# Patient Record
Sex: FEMALE | Race: WHITE | Employment: OTHER | URBAN - METROPOLITAN AREA
[De-identification: names, ages, dates, MRNs, and addresses within clinical notes are randomized per-mention and may not be internally consistent; named-entity substitution may affect disease eponyms.]

---

## 2017-01-05 ENCOUNTER — ANTI-COAG VISIT (OUTPATIENT)
Dept: INTERNAL MEDICINE CLINIC | Facility: CLINIC | Age: 77
End: 2017-01-05

## 2017-01-05 DIAGNOSIS — I48.20 CHRONIC ATRIAL FIBRILLATION (HCC): Primary | ICD-10-CM

## 2017-01-05 LAB — INR: 2.4 (ref 2–3)

## 2017-01-05 PROCEDURE — 85610 PROTHROMBIN TIME: CPT

## 2017-01-05 PROCEDURE — 36416 COLLJ CAPILLARY BLOOD SPEC: CPT

## 2017-02-02 ENCOUNTER — ANTI-COAG VISIT (OUTPATIENT)
Dept: INTERNAL MEDICINE CLINIC | Facility: CLINIC | Age: 77
End: 2017-02-02

## 2017-02-02 ENCOUNTER — TELEPHONE (OUTPATIENT)
Dept: CARDIOLOGY CLINIC | Facility: CLINIC | Age: 77
End: 2017-02-02

## 2017-02-02 DIAGNOSIS — I48.20 CHRONIC ATRIAL FIBRILLATION (HCC): Primary | ICD-10-CM

## 2017-02-02 LAB — INR: 3.1 (ref 2–3)

## 2017-02-02 PROCEDURE — 85610 PROTHROMBIN TIME: CPT | Performed by: INTERNAL MEDICINE

## 2017-02-02 PROCEDURE — 36416 COLLJ CAPILLARY BLOOD SPEC: CPT | Performed by: INTERNAL MEDICINE

## 2017-02-02 NOTE — TELEPHONE ENCOUNTER
Rcvd call from Pastor Burton pacemaker RN she informed me that Pacemaker transmission//Normal function, 99% ventricular pacing, native rythm AF, No VHR- reviewed with Sara and patient  Pt. needs OV with Dr. Adriana Davison in next few weeks/see at [facility]

## 2017-02-02 NOTE — TELEPHONE ENCOUNTER
Let them know that I am not in the office and that if she is having significant problems just go to the ER.

## 2017-02-02 NOTE — TELEPHONE ENCOUNTER
Pt triaged in office. Pt states that for the past week she has been experiencing SOB on exertion and at rest. States that she feels like there is a 'bubble' in her chest (points to her sternum) No c/o chest pain, numbness, tingling.  Pt is compliant with al

## 2017-02-02 NOTE — TELEPHONE ENCOUNTER
Encounter routed to Dr. Abraham Zapien office and patient contacted. Dr. Guzman Osullivan advice relayed to go to ER if symptoms get worse.  Patient states she has been getting palpitations periodically for several weeks, months now and especially noticeable when she lays

## 2017-02-02 NOTE — TELEPHONE ENCOUNTER
Called pt to see how she was doing \"I'm not feeling well\"  Pt states that she s/w pacemaker nurse as well and she recommended that pt sees Dr Rocío Hua pt several times that she needs to be evaluated in the ER for her symptoms.    Reminded pt that s

## 2017-02-02 NOTE — TELEPHONE ENCOUNTER
Pt was in office for coumadin appt. Pt c/o palpitations for the past week and slight SOB. Pt also c/o pain in middle of diaphragm. Pt has a pacemaker. [facility] Pastor Burton advised pt to go home & send her remote transmission.   Laura Alexis &

## 2017-02-02 NOTE — TELEPHONE ENCOUNTER
Per Hill Guzman, pt contacted and was scheduled to see Dr. Kian Kennedy on 02/06. Pt was again advised to go to ER based on symptoms (pt understood but also stated she prefers to see Dr. Kian Kennedy).

## 2017-02-06 ENCOUNTER — OFFICE VISIT (OUTPATIENT)
Dept: CARDIOLOGY CLINIC | Facility: CLINIC | Age: 77
End: 2017-02-06

## 2017-02-06 ENCOUNTER — NURSE ONLY (OUTPATIENT)
Dept: CARDIOLOGY CLINIC | Facility: CLINIC | Age: 77
End: 2017-02-06

## 2017-02-06 VITALS
SYSTOLIC BLOOD PRESSURE: 140 MMHG | HEART RATE: 72 BPM | BODY MASS INDEX: 36.21 KG/M2 | WEIGHT: 220 LBS | DIASTOLIC BLOOD PRESSURE: 80 MMHG | RESPIRATION RATE: 18 BRPM | HEIGHT: 65.5 IN

## 2017-02-06 DIAGNOSIS — I48.20 CHRONIC ATRIAL FIBRILLATION (HCC): Primary | ICD-10-CM

## 2017-02-06 PROCEDURE — 99214 OFFICE O/P EST MOD 30 MIN: CPT | Performed by: INTERNAL MEDICINE

## 2017-02-06 PROCEDURE — G0463 HOSPITAL OUTPT CLINIC VISIT: HCPCS | Performed by: INTERNAL MEDICINE

## 2017-02-06 RX ORDER — NYSTATIN 100000 U/G
CREAM TOPICAL
Qty: 30 G | Refills: 3 | Status: SHIPPED | OUTPATIENT
Start: 2017-02-06 | End: 2017-09-11

## 2017-02-06 RX ORDER — GARLIC EXTRACT 500 MG
1 CAPSULE ORAL DAILY
COMMUNITY
End: 2018-07-25

## 2017-02-06 RX ORDER — AMOXICILLIN 250 MG
CAPSULE ORAL
COMMUNITY
End: 2018-07-25

## 2017-02-06 NOTE — PROGRESS NOTES
Marcia Ellington is a 64year old female.     HPI:   Patient presents with:  Atrial Fibrillation  Hypertension  Hyperlipidemia  Palpitations  Dyspnea: dyspnea accompanied by mid epigastric pain/ swelling, on exertion  Pacemaker Reprogramming      Patient is h Medications    Current outpatient prescriptions:   •  GLIPIZIDE OR, Take by mouth., Disp: , Rfl:   •  Acidophilus/Pectin Oral Cap, Take 1 capsule by mouth daily. , Disp: , Rfl:   •  Multiple Vitamins-Minerals (BIOTIN PLUS/CALCIUM/VIT D3) Oral Tab, Take by m Cardiovascular: Negative for palpitations. Gastrointestinal: Positive for abdominal pain.          PHYSICAL EXAM:   /80 mmHg  Pulse 72  Resp 18  Ht 5' 5.5\" (1.664 m)  Wt 220 lb (99.791 kg)  BMI 36.04 kg/m2  Physical Exam   Constitutional: She is

## 2017-02-21 ENCOUNTER — OFFICE VISIT (OUTPATIENT)
Dept: PODIATRY CLINIC | Facility: CLINIC | Age: 77
End: 2017-02-21

## 2017-02-21 DIAGNOSIS — M79.674 PAIN OF TOE OF RIGHT FOOT: Primary | ICD-10-CM

## 2017-02-21 DIAGNOSIS — B35.1 ONYCHOMYCOSIS: ICD-10-CM

## 2017-02-21 DIAGNOSIS — E11.42 TYPE 2 DIABETES MELLITUS WITH DIABETIC POLYNEUROPATHY, WITHOUT LONG-TERM CURRENT USE OF INSULIN (HCC): ICD-10-CM

## 2017-02-21 PROCEDURE — 99203 OFFICE O/P NEW LOW 30 MIN: CPT | Performed by: PODIATRIST

## 2017-02-21 PROCEDURE — 11720 DEBRIDE NAIL 1-5: CPT | Performed by: PODIATRIST

## 2017-02-21 NOTE — PROGRESS NOTES
HPI:    Patient ID: Shruthi Edmond is a 64year old female. HPI  Presents as a new patient to me and states that she is self-referred.   She states that she is here for me to check her feet as a diabetic but is having pain associated with the right great OneTouch UltraSoft Lancets Does not apply Misc Test 2-3 times per day Disp:  Rfl:    Albuterol Sulfate (VENTOLIN) (2.5 MG/3ML) 0.083% Inhalation Nebu Soln  Disp:  Rfl: 0     Allergies:  Benzocaine                  Comment:Other reaction(s): BENZOCAINE  C types were placed in this encounter.        Meds This Visit:  No prescriptions requested or ordered in this encounter    Imaging & Referrals:  None       VV#[id number]

## 2017-02-27 ENCOUNTER — TELEPHONE (OUTPATIENT)
Dept: NEPHROLOGY | Facility: CLINIC | Age: 77
End: 2017-02-27

## 2017-02-27 NOTE — TELEPHONE ENCOUNTER
Form dropped off form Diabetic shoes.  Pt saw Dr Hieu Menjivar and his office will be sending a report of that appt

## 2017-02-28 NOTE — TELEPHONE ENCOUNTER
Form completed by Dr. Avelina Yoon, office notes and order faxed to [address] [place] [address].

## 2017-03-02 ENCOUNTER — ANTI-COAG VISIT (OUTPATIENT)
Dept: INTERNAL MEDICINE CLINIC | Facility: CLINIC | Age: 77
End: 2017-03-02

## 2017-03-02 DIAGNOSIS — I48.20 CHRONIC ATRIAL FIBRILLATION (HCC): Primary | ICD-10-CM

## 2017-03-02 LAB — INR: 3.4 (ref 2–3)

## 2017-03-02 PROCEDURE — 36416 COLLJ CAPILLARY BLOOD SPEC: CPT

## 2017-03-02 PROCEDURE — G0463 HOSPITAL OUTPT CLINIC VISIT: HCPCS

## 2017-03-02 PROCEDURE — 85610 PROTHROMBIN TIME: CPT

## 2017-03-15 ENCOUNTER — ANTI-COAG VISIT (OUTPATIENT)
Dept: INTERNAL MEDICINE CLINIC | Facility: CLINIC | Age: 77
End: 2017-03-15

## 2017-03-15 DIAGNOSIS — I48.20 CHRONIC ATRIAL FIBRILLATION (HCC): Primary | ICD-10-CM

## 2017-03-15 LAB — INR: 2.7 (ref 2–3)

## 2017-03-15 PROCEDURE — 36416 COLLJ CAPILLARY BLOOD SPEC: CPT

## 2017-03-15 PROCEDURE — 85610 PROTHROMBIN TIME: CPT

## 2017-03-17 ENCOUNTER — TELEPHONE (OUTPATIENT)
Dept: CARDIOLOGY CLINIC | Facility: CLINIC | Age: 77
End: 2017-03-17

## 2017-03-17 DIAGNOSIS — E78.00 PURE HYPERCHOLESTEROLEMIA: Primary | ICD-10-CM

## 2017-03-17 DIAGNOSIS — E11.9 DIABETES MELLITUS WITHOUT COMPLICATION (HCC): ICD-10-CM

## 2017-03-17 RX ORDER — GLIPIZIDE 10 MG/1
TABLET, FILM COATED, EXTENDED RELEASE ORAL
Qty: 90 TABLET | Refills: 0 | Status: SHIPPED | OUTPATIENT
Start: 2017-03-17 | End: 2017-06-14

## 2017-03-17 RX ORDER — GLIPIZIDE 10 MG/1
TABLET, FILM COATED, EXTENDED RELEASE ORAL
Qty: 30 TABLET | Refills: 0 | Status: SHIPPED | OUTPATIENT
Start: 2017-03-17 | End: 2017-03-17

## 2017-03-20 NOTE — TELEPHONE ENCOUNTER
Patient contacted. Advised to do lab work ordered and appointment booked for Monday 3/27/17 at 2:40PM. Instructed to fast 12 hours for lab work.

## 2017-03-23 ENCOUNTER — LAB ENCOUNTER (OUTPATIENT)
Dept: LAB | Age: 77
End: 2017-03-23
Attending: INTERNAL MEDICINE
Payer: MEDICARE

## 2017-03-23 DIAGNOSIS — E78.00 PURE HYPERCHOLESTEROLEMIA: ICD-10-CM

## 2017-03-23 DIAGNOSIS — E11.9 DIABETES MELLITUS WITHOUT COMPLICATION (HCC): ICD-10-CM

## 2017-03-23 LAB
ALBUMIN SERPL BCP-MCNC: 3.8 G/DL (ref 3.5–4.8)
ALBUMIN/GLOB SERPL: 1 {RATIO} (ref 1–2)
ALP SERPL-CCNC: 39 U/L (ref 32–100)
ALT SERPL-CCNC: 29 U/L (ref 14–54)
ANION GAP SERPL CALC-SCNC: 7 MMOL/L (ref 0–18)
AST SERPL-CCNC: 36 U/L (ref 15–41)
BASOPHILS # BLD: 0.1 K/UL (ref 0–0.2)
BASOPHILS NFR BLD: 1 %
BILIRUB SERPL-MCNC: 0.9 MG/DL (ref 0.3–1.2)
BILIRUB UR QL: NEGATIVE
BUN SERPL-MCNC: 15 MG/DL (ref 8–20)
BUN/CREAT SERPL: 18.3 (ref 10–20)
CALCIUM SERPL-MCNC: 9.8 MG/DL (ref 8.5–10.5)
CHLORIDE SERPL-SCNC: 101 MMOL/L (ref 95–110)
CHOLEST SERPL-MCNC: 178 MG/DL (ref 110–200)
CO2 SERPL-SCNC: 31 MMOL/L (ref 22–32)
COLOR UR: YELLOW
CREAT SERPL-MCNC: 0.82 MG/DL (ref 0.5–1.5)
EOSINOPHIL # BLD: 0.2 K/UL (ref 0–0.7)
EOSINOPHIL NFR BLD: 3 %
ERYTHROCYTE [DISTWIDTH] IN BLOOD BY AUTOMATED COUNT: 13.8 % (ref 11–15)
GLOBULIN PLAS-MCNC: 3.8 G/DL (ref 2.5–3.7)
GLUCOSE SERPL-MCNC: 97 MG/DL (ref 70–99)
GLUCOSE UR-MCNC: NEGATIVE MG/DL
HBA1C MFR BLD: 5.8 % (ref 4–6)
HCT VFR BLD AUTO: 38.7 % (ref 35–48)
HDLC SERPL-MCNC: 66 MG/DL
HGB BLD-MCNC: 12.7 G/DL (ref 12–16)
HYALINE CASTS #/AREA URNS AUTO: 1 /LPF
KETONES UR-MCNC: NEGATIVE MG/DL
LDLC SERPL CALC-MCNC: 95 MG/DL (ref 0–99)
LYMPHOCYTES # BLD: 1.4 K/UL (ref 1–4)
LYMPHOCYTES NFR BLD: 28 %
MCH RBC QN AUTO: 29.6 PG (ref 27–32)
MCHC RBC AUTO-ENTMCNC: 32.9 G/DL (ref 32–37)
MCV RBC AUTO: 89.9 FL (ref 80–100)
MONOCYTES # BLD: 0.6 K/UL (ref 0–1)
MONOCYTES NFR BLD: 12 %
NEUTROPHILS # BLD AUTO: 2.9 K/UL (ref 1.8–7.7)
NEUTROPHILS NFR BLD: 56 %
NITRITE UR QL STRIP.AUTO: NEGATIVE
NONHDLC SERPL-MCNC: 112 MG/DL
OSMOLALITY UR CALC.SUM OF ELEC: 289 MOSM/KG (ref 275–295)
PH UR: 7 [PH] (ref 5–8)
PLATELET # BLD AUTO: 199 K/UL (ref 140–400)
PMV BLD AUTO: 7.3 FL (ref 7.4–10.3)
POTASSIUM SERPL-SCNC: 4 MMOL/L (ref 3.3–5.1)
PROT SERPL-MCNC: 7.6 G/DL (ref 5.9–8.4)
PROT UR-MCNC: NEGATIVE MG/DL
RBC # BLD AUTO: 4.31 M/UL (ref 3.7–5.4)
RBC #/AREA URNS AUTO: 1 /HPF
SODIUM SERPL-SCNC: 139 MMOL/L (ref 136–144)
SP GR UR STRIP: 1.02 (ref 1–1.03)
TRIGL SERPL-MCNC: 87 MG/DL (ref 1–149)
UROBILINOGEN UR STRIP-ACNC: <2
VIT C UR-MCNC: NEGATIVE MG/DL
WBC # BLD AUTO: 5.2 K/UL (ref 4–11)
WBC #/AREA URNS AUTO: 5 /HPF

## 2017-03-23 PROCEDURE — 83036 HEMOGLOBIN GLYCOSYLATED A1C: CPT

## 2017-03-23 PROCEDURE — 80061 LIPID PANEL: CPT

## 2017-03-23 PROCEDURE — 36415 COLL VENOUS BLD VENIPUNCTURE: CPT

## 2017-03-23 PROCEDURE — 85025 COMPLETE CBC W/AUTO DIFF WBC: CPT

## 2017-03-23 PROCEDURE — 81001 URINALYSIS AUTO W/SCOPE: CPT

## 2017-03-23 PROCEDURE — 80053 COMPREHEN METABOLIC PANEL: CPT

## 2017-03-27 ENCOUNTER — OFFICE VISIT (OUTPATIENT)
Dept: NEPHROLOGY | Facility: CLINIC | Age: 77
End: 2017-03-27

## 2017-03-27 VITALS
DIASTOLIC BLOOD PRESSURE: 73 MMHG | HEIGHT: 64 IN | BODY MASS INDEX: 38.07 KG/M2 | HEART RATE: 65 BPM | WEIGHT: 223 LBS | SYSTOLIC BLOOD PRESSURE: 126 MMHG

## 2017-03-27 DIAGNOSIS — E11.9 TYPE 2 DIABETES MELLITUS WITHOUT COMPLICATION, WITHOUT LONG-TERM CURRENT USE OF INSULIN (HCC): Primary | ICD-10-CM

## 2017-03-27 DIAGNOSIS — M25.561 ARTHRALGIA OF RIGHT KNEE: ICD-10-CM

## 2017-03-27 DIAGNOSIS — I10 ESSENTIAL HYPERTENSION: ICD-10-CM

## 2017-03-27 PROCEDURE — G0463 HOSPITAL OUTPT CLINIC VISIT: HCPCS | Performed by: INTERNAL MEDICINE

## 2017-03-27 PROCEDURE — 99214 OFFICE O/P EST MOD 30 MIN: CPT | Performed by: INTERNAL MEDICINE

## 2017-03-27 RX ORDER — TRAMADOL HYDROCHLORIDE 50 MG/1
TABLET ORAL
Qty: 30 TABLET | Refills: 1 | Status: SHIPPED | OUTPATIENT
Start: 2017-03-27 | End: 2017-10-06

## 2017-03-29 ENCOUNTER — HOSPITAL ENCOUNTER (OUTPATIENT)
Dept: GENERAL RADIOLOGY | Facility: HOSPITAL | Age: 77
Discharge: HOME | End: 2017-03-29
Attending: INTERNAL MEDICINE
Payer: MEDICARE

## 2017-03-29 DIAGNOSIS — M25.561 ARTHRALGIA OF RIGHT KNEE: ICD-10-CM

## 2017-03-29 PROCEDURE — 73562 X-RAY EXAM OF KNEE 3: CPT

## 2017-03-31 NOTE — PROGRESS NOTES
HPI:    Patient ID: Cierra Melo is a 64year old female.     HPI Comments: 26-year-old female with a history of adult onset diabetes mellitus, hypertension, villous adenoma, DJD, diabetic peripheral neuropathy, status post cholecystectomy, status post TA TABLET THE OTHER 4 DAYS AS DIRECTED Disp: 90 tablet Rfl: 1   glycerin, laxative, 1.2 G Rectal Suppos Place 1 suppository rectally daily as needed.  Disp:  Rfl:    OneTouch UltraSoft Lancets Does not apply Misc Test 2-3 times per day Disp:  Rfl:    NYSTATIN from March 23, 2017. There are actually quite good and diabetes was under good control with an A1c of 5.8. Lipids were good. Therefore continue current medications. Is having increasing orthopedic issues.   We will x-ray her right knee and then advised

## 2017-04-01 ENCOUNTER — TELEPHONE (OUTPATIENT)
Dept: NEPHROLOGY | Facility: CLINIC | Age: 77
End: 2017-04-01

## 2017-04-03 NOTE — TELEPHONE ENCOUNTER
Spoke to pt's  and relayed MKK's message below re: XR results (per okay in confidential communications). He will let her know to see ortho.

## 2017-04-13 ENCOUNTER — ANTI-COAG VISIT (OUTPATIENT)
Dept: INTERNAL MEDICINE CLINIC | Facility: CLINIC | Age: 77
End: 2017-04-13

## 2017-04-13 DIAGNOSIS — I48.20 CHRONIC ATRIAL FIBRILLATION (HCC): Primary | ICD-10-CM

## 2017-04-13 PROCEDURE — 36416 COLLJ CAPILLARY BLOOD SPEC: CPT | Performed by: INTERNAL MEDICINE

## 2017-04-13 PROCEDURE — 85610 PROTHROMBIN TIME: CPT | Performed by: INTERNAL MEDICINE

## 2017-05-11 ENCOUNTER — ANTI-COAG VISIT (OUTPATIENT)
Dept: INTERNAL MEDICINE CLINIC | Facility: CLINIC | Age: 77
End: 2017-05-11

## 2017-05-11 DIAGNOSIS — I48.20 CHRONIC ATRIAL FIBRILLATION (HCC): Primary | ICD-10-CM

## 2017-05-11 PROCEDURE — 85610 PROTHROMBIN TIME: CPT

## 2017-05-11 PROCEDURE — 36416 COLLJ CAPILLARY BLOOD SPEC: CPT

## 2017-06-05 RX ORDER — WARFARIN SODIUM 5 MG/1
TABLET ORAL
Qty: 64 TABLET | Refills: 0 | Status: SHIPPED | OUTPATIENT
Start: 2017-06-05 | End: 2018-02-19

## 2017-06-05 RX ORDER — METFORMIN HYDROCHLORIDE 500 MG/1
TABLET, EXTENDED RELEASE ORAL
Qty: 180 TABLET | Refills: 0 | Status: SHIPPED | OUTPATIENT
Start: 2017-06-05 | End: 2017-09-11

## 2017-06-08 ENCOUNTER — ANTI-COAG VISIT (OUTPATIENT)
Dept: INTERNAL MEDICINE CLINIC | Facility: CLINIC | Age: 77
End: 2017-06-08

## 2017-06-08 DIAGNOSIS — I48.20 CHRONIC ATRIAL FIBRILLATION (HCC): Primary | ICD-10-CM

## 2017-06-08 PROCEDURE — 85610 PROTHROMBIN TIME: CPT

## 2017-06-08 PROCEDURE — 36416 COLLJ CAPILLARY BLOOD SPEC: CPT

## 2017-06-12 ENCOUNTER — OFFICE VISIT (OUTPATIENT)
Dept: ORTHOPEDICS CLINIC | Facility: CLINIC | Age: 77
End: 2017-06-12

## 2017-06-12 ENCOUNTER — HOSPITAL ENCOUNTER (OUTPATIENT)
Dept: GENERAL RADIOLOGY | Facility: HOSPITAL | Age: 77
Discharge: HOME | End: 2017-06-12
Attending: ORTHOPAEDIC SURGERY | Admitting: ORTHOPAEDIC SURGERY
Payer: MEDICARE

## 2017-06-12 VITALS — SYSTOLIC BLOOD PRESSURE: 140 MMHG | HEART RATE: 60 BPM | DIASTOLIC BLOOD PRESSURE: 90 MMHG | RESPIRATION RATE: 14 BRPM

## 2017-06-12 DIAGNOSIS — M25.562 LEFT KNEE PAIN, UNSPECIFIED CHRONICITY: Primary | ICD-10-CM

## 2017-06-12 DIAGNOSIS — M25.562 LEFT KNEE PAIN, UNSPECIFIED CHRONICITY: ICD-10-CM

## 2017-06-12 PROCEDURE — 73564 X-RAY EXAM KNEE 4 OR MORE: CPT | Performed by: ORTHOPAEDIC SURGERY

## 2017-06-12 PROCEDURE — G0463 HOSPITAL OUTPT CLINIC VISIT: HCPCS | Performed by: ORTHOPAEDIC SURGERY

## 2017-06-12 PROCEDURE — 99203 OFFICE O/P NEW LOW 30 MIN: CPT | Performed by: ORTHOPAEDIC SURGERY

## 2017-06-12 NOTE — PROGRESS NOTES
Per verbal order from VT, draw up 2 syringes, each with 3ml of Kenalog 10 and 3ml of 1% lidocaine for cortisone injection to bilateral knee.  Panda Cardenas RN

## 2017-06-12 NOTE — H&P
Chief Complaint: Bilateral knee pain    NURSING INTAKE COMMENTS: Patient presents with:  Knee Pain: Bilateral- pt states left knee pain started 4 yrs ago & right knee started 2 yrs ago.  pt had right knee XR. pt states right knee feels like it twists when s negatives noted in the the HPI. Physical Examination:  There is no weight on file to calculate BMI. This 64year old female is A&O in no acute distress.   KNEE EXAM: LEFT  RIGHT   Range of Motion 0-150 Degrees 0-150 Degrees   Effusion None None   Swell

## 2017-06-13 RX ORDER — HYDROCHLOROTHIAZIDE 25 MG/1
TABLET ORAL
Qty: 90 TABLET | Refills: 0 | Status: SHIPPED | OUTPATIENT
Start: 2017-06-13 | End: 2017-09-11

## 2017-06-13 RX ORDER — CLONIDINE HYDROCHLORIDE 0.2 MG/1
TABLET ORAL
Qty: 180 TABLET | Refills: 0 | Status: SHIPPED | OUTPATIENT
Start: 2017-06-13 | End: 2017-09-11

## 2017-06-13 RX ORDER — VALSARTAN 320 MG/1
TABLET ORAL
Qty: 90 TABLET | Refills: 0 | Status: SHIPPED | OUTPATIENT
Start: 2017-06-13 | End: 2017-09-11

## 2017-06-14 RX ORDER — GLIPIZIDE 10 MG/1
TABLET, FILM COATED, EXTENDED RELEASE ORAL
Qty: 90 TABLET | Refills: 0 | Status: SHIPPED | OUTPATIENT
Start: 2017-06-14 | End: 2017-09-11

## 2017-06-16 ENCOUNTER — MYAURORA ACCOUNT LINK (OUTPATIENT)
Dept: OTHER | Age: 77
End: 2017-06-16

## 2017-06-20 NOTE — TELEPHONE ENCOUNTER
Current Outpatient Prescriptions:  ONETOUCH ULTRA BLUE In Vitro Strip TEST EVERY DAY Disp: 100 strip Rfl: 1     Refill

## 2017-06-21 ENCOUNTER — TELEPHONE (OUTPATIENT)
Dept: NEPHROLOGY | Facility: CLINIC | Age: 77
End: 2017-06-21

## 2017-06-21 RX ORDER — LANCING DEVICE/LANCETS
KIT MISCELLANEOUS
Qty: 1 KIT | Refills: 0 | Status: SHIPPED | OUTPATIENT
Start: 2017-06-21 | End: 2017-09-19

## 2017-06-21 RX ORDER — LANCETS 30 GAUGE
EACH MISCELLANEOUS
Qty: 100 EACH | Refills: 5 | Status: SHIPPED | OUTPATIENT
Start: 2017-06-21

## 2017-06-21 NOTE — TELEPHONE ENCOUNTER
Pts  states that pharmacy told pt that One Touch is not covered by Medicare any longer. Pt will need RX for True Metrix or Accu chek would be covered. Pt will also need new meter. Please call.         Current outpatient prescriptions:   •  Glucose

## 2017-06-21 NOTE — TELEPHONE ENCOUNTER
Current Outpatient Prescriptions:  OneTouch UltraSoft Lancets Does not apply Misc Test 2-3 times per day Disp:  Rfl:      Per pharmacy pt needs new RX for Accu Check softclix lancets

## 2017-06-22 ENCOUNTER — TELEPHONE (OUTPATIENT)
Dept: CARDIOLOGY CLINIC | Facility: CLINIC | Age: 77
End: 2017-06-22

## 2017-06-22 NOTE — TELEPHONE ENCOUNTER
S/w pt- states once every 2 to 3 week, her SBP goes up to 180's in the middle of night. And it already happened about 5 times. She denies any symptoms like CP, SOB, headache when BP goes, but it makes her anxious and unable to fall back to sleep.  She is cu

## 2017-07-06 ENCOUNTER — ANTI-COAG VISIT (OUTPATIENT)
Dept: INTERNAL MEDICINE CLINIC | Facility: CLINIC | Age: 77
End: 2017-07-06

## 2017-07-06 DIAGNOSIS — I48.20 CHRONIC ATRIAL FIBRILLATION (HCC): ICD-10-CM

## 2017-07-06 LAB — INR: 3.5 (ref 2–3)

## 2017-07-06 PROCEDURE — G0463 HOSPITAL OUTPT CLINIC VISIT: HCPCS

## 2017-07-06 PROCEDURE — 85610 PROTHROMBIN TIME: CPT

## 2017-07-06 PROCEDURE — 36416 COLLJ CAPILLARY BLOOD SPEC: CPT

## 2017-08-03 ENCOUNTER — ANTI-COAG VISIT (OUTPATIENT)
Dept: INTERNAL MEDICINE CLINIC | Facility: CLINIC | Age: 77
End: 2017-08-03

## 2017-08-03 DIAGNOSIS — I48.20 CHRONIC ATRIAL FIBRILLATION (HCC): ICD-10-CM

## 2017-08-03 LAB — INR: 2 (ref 2–3)

## 2017-08-03 PROCEDURE — 85610 PROTHROMBIN TIME: CPT

## 2017-08-03 PROCEDURE — 36416 COLLJ CAPILLARY BLOOD SPEC: CPT

## 2017-08-16 ENCOUNTER — OFFICE VISIT (OUTPATIENT)
Dept: PODIATRY CLINIC | Facility: CLINIC | Age: 77
End: 2017-08-16

## 2017-08-16 DIAGNOSIS — B35.1 ONYCHOMYCOSIS: ICD-10-CM

## 2017-08-16 DIAGNOSIS — M79.674 PAIN OF TOE OF RIGHT FOOT: ICD-10-CM

## 2017-08-16 DIAGNOSIS — E11.42 TYPE 2 DIABETES MELLITUS WITH PERIPHERAL NEUROPATHY (HCC): Primary | ICD-10-CM

## 2017-08-16 PROCEDURE — 99212 OFFICE O/P EST SF 10 MIN: CPT | Performed by: PODIATRIST

## 2017-08-16 PROCEDURE — 11721 DEBRIDE NAIL 6 OR MORE: CPT | Performed by: PODIATRIST

## 2017-08-16 NOTE — PROGRESS NOTES
HPI:    Patient ID: Arminda Piper is a 64year old female. HPI  This 64-year-old diabetic presents for evaluation and care associated with her painful toenails.   She also would like to discuss diabetic shoes because the present shoe gear is not adequat Disp: 30 g Rfl: 3   Acidophilus/Pectin Oral Cap Take 1 capsule by mouth daily. Disp:  Rfl:    Multiple Vitamins-Minerals (BIOTIN PLUS/CALCIUM/VIT D3) Oral Tab Take by mouth. Disp:  Rfl:    FOLIC ACID OR Take by mouth.  Disp:  Rfl:    WARFARIN SODIUM 5 MG Or of the defined types were placed in this encounter.       Meds This Visit:  No prescriptions requested or ordered in this encounter    Imaging & Referrals:  DME - EXTERNAL        #[phone]

## 2017-08-21 ENCOUNTER — OFFICE VISIT (OUTPATIENT)
Dept: OTOLARYNGOLOGY | Facility: CLINIC | Age: 77
End: 2017-08-21

## 2017-08-21 VITALS
TEMPERATURE: 97 F | BODY MASS INDEX: 36.93 KG/M2 | SYSTOLIC BLOOD PRESSURE: 148 MMHG | WEIGHT: 219 LBS | HEIGHT: 64.5 IN | DIASTOLIC BLOOD PRESSURE: 75 MMHG

## 2017-08-21 DIAGNOSIS — J32.9 CHRONIC SINUSITIS, UNSPECIFIED LOCATION: ICD-10-CM

## 2017-08-21 DIAGNOSIS — R42 VERTIGO: Primary | ICD-10-CM

## 2017-08-21 PROCEDURE — 99203 OFFICE O/P NEW LOW 30 MIN: CPT | Performed by: OTOLARYNGOLOGY

## 2017-08-21 PROCEDURE — G0463 HOSPITAL OUTPT CLINIC VISIT: HCPCS | Performed by: OTOLARYNGOLOGY

## 2017-08-21 RX ORDER — FLUTICASONE PROPIONATE 50 MCG
2 SPRAY, SUSPENSION (ML) NASAL DAILY
Qty: 1 BOTTLE | Refills: 3 | Status: SHIPPED | OUTPATIENT
Start: 2017-08-21 | End: 2017-08-21

## 2017-08-21 NOTE — PROGRESS NOTES
Prateek Harris is a 64year old female. Patient presents with:  Dizziness: off/on forever, water in left ear,     HPI:   She feels that her ears are blocked and that she has water in her ears.  She's been experiencing dizziness for many years which she desc DRY SKIN Disp: 30 g Rfl: 3   Acidophilus/Pectin Oral Cap Take 1 capsule by mouth daily. Disp:  Rfl:    Multiple Vitamins-Minerals (BIOTIN PLUS/CALCIUM/VIT D3) Oral Tab Take by mouth. Disp:  Rfl:    FOLIC ACID OR Take by mouth.  Disp:  Rfl:    WARFARIN SODIU - Normal.   Oral/Oropharynx Normal Lips - Normal, Tonsils - Normal, Tongue - Normal    Nasal Normal External nose - Normal. Nasal septum - nasal septal deviation with congestion   Neurological Normal Memory - Normal. Cranial nerves - Cranial nerves II thro

## 2017-08-24 ENCOUNTER — TELEPHONE (OUTPATIENT)
Dept: NEPHROLOGY | Facility: CLINIC | Age: 77
End: 2017-08-24

## 2017-08-24 RX ORDER — WARFARIN SODIUM 5 MG/1
TABLET ORAL
Qty: 90 TABLET | Refills: 0 | Status: SHIPPED | OUTPATIENT
Start: 2017-08-24 | End: 2017-12-10

## 2017-08-24 RX ORDER — FLUTICASONE PROPIONATE 50 MCG
SPRAY, SUSPENSION (ML) NASAL
Qty: 3 BOTTLE | Refills: 1 | Status: SHIPPED | OUTPATIENT
Start: 2017-08-24 | End: 2018-07-25

## 2017-08-24 NOTE — TELEPHONE ENCOUNTER
Current Outpatient Prescriptions:                                                                                                  WARFARIN SODIUM 5 MG Oral Tab TAKE 1 TABLET BY MOUTH 3 DAYS OUT OF THE WEEK AND 1/2 TABLET THE OTHER 4 DAYS AS DIRECTED Dis

## 2017-08-24 NOTE — TELEPHONE ENCOUNTER
Last Coumadin Clinic appointment was on 8/3/17. Prescription refilled per written protocol for 1 time #90 as patient requested.

## 2017-08-31 ENCOUNTER — ANTI-COAG VISIT (OUTPATIENT)
Dept: INTERNAL MEDICINE CLINIC | Facility: CLINIC | Age: 77
End: 2017-08-31

## 2017-08-31 DIAGNOSIS — I48.20 CHRONIC ATRIAL FIBRILLATION (HCC): ICD-10-CM

## 2017-08-31 LAB — INR: 2.4 (ref 2–3)

## 2017-08-31 PROCEDURE — 85610 PROTHROMBIN TIME: CPT

## 2017-08-31 PROCEDURE — 36416 COLLJ CAPILLARY BLOOD SPEC: CPT

## 2017-09-11 DIAGNOSIS — E11.9 DIABETES MELLITUS WITHOUT COMPLICATION (HCC): Primary | ICD-10-CM

## 2017-09-11 DIAGNOSIS — E78.00 PURE HYPERCHOLESTEROLEMIA: ICD-10-CM

## 2017-09-11 RX ORDER — GLIPIZIDE 10 MG/1
TABLET, FILM COATED, EXTENDED RELEASE ORAL
Qty: 90 TABLET | Refills: 0 | Status: SHIPPED | OUTPATIENT
Start: 2017-09-11 | End: 2017-12-06

## 2017-09-11 RX ORDER — HYDROCHLOROTHIAZIDE 25 MG/1
TABLET ORAL
Qty: 90 TABLET | Refills: 0 | Status: SHIPPED | OUTPATIENT
Start: 2017-09-11 | End: 2017-12-06

## 2017-09-11 RX ORDER — VALSARTAN 320 MG/1
TABLET ORAL
Qty: 90 TABLET | Refills: 0 | Status: SHIPPED | OUTPATIENT
Start: 2017-09-11 | End: 2017-12-06

## 2017-09-11 RX ORDER — METFORMIN HYDROCHLORIDE 500 MG/1
TABLET, EXTENDED RELEASE ORAL
Qty: 180 TABLET | Refills: 0 | Status: SHIPPED | OUTPATIENT
Start: 2017-09-11 | End: 2017-12-06

## 2017-09-11 RX ORDER — NYSTATIN 100000 U/G
CREAM TOPICAL
Qty: 30 G | Refills: 0 | Status: SHIPPED | OUTPATIENT
Start: 2017-09-11 | End: 2017-11-09

## 2017-09-11 RX ORDER — CLONIDINE HYDROCHLORIDE 0.2 MG/1
TABLET ORAL
Qty: 180 TABLET | Refills: 0 | Status: SHIPPED | OUTPATIENT
Start: 2017-09-11 | End: 2017-10-17

## 2017-09-11 NOTE — TELEPHONE ENCOUNTER
Pharmacy is requesting a refill for Rx METFORMIN HCL  MG Oral Tablet 24 Hr.  Thank You       Current Outpatient Prescriptions:     •  METFORMIN HCL  MG Oral Tablet 24 Hr, TAKE 2 TABLETS BY MOUTH BEFORE DINNER, Disp: 180 tablet, Rfl: 0

## 2017-09-12 NOTE — TELEPHONE ENCOUNTER
Spoke to patient's  Trung Simmons and informed him to tell patient that Dr. Dominique Blum would like patient to do lab work and schedule an office visit. Orders entered in system. Instructed to fast 12 hours for lab work.

## 2017-09-19 RX ORDER — LANCETS
EACH MISCELLANEOUS
Qty: 100 EACH | Refills: 3 | Status: SHIPPED | OUTPATIENT
Start: 2017-09-19 | End: 2018-09-27

## 2017-09-21 ENCOUNTER — APPOINTMENT (OUTPATIENT)
Dept: LAB | Age: 77
End: 2017-09-21
Attending: INTERNAL MEDICINE
Payer: MEDICARE

## 2017-09-21 DIAGNOSIS — E78.00 PURE HYPERCHOLESTEROLEMIA: ICD-10-CM

## 2017-09-21 DIAGNOSIS — E11.9 DIABETES MELLITUS WITHOUT COMPLICATION (HCC): ICD-10-CM

## 2017-09-21 LAB
ALBUMIN SERPL BCP-MCNC: 3.4 G/DL (ref 3.5–4.8)
ALBUMIN/GLOB SERPL: 1 {RATIO} (ref 1–2)
ALP SERPL-CCNC: 37 U/L (ref 32–100)
ALT SERPL-CCNC: 28 U/L (ref 14–54)
ANION GAP SERPL CALC-SCNC: 8 MMOL/L (ref 0–18)
AST SERPL-CCNC: 35 U/L (ref 15–41)
BILIRUB SERPL-MCNC: 0.9 MG/DL (ref 0.3–1.2)
BUN SERPL-MCNC: 12 MG/DL (ref 8–20)
BUN/CREAT SERPL: 14.8 (ref 10–20)
CALCIUM SERPL-MCNC: 9.2 MG/DL (ref 8.5–10.5)
CHLORIDE SERPL-SCNC: 97 MMOL/L (ref 95–110)
CHOLEST SERPL-MCNC: 167 MG/DL (ref 110–200)
CO2 SERPL-SCNC: 29 MMOL/L (ref 22–32)
CREAT SERPL-MCNC: 0.81 MG/DL (ref 0.5–1.5)
GLOBULIN PLAS-MCNC: 3.4 G/DL (ref 2.5–3.7)
GLUCOSE SERPL-MCNC: 106 MG/DL (ref 70–99)
HBA1C MFR BLD: 5.5 % (ref 4–6)
HDLC SERPL-MCNC: 71 MG/DL
LDLC SERPL CALC-MCNC: 80 MG/DL (ref 0–99)
NONHDLC SERPL-MCNC: 96 MG/DL
OSMOLALITY UR CALC.SUM OF ELEC: 278 MOSM/KG (ref 275–295)
POTASSIUM SERPL-SCNC: 3.8 MMOL/L (ref 3.3–5.1)
PROT SERPL-MCNC: 6.8 G/DL (ref 5.9–8.4)
SODIUM SERPL-SCNC: 134 MMOL/L (ref 136–144)
TRIGL SERPL-MCNC: 81 MG/DL (ref 1–149)

## 2017-09-21 PROCEDURE — 80053 COMPREHEN METABOLIC PANEL: CPT

## 2017-09-21 PROCEDURE — 83036 HEMOGLOBIN GLYCOSYLATED A1C: CPT

## 2017-09-21 PROCEDURE — 36415 COLL VENOUS BLD VENIPUNCTURE: CPT

## 2017-09-21 PROCEDURE — 80061 LIPID PANEL: CPT

## 2017-09-27 ENCOUNTER — MYAURORA ACCOUNT LINK (OUTPATIENT)
Dept: OTHER | Age: 77
End: 2017-09-27

## 2017-09-28 ENCOUNTER — ANTI-COAG VISIT (OUTPATIENT)
Dept: INTERNAL MEDICINE CLINIC | Facility: CLINIC | Age: 77
End: 2017-09-28

## 2017-09-28 DIAGNOSIS — I48.20 CHRONIC ATRIAL FIBRILLATION (HCC): ICD-10-CM

## 2017-09-28 LAB — INR: 2.3 (ref 2–3)

## 2017-09-28 PROCEDURE — 36416 COLLJ CAPILLARY BLOOD SPEC: CPT

## 2017-09-28 PROCEDURE — 85610 PROTHROMBIN TIME: CPT

## 2017-10-06 ENCOUNTER — OFFICE VISIT (OUTPATIENT)
Dept: NEPHROLOGY | Facility: CLINIC | Age: 77
End: 2017-10-06

## 2017-10-06 VITALS
SYSTOLIC BLOOD PRESSURE: 130 MMHG | BODY MASS INDEX: 40.64 KG/M2 | HEIGHT: 63 IN | DIASTOLIC BLOOD PRESSURE: 79 MMHG | WEIGHT: 229.38 LBS | HEART RATE: 64 BPM

## 2017-10-06 DIAGNOSIS — M54.6 CHRONIC MIDLINE THORACIC BACK PAIN: Primary | ICD-10-CM

## 2017-10-06 DIAGNOSIS — G89.29 CHRONIC MIDLINE THORACIC BACK PAIN: Primary | ICD-10-CM

## 2017-10-06 PROCEDURE — G0463 HOSPITAL OUTPT CLINIC VISIT: HCPCS | Performed by: INTERNAL MEDICINE

## 2017-10-06 PROCEDURE — 99215 OFFICE O/P EST HI 40 MIN: CPT | Performed by: INTERNAL MEDICINE

## 2017-10-06 RX ORDER — TRAMADOL HYDROCHLORIDE 50 MG/1
TABLET ORAL
Qty: 30 TABLET | Refills: 1 | Status: SHIPPED | OUTPATIENT
Start: 2017-10-06 | End: 2018-03-09

## 2017-10-06 RX ORDER — ALBUTEROL SULFATE 2.5 MG/3ML
2.5 SOLUTION RESPIRATORY (INHALATION) EVERY 6 HOURS PRN
Qty: 25 VIAL | Refills: 3 | Status: ON HOLD | OUTPATIENT
Start: 2017-10-06 | End: 2019-01-01

## 2017-10-06 NOTE — PATIENT INSTRUCTIONS
Call me with your blood pressure readings. You should see gastroenterology for follow-up. If your heel pain does not improve with your new shoes you should follow-up with podiatry.

## 2017-10-06 NOTE — PROGRESS NOTES
[facility], [facility]  Nephrology Daily Progress Note    Char Foster  [id number]  64year old  Patient presents with:  Hypertension: follow up      HPI:   Char Foster is a 64year old female.   58-year-old female with a history of adult onset diabetes lyndsey bruising  Integumentary:  Negative for pruritus and rash  Musculoskeletal:  Negative for bone/joint symptoms  Neurological:  Negative for gait disturbance  Psychiatric:  Negative for inappropriate interaction and psychiatric symptoms  Respiratory:  Negativ Inhalation Nebu Soln, Take 3 mL (2.5 mg total) by nebulization every 6 (six) hours as needed for Wheezing., Disp: 25 vial, Rfl: 3  •  NYSTATIN [id number] UNIT/GM External Cream, APPLY EXTERNALLY TO THE AFFECTED AREA TWICE DAILY AS NEEDED FOR DRY SKIN, Disp: 30 BY MOUTH TWICE DAILY, Disp: 180 tablet, Rfl: 0  •  Lancets Does not apply Misc, For ACCU-Check softclix device Dx E11.9 Type 2 non insulin dependant diabetes, Disp: 100 each, Rfl: 5  •  FOLIC ACID OR, Take by mouth., Disp: , Rfl:     Allergies:    Benzocai

## 2017-10-09 ENCOUNTER — TELEPHONE (OUTPATIENT)
Dept: NEPHROLOGY | Facility: CLINIC | Age: 77
End: 2017-10-09

## 2017-10-09 NOTE — TELEPHONE ENCOUNTER
Current Outpatient Prescriptions:  albuterol sulfate (2.5 MG/3ML) 0.083% Inhalation Nebu Soln Take 3 mL (2.5 mg total) by nebulization every 6 (six) hours as needed for Wheezing.  Disp: 25 vial Rfl: 3     PA request pls call [phone] Pt ID# [id number]

## 2017-10-09 NOTE — TELEPHONE ENCOUNTER
Spoke to representative. She states PA is already on file for albuterol so she looked into why claim isn't going through and she said it is because they need to have the purchase/rental date of her nebulizer machine on file.  We can either provide this info

## 2017-10-10 ENCOUNTER — TELEPHONE (OUTPATIENT)
Dept: NEPHROLOGY | Facility: CLINIC | Age: 77
End: 2017-10-10

## 2017-10-10 NOTE — TELEPHONE ENCOUNTER
Grady/pharm calling for pt requesting diagnosis for the use of jorge henry for medicare. Pls fax:[phone] or call at:[phone],x.     Current Outpatient Prescriptions:   •  albuterol sulfate (2.5 MG/3ML) 0.083% Inhalation Nebu Soln, Take 3 mL (2.

## 2017-10-17 ENCOUNTER — TELEPHONE (OUTPATIENT)
Dept: NEPHROLOGY | Facility: CLINIC | Age: 77
End: 2017-10-17

## 2017-10-17 RX ORDER — CLONIDINE HYDROCHLORIDE 0.2 MG/1
0.1 TABLET ORAL 2 TIMES DAILY
Qty: 90 TABLET | Refills: 0 | COMMUNITY
Start: 2017-10-17 | End: 2018-07-02

## 2017-10-17 NOTE — TELEPHONE ENCOUNTER
MKK received pt's fax with BPs and sugars. Per MKK, \"blood sugars good. CPM. BPs too high. She had stopped clonidine but resume at lower dose 0.1 mg BID #60 5 refills. Call in 1 week. \"    Spoke to pt and notified her MKK's instructions.  She has 0.2 mg ta

## 2017-10-19 ENCOUNTER — OFFICE VISIT (OUTPATIENT)
Dept: PHYSICAL THERAPY | Facility: HOSPITAL | Age: 77
End: 2017-10-19
Attending: OTOLARYNGOLOGY
Payer: MEDICARE

## 2017-10-19 DIAGNOSIS — R42 VERTIGO: ICD-10-CM

## 2017-10-19 PROCEDURE — 97161 PT EVAL LOW COMPLEX 20 MIN: CPT

## 2017-10-19 NOTE — PROGRESS NOTES
PHYSICAL THERAPY EVALUATION:   Referring Physician: Dr. Otis Fontanez V  Diagnosis: Dizziness      Date of Onset: Per HPI Date of Service: 10/19/2017     PATIENT SUMMARY   Akbar Baptiste is a 76year old y/o female who presents to therapy today with reports of back surgery (thoracic spine), A-fib        ASSESSMENT:      Meño Brandi presents today with complaints of vertigo brought on by position changes. Oculomotor exam normal. Pt's subjective symptoms consistent with BPPV but testing negative today.  Testing limited with position changes. Frequency / Duration: Patient will be seen for 1-2 x/week or a total of 8 visits over a 90 day period.   Treatment will include: Home exercise program development and instruction, Patient/family education, Balance training, Kierralisandro Yip

## 2017-10-23 ENCOUNTER — OFFICE VISIT (OUTPATIENT)
Dept: PHYSICAL THERAPY | Facility: HOSPITAL | Age: 77
End: 2017-10-23
Attending: OTOLARYNGOLOGY
Payer: MEDICARE

## 2017-10-23 PROCEDURE — 97112 NEUROMUSCULAR REEDUCATION: CPT

## 2017-10-23 NOTE — PROGRESS NOTES
Diagnosis: Dizziness   Visit (# authorized): 2     Referring MD(next visit): Delano  Precautions:   Pacemaker  Medication Changes since last visit?: No    Subjective: Pt reports experiencing neck and back pain after last session.  The night after the initia experience symptoms in next few weeks she will be discharged. Pt was agreeable to POC and expressed understanding. Plan: Monitor symptoms, follow-up if needed.      Charges: 3 NMR       Total Timed Treatment: 38 min  Total Treatment Time: 40 min

## 2017-10-23 NOTE — PATIENT INSTRUCTIONS
Do these exercises everyday. 1. Stand tall and place feet next to each other so that they are touching. Cross arms, keep your eyes open. Balance for 30 seconds, paying attention to the feeling of your feet on the floor. Do near your bed for safety.

## 2017-10-26 ENCOUNTER — ANTI-COAG VISIT (OUTPATIENT)
Dept: INTERNAL MEDICINE CLINIC | Facility: CLINIC | Age: 77
End: 2017-10-26

## 2017-10-26 DIAGNOSIS — I48.20 CHRONIC ATRIAL FIBRILLATION (HCC): ICD-10-CM

## 2017-10-26 PROCEDURE — 36416 COLLJ CAPILLARY BLOOD SPEC: CPT

## 2017-10-26 PROCEDURE — 85610 PROTHROMBIN TIME: CPT

## 2017-10-31 ENCOUNTER — APPOINTMENT (OUTPATIENT)
Dept: PHYSICAL THERAPY | Facility: HOSPITAL | Age: 77
End: 2017-10-31
Attending: OTOLARYNGOLOGY
Payer: MEDICARE

## 2017-11-07 ENCOUNTER — APPOINTMENT (OUTPATIENT)
Dept: PHYSICAL THERAPY | Facility: HOSPITAL | Age: 77
End: 2017-11-07
Attending: OTOLARYNGOLOGY
Payer: MEDICARE

## 2017-11-07 ENCOUNTER — OFFICE VISIT (OUTPATIENT)
Dept: PODIATRY CLINIC | Facility: CLINIC | Age: 77
End: 2017-11-07

## 2017-11-07 DIAGNOSIS — E11.42 TYPE 2 DIABETES MELLITUS WITH PERIPHERAL NEUROPATHY (HCC): Primary | ICD-10-CM

## 2017-11-07 DIAGNOSIS — B35.1 ONYCHOMYCOSIS: ICD-10-CM

## 2017-11-07 PROCEDURE — 11721 DEBRIDE NAIL 6 OR MORE: CPT | Performed by: PODIATRIST

## 2017-11-07 NOTE — PROGRESS NOTES
HPI:    Patient ID: Stephanie Kaba is a 76year old female. HPI  This 78-year-old diabetic presents for evaluation and care. She saw Sourav Mccoy her 6/2017. Her most recent A1c was 5.5 and her fasting blood sugar today was 78.   Review of Systems  I did Lancets Does not apply Misc For ACCU-Check softclix device Dx E11.9 Type 2 non insulin dependant diabetes Disp: 100 each Rfl: 5   WARFARIN SODIUM 5 MG Oral Tab TAKE 1 TABLET BY MOUTH 3 DAYS OUT OF THE WEEK AND 1/2 TABLET THE OTHER 4 DAYS AS DIRECTED Disp in reference to her symptom. She was given the name of Dr. Cristy Valdez for consideration.   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

## 2017-11-09 ENCOUNTER — APPOINTMENT (OUTPATIENT)
Dept: PHYSICAL THERAPY | Facility: HOSPITAL | Age: 77
End: 2017-11-09
Attending: OTOLARYNGOLOGY
Payer: MEDICARE

## 2017-11-10 RX ORDER — NYSTATIN 100000 U/G
CREAM TOPICAL
Qty: 30 G | Refills: 0 | Status: SHIPPED | OUTPATIENT
Start: 2017-11-10 | End: 2018-02-19

## 2017-11-13 ENCOUNTER — APPOINTMENT (OUTPATIENT)
Dept: PHYSICAL THERAPY | Facility: HOSPITAL | Age: 77
End: 2017-11-13
Attending: OTOLARYNGOLOGY
Payer: MEDICARE

## 2017-11-15 ENCOUNTER — TELEPHONE (OUTPATIENT)
Dept: INTERNAL MEDICINE CLINIC | Facility: CLINIC | Age: 77
End: 2017-11-15

## 2017-11-15 ENCOUNTER — APPOINTMENT (OUTPATIENT)
Dept: PHYSICAL THERAPY | Facility: HOSPITAL | Age: 77
End: 2017-11-15
Attending: OTOLARYNGOLOGY
Payer: MEDICARE

## 2017-11-15 DIAGNOSIS — I48.20 CHRONIC ATRIAL FIBRILLATION (HCC): Primary | ICD-10-CM

## 2017-11-16 ENCOUNTER — NURSE ONLY (OUTPATIENT)
Dept: CARDIOLOGY CLINIC | Facility: CLINIC | Age: 77
End: 2017-11-16

## 2017-11-16 PROCEDURE — 93279 PRGRMG DEV EVAL PM/LDLS PM: CPT | Performed by: INTERNAL MEDICINE

## 2017-11-16 NOTE — PROGRESS NOTES
[employer] pacemaker implanted by Dr. Austin Gibson in 2015  No [address] [facility]. Native EKG: AF (on Warfarin)  Ventricular pacing 98%. Satisfactory pacemaker function with stable thresholds and impedences. Battery status good 11.7 yrs. RTC in 1 year, q 3 month remotes.

## 2017-11-20 ENCOUNTER — ANTI-COAG VISIT (OUTPATIENT)
Dept: INTERNAL MEDICINE CLINIC | Facility: CLINIC | Age: 77
End: 2017-11-20

## 2017-11-20 ENCOUNTER — APPOINTMENT (OUTPATIENT)
Dept: PHYSICAL THERAPY | Facility: HOSPITAL | Age: 77
End: 2017-11-20
Attending: OTOLARYNGOLOGY
Payer: MEDICARE

## 2017-11-20 DIAGNOSIS — I48.20 CHRONIC ATRIAL FIBRILLATION (HCC): ICD-10-CM

## 2017-11-20 PROCEDURE — 85610 PROTHROMBIN TIME: CPT

## 2017-11-20 PROCEDURE — 36416 COLLJ CAPILLARY BLOOD SPEC: CPT

## 2017-11-27 NOTE — PROGRESS NOTES
Patient Name: Paulette Lundy, : 10/14/1940, MRN: [id number]   Date:  2017  Referring Physician:  Ric Gonzalez V    Diagnosis: Dizziness    Discharge Summary    Pt has attended 2 visits in Physical Therapy.      Progress Note Start Date: 10/19/201

## 2017-12-01 ENCOUNTER — OFFICE VISIT (OUTPATIENT)
Dept: PHYSICAL THERAPY | Age: 77
End: 2017-12-01
Attending: INTERNAL MEDICINE
Payer: MEDICARE

## 2017-12-01 DIAGNOSIS — M54.6 CHRONIC MIDLINE THORACIC BACK PAIN: ICD-10-CM

## 2017-12-01 DIAGNOSIS — G89.29 CHRONIC MIDLINE THORACIC BACK PAIN: ICD-10-CM

## 2017-12-01 PROCEDURE — 97162 PT EVAL MOD COMPLEX 30 MIN: CPT | Performed by: PHYSICAL THERAPIST

## 2017-12-01 NOTE — PROGRESS NOTES
LUMBAR SPINE EVALUATION:   Referring Physician: Dr. Nabeel Briceno  Diagnosis: Chronic midline thoracic back pain (M54.6,G89.29)    Date of Service: 12/1/2017   Date of Onset: 10/6/17    PATIENT SUMMARY   Mauricio Macias is a 76year old y/o female who presents Pain (+/-)   Flexion Minimal loss P, NW (R) low back   Extension Major loss P, NW mid back stretch/pressure   R Sideglide Minimal loss NE stiff   L Sideglide Minimal loss NE stiff     Today’s Treatment and Response:  Patient instructed and educated on lumb you for your referral. Please co-sign or sign and return this letter via fax as soon as possible to [phone].  If you have any questions, please contact me at Dept: [phone]    Sincerely,  Electronically signed by therapist: Chris Ashford PT Cert

## 2017-12-04 ENCOUNTER — TELEPHONE (OUTPATIENT)
Dept: GASTROENTEROLOGY | Facility: CLINIC | Age: 77
End: 2017-12-04

## 2017-12-04 DIAGNOSIS — R19.4 CHANGE IN BOWEL HABITS: ICD-10-CM

## 2017-12-04 DIAGNOSIS — K62.89 ANAL OR RECTAL PAIN: ICD-10-CM

## 2017-12-04 DIAGNOSIS — R10.84 ABDOMINAL PAIN, GENERALIZED: Primary | ICD-10-CM

## 2017-12-04 DIAGNOSIS — K59.00 CONSTIPATION, UNSPECIFIED CONSTIPATION TYPE: ICD-10-CM

## 2017-12-04 NOTE — TELEPHONE ENCOUNTER
Dr Eric Mccain    I spoke to the pt's     He states the pt is experiencing abdominal pain and the pain radiates from the rectum up into her back.  She has felt this way for the past couple of weeks    She has a hard time digesting food    She does have a

## 2017-12-04 NOTE — TELEPHONE ENCOUNTER
Pts  states that pt has been constipated and has been having abdominal pain for last few weeks. He is requesting appt with Dr. Shen Price or Darryl Denny sooner than first available. Please call.

## 2017-12-05 NOTE — TELEPHONE ENCOUNTER
I spoke to the pt. She was given Dr Moraima Rdz recommendations and she verbalizes understanding. I gave her the number to call to schedule the CT scan. She would like to hold off on an appointment until after the CT scan results are back.

## 2017-12-05 NOTE — TELEPHONE ENCOUNTER
GI RNs–  Please advise the Froylan Benson family that Katerine Blanchard needs to be taking MiraLAX twice daily -- may need to increase her dose. She should stay on clear liquids until better. I would advise CT scan to assess the unusual constipation complaint.   I have put

## 2017-12-06 ENCOUNTER — TELEPHONE (OUTPATIENT)
Dept: PHYSICAL THERAPY | Age: 77
End: 2017-12-06

## 2017-12-06 RX ORDER — GLIPIZIDE 10 MG/1
TABLET, FILM COATED, EXTENDED RELEASE ORAL
Qty: 90 TABLET | Refills: 1 | Status: SHIPPED | OUTPATIENT
Start: 2017-12-06 | End: 2018-05-14

## 2017-12-06 RX ORDER — HYDROCHLOROTHIAZIDE 25 MG/1
TABLET ORAL
Qty: 90 TABLET | Refills: 1 | Status: SHIPPED | OUTPATIENT
Start: 2017-12-06 | End: 2018-06-09

## 2017-12-06 RX ORDER — GLIPIZIDE 10 MG/1
TABLET, FILM COATED, EXTENDED RELEASE ORAL
Qty: 90 TABLET | Refills: 0 | Status: SHIPPED | OUTPATIENT
Start: 2017-12-06 | End: 2018-02-19

## 2017-12-06 RX ORDER — VALSARTAN 320 MG/1
TABLET ORAL
Qty: 90 TABLET | Refills: 1 | Status: SHIPPED | OUTPATIENT
Start: 2017-12-06 | End: 2018-06-10

## 2017-12-06 RX ORDER — METFORMIN HYDROCHLORIDE 500 MG/1
TABLET, EXTENDED RELEASE ORAL
Qty: 180 TABLET | Refills: 1 | Status: SHIPPED | OUTPATIENT
Start: 2017-12-06 | End: 2018-09-27

## 2017-12-06 RX ORDER — NYSTATIN 100000 U/G
CREAM TOPICAL
Qty: 30 G | Refills: 1 | Status: SHIPPED | OUTPATIENT
Start: 2017-12-06 | End: 2018-07-30

## 2017-12-07 ENCOUNTER — APPOINTMENT (OUTPATIENT)
Dept: PHYSICAL THERAPY | Age: 77
End: 2017-12-07
Attending: INTERNAL MEDICINE
Payer: MEDICARE

## 2017-12-11 RX ORDER — WARFARIN SODIUM 5 MG/1
TABLET ORAL
Qty: 90 TABLET | Refills: 1 | Status: SHIPPED | OUTPATIENT
Start: 2017-12-11 | End: 2018-08-11

## 2017-12-13 ENCOUNTER — OFFICE VISIT (OUTPATIENT)
Dept: PHYSICAL THERAPY | Age: 77
End: 2017-12-13
Attending: INTERNAL MEDICINE
Payer: MEDICARE

## 2017-12-13 PROCEDURE — 97110 THERAPEUTIC EXERCISES: CPT

## 2017-12-14 ENCOUNTER — HOSPITAL ENCOUNTER (OUTPATIENT)
Dept: CT IMAGING | Age: 77
Discharge: HOME | End: 2017-12-14
Attending: INTERNAL MEDICINE
Payer: MEDICARE

## 2017-12-14 DIAGNOSIS — R10.84 ABDOMINAL PAIN, GENERALIZED: ICD-10-CM

## 2017-12-14 DIAGNOSIS — K59.00 CONSTIPATION, UNSPECIFIED CONSTIPATION TYPE: ICD-10-CM

## 2017-12-14 DIAGNOSIS — K62.89 ANAL OR RECTAL PAIN: ICD-10-CM

## 2017-12-14 DIAGNOSIS — R19.4 CHANGE IN BOWEL HABITS: ICD-10-CM

## 2017-12-14 PROCEDURE — 82565 ASSAY OF CREATININE: CPT

## 2017-12-14 PROCEDURE — 74177 CT ABD & PELVIS W/CONTRAST: CPT | Performed by: INTERNAL MEDICINE

## 2017-12-15 ENCOUNTER — OFFICE VISIT (OUTPATIENT)
Dept: PHYSICAL THERAPY | Age: 77
End: 2017-12-15
Attending: INTERNAL MEDICINE
Payer: MEDICARE

## 2017-12-15 PROCEDURE — 97110 THERAPEUTIC EXERCISES: CPT | Performed by: PHYSICAL THERAPIST

## 2017-12-15 NOTE — PROGRESS NOTES
Dx: chronic midline thoracic back pain                               Next MD visit: none scheduled  Fall Risk: standard         Precautions: n/a           Medications: Yes/no  Subjective: Patient reports that she is feeling a tightness/ache in the medial ( symptoms in the low back to enable easier ADLs, functional, work, and recreational activities.    3. Patient to have WNL of lumbar mobility in all directions to facilitate a return to all (walking in the hallway of the hospital, watch TV/read) activities wi

## 2017-12-19 ENCOUNTER — OFFICE VISIT (OUTPATIENT)
Dept: PHYSICAL THERAPY | Age: 77
End: 2017-12-19
Attending: INTERNAL MEDICINE
Payer: MEDICARE

## 2017-12-19 PROCEDURE — 97110 THERAPEUTIC EXERCISES: CPT

## 2017-12-19 NOTE — PROGRESS NOTES
Dx: chronic midline thoracic back pain                               Next MD visit: none scheduled  Fall Risk: standard         Precautions: n/a           Medications: Yes/no  Subjective: Patient reports that she has no more \" burning \" pain  in the [facility] Improvement in posture with gait observed  after therapy today. Goals     • Therapy Goals            1. Patient to consistently perform her HEP and it's progression to maintain her improved condition.    2. Patient to have reduced, centralized, and ab

## 2017-12-21 ENCOUNTER — TELEPHONE (OUTPATIENT)
Dept: GASTROENTEROLOGY | Facility: CLINIC | Age: 77
End: 2017-12-21

## 2017-12-21 ENCOUNTER — ANTI-COAG VISIT (OUTPATIENT)
Dept: INTERNAL MEDICINE CLINIC | Facility: CLINIC | Age: 77
End: 2017-12-21

## 2017-12-21 DIAGNOSIS — K74.60 CIRRHOSIS OF LIVER WITHOUT ASCITES, UNSPECIFIED HEPATIC CIRRHOSIS TYPE (HCC): ICD-10-CM

## 2017-12-21 DIAGNOSIS — I48.20 CHRONIC ATRIAL FIBRILLATION (HCC): ICD-10-CM

## 2017-12-21 DIAGNOSIS — R16.0 HEPATOMEGALY: ICD-10-CM

## 2017-12-21 DIAGNOSIS — R93.3 ABNORMAL CT SCAN, COLON: ICD-10-CM

## 2017-12-21 DIAGNOSIS — K76.0 FATTY LIVER DISEASE, NONALCOHOLIC: Primary | ICD-10-CM

## 2017-12-21 PROCEDURE — 85610 PROTHROMBIN TIME: CPT

## 2017-12-21 PROCEDURE — 36416 COLLJ CAPILLARY BLOOD SPEC: CPT

## 2017-12-21 NOTE — TELEPHONE ENCOUNTER
Pt states she has not rec'vd CT Scan results and was wondering if CB had a chance to review them. Pls call. Thank you.

## 2017-12-21 NOTE — TELEPHONE ENCOUNTER
GI RNs–    Please call Ms. Devante Garza and  to advise that her intestines looked good on the CT scan. We ordered the CT scan to evaluate her abdominal pain and possible constipation. I was concerned that she might have another obstruction going.   Nothing

## 2017-12-21 NOTE — TELEPHONE ENCOUNTER
Pt contacted and reviewed Dr. Karina Hull message below in detail w/ the pt.      She verbalized understanding and is interested in completing more labs for her liver and discuss the results with Dr. Karina Hull at the appt she scheduled:  Date Time Provider Department [facility]

## 2017-12-21 NOTE — TELEPHONE ENCOUNTER
GI RNs–  These call and advise the Gilberto Rush family that I have ordered some labs to further evaluate her symptoms and the abnormal liver.   1 of them, the \"CEA\" marker for colon tumors which is VERY unlikely to be positive but worth checking, is being denied

## 2017-12-21 NOTE — TELEPHONE ENCOUNTER
Dr. Junior Carpio,     Please advise on CT a/p results from 12/14/17, thank you. \"CONCLUSION:   1. Hepatomegaly. Liver has increased in size since prior exam and has a scalloped contrast using cirrhosis.   The hepatic parenchyma is heterogeneous no discrete mass

## 2017-12-22 ENCOUNTER — OFFICE VISIT (OUTPATIENT)
Dept: PHYSICAL THERAPY | Age: 77
End: 2017-12-22
Attending: INTERNAL MEDICINE
Payer: MEDICARE

## 2017-12-22 PROCEDURE — 97110 THERAPEUTIC EXERCISES: CPT | Performed by: PHYSICAL THERAPIST

## 2017-12-22 NOTE — PROGRESS NOTES
Dx: chronic midline thoracic back pain                               Next MD visit: none scheduled  Fall Risk: standard         Precautions: n/a           Medications: Yes/no  Subjective: Patient has no pain at this time just tightness across the upper roberto RTB    + c/s retraction 2 x 10 reps; P, NW (tightness along the spine)       Supine: with towel with towel roll x 3 mins; NE   + c/s retraction 2 x 10 reps; NE       AVERY over mat table x 10 reps; NE       Seated: c/s retraction 2 x 10 reps; NE (no crampin

## 2018-01-01 ENCOUNTER — NURSE ONLY (OUTPATIENT)
Dept: CARDIOLOGY CLINIC | Facility: CLINIC | Age: 78
End: 2018-01-01
Payer: MEDICARE

## 2018-01-01 ENCOUNTER — OFFICE VISIT (OUTPATIENT)
Dept: PODIATRY CLINIC | Facility: CLINIC | Age: 78
End: 2018-01-01
Payer: MEDICARE

## 2018-01-01 ENCOUNTER — ANTI-COAG VISIT (OUTPATIENT)
Dept: INTERNAL MEDICINE CLINIC | Facility: CLINIC | Age: 78
End: 2018-01-01
Payer: MEDICARE

## 2018-01-01 ENCOUNTER — TELEPHONE (OUTPATIENT)
Dept: NEPHROLOGY | Facility: CLINIC | Age: 78
End: 2018-01-01

## 2018-01-01 DIAGNOSIS — Z51.81 ENCOUNTER FOR THERAPEUTIC DRUG MONITORING: ICD-10-CM

## 2018-01-01 DIAGNOSIS — I48.20 CHRONIC ATRIAL FIBRILLATION (HCC): ICD-10-CM

## 2018-01-01 DIAGNOSIS — B35.1 ONYCHOMYCOSIS: ICD-10-CM

## 2018-01-01 DIAGNOSIS — Z79.01 LONG TERM (CURRENT) USE OF ANTICOAGULANTS: ICD-10-CM

## 2018-01-01 DIAGNOSIS — E11.42 TYPE 2 DIABETES MELLITUS WITH PERIPHERAL NEUROPATHY (HCC): ICD-10-CM

## 2018-01-01 DIAGNOSIS — M72.2 PLANTAR FASCIITIS OF LEFT FOOT: Primary | ICD-10-CM

## 2018-01-01 PROCEDURE — 93279 PRGRMG DEV EVAL PM/LDLS PM: CPT | Performed by: INTERNAL MEDICINE

## 2018-01-01 PROCEDURE — 85610 PROTHROMBIN TIME: CPT

## 2018-01-01 PROCEDURE — 36416 COLLJ CAPILLARY BLOOD SPEC: CPT

## 2018-01-01 PROCEDURE — 11721 DEBRIDE NAIL 6 OR MORE: CPT | Performed by: PODIATRIST

## 2018-01-01 RX ORDER — LOSARTAN POTASSIUM 50 MG/1
50 TABLET ORAL DAILY
Qty: 30 TABLET | Refills: 2 | Status: SHIPPED | OUTPATIENT
Start: 2018-01-01 | End: 2019-01-01

## 2018-01-03 ENCOUNTER — OFFICE VISIT (OUTPATIENT)
Dept: PHYSICAL THERAPY | Age: 78
End: 2018-01-03
Attending: INTERNAL MEDICINE
Payer: MEDICARE

## 2018-01-03 PROCEDURE — 97110 THERAPEUTIC EXERCISES: CPT | Performed by: PHYSICAL THERAPIST

## 2018-01-03 NOTE — PROGRESS NOTES
Dx: chronic midline thoracic back pain                               Next MD visit: none scheduled  Fall Risk: standard         Precautions: n/a           Medications: Yes/no  Subjective: Patient is painfree but she feels light tightness across the upper b Supine: 1 pillow with towel roll x 3 mins; NE relaxed CC: (R/L) D1 extension 2.5 lbs x 20 reps ea    (B) UE redTB n.row, ext., w.row  10 reps x 5 cts; NE (B) neha rows, shoulder extension and wide rows with RTB    + c/s retraction 2 x 10 reps; P, NW (tightn preference exercises, posture correction/exercises, and patient education. Charges:  TherEx 3      Total Timed Treatment: 46 min  Total Treatment Time: 48 min

## 2018-01-03 NOTE — PROGRESS NOTES
Dx: chronic midline thoracic back pain                               Next MD visit: none scheduled  Fall Risk: standard         Precautions: n/a           Medications: Yes/no  Subjective: Patient is painfree but she feels light tightness across the upper b

## 2018-01-04 ENCOUNTER — TELEPHONE (OUTPATIENT)
Dept: NEPHROLOGY | Facility: CLINIC | Age: 78
End: 2018-01-04

## 2018-01-04 DIAGNOSIS — M54.9 BACK PAIN, UNSPECIFIED BACK LOCATION, UNSPECIFIED BACK PAIN LATERALITY, UNSPECIFIED CHRONICITY: Primary | ICD-10-CM

## 2018-01-04 NOTE — TELEPHONE ENCOUNTER
Pt states she has a therapy session coming up on 1/9/18. Pt states therapy facility needs approval from [facility] to continue therapy. Pt states initial appt was scheduled for December but had to be pushed back.  Thank you

## 2018-01-05 NOTE — TELEPHONE ENCOUNTER
Spoke to patient who directed me to call [facility]. I spoke to [place]. She states they just need a new order to continue PT. Order entered in system per Dr. Yumiko macias.

## 2018-01-05 NOTE — TELEPHONE ENCOUNTER
LM on vm--need details. Where is therapy being done? Do they need a written order or is verbal order ok? Need name and phone or fax# of facility where patient is going to go to.

## 2018-01-09 ENCOUNTER — OFFICE VISIT (OUTPATIENT)
Dept: PHYSICAL THERAPY | Age: 78
End: 2018-01-09
Attending: INTERNAL MEDICINE
Payer: MEDICARE

## 2018-01-09 DIAGNOSIS — M54.9 BACK PAIN, UNSPECIFIED BACK LOCATION, UNSPECIFIED BACK PAIN LATERALITY, UNSPECIFIED CHRONICITY: ICD-10-CM

## 2018-01-09 PROCEDURE — 97110 THERAPEUTIC EXERCISES: CPT

## 2018-01-09 NOTE — PROGRESS NOTES
Dx: chronic midline thoracic back pain                               Next MD visit: none scheduled  Fall Risk: standard         Precautions: n/a           Medications: Yes/no  Subjective: Patient reports that she continue to feel stiff in her CS and thorac (B) UE redTB n.row, ext., w.row 10 reps x 10 cts ea  AVERY x 10 reps     AVERY with hips over fulcrum x 10 x 2  P/NW with end range stretch  Seated: c/s (R) SB x 10 reps; Dec, C to neck  Seated : CS SB to (R) x 10 x 2, P/ NE ( inc pulling sensation in right correction/exercises, and patient education. Charges:  TherEx 3      Total Timed Treatment: 40 min  Total Treatment Time: 45 min

## 2018-01-11 ENCOUNTER — APPOINTMENT (OUTPATIENT)
Dept: LAB | Age: 78
End: 2018-01-11
Attending: INTERNAL MEDICINE
Payer: MEDICARE

## 2018-01-11 ENCOUNTER — OFFICE VISIT (OUTPATIENT)
Dept: PHYSICAL THERAPY | Age: 78
End: 2018-01-11
Attending: INTERNAL MEDICINE
Payer: MEDICARE

## 2018-01-11 DIAGNOSIS — R93.3 ABNORMAL CT SCAN, COLON: ICD-10-CM

## 2018-01-11 DIAGNOSIS — K76.0 FATTY LIVER DISEASE, NONALCOHOLIC: ICD-10-CM

## 2018-01-11 DIAGNOSIS — R16.0 HEPATOMEGALY: ICD-10-CM

## 2018-01-11 DIAGNOSIS — K74.60 CIRRHOSIS OF LIVER WITHOUT ASCITES, UNSPECIFIED HEPATIC CIRRHOSIS TYPE (HCC): ICD-10-CM

## 2018-01-11 LAB
CEA SERPL-MCNC: 3.5 NG/ML (ref 0–5)
FERRITIN SERPL IA-MCNC: 25 NG/ML (ref 11–307)
IRON SATN MFR SERPL: 18 % (ref 15–50)
IRON SERPL-MCNC: 78 MCG/DL (ref 28–170)
TIBC SERPL-MCNC: 426 MCG/DL (ref 228–428)
TRANSFERRIN SERPL-MCNC: 323 MG/DL (ref 192–382)

## 2018-01-11 PROCEDURE — 82728 ASSAY OF FERRITIN: CPT

## 2018-01-11 PROCEDURE — 86803 HEPATITIS C AB TEST: CPT

## 2018-01-11 PROCEDURE — 82378 CARCINOEMBRYONIC ANTIGEN: CPT

## 2018-01-11 PROCEDURE — 83540 ASSAY OF IRON: CPT

## 2018-01-11 PROCEDURE — 86704 HEP B CORE ANTIBODY TOTAL: CPT

## 2018-01-11 PROCEDURE — 80500 HEPATITIS A B + C PROFILE: CPT

## 2018-01-11 PROCEDURE — 36415 COLL VENOUS BLD VENIPUNCTURE: CPT

## 2018-01-11 PROCEDURE — 87340 HEPATITIS B SURFACE AG IA: CPT

## 2018-01-11 PROCEDURE — 86708 HEPATITIS A ANTIBODY: CPT

## 2018-01-11 PROCEDURE — 84466 ASSAY OF TRANSFERRIN: CPT

## 2018-01-11 PROCEDURE — 82105 ALPHA-FETOPROTEIN SERUM: CPT

## 2018-01-11 PROCEDURE — 86706 HEP B SURFACE ANTIBODY: CPT

## 2018-01-11 PROCEDURE — 97110 THERAPEUTIC EXERCISES: CPT | Performed by: PHYSICAL THERAPIST

## 2018-01-11 NOTE — PROGRESS NOTES
Dx: chronic midline thoracic back pain                               Next MD visit: none scheduled  Fall Risk: standard         Precautions: n/a           Medications: Yes/no  Subjective: Patient reports (R) c/s ache and (R) anterior hip ache upon getting stretch  Seated: c/s (R) SB x 10 reps; Dec, C to neck  Seated : CS SB to (R) x 10 x 2, P/ NE ( inc pulling sensation in right UT and right upper scapula ) Supine: 1 pillow with towel roll x 3 mins; NE relaxed CC: (R/L) D1 extension 2.5 lbs x 20 reps ea   S consistently perform her HEP and it's progression to maintain her improved condition. 2. Patient to have reduced, centralized, and abolished symptoms in the low back to enable easier ADLs, functional, work, and recreational activities.    3. Patient to ha

## 2018-01-12 LAB
AFP-TM SERPL-MCNC: 3.9 NG/ML (ref 0–8.9)
HAV AB SER QL IA: NONREACTIVE
HBV CORE AB SERPL QL IA: NONREACTIVE
HBV SURFACE AB SER-ACNC: <3.1 MIU/ML (ref ?–10)
HBV SURFACE AG SERPL QL IA: NONREACTIVE
HBV SURFACE AG SERPL QL IA: NONREACTIVE
HCV AB SERPL QL IA: REACTIVE

## 2018-01-15 ENCOUNTER — OFFICE VISIT (OUTPATIENT)
Dept: GASTROENTEROLOGY | Facility: CLINIC | Age: 78
End: 2018-01-15

## 2018-01-15 VITALS
DIASTOLIC BLOOD PRESSURE: 77 MMHG | SYSTOLIC BLOOD PRESSURE: 172 MMHG | HEART RATE: 65 BPM | WEIGHT: 227 LBS | BODY MASS INDEX: 38.28 KG/M2 | HEIGHT: 64.5 IN

## 2018-01-15 DIAGNOSIS — R16.0 HEPATOMEGALY: ICD-10-CM

## 2018-01-15 DIAGNOSIS — R76.8 HEPATITIS C ANTIBODY TEST POSITIVE: Primary | ICD-10-CM

## 2018-01-15 DIAGNOSIS — R10.84 ABDOMINAL PAIN, GENERALIZED: ICD-10-CM

## 2018-01-15 DIAGNOSIS — K59.01 SLOW TRANSIT CONSTIPATION: ICD-10-CM

## 2018-01-15 PROCEDURE — G0463 HOSPITAL OUTPT CLINIC VISIT: HCPCS | Performed by: INTERNAL MEDICINE

## 2018-01-15 PROCEDURE — 99214 OFFICE O/P EST MOD 30 MIN: CPT | Performed by: INTERNAL MEDICINE

## 2018-01-15 NOTE — PROGRESS NOTES
HPI:    Patient ID: Irwin Jonas returns today for follow up. Meño Paz' Chief complaint this past month has been right-sided abdominal pain radiating around to the right flank and right back.   Severe right sided abdominal pain right flank and right roberto 12/21/17 5:20 PM   You routed this conversation to [facility] Clinical Staff   Me       GI RNs–  These call and advise the Kanwal Fowler family that I have ordered some labs to further evaluate her symptoms and the abnormal liver.   1 of them, the \"CEA\" marker for colo fatty change. This is most likely one of the consequences of being a bit overweight. Fatty liver is fairly common.   Her last liver tests (usually run in the basic chemistry panel which she has had 4 times since January 2016) have been repeatedly NORMAL 2 12/4/2017:    Telephone     December 14, 2017   Chetan Villaseñor RN       CT scan scheduled for today 12/14/17 @ 12:30pm.       December 5, 2017   8:56 AM   Alma Fields RN routed this conversation to Me        I spoke to the pt.  She was given Dr MCCRARY   9:00 AM   Francie Loya routed this conversation to [facility] Clinical Staff       Pts  states that pt has been constipated and has been having abdominal pain for last few weeks.   He is requesting appt with Dr. Celina Goel or Talisha Carrasquillo sooner than first a stable.    ============================================  Previous office visit 12/16/2016    Gretchen Pinto returns with her  for follow-up of abdominal pain in the previous severe postoperative obstipation. Overall, things sound about the same. since the laminectomy surgery. This is postprandial.  Her  believes that she is eating less. She describes focal right upper quadrant pain radiating around the right side towards the back.   At times, she describes a diffuse pain involving the enti =============================================  Previous office visit September 11th 2015 Dr. Nick Staples: The patient is seen today along with her . Her history has been well documented in previous inpatient and outpatient notes.   In brief not been eating very much and when she eats she typically will \"vomit\". She describes dry heaves and very little food. The patient typically has very small volume stools every 4 days with \"slime\".   This is the case despite stool softeners and laxativ August 18th, 2015. After that tumor, Ms. Seun Lucero made a gradual recovery  and was transferred over to a rehab facility. She was in significant pain  and apparently has been receiving regular doses of narcotic pain  medications since the surgery.   She and he fibrillation. 2.   Diabetes. 3.   Hypertension. 4.   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 PAST SURGICAL HISTORY:  Includes:  1. The above thoracic laminectomy surgery.   2.   Apparent diffusely. Suprapubic tenderness of uncertain  significance. EXTREMITIES:  Show no cyanosis or edema. SKIN:  Warm and moist with no rashes. NEUROLOGICAL:  Mental status and sensorium are intact. She is alert and  oriented and appropriate.      LABORATO tumor on August 18th, 2015.   After inactivity, narcotics since the surgery superimposed on a  likely preceding constipation, she is here with a fairly severe obstipation  with a large massive stool impacted likely at her surgical anastomosis and  unable t PELVIS WITHOUT CONTRAST  COMPARISON: [facility], [address], 7/18/2013, 11:29. INDICATIONS: Abdominal pelvic pain/discomfort. Previous partial colectomy. Yumiko Vogel       FINDINGS:  LIVER: No enlargement, atrophy, abnormal density, or this segment suggests a  nonspecific colitis. No obstructive pattern. .  2. Cholecystectomy. No biliary dilatation. 3. Large hiatal hernia. Chronic bibasilar atelectasis/scarring with bibasilar pleural reaction.     Dictated by (CST): JORGE Guadalupe lymph nodes. BLADDER: Moderately distended. PELVIC ORGANS: Post hysterectomy. BONES: Multilevel degenerative changes of the visualized spine. No acute-appearing fracture  or suspicious osseous lesion.   LUNG BASES: There is atelectasis and/or scarring at size.  STOMACH:       Moderate-sized hiatal hernia. No evidence of gastric obstruction. Duodenum has a subcentimeter diverticulum near the ampulla. PANCREAS:      No lesion, fluid collection, ductal dilatation, or atrophy.     ADRENALS:      No defined m has a scalloped contrast using cirrhosis. The hepatic parenchyma is heterogeneous no discrete mass lesion is identified however arterial phase imaging was not performed.   Recommend   correlation with AFP level and liver protocol MRI abdomen without contra abdominal pain and some constipation. She is  overdue for surveillance colonoscope and presents today in light of her  history of polyps and her symptoms. ENDOSCOPIC FINDINGS:  Preparation of the colon was good.   The terminal ileal mucosa was normal POSTOPERATIVE DIAGNOSIS:  1. Sigmoid colon fecal impaction. 2.  Colonic obstruction. SEDATION:  Fentanyl 125 mcg and midazolam 5 mg given intravenously   before  and throughout the procedure.      SIGMOIDOSCOPY PROCEDURE:  After the nature and risks likely due to the impaction and pressure against the   walls of the  colon. RECOMMENDATIONS:  1. Resume clear liquid diet and continue to observe. Miss Milo Baca   should  pass the rest of this stool and prep in the next 24-48 hours.   2.  Daily laxative r scope around this   sharp  angulation into the stool ball itself. From here about 10 minutes   spent  flushing over 1 liter of irrigating sterile water into the stool   ball  breaking it up into debris. The stool ball did have white barium   in it.   As w Oral Tab TAKE ONE TABLET BY MOUTH 4 DAYS A WEEK AND 1/2 TABLET ON THE OTHER DAYS Disp: 90 tablet Rfl: 1   GLIPIZIDE ER 10 MG Oral Tablet 24 Hr TAKE 1 TABLET BY MOUTH DAILY Disp: 90 tablet Rfl: 0   GLIPIZIDE ER 10 MG Oral Tablet 24 Hr TAKE 1 TABLET BY MOUTH Tab TAKE 1 TABLET BY MOUTH 3 DAYS OUT OF THE WEEK AND 1/2 TABLET THE OTHER 4 DAYS AS DIRECTED Disp: 64 tablet Rfl: 0   Acidophilus/Pectin Oral Cap Take 1 capsule by mouth daily.  Disp:  Rfl:    Multiple Vitamins-Minerals (BIOTIN PLUS/CALCIUM/VIT D3) Oral Ta recurring rectal villous adenoma requiring multiple resections to clear 2004 - 2005. Her abdominal pain was significantly improved on discharge from the hospital in September after we cleared out the severe fecal impaction.   It had worsened again as of to cardiac pacemaker. Consider repeat CT scan approximately 3/15/2018 for 3 month follow-up. 6.  Imaging is also showing us \"stable\" diffuse biliary dilation in this patient who has undergone cholecystectomy surgery approximately 15 years ago.   Kunal Briseno

## 2018-01-16 ENCOUNTER — APPOINTMENT (OUTPATIENT)
Dept: PHYSICAL THERAPY | Age: 78
End: 2018-01-16
Attending: INTERNAL MEDICINE
Payer: MEDICARE

## 2018-01-16 PROBLEM — K59.01 SLOW TRANSIT CONSTIPATION: Status: ACTIVE | Noted: 2018-01-16

## 2018-01-16 PROBLEM — R76.8 HEPATITIS C ANTIBODY TEST POSITIVE: Status: ACTIVE | Noted: 2018-01-16

## 2018-01-16 PROBLEM — R16.0 HEPATOMEGALY: Status: ACTIVE | Noted: 2018-01-16

## 2018-01-16 PROCEDURE — 97110 THERAPEUTIC EXERCISES: CPT | Performed by: PHYSICAL THERAPIST

## 2018-01-16 NOTE — PROGRESS NOTES
Dx: chronic midline thoracic back pain                               Next MD visit: none scheduled  Fall Risk: standard         Precautions: n/a           Medications: Yes/no  Subjective: Patient has no symptoms at this time.    She reports doing her HEP (r x 10 reps; NE  (B) UE redTB n.row, ext., w.row 10 reps x 10 cts ea  AVERY x 10 reps     AVERY with hips over fulcrum x 10 x 2  P/NW with end range stretch  Seated: c/s (R) SB x 10 reps; Dec, C to neck  Seated : CS SB to (R) x 10 x 2, P/ NE ( inc pulling sens back.  She required cueing to maintain her posture during exercises. Goals in progress  Goals     • Therapy Goals            1. Patient to consistently perform her HEP and it's progression to maintain her improved condition.    2. Patient to have redu

## 2018-01-18 ENCOUNTER — OFFICE VISIT (OUTPATIENT)
Dept: PHYSICAL THERAPY | Age: 78
End: 2018-01-18
Attending: INTERNAL MEDICINE
Payer: MEDICARE

## 2018-01-18 ENCOUNTER — ANTI-COAG VISIT (OUTPATIENT)
Dept: INTERNAL MEDICINE CLINIC | Facility: CLINIC | Age: 78
End: 2018-01-18

## 2018-01-18 ENCOUNTER — APPOINTMENT (OUTPATIENT)
Dept: LAB | Age: 78
End: 2018-01-18
Attending: INTERNAL MEDICINE
Payer: MEDICARE

## 2018-01-18 DIAGNOSIS — I48.20 CHRONIC ATRIAL FIBRILLATION (HCC): ICD-10-CM

## 2018-01-18 DIAGNOSIS — R76.8 HEPATITIS C ANTIBODY TEST POSITIVE: ICD-10-CM

## 2018-01-18 LAB — INR: 2.8 (ref 2–3)

## 2018-01-18 PROCEDURE — 36415 COLL VENOUS BLD VENIPUNCTURE: CPT

## 2018-01-18 PROCEDURE — 36416 COLLJ CAPILLARY BLOOD SPEC: CPT

## 2018-01-18 PROCEDURE — 87902 NFCT AGT GNTYP ALYS HEP C: CPT

## 2018-01-18 PROCEDURE — 87522 HEPATITIS C REVRS TRNSCRPJ: CPT

## 2018-01-18 PROCEDURE — 85610 PROTHROMBIN TIME: CPT

## 2018-01-18 PROCEDURE — 97110 THERAPEUTIC EXERCISES: CPT | Performed by: PHYSICAL THERAPIST

## 2018-01-18 NOTE — PROGRESS NOTES
Dx: chronic midline thoracic back pain                               Next MD visit: none scheduled  Fall Risk: standard         Precautions: n/a           Medications: Yes/no  Subjective: Patient has no symptoms at this time.    She reports doing her HEP (r x 10 reps; NE  (B) UE redTB n.row, ext., w.row 10 reps x 10 cts ea  AVERY x 10 reps     AVERY with hips over fulcrum x 10 x 2  P/NW with end range stretch  Seated: c/s (R) SB x 10 reps; Dec, C to neck  Seated : CS SB to (R) x 10 x 2, P/ NE ( inc pulling sens greenTB ext., w.row 10 reps x 10 cts ea; NE  (R/L) step up onto 6 inch step x 20 reps ea LE with CGA  Seated: c/s extension x 10 reps                Assessment:  Patient performed her exercises today without production of symptoms in the neck and upper roberto

## 2018-01-20 LAB
HCV RNA QNT PCR INTERPRETATION: DETECTED
HCV RNA QNT REAL-TIME PCR LOG IU/ML: 6.9 LOG IU

## 2018-01-23 ENCOUNTER — OFFICE VISIT (OUTPATIENT)
Dept: PHYSICAL THERAPY | Age: 78
End: 2018-01-23
Attending: INTERNAL MEDICINE
Payer: MEDICARE

## 2018-01-23 PROCEDURE — 97110 THERAPEUTIC EXERCISES: CPT | Performed by: PHYSICAL THERAPIST

## 2018-01-23 NOTE — PROGRESS NOTES
Dx: chronic midline thoracic back pain                               Next MD visit: none scheduled  Fall Risk: standard         Precautions: n/a           Medications: Yes/no  Subjective: Patient has no symptoms at this time but she feels achy and tight al with hips over fulcrum x 10 x 2  P/NW with end range stretch  Seated: c/s (R) SB x 10 reps; Dec, C to neck  Seated : CS SB to (R) x 10 x 2, P/ NE ( inc pulling sensation in right UT and right upper scapula ) Supine: 1 pillow with towel roll x 3 mins; NE re inch step x 20 reps ea LE with CGA  Seated: c/s extension x 10 reps         Scifit: UBE L2 bkwd x 5 mins; postural training  Seated: c/s extension x 10 reps; P, NW mid neck   + lean back on chair with 1 pillow on chair x 10 reps; P, NW mid neck  (B) BECKY lozano

## 2018-01-24 ENCOUNTER — APPOINTMENT (OUTPATIENT)
Dept: PHYSICAL THERAPY | Age: 78
End: 2018-01-24
Attending: INTERNAL MEDICINE
Payer: MEDICARE

## 2018-01-25 ENCOUNTER — APPOINTMENT (OUTPATIENT)
Dept: PHYSICAL THERAPY | Age: 78
End: 2018-01-25
Attending: INTERNAL MEDICINE
Payer: MEDICARE

## 2018-01-31 ENCOUNTER — TELEPHONE (OUTPATIENT)
Dept: GASTROENTEROLOGY | Facility: CLINIC | Age: 78
End: 2018-01-31

## 2018-02-02 NOTE — TELEPHONE ENCOUNTER
Dr Lloyd Ash,    I spoke to Yen. She is taking the Miralax and the mineral oil    She says sometimes her stomach gets sore and she has to skip a day of taking the mineral oil and Miralax. She likes the mineral oil.  She states it helps her BM's get past

## 2018-02-02 NOTE — TELEPHONE ENCOUNTER
The Hepatitis C labs show positive HCV viral load 7.7 million IU/mL (6.9 log), HCV genotype 1A or 1B. AFP 3.9. CEA 3.5. I called Nabila Velásquez and left a non-detailed message today.     GI RNs – if Deborah Ernestine calls back, please advise that she does have chronic

## 2018-02-15 ENCOUNTER — ANTI-COAG VISIT (OUTPATIENT)
Dept: INTERNAL MEDICINE CLINIC | Facility: CLINIC | Age: 78
End: 2018-02-15

## 2018-02-15 ENCOUNTER — TELEPHONE (OUTPATIENT)
Dept: INTERNAL MEDICINE CLINIC | Facility: CLINIC | Age: 78
End: 2018-02-15

## 2018-02-15 DIAGNOSIS — I48.20 CHRONIC ATRIAL FIBRILLATION (HCC): ICD-10-CM

## 2018-02-15 LAB — INR: 3 (ref 2–3)

## 2018-02-15 PROCEDURE — 36416 COLLJ CAPILLARY BLOOD SPEC: CPT

## 2018-02-15 PROCEDURE — 85610 PROTHROMBIN TIME: CPT

## 2018-02-15 NOTE — TELEPHONE ENCOUNTER
Zechariah Emery and Staff. Just an FYI. Pt was seen today and just happened to states she saw blood in her stool some time last week or the week before. She stated it hasn't happened since. INR was 3.0 today, this is in her range.   Thanks christopher

## 2018-02-16 NOTE — TELEPHONE ENCOUNTER
Spoke to patient's  Pilar Peraza. Dr. Denton Braxton advice message relayed to monitor stools for blood and if it continues he wants patient to see GI Dr. Sudhir Browning.

## 2018-02-19 ENCOUNTER — OFFICE VISIT (OUTPATIENT)
Dept: GASTROENTEROLOGY | Facility: CLINIC | Age: 78
End: 2018-02-19

## 2018-02-19 ENCOUNTER — TELEPHONE (OUTPATIENT)
Dept: GASTROENTEROLOGY | Facility: CLINIC | Age: 78
End: 2018-02-19

## 2018-02-19 VITALS
HEART RATE: 65 BPM | SYSTOLIC BLOOD PRESSURE: 147 MMHG | HEIGHT: 64 IN | BODY MASS INDEX: 38.76 KG/M2 | DIASTOLIC BLOOD PRESSURE: 81 MMHG | WEIGHT: 227 LBS

## 2018-02-19 DIAGNOSIS — M62.89 PELVIC FLOOR DYSFUNCTION: Primary | ICD-10-CM

## 2018-02-19 DIAGNOSIS — B18.2 CHRONIC HEPATITIS C WITHOUT HEPATIC COMA (HCC): ICD-10-CM

## 2018-02-19 DIAGNOSIS — K59.02 CONSTIPATION DUE TO OUTLET DYSFUNCTION: ICD-10-CM

## 2018-02-19 DIAGNOSIS — K92.1 BLOOD IN STOOL: ICD-10-CM

## 2018-02-19 DIAGNOSIS — R76.8 HEPATITIS C ANTIBODY TEST POSITIVE: Primary | ICD-10-CM

## 2018-02-19 DIAGNOSIS — K62.89 ANORECTAL PAIN: ICD-10-CM

## 2018-02-19 PROCEDURE — 99214 OFFICE O/P EST MOD 30 MIN: CPT | Performed by: INTERNAL MEDICINE

## 2018-02-19 PROCEDURE — G0463 HOSPITAL OUTPT CLINIC VISIT: HCPCS | Performed by: INTERNAL MEDICINE

## 2018-02-19 NOTE — PROGRESS NOTES
HPI:    Patient ID: Peggyann Mask returns today for follow up. Arielle Kathryn comes in with numerous tangential concerns today. It was very difficult to get a cohesive discussion and conclusion on each topic discussed before jumping to the next.   Toward the saw an uncertain amount of bright red blood with a bowel movement. This was new and unprecedented. Nikko Bansal is very concerned about hair loss, hair thinning.   It sounds like she is seeing a dermatologist.  She is very worried about her family histor oil to keep her bowels regular. She has a tight surgical anastomosis above the rectum which is aggravating the constipation.     How she doing? She has a follow-up with me on 3/19/2018 which should be enough.   I can call her back if she has additional qu surgery with ostomy as above    ======================  Telephone calls of 12/21/2017:    Shanna Sheikh RN   12/21/17 5:44 PM   Note      Pt contacted and reviewed Dr. Alma Sommer message below, she verbalized understanding and will complete before her OV on 1/15 regimen of MiraLAX or fiber or stool softeners to absolutely prevent the constipation.   She certainly does not have another colonic obstruction.     She did have an incidental finding of possible liver disease on the scan compared to the previous scan of 2 spleen.  Spleen is normal in size. 7.  Moderate size hiatal hernia. 8.  Cardiomegaly.  Cardiac pacing electrode is present. 9. Destiny Pink in the right gluteus medius muscle. 10.  Post hysterectomy.  No evidence of mass. 11.  Post appendectomy. \"      this point        She had a pacemaker placed about a year ago     She is not feeling well at all and  is at a loss for what to do for her     LOV 02/24/16     Please advise next step             9:59 AM      Jenny Carmona RN contacted migdalia occasionally PRN. Passing soft, loose stools usually at least once per day, sometimes more. Stools are soft, loose without previous bowel regimen. Last iron infusion therapy was October and November 2015.     Weight has been stable.    ================ multiple issues including the followin. Right upper quadrant abdominal pain. This continues to be severe and a daily problem. She wakes up with it.   This preceded the laminectomy surgery in August but has been much worse since the laminectomy paulina has not gone. She was afraid they would be constipating. She and her  are very focused on the iron deficiency. Abdominal Pain    Associated symptoms include constipation.     Constipation    Associated symptoms include abdominal pain.     === discharged to rehabilitation. The patient has not felt well since hospital discharge. She states that she has constant, crampy mid abdominal pain. If she is able to pass flatus her symptoms improve for a few minutes.   Due to the pain she has not been stool  impaction.      HISTORY OF PRESENT ILLNESS: Patel Au is a herminia 49-year-old woman  recovering from a complicated series of events including a thoracic  laminectomy and removal of an intradural extra medullary spinal cord tumor  on August 18th, 2 She  feels nauseated and full. She looks very uncomfortable. No fevers so far. Reassuring vital signs. Ms. Aleksandra Ruelas is still able to get  up to go to the bathroom to urinate. PAST MEDICAL HISTORY:  Includes:  1. Previous atrial fibrillation.   2. abdomen is soft and nondistended. She has good bowel sounds,  slightly increased. Exquisite diffuse abdominal tenderness with no amy  peritoneal signs. Tenderness is most focused in the mid left abdomen. Lesser tenderness diffusely.   Suprapubic tende previous history of atrial  fibrillation, Coumadin therapy, dyslipidemia, and colonic surgical history  as above noteworthy for this anastomosis in her sigmoid colon; recovering  from thoracic laminectomy and excision of spinal cord tumor on August 18th, 201511:34 A    ==============================================    Patient: Jose Maria Shen MRN: [id number]  : 10/14/1940 Age/Sex: 74/F Account Number: [de-identified]  Date of exam: 2015    PROCEDURE: CT OF THE ABDOMEN AND PELVIS WITHOUT CONTRAST measures 9.6 x  8.0 x 3.2 cm. .    CONCLUSION:  1. Colonic diverticulosis without diverticulitis. Partial colectomy with anastomosis in the sigmoid colon.   Mild dilatation just proximal to the anastomosis with mild bowel wall dilatation this segment suggest contrast  within the sigmoid colon. There is a background of colonic diverticulosis. VASCULAR: There is atherosclerotic calcification of the abdominal aorta and branch vessels but no  aneurysm. LYMPH NODES: No significantly enlarged lymph nodes.   BLADDER Cholecystectomy has been performed. There is mild intrahepatic biliary ductal dilatation. Common bile duct is enlarged at 16 mm in diameter. SPLEEN:           The spleen is granulomatous calcifications. Spleen has normal size.   STOMACH:       Moderate-s electrode in the right ventricle. OTHER:             Negative. Dictated by (CST): Mickey Baptiste MD on 12/14/2017 at 13:27          =====  CONCLUSION:   1. Hepatomegaly.   Liver has increased in size since prior exam and has a scalloped contrast using polyps including a large rectal villous adenoma that  required multiple sessions of both endoscopic and surgical transanal  resection. The patient's last colonoscopy was in 2007.   The patient has  been experiencing right-sided abdominal pain and some cons 11/19/2013 8:34 A    ======================    Patient:   Edgardo Short     SIGMOIDOSCOPY PROCEDURE REPORT     DATE OF PROCEDURE:  9/14/15     PREOPERATIVE DIAGNOSIS:  1. Sigmoid colon fecal impaction. 2.  Colonic obstruction.      POSTOPERATIVE DIAGNOS above the  anastomosis as before. Able to irrigate, wash and advance the   scope around  and through this up to the distal transverse colon.   2.  2 cm colonic ulceration seen on limited views of the areas of   the fecal  impaction likely due to the impact encountered a somewhat   edematous,  swollen colon and a mass of stool hooked behind a haustral fold   and  possibly the anastomosis.   With some difficulty and significant   tenderness  on her part, I slowly torqued and advanced the scope around this   sha kg)  10/06/17 : 229 lb 6.4 oz (104.1 kg)  08/21/17 : 219 lb (99.3 kg)  03/27/17 : 223 lb (101.2 kg)  02/06/17 : 220 lb (99.8 kg)       27 pound weight loss since June is noted         Current Outpatient Prescriptions:  WARFARIN SODIUM 5 MG Oral Tab TAKE ON Vitamins-Minerals (BIOTIN PLUS/CALCIUM/VIT D3) Oral Tab Take by mouth. Disp:  Rfl:    FOLIC ACID OR Take by mouth. Disp:  Rfl:    glycerin, laxative, 1.2 G Rectal Suppos Place 1 suppository rectally daily as needed.  Disp:  Rfl:    OneTouch UltraSoft Lancet on discharge from the hospital in September after we cleared out the severe fecal impaction. It had worsened again as of follow-up visit October 2015. Suspect multifactorial pain here. Constipation and colonic distention probably plays a role.   Scar tis hypoalbuminemia. We briefly discussed the new oral antiviral therapy options for chronic Hepatitis C. We discussed the minimal side effects and toxicity.   However, unclear if any of her multiple complicated symptoms relate to the Hepatitis C and mild a this encounter    Imaging & Referrals:  None     DE#[id number]

## 2018-02-19 NOTE — TELEPHONE ENCOUNTER
Referral entered for pelvic floor therapy. I gave pt the phone # [phone] to call and make an appt.  They only see pre and post jefry pt's in [address] but she will ask when she calls to make an appt

## 2018-02-19 NOTE — TELEPHONE ENCOUNTER
GI RNs–  Please arrange referral for Rinagustin Shawndemetrio to physical therapy for pelvic floor therapy, training. Suspect pelvic floor dysfunction. If possible, she needs to be seen in the [address] [place] [address].

## 2018-02-20 ENCOUNTER — APPOINTMENT (OUTPATIENT)
Dept: PHYSICAL THERAPY | Age: 78
End: 2018-02-20
Attending: INTERNAL MEDICINE
Payer: MEDICARE

## 2018-02-23 ENCOUNTER — APPOINTMENT (OUTPATIENT)
Dept: PHYSICAL THERAPY | Age: 78
End: 2018-02-23
Attending: INTERNAL MEDICINE
Payer: MEDICARE

## 2018-03-01 ENCOUNTER — OFFICE VISIT (OUTPATIENT)
Dept: PHYSICAL THERAPY | Age: 78
End: 2018-03-01
Attending: INTERNAL MEDICINE
Payer: MEDICARE

## 2018-03-01 PROCEDURE — 97110 THERAPEUTIC EXERCISES: CPT | Performed by: PHYSICAL THERAPIST

## 2018-03-01 NOTE — PROGRESS NOTES
Dx: chronic midline thoracic back pain                               Next MD visit: none scheduled  Fall Risk: standard         Precautions: n/a           Medications: Yes/no  Subjective: Patient has no pain in the neck or upper back but she feels weak bec 10 reps x 10 cts ea  AVERY x 10 reps     AVERY with hips over fulcrum x 10 x 2  P/NW with end range stretch  Seated: c/s (R) SB x 10 reps; Dec, C to neck  Seated : CS SB to (R) x 10 x 2, P/ NE ( inc pulling sensation in right UT and right upper scapula ) Sup UE greenTB ext., w.row 10 reps x 10 cts ea; NE  (R/L) step up onto 6 inch step x 20 reps ea LE with CGA  Seated: c/s extension x 10 reps         Scifit: UBE L2 bkwd x 5 mins; postural training  Seated: c/s extension x 10 reps; P, NW mid neck   + lean back

## 2018-03-05 RX ORDER — GLIPIZIDE 10 MG/1
TABLET, FILM COATED, EXTENDED RELEASE ORAL
Qty: 90 TABLET | Refills: 0 | Status: SHIPPED | OUTPATIENT
Start: 2018-03-05 | End: 2018-06-10

## 2018-03-05 NOTE — TELEPHONE ENCOUNTER
Current Outpatient Prescriptions:  TraMADol HCl 50 MG Oral Tab TAKE 1 TABLET BY MOUTH THREE TIMES DAILY AS NEEDED FOR PAIN Disp: 30 tablet Rfl: 1     Refill

## 2018-03-06 ENCOUNTER — OFFICE VISIT (OUTPATIENT)
Dept: PHYSICAL THERAPY | Age: 78
End: 2018-03-06
Attending: INTERNAL MEDICINE
Payer: MEDICARE

## 2018-03-06 PROCEDURE — 97110 THERAPEUTIC EXERCISES: CPT | Performed by: PHYSICAL THERAPIST

## 2018-03-06 NOTE — PROGRESS NOTES
Dx: chronic midline thoracic back pain                               Next MD visit: none scheduled  Fall Risk: standard         Precautions: n/a           Medications: Yes/no  Subjective: Patient reports that she still has the ache in the (R) base of the n ) Seated: c/s (R) rotation 2 x 20 reps; NE  Supine: 2 pillows x 2 mins; NE relaxed    + c/s retraction x 10 reps; NE  (B) UE Kailee n.row, ext., w.row 10 reps x 10 cts ea  AVERY x 10 reps     AVERY with hips over fulcrum x 10 x 2  P/NW with end range stretch NE  Seated: c/s (R/L) 10-8 reps x 5 cts ea; NE tired (R) LE  AVERY over mat table x 10 reps; NE  Scifit: UBE L4 6 mins bkwd; postural training  Seated: c/s extension x 10 reps  (B) UE greenTB ext., w.row 10 reps x 10 cts ea; NE  (R/L) step up onto 6 inch st (c/s (R) rotation with self OP 10-20 reps every 4-5 hours). She will benefit to continue with her physical therapy to attain goals. Goals in progress  Goals     • Therapy Goals            1.  Patient to consistently perform her HEP and it's progress

## 2018-03-07 NOTE — PROGRESS NOTES
Dx: chronic midline thoracic back pain                               Next MD visit: none scheduled  Fall Risk: standard         Precautions: n/a           Medications: Yes/no  Subjective: Patient reports that she still has the ache in the (R) base of the n 43/100;  Goal:  52/100    Current status G Code: Interim Mobility: Walking and Moving Around, CL: 20-39% impaired, limited, or restricted  Goal status G Code: Projected Mobility: Walking and Moving AroundCK: 40-59% impaired, limited, or restricted    Plan:

## 2018-03-09 RX ORDER — TRAMADOL HYDROCHLORIDE 50 MG/1
TABLET ORAL
Qty: 30 TABLET | Refills: 1 | Status: SHIPPED | OUTPATIENT
Start: 2018-03-09 | End: 2018-09-25

## 2018-03-15 ENCOUNTER — ANTI-COAG VISIT (OUTPATIENT)
Dept: INTERNAL MEDICINE CLINIC | Facility: CLINIC | Age: 78
End: 2018-03-15

## 2018-03-15 DIAGNOSIS — I48.20 CHRONIC ATRIAL FIBRILLATION (HCC): ICD-10-CM

## 2018-03-15 LAB — INR: 2.8 (ref 2–3)

## 2018-03-15 PROCEDURE — 85610 PROTHROMBIN TIME: CPT

## 2018-03-15 PROCEDURE — 36416 COLLJ CAPILLARY BLOOD SPEC: CPT

## 2018-03-27 ENCOUNTER — TELEPHONE (OUTPATIENT)
Dept: NEPHROLOGY | Facility: CLINIC | Age: 78
End: 2018-03-27

## 2018-03-27 RX ORDER — AZITHROMYCIN 250 MG/1
TABLET, FILM COATED ORAL
Qty: 1 PACKAGE | Refills: 0 | Status: SHIPPED | OUTPATIENT
Start: 2018-03-27 | End: 2018-03-31

## 2018-03-27 NOTE — TELEPHONE ENCOUNTER
Interaction warning:    Drug-Drug: azithromycin and Warfarin Sodium   Hypoprothrombinemic effects of Anticoagulants may be increased by Macrolide and Ketolides. Bleeding may occur. Okay to dispense?

## 2018-03-27 NOTE — TELEPHONE ENCOUNTER
Pt indicates she has an irriated infected throat and is requesting medication to relieve, pls call pt at:[phone],thanks.

## 2018-03-27 NOTE — TELEPHONE ENCOUNTER
Rx sent. Contacted pt and notified her rx has been sent & advised her to f/u with [facility] if not getting better. She stated understanding.

## 2018-03-27 NOTE — TELEPHONE ENCOUNTER
Contacted pt. She started having a sore throat 2 nights ago and it has progressively gotten worse. She has been gargling salt water & using throat lozenges without relief. Ears are also bothering her.  She has a little bit of body aches & chills, nothing se

## 2018-04-12 ENCOUNTER — ANTI-COAG VISIT (OUTPATIENT)
Dept: INTERNAL MEDICINE CLINIC | Facility: CLINIC | Age: 78
End: 2018-04-12

## 2018-04-12 ENCOUNTER — TELEPHONE (OUTPATIENT)
Dept: INTERNAL MEDICINE CLINIC | Facility: CLINIC | Age: 78
End: 2018-04-12

## 2018-04-12 DIAGNOSIS — I48.20 CHRONIC ATRIAL FIBRILLATION (HCC): ICD-10-CM

## 2018-04-12 PROCEDURE — 85610 PROTHROMBIN TIME: CPT

## 2018-04-12 PROCEDURE — 36416 COLLJ CAPILLARY BLOOD SPEC: CPT

## 2018-04-12 PROCEDURE — G0463 HOSPITAL OUTPT CLINIC VISIT: HCPCS

## 2018-04-12 NOTE — TELEPHONE ENCOUNTER
FYI:  Pt came in today with C/O S/S of poss blood clot in rt leg. Pt states area is painful all the time, swollen and warm to touch. When I looked at her leg it was swollen but I think it's like that a lot was not warm to touch.   When I was feeling her l

## 2018-05-02 RX ORDER — NYSTATIN 100000 U/G
CREAM TOPICAL
Qty: 30 G | Refills: 0 | Status: SHIPPED | OUTPATIENT
Start: 2018-05-02 | End: 2018-05-14

## 2018-05-02 NOTE — TELEPHONE ENCOUNTER
Per pt has a rash that is continuous under the breast. Requesting cream. Pt states cream helps but comes back and spreads. Onset 4 days ago.  Please advise

## 2018-05-10 ENCOUNTER — ANTI-COAG VISIT (OUTPATIENT)
Dept: INTERNAL MEDICINE CLINIC | Facility: CLINIC | Age: 78
End: 2018-05-10

## 2018-05-10 DIAGNOSIS — I48.20 CHRONIC ATRIAL FIBRILLATION (HCC): ICD-10-CM

## 2018-05-10 PROCEDURE — 36416 COLLJ CAPILLARY BLOOD SPEC: CPT

## 2018-05-10 PROCEDURE — 85610 PROTHROMBIN TIME: CPT

## 2018-05-14 ENCOUNTER — OFFICE VISIT (OUTPATIENT)
Dept: OBGYN CLINIC | Facility: CLINIC | Age: 78
End: 2018-05-14

## 2018-05-14 VITALS
SYSTOLIC BLOOD PRESSURE: 149 MMHG | WEIGHT: 227 LBS | DIASTOLIC BLOOD PRESSURE: 77 MMHG | BODY MASS INDEX: 39 KG/M2 | HEART RATE: 72 BPM

## 2018-05-14 DIAGNOSIS — R10.9 ABDOMINAL PAIN IN FEMALE: Primary | ICD-10-CM

## 2018-05-14 PROCEDURE — 99202 OFFICE O/P NEW SF 15 MIN: CPT | Performed by: OBSTETRICS & GYNECOLOGY

## 2018-05-14 RX ORDER — NYSTATIN 100000 [USP'U]/G
POWDER TOPICAL
Qty: 45 G | Refills: 1 | Status: SHIPPED | OUTPATIENT
Start: 2018-05-14 | End: 2018-11-06

## 2018-05-15 NOTE — PROGRESS NOTES
Arminda Piper is a 76year old female  No LMP recorded. Patient has had a hysterectomy. Patient presents with:  Gyn Problem: Lower R. pelvic pain running down R. leg. with extreme cramping    New pt.   Ever since back surgery, has right mid abdomina [id number] UNIT/GM External Powder Apply to affected areas bid Disp: 45 g Rfl: 1   TraMADol HCl 50 MG Oral Tab TAKE 1 TABLET BY MOUTH THREE TIMES DAILY AS NEEDED FOR PAIN Disp: 30 tablet Rfl: 1   GLIPIZIDE ER 10 MG Oral Tablet 24 Hr TAKE 1 TABLET BY MOUTH KRYSTEN mouth. Disp:  Rfl:    FOLIC ACID OR Take by mouth. Disp:  Rfl:    glycerin, laxative, 1.2 G Rectal Suppos Place 1 suppository rectally daily as needed.  Disp:  Rfl:    OneTouch UltraSoft Lancets Does not apply Misc Test 2-3 times per day Disp:  Rfl:

## 2018-05-17 ENCOUNTER — TELEPHONE (OUTPATIENT)
Dept: PODIATRY CLINIC | Facility: CLINIC | Age: 78
End: 2018-05-17

## 2018-05-17 ENCOUNTER — OFFICE VISIT (OUTPATIENT)
Dept: PODIATRY CLINIC | Facility: CLINIC | Age: 78
End: 2018-05-17

## 2018-05-17 DIAGNOSIS — B35.1 ONYCHOMYCOSIS: ICD-10-CM

## 2018-05-17 DIAGNOSIS — E11.42 TYPE 2 DIABETES MELLITUS WITH PERIPHERAL NEUROPATHY (HCC): ICD-10-CM

## 2018-05-17 DIAGNOSIS — G62.9 NEUROPATHY: Primary | ICD-10-CM

## 2018-05-17 PROCEDURE — 11721 DEBRIDE NAIL 6 OR MORE: CPT | Performed by: PODIATRIST

## 2018-05-17 NOTE — PROGRESS NOTES
HPI:    Patient ID: Darryl Shaw is a 76year old female. HPI  This 66-year-old diabetic presents for evaluation and care. She saw Yulia Stephenson her seventh 2017. She states her fasting blood sugar today was 107.   Review of Systems  I reviewed medical [id number] UNIT/GM External Cream APPLY EXTERNALLY TO THE AFFECTED AREA TWICE DAILY AS NEEDED FOR DRY SKIN Disp: 30 g Rfl: 1   ACCU-CHEK SOFTCLIX LANCETS Does not apply Misc Test blood sugar daily Dx E11.9 Adult onset non insulin using diabetes.  Disp: 100 each (hcc)  Onychomycosis    No orders of the defined types were placed in this encounter.       Meds This Visit:  No prescriptions requested or ordered in this encounter    Imaging & Referrals:  DME - EXTERNAL        AU#[id number]

## 2018-05-17 NOTE — TELEPHONE ENCOUNTER
Pt asking for me to call her informing her which spine specialist [facility] recommends. Spoke to [facility] and he states pt should call PCP, Dr. Brina Mars, to discuss which spine specialist pt should go to.

## 2018-06-04 ENCOUNTER — APPOINTMENT (OUTPATIENT)
Dept: GENERAL RADIOLOGY | Age: 78
End: 2018-06-04
Attending: EMERGENCY MEDICINE
Payer: MEDICARE

## 2018-06-04 ENCOUNTER — TELEPHONE (OUTPATIENT)
Dept: PODIATRY CLINIC | Facility: CLINIC | Age: 78
End: 2018-06-04

## 2018-06-04 ENCOUNTER — HOSPITAL ENCOUNTER (OUTPATIENT)
Age: 78
Discharge: HOME | End: 2018-06-04
Attending: EMERGENCY MEDICINE
Payer: MEDICARE

## 2018-06-04 VITALS
BODY MASS INDEX: 37.49 KG/M2 | HEART RATE: 66 BPM | RESPIRATION RATE: 14 BRPM | WEIGHT: 225 LBS | SYSTOLIC BLOOD PRESSURE: 148 MMHG | OXYGEN SATURATION: 98 % | HEIGHT: 65 IN | DIASTOLIC BLOOD PRESSURE: 70 MMHG | TEMPERATURE: 98 F

## 2018-06-04 DIAGNOSIS — M77.30: Primary | ICD-10-CM

## 2018-06-04 PROCEDURE — 73630 X-RAY EXAM OF FOOT: CPT | Performed by: EMERGENCY MEDICINE

## 2018-06-04 PROCEDURE — 99213 OFFICE O/P EST LOW 20 MIN: CPT

## 2018-06-04 PROCEDURE — 73610 X-RAY EXAM OF ANKLE: CPT | Performed by: EMERGENCY MEDICINE

## 2018-06-04 NOTE — TELEPHONE ENCOUNTER
Per SCR, okay to schedule pt tomorrow at 12:50pm.    Spoke to PIERCE, spouse of pt, and scheduled appt with SCR for tomorrow at [facility] at 12:50pm. Directions discussed. PIERCE verbalized understanding.

## 2018-06-04 NOTE — ED INITIAL ASSESSMENT (HPI)
REPORTS LEFT FOOT PAIN X 1 WEEK. STATES PAIN IS MAINLY LOCATED TO HER LEFT HEEL AND IT RADIATES UP HER LEG AND DOWN HER FOOT.  + EDEMA NOTED. DENIES TRAUMA/INJURY.

## 2018-06-04 NOTE — ED PROVIDER NOTES
Patient Seen in: [address]    History   Patient presents with:  Lower Extremity Injury (musculoskeletal)    Stated Complaint: Lt Foot Pain    HPI    The patient is a 18-year-old female with an extensive past medical hist Pain  Other systems are as noted in HPI. Constitutional and vital signs reviewed. All other systems reviewed and negative except as noted above.     Physical Exam   ED Triage Vitals [06/04/18 1339]  BP: 148/70  Pulse: 66  Resp: 14  Temp: 98.1 °F (36.7

## 2018-06-04 NOTE — TELEPHONE ENCOUNTER
pts  called. pt was seen at [address] [place] [address] Today. Heel spur. Advised to follow up in one day. Please advise. Thank you.

## 2018-06-05 ENCOUNTER — OFFICE VISIT (OUTPATIENT)
Dept: PODIATRY CLINIC | Facility: CLINIC | Age: 78
End: 2018-06-05

## 2018-06-05 VITALS — DIASTOLIC BLOOD PRESSURE: 80 MMHG | SYSTOLIC BLOOD PRESSURE: 157 MMHG | HEART RATE: 65 BPM | RESPIRATION RATE: 20 BRPM

## 2018-06-05 DIAGNOSIS — M72.2 PLANTAR FASCIITIS OF LEFT FOOT: Primary | ICD-10-CM

## 2018-06-05 PROCEDURE — 20550 NJX 1 TENDON SHEATH/LIGAMENT: CPT | Performed by: PODIATRIST

## 2018-06-05 PROCEDURE — 99213 OFFICE O/P EST LOW 20 MIN: CPT | Performed by: PODIATRIST

## 2018-06-05 NOTE — PROGRESS NOTES
Per verbal order from Dr. Jcaky Shepherd, draw up 0.5ml of 0.5% Marcaine and 20 mg of Depo-Medrol for cortisone injection to left foot.   Layne Bennett RN

## 2018-06-05 NOTE — PROGRESS NOTES
HPI:    Patient ID: Florentin Jacques is a 76year old female. HPI  This 20-year-old female reports today after being seen in the urgent care center yesterday with left heel pain.   The physician requested that I give her a cortisone injection to her left h AREA TWICE DAILY AS NEEDED FOR DRY SKIN Disp: 30 g Rfl: 1   ACCU-CHEK SOFTCLIX LANCETS Does not apply Misc Test blood sugar daily Dx E11.9 Adult onset non insulin using diabetes.  Disp: 100 each Rfl: 3   Blood Glucose Monitoring Suppl (ACCU-CHEK SABRINA) Does reference to her blood sugar level.   She indicates an understanding see patient in 1 month         ASSESSMENT/PLAN:   Plantar fasciitis of left foot  (primary encounter diagnosis)      Orders Placed This Encounter      tendon sheath/ligament    Meds This V

## 2018-06-06 ENCOUNTER — TELEPHONE (OUTPATIENT)
Dept: NEPHROLOGY | Facility: CLINIC | Age: 78
End: 2018-06-06

## 2018-06-08 ENCOUNTER — ANTI-COAG VISIT (OUTPATIENT)
Dept: INTERNAL MEDICINE CLINIC | Facility: CLINIC | Age: 78
End: 2018-06-08

## 2018-06-08 DIAGNOSIS — I48.20 CHRONIC ATRIAL FIBRILLATION (HCC): ICD-10-CM

## 2018-06-08 PROCEDURE — 85610 PROTHROMBIN TIME: CPT

## 2018-06-08 PROCEDURE — 99211 OFF/OP EST MAY X REQ PHY/QHP: CPT

## 2018-06-08 PROCEDURE — 36416 COLLJ CAPILLARY BLOOD SPEC: CPT

## 2018-06-11 RX ORDER — VALSARTAN 320 MG/1
TABLET ORAL
Qty: 90 TABLET | Refills: 0 | Status: SHIPPED | OUTPATIENT
Start: 2018-06-11 | End: 2018-07-26 | Stop reason: ALTCHOICE

## 2018-06-11 RX ORDER — GLIPIZIDE 10 MG/1
TABLET, FILM COATED, EXTENDED RELEASE ORAL
Qty: 90 TABLET | Refills: 0 | Status: SHIPPED | OUTPATIENT
Start: 2018-06-11 | End: 2018-09-27

## 2018-06-11 RX ORDER — HYDROCHLOROTHIAZIDE 25 MG/1
TABLET ORAL
Qty: 90 TABLET | Refills: 0 | Status: SHIPPED | OUTPATIENT
Start: 2018-06-11 | End: 2018-07-25

## 2018-06-15 ENCOUNTER — TELEPHONE (OUTPATIENT)
Dept: NEPHROLOGY | Facility: CLINIC | Age: 78
End: 2018-06-15

## 2018-06-15 DIAGNOSIS — E11.9 DIABETES MELLITUS WITHOUT COMPLICATION (HCC): Primary | ICD-10-CM

## 2018-06-15 NOTE — TELEPHONE ENCOUNTER
Orders entered in system. Need 12 hour fast. Patient contacted. She is aware to do lab work a few days prior to her appointment.

## 2018-06-15 NOTE — TELEPHONE ENCOUNTER
Carl Huff states pt has appt to see [facility] on 6/20/2018 and was wondering if labs are due. Pls call>  Thank you.

## 2018-06-15 NOTE — TELEPHONE ENCOUNTER
Checking with Dr. Keyana Moran you have the form? Patient would like a call back once form is completed. She stated that Medicare requires PCP not Podiatrist to complete this form.

## 2018-06-18 ENCOUNTER — TELEPHONE (OUTPATIENT)
Dept: PODIATRY CLINIC | Facility: CLINIC | Age: 78
End: 2018-06-18

## 2018-06-18 ENCOUNTER — LAB ENCOUNTER (OUTPATIENT)
Dept: LAB | Age: 78
End: 2018-06-18
Attending: INTERNAL MEDICINE
Payer: MEDICARE

## 2018-06-18 DIAGNOSIS — E11.9 DIABETES MELLITUS WITHOUT COMPLICATION (HCC): ICD-10-CM

## 2018-06-18 PROCEDURE — 82570 ASSAY OF URINE CREATININE: CPT

## 2018-06-18 PROCEDURE — 82043 UR ALBUMIN QUANTITATIVE: CPT

## 2018-06-18 PROCEDURE — 83036 HEMOGLOBIN GLYCOSYLATED A1C: CPT

## 2018-06-18 PROCEDURE — 80053 COMPREHEN METABOLIC PANEL: CPT

## 2018-06-18 PROCEDURE — 80061 LIPID PANEL: CPT

## 2018-06-18 PROCEDURE — 81001 URINALYSIS AUTO W/SCOPE: CPT

## 2018-06-18 PROCEDURE — 85025 COMPLETE CBC W/AUTO DIFF WBC: CPT

## 2018-06-18 PROCEDURE — 36415 COLL VENOUS BLD VENIPUNCTURE: CPT

## 2018-06-20 ENCOUNTER — OFFICE VISIT (OUTPATIENT)
Dept: NEPHROLOGY | Facility: CLINIC | Age: 78
End: 2018-06-20

## 2018-06-20 VITALS
SYSTOLIC BLOOD PRESSURE: 133 MMHG | BODY MASS INDEX: 38.38 KG/M2 | HEIGHT: 65 IN | HEART RATE: 65 BPM | WEIGHT: 230.38 LBS | DIASTOLIC BLOOD PRESSURE: 73 MMHG

## 2018-06-20 DIAGNOSIS — I48.20 CHRONIC ATRIAL FIBRILLATION (HCC): ICD-10-CM

## 2018-06-20 DIAGNOSIS — I34.0 MITRAL VALVE INSUFFICIENCY, UNSPECIFIED ETIOLOGY: Primary | ICD-10-CM

## 2018-06-20 DIAGNOSIS — R60.9 EDEMA, UNSPECIFIED TYPE: ICD-10-CM

## 2018-06-20 DIAGNOSIS — E11.9 TYPE 2 DIABETES MELLITUS WITHOUT COMPLICATION, WITHOUT LONG-TERM CURRENT USE OF INSULIN (HCC): ICD-10-CM

## 2018-06-20 PROCEDURE — G0463 HOSPITAL OUTPT CLINIC VISIT: HCPCS | Performed by: INTERNAL MEDICINE

## 2018-06-20 PROCEDURE — 99215 OFFICE O/P EST HI 40 MIN: CPT | Performed by: INTERNAL MEDICINE

## 2018-06-20 NOTE — PROGRESS NOTES
[facility], [facility]  Nephrology Daily Progress Note    Paulette Lundy  [id number]  76year old  Patient presents with:  Test Results      HPI:   Paulette Lundy is a 76year old female.   76year old female with a history of adult onset diabetes mellitus, hypert vomiting  Genitourinary:  Negative for dysuria and hematuria  Hema/Lymph:  Negative for easy bleeding and easy bruising  Integumentary:  Negative for pruritus and rash  Musculoskeletal:  Negative for bone/joint symptoms  Neurological:  Negative for gait di last 6 months:    Recent Labs   06/18/18  0937   GLU 97   *   K 4.5   CL 98   CO2 30   BUN 15   CREATSERUM 0.80   CA 9.4   ALB 3.7   BUNCREA 18.8   ANIONGAP 5   OSMOCALC 277   GFRNAA >60   GFRAA >60       Imaging        Medications:    Current Outpat device Dx E11.9 Type 2 non insulin dependant diabetes, Disp: 100 each, Rfl: 5  •  Multiple Vitamins-Minerals (BIOTIN PLUS/CALCIUM/VIT D3) Oral Tab, Take by mouth., Disp: , Rfl:   •  FOLIC ACID OR, Take by mouth., Disp: , Rfl:   •  glycerin, laxative, 1.2 G No thrombocytopenia. Does have chronic lower extremity edema. Will repeat Doppler echocardiogram.  This may be secondary to pulmonary hypertension and apnea.   Encouraged her to see orthopedic doctor for follow-up for possible cortisone injections in her

## 2018-06-21 ENCOUNTER — TELEPHONE (OUTPATIENT)
Dept: NEPHROLOGY | Facility: CLINIC | Age: 78
End: 2018-06-21

## 2018-06-21 DIAGNOSIS — I48.20 CHRONIC ATRIAL FIBRILLATION (HCC): ICD-10-CM

## 2018-06-21 DIAGNOSIS — I34.8 MYXOID TRANSFORMATION OF MITRAL VALVE: Primary | ICD-10-CM

## 2018-06-21 NOTE — TELEPHONE ENCOUNTER
I will let patient know that the thyroid labs are just a one-time blood draw, but I see that a TSH, TSH with reflex to T4, and T3 were ordered.  Dr. Ramonia Kayser, did you want a T4 level drawn regardless of TSH level or only if TSH is abnormal?

## 2018-06-21 NOTE — TELEPHONE ENCOUNTER
Pt has ques about thyroid test - she thinks it is 2 parts and there is a waiting period - asking for Rn to confirm this

## 2018-06-21 NOTE — TELEPHONE ENCOUNTER
Contacted pt and notified her all 3 thyroid hormones will be drawn in one seating.  There is no 2 part test.

## 2018-06-25 ENCOUNTER — OFFICE VISIT (OUTPATIENT)
Dept: GASTROENTEROLOGY | Facility: CLINIC | Age: 78
End: 2018-06-25

## 2018-06-25 VITALS
HEIGHT: 65 IN | WEIGHT: 230 LBS | BODY MASS INDEX: 38.32 KG/M2 | SYSTOLIC BLOOD PRESSURE: 156 MMHG | HEART RATE: 65 BPM | DIASTOLIC BLOOD PRESSURE: 79 MMHG

## 2018-06-25 DIAGNOSIS — K59.01 SLOW TRANSIT CONSTIPATION: Primary | ICD-10-CM

## 2018-06-25 DIAGNOSIS — R10.11 RUQ ABDOMINAL PAIN: ICD-10-CM

## 2018-06-25 DIAGNOSIS — R10.13 EPIGASTRIC ABDOMINAL PAIN: ICD-10-CM

## 2018-06-25 DIAGNOSIS — K83.8 DILATED BILE DUCT: ICD-10-CM

## 2018-06-25 PROCEDURE — 99215 OFFICE O/P EST HI 40 MIN: CPT | Performed by: INTERNAL MEDICINE

## 2018-06-25 PROCEDURE — G0463 HOSPITAL OUTPT CLINIC VISIT: HCPCS | Performed by: INTERNAL MEDICINE

## 2018-06-27 ENCOUNTER — HOSPITAL ENCOUNTER (OUTPATIENT)
Dept: CV DIAGNOSTICS | Facility: HOSPITAL | Age: 78
Discharge: HOME | End: 2018-06-27
Attending: INTERNAL MEDICINE
Payer: MEDICARE

## 2018-06-27 ENCOUNTER — HOSPITAL ENCOUNTER (EMERGENCY)
Facility: HOSPITAL | Age: 78
Discharge: HOME | End: 2018-06-27
Attending: EMERGENCY MEDICINE
Payer: MEDICARE

## 2018-06-27 VITALS
HEIGHT: 65 IN | OXYGEN SATURATION: 97 % | BODY MASS INDEX: 37.15 KG/M2 | WEIGHT: 223 LBS | RESPIRATION RATE: 18 BRPM | HEART RATE: 65 BPM | SYSTOLIC BLOOD PRESSURE: 172 MMHG | TEMPERATURE: 99 F | DIASTOLIC BLOOD PRESSURE: 67 MMHG

## 2018-06-27 DIAGNOSIS — R00.2 PALPITATIONS: Primary | ICD-10-CM

## 2018-06-27 DIAGNOSIS — I34.0 MITRAL VALVE INSUFFICIENCY, UNSPECIFIED ETIOLOGY: ICD-10-CM

## 2018-06-27 DIAGNOSIS — R07.89 CHEST PRESSURE: ICD-10-CM

## 2018-06-27 PROCEDURE — 80048 BASIC METABOLIC PNL TOTAL CA: CPT | Performed by: EMERGENCY MEDICINE

## 2018-06-27 PROCEDURE — 93005 ELECTROCARDIOGRAM TRACING: CPT

## 2018-06-27 PROCEDURE — 93306 TTE W/DOPPLER COMPLETE: CPT | Performed by: INTERNAL MEDICINE

## 2018-06-27 PROCEDURE — 84484 ASSAY OF TROPONIN QUANT: CPT | Performed by: EMERGENCY MEDICINE

## 2018-06-27 PROCEDURE — 85025 COMPLETE CBC W/AUTO DIFF WBC: CPT | Performed by: EMERGENCY MEDICINE

## 2018-06-27 PROCEDURE — 36415 COLL VENOUS BLD VENIPUNCTURE: CPT

## 2018-06-27 PROCEDURE — 82962 GLUCOSE BLOOD TEST: CPT

## 2018-06-27 PROCEDURE — 99285 EMERGENCY DEPT VISIT HI MDM: CPT

## 2018-06-27 PROCEDURE — 80061 LIPID PANEL: CPT | Performed by: EMERGENCY MEDICINE

## 2018-06-27 PROCEDURE — 93010 ELECTROCARDIOGRAM REPORT: CPT | Performed by: EMERGENCY MEDICINE

## 2018-06-27 NOTE — ED PROVIDER NOTES
Patient Seen in: [facility] AND [facility] Emergency Department    History   Patient presents with:  Palpitations    Stated Complaint: palpitations    HPI    64-year-old female with history of atrial fibrillation, valvular heart disease, hypertension, hyperlipid Review of Systems    Positive for stated complaint: palpitations  Other systems are as noted in HPI. Constitutional and vital signs reviewed. All other systems reviewed and negative except as noted above.     Physical Exam   ED Triage Vitals [06 Abnormality         Status                     ---------                               -----------         ------                     CBC W/ DIFFERENTIAL[[id number]]          Abnormal            Final result                 Please view results for these renetta

## 2018-06-27 NOTE — ED INITIAL ASSESSMENT (HPI)
Pt presents to ED with a c/o palpitations and sob. States she was having a cardiac \"doppler\" to check a leaky valve.

## 2018-06-27 NOTE — ED NOTES
Care assumed from triage via ARCADIO Tuttle 23 stand pivot independently to stretcher Alert and interactive Presents with \"palpatations\" and anterior chest pressure after receiving IV contrast for cardiac procedure + associated SOB/HTN and HA Symptoms have abated but c

## 2018-06-28 ENCOUNTER — TELEPHONE (OUTPATIENT)
Dept: NEPHROLOGY | Facility: CLINIC | Age: 78
End: 2018-06-28

## 2018-06-29 ENCOUNTER — OFFICE VISIT (OUTPATIENT)
Dept: INTERNAL MEDICINE CLINIC | Facility: CLINIC | Age: 78
End: 2018-06-29

## 2018-06-29 VITALS
WEIGHT: 226 LBS | SYSTOLIC BLOOD PRESSURE: 130 MMHG | HEIGHT: 65 IN | HEART RATE: 65 BPM | BODY MASS INDEX: 37.65 KG/M2 | DIASTOLIC BLOOD PRESSURE: 76 MMHG

## 2018-06-29 DIAGNOSIS — I27.20 PULMONARY HYPERTENSION (HCC): ICD-10-CM

## 2018-06-29 DIAGNOSIS — I51.89 LEFT VENTRICULAR DYSFUNCTION WITH REDUCED LEFT VENTRICULAR FUNCTION: ICD-10-CM

## 2018-06-29 DIAGNOSIS — R60.0 LEG EDEMA, RIGHT: Primary | ICD-10-CM

## 2018-06-29 DIAGNOSIS — M25.561 CHRONIC PAIN OF RIGHT KNEE: ICD-10-CM

## 2018-06-29 DIAGNOSIS — G89.29 CHRONIC PAIN OF RIGHT KNEE: ICD-10-CM

## 2018-06-29 PROCEDURE — 99214 OFFICE O/P EST MOD 30 MIN: CPT | Performed by: INTERNAL MEDICINE

## 2018-06-29 PROCEDURE — G0463 HOSPITAL OUTPT CLINIC VISIT: HCPCS | Performed by: INTERNAL MEDICINE

## 2018-06-30 ENCOUNTER — TELEPHONE (OUTPATIENT)
Dept: NEPHROLOGY | Facility: CLINIC | Age: 78
End: 2018-06-30

## 2018-06-30 NOTE — PROGRESS NOTES
HPI:    Patient ID: Cierra Melo is a 76year old female. Presents for evaluation of several concerns    HPI  Patient of Dr. Damian Valerio presents with concern of right leg swelling.   Patient reports that last 2 weeks leg is swollen and she has more difficul Prescriptions:  HYDROCHLOROTHIAZIDE 25 MG Oral Tab TAKE 1 TABLET(25 MG) BY MOUTH DAILY Disp: 90 tablet Rfl: 0   GLIPIZIDE ER 10 MG Oral Tablet 24 Hr TAKE 1 TABLET BY MOUTH DAILY Disp: 90 tablet Rfl: 0   VALSARTAN 320 MG Oral Tab TAKE 1 TABLET BY MOUTH EVER D3) Oral Tab Take by mouth. Disp:  Rfl:    FOLIC ACID OR Take by mouth. Disp:  Rfl:    glycerin, laxative, 1.2 G Rectal Suppos Place 1 suppository rectally daily as needed.  Disp:  Rfl:    OneTouch UltraSoft Lancets Does not apply Misc Test 2-3 times per [employer] Coumadin,  She will schedule test at her convenience  Chronic pain of right knee advised patient to see orthopedic specialist for further management of the knee pain, continue to use cane  Valvular heart disease, pulmonary hypertension decreased left ventr

## 2018-06-30 NOTE — TELEPHONE ENCOUNTER
Her echocadiogram shows that her heart as a pump has gotten much worse. This is probably causing her lower ext edema. Needs to see her cardilogist ASAP.

## 2018-07-02 ENCOUNTER — OFFICE VISIT (OUTPATIENT)
Dept: CARDIOLOGY CLINIC | Facility: CLINIC | Age: 78
End: 2018-07-02

## 2018-07-02 ENCOUNTER — PRIOR ORIGINAL RECORDS (OUTPATIENT)
Dept: OTHER | Age: 78
End: 2018-07-02

## 2018-07-02 ENCOUNTER — HOSPITAL ENCOUNTER (EMERGENCY)
Facility: HOSPITAL | Age: 78
Discharge: HOME | End: 2018-07-02
Attending: EMERGENCY MEDICINE | Admitting: INTERNAL MEDICINE
Payer: MEDICARE

## 2018-07-02 VITALS
OXYGEN SATURATION: 98 % | HEART RATE: 65 BPM | SYSTOLIC BLOOD PRESSURE: 145 MMHG | RESPIRATION RATE: 16 BRPM | TEMPERATURE: 98 F | DIASTOLIC BLOOD PRESSURE: 70 MMHG

## 2018-07-02 VITALS
HEIGHT: 65 IN | SYSTOLIC BLOOD PRESSURE: 126 MMHG | DIASTOLIC BLOOD PRESSURE: 64 MMHG | WEIGHT: 227 LBS | BODY MASS INDEX: 37.82 KG/M2 | HEART RATE: 64 BPM

## 2018-07-02 DIAGNOSIS — R10.9 CHRONIC ABDOMINAL PAIN: Primary | ICD-10-CM

## 2018-07-02 DIAGNOSIS — R60.0 LOCALIZED EDEMA: ICD-10-CM

## 2018-07-02 DIAGNOSIS — M79.604 RIGHT LEG PAIN: ICD-10-CM

## 2018-07-02 DIAGNOSIS — I42.9 CARDIOMYOPATHY, UNSPECIFIED TYPE (HCC): Primary | ICD-10-CM

## 2018-07-02 DIAGNOSIS — I48.20 CHRONIC ATRIAL FIBRILLATION (HCC): ICD-10-CM

## 2018-07-02 DIAGNOSIS — G89.29 CHRONIC ABDOMINAL PAIN: Primary | ICD-10-CM

## 2018-07-02 DIAGNOSIS — I42.0 DILATED CARDIOMYOPATHY (HCC): ICD-10-CM

## 2018-07-02 LAB
ANION GAP SERPL CALC-SCNC: 9 MMOL/L (ref 0–18)
BASOPHILS # BLD: 0.1 K/UL (ref 0–0.2)
BASOPHILS NFR BLD: 1 %
BUN SERPL-MCNC: 16 MG/DL (ref 8–20)
BUN/CREAT SERPL: 22.5 (ref 10–20)
CALCIUM SERPL-MCNC: 9.4 MG/DL (ref 8.5–10.5)
CHLORIDE SERPL-SCNC: 94 MMOL/L (ref 95–110)
CO2 SERPL-SCNC: 28 MMOL/L (ref 22–32)
CREAT SERPL-MCNC: 0.71 MG/DL (ref 0.5–1.5)
EOSINOPHIL # BLD: 0.2 K/UL (ref 0–0.7)
EOSINOPHIL NFR BLD: 3 %
ERYTHROCYTE [DISTWIDTH] IN BLOOD BY AUTOMATED COUNT: 14.5 % (ref 11–15)
GLUCOSE SERPL-MCNC: 120 MG/DL (ref 70–99)
HCT VFR BLD AUTO: 37.3 % (ref 35–48)
HGB BLD-MCNC: 12.2 G/DL (ref 12–16)
INR BLD: 2.1 (ref 0.9–1.2)
LYMPHOCYTES # BLD: 2 K/UL (ref 1–4)
LYMPHOCYTES NFR BLD: 33 %
MCH RBC QN AUTO: 29 PG (ref 27–32)
MCHC RBC AUTO-ENTMCNC: 32.8 G/DL (ref 32–37)
MCV RBC AUTO: 88.4 FL (ref 80–100)
MONOCYTES # BLD: 0.8 K/UL (ref 0–1)
MONOCYTES NFR BLD: 13 %
NEUTROPHILS # BLD AUTO: 3.1 K/UL (ref 1.8–7.7)
NEUTROPHILS NFR BLD: 50 %
OSMOLALITY UR CALC.SUM OF ELEC: 274 MOSM/KG (ref 275–295)
PLATELET # BLD AUTO: 209 K/UL (ref 140–400)
PMV BLD AUTO: 7.5 FL (ref 7.4–10.3)
POTASSIUM SERPL-SCNC: 4.1 MMOL/L (ref 3.3–5.1)
PROTHROMBIN TIME: 23.3 SECONDS (ref 11.8–14.5)
RBC # BLD AUTO: 4.22 M/UL (ref 3.7–5.4)
SODIUM SERPL-SCNC: 131 MMOL/L (ref 136–144)
WBC # BLD AUTO: 6.2 K/UL (ref 4–11)

## 2018-07-02 PROCEDURE — 36415 COLL VENOUS BLD VENIPUNCTURE: CPT

## 2018-07-02 PROCEDURE — 99215 OFFICE O/P EST HI 40 MIN: CPT | Performed by: INTERNAL MEDICINE

## 2018-07-02 PROCEDURE — G0463 HOSPITAL OUTPT CLINIC VISIT: HCPCS | Performed by: INTERNAL MEDICINE

## 2018-07-02 PROCEDURE — 80048 BASIC METABOLIC PNL TOTAL CA: CPT | Performed by: EMERGENCY MEDICINE

## 2018-07-02 PROCEDURE — 99283 EMERGENCY DEPT VISIT LOW MDM: CPT

## 2018-07-02 PROCEDURE — 85025 COMPLETE CBC W/AUTO DIFF WBC: CPT | Performed by: EMERGENCY MEDICINE

## 2018-07-02 PROCEDURE — 85610 PROTHROMBIN TIME: CPT | Performed by: EMERGENCY MEDICINE

## 2018-07-02 RX ORDER — CLONIDINE HYDROCHLORIDE 0.1 MG/1
TABLET ORAL
Qty: 30 TABLET | Refills: 0 | Status: SHIPPED | OUTPATIENT
Start: 2018-07-02 | End: 2018-07-02

## 2018-07-02 RX ORDER — CLONIDINE HYDROCHLORIDE 0.1 MG/1
0.1 TABLET ORAL 2 TIMES DAILY
Qty: 30 TABLET | Refills: 0 | Status: SHIPPED | OUTPATIENT
Start: 2018-07-02 | End: 2018-07-02

## 2018-07-02 RX ORDER — TORSEMIDE 20 MG/1
20 TABLET ORAL DAILY
Qty: 30 TABLET | Refills: 0 | Status: SHIPPED | OUTPATIENT
Start: 2018-07-02 | End: 2018-07-02

## 2018-07-02 RX ORDER — CLONIDINE HYDROCHLORIDE 0.1 MG/1
TABLET ORAL
Qty: 180 TABLET | Refills: 0 | Status: CANCELLED | OUTPATIENT
Start: 2018-07-02

## 2018-07-02 NOTE — TELEPHONE ENCOUNTER
Patient contacted. Results message from Dr. Keyona Lopez read to patient. She has seen Dr. Amandeep Duarte in the past so I gave her his phone# and advised her to call to make an appointment and needs to be seen ASAP as Dr. Keyona Lopez advised.

## 2018-07-02 NOTE — ED NOTES
Reviewed all discharge information with patient. Patient verbalized understanding, no further questions or complaints at this time. Patient is alert and orientated x4, in no apparent distress, ambulating with steady gait. IV discontinued.  Melissa becker

## 2018-07-02 NOTE — PATIENT INSTRUCTIONS
1.  Schedule regadenoson nuclear stress test    2 discontinue hydrochlorothiazide    3 start torsemide 20 mg per day    4 decrease clonidine to 0.1 mg nightly.     Blood work next week and follow-up with Ilana

## 2018-07-02 NOTE — PROGRESS NOTES
Sylvain Boyd is a 76year old female. Patient presents with:   Follow - Up: Shortness of breath, Abnormal Echo    HPI:   Patient is a history of atrial fibrillation and tachybradycardia syndrome with a permanent pacemaker, on warfarin.,  History of hyperte Disp: 25 vial Rfl: 3   ACCU-CHEK SOFTCLIX LANCETS Does not apply Misc Test blood sugar daily Dx E11.9 Adult onset non insulin using diabetes.  Disp: 100 each Rfl: 3   FLUTICASONE PROPIONATE 50 MCG/ACT Nasal Suspension SHAKE LIQUID AND USE 2 SPRAYS IN [facility] Musculoskeletal: Positive for back pain and myalgias. Neurological: Negative for syncope. EXAM:   /64   Pulse 64   Ht 5' 5\" (1.651 m)   Wt 227 lb (103 kg)   BMI 37.77 kg/m²   Physical Exam   Cardiovascular: Regular rhythm.     No murmur hea

## 2018-07-02 NOTE — ED NOTES
Received pt from [address]. Pt here with c/o rt sided abd pain radiating down rt leg. States pain began approx around 2100 yesterday. Abd non tender, states pain is better in abd at this time but worse in her leg.  Pt placed on monitor, IV inserted,

## 2018-07-02 NOTE — ED PROVIDER NOTES
Patient Seen in: [facility] AND [facility] Emergency Department    History   Patient presents with:  Abdomen/Flank Pain (GI/)    Stated Complaint: Rt sided abd pain    HPI    66-year-old female presents the emergency department after an episode of right-sided reviewed and negative except as noted above.     Physical Exam   ED Triage Vitals [07/02/18 0030]  BP: (!) 166/81  Pulse: 66  Resp: 20  Temp: 97.7 °F (36.5 °C)  Temp src: Oral  SpO2: 99 %  O2 Device: None (Room air)    Current:BP (!) 166/81   Pulse 66   Tem Abnormality         Status                     ---------                               -----------         ------                     CBC W/ DIFFERENTIAL[[id number]]                              Final result                 Please view results

## 2018-07-05 ENCOUNTER — ANTI-COAG VISIT (OUTPATIENT)
Dept: INTERNAL MEDICINE CLINIC | Facility: CLINIC | Age: 78
End: 2018-07-05

## 2018-07-05 DIAGNOSIS — I48.20 CHRONIC ATRIAL FIBRILLATION (HCC): ICD-10-CM

## 2018-07-05 LAB — INR: 3.4 (ref 2–3)

## 2018-07-05 PROCEDURE — G0463 HOSPITAL OUTPT CLINIC VISIT: HCPCS | Performed by: INTERNAL MEDICINE

## 2018-07-05 RX ORDER — CLONIDINE HYDROCHLORIDE 0.1 MG/1
TABLET ORAL
Qty: 90 TABLET | Refills: 1 | Status: SHIPPED | OUTPATIENT
Start: 2018-07-05 | End: 2018-07-30

## 2018-07-05 RX ORDER — TORSEMIDE 20 MG/1
TABLET ORAL
Qty: 90 TABLET | Refills: 1 | Status: SHIPPED | OUTPATIENT
Start: 2018-07-05 | End: 2018-07-30 | Stop reason: DRUGHIGH

## 2018-07-09 ENCOUNTER — APPOINTMENT (OUTPATIENT)
Dept: LAB | Age: 78
End: 2018-07-09
Attending: INTERNAL MEDICINE
Payer: MEDICARE

## 2018-07-09 ENCOUNTER — HOSPITAL ENCOUNTER (OUTPATIENT)
Dept: ULTRASOUND IMAGING | Facility: HOSPITAL | Age: 78
Discharge: HOME | End: 2018-07-09
Attending: INTERNAL MEDICINE
Payer: MEDICARE

## 2018-07-09 DIAGNOSIS — I42.9 CARDIOMYOPATHY, UNSPECIFIED TYPE (HCC): ICD-10-CM

## 2018-07-09 DIAGNOSIS — R60.0 LEG EDEMA, RIGHT: ICD-10-CM

## 2018-07-09 LAB
ANION GAP SERPL CALC-SCNC: 10 MMOL/L (ref 0–18)
BUN SERPL-MCNC: 16 MG/DL (ref 8–20)
BUN/CREAT SERPL: 18.4 (ref 10–20)
CALCIUM SERPL-MCNC: 9.2 MG/DL (ref 8.5–10.5)
CHLORIDE SERPL-SCNC: 94 MMOL/L (ref 95–110)
CO2 SERPL-SCNC: 30 MMOL/L (ref 22–32)
CREAT SERPL-MCNC: 0.87 MG/DL (ref 0.5–1.5)
GLUCOSE SERPL-MCNC: 156 MG/DL (ref 70–99)
OSMOLALITY UR CALC.SUM OF ELEC: 282 MOSM/KG (ref 275–295)
POTASSIUM SERPL-SCNC: 4.1 MMOL/L (ref 3.3–5.1)
SODIUM SERPL-SCNC: 134 MMOL/L (ref 136–144)

## 2018-07-09 PROCEDURE — 80048 BASIC METABOLIC PNL TOTAL CA: CPT

## 2018-07-09 PROCEDURE — 93971 EXTREMITY STUDY: CPT | Performed by: INTERNAL MEDICINE

## 2018-07-09 PROCEDURE — 36415 COLL VENOUS BLD VENIPUNCTURE: CPT

## 2018-07-09 RX ORDER — NYSTATIN 100000 U/G
CREAM TOPICAL
Qty: 30 G | Refills: 0 | Status: ON HOLD | OUTPATIENT
Start: 2018-07-09 | End: 2019-01-01

## 2018-07-09 RX ORDER — NYSTATIN 100000 [USP'U]/G
POWDER TOPICAL
Qty: 45 G | Refills: 0 | OUTPATIENT
Start: 2018-07-09

## 2018-07-10 ENCOUNTER — TELEPHONE (OUTPATIENT)
Dept: OTHER | Age: 78
End: 2018-07-10

## 2018-07-10 NOTE — TELEPHONE ENCOUNTER
Spoke with patient (identified name and ), results reviewed and agrees with plan.     Result Notes     Notes recorded by Chi Villalta on 7/10/2018 at 9:07 AM T  [facility] transfer to [address] [place] [address]  ------    Notes recorded by Lenka Sanderson MD on 2018

## 2018-07-11 ENCOUNTER — ANTI-COAG VISIT (OUTPATIENT)
Dept: INTERNAL MEDICINE CLINIC | Facility: CLINIC | Age: 78
End: 2018-07-11

## 2018-07-11 ENCOUNTER — OFFICE VISIT (OUTPATIENT)
Dept: CARDIOLOGY CLINIC | Facility: CLINIC | Age: 78
End: 2018-07-11

## 2018-07-11 VITALS
BODY MASS INDEX: 36.99 KG/M2 | WEIGHT: 222 LBS | HEIGHT: 65 IN | HEART RATE: 64 BPM | SYSTOLIC BLOOD PRESSURE: 134 MMHG | RESPIRATION RATE: 16 BRPM | DIASTOLIC BLOOD PRESSURE: 78 MMHG

## 2018-07-11 DIAGNOSIS — I42.9 CARDIOMYOPATHY, UNSPECIFIED TYPE (HCC): Primary | ICD-10-CM

## 2018-07-11 DIAGNOSIS — I48.20 CHRONIC ATRIAL FIBRILLATION (HCC): ICD-10-CM

## 2018-07-11 LAB — INR: 2.9 (ref 2–3)

## 2018-07-11 PROCEDURE — G0463 HOSPITAL OUTPT CLINIC VISIT: HCPCS | Performed by: NURSE PRACTITIONER

## 2018-07-11 PROCEDURE — 99213 OFFICE O/P EST LOW 20 MIN: CPT | Performed by: NURSE PRACTITIONER

## 2018-07-11 NOTE — PATIENT INSTRUCTIONS
1. Weigh daily and limit salt  2. Elevated feet   3. Follow up with Sara in 2 wks  4.  Schedule stress test

## 2018-07-11 NOTE — PROGRESS NOTES
Mauricio Walker is a 76year old female. Patient presents with: Follow - Up: Digestion, Fatigue, Both leggs swollen    HPI:   Patient comes in today for a checkup for lower extremity swelling. She has history of A. fib pacemaker she is on Coumadin.   Thomasine Kayser Does not apply Device Test blood sugar dailyDx E11.9 Type 2 non insulin diabetic Disp: 1 Device Rfl: 0   Glucose Blood (ACCU-CHEK SABRINA) In Vitro Strip Test blood sugar daily.  Dx E11.9 Type 2 non insulin diabetic Disp: 50 strip Rfl: 5   Lancets Does not ap 1/1/1977  Smokeless tobacco: Never Used                      Comment: Quit 1977  Alcohol use:  No                 REVIEW OF SYSTEMS:   GENERAL HEALTH: feels well otherwise  SKIN: denies any unusual skin lesions or rashes  RESPIRATORY: denies shortness of br

## 2018-07-18 ENCOUNTER — MYAURORA ACCOUNT LINK (OUTPATIENT)
Dept: OTHER | Age: 78
End: 2018-07-18

## 2018-07-20 ENCOUNTER — TELEPHONE (OUTPATIENT)
Dept: CARDIOLOGY CLINIC | Facility: CLINIC | Age: 78
End: 2018-07-20

## 2018-07-20 NOTE — TELEPHONE ENCOUNTER
FYI - Pt called to find out if she needs to take regular meds before nuclear stress test on Monday morning. Attempted to transfer/no answer at all #'s. Call transferred to service for on call doctor.

## 2018-07-23 NOTE — TELEPHONE ENCOUNTER
Pt states she cancelled because she felt sick. Per pt has appt with [facility] tomorrow. Verbalized will discuss with her about her medication.

## 2018-07-25 ENCOUNTER — OFFICE VISIT (OUTPATIENT)
Dept: CARDIOLOGY CLINIC | Facility: CLINIC | Age: 78
End: 2018-07-25
Payer: MEDICARE

## 2018-07-25 VITALS
BODY MASS INDEX: 37.32 KG/M2 | WEIGHT: 224 LBS | RESPIRATION RATE: 18 BRPM | DIASTOLIC BLOOD PRESSURE: 64 MMHG | SYSTOLIC BLOOD PRESSURE: 145 MMHG | HEIGHT: 65 IN | HEART RATE: 65 BPM

## 2018-07-25 DIAGNOSIS — I42.9 CARDIOMYOPATHY, UNSPECIFIED TYPE (HCC): ICD-10-CM

## 2018-07-25 DIAGNOSIS — I73.9 CLAUDICATION (HCC): Primary | ICD-10-CM

## 2018-07-25 PROCEDURE — 99214 OFFICE O/P EST MOD 30 MIN: CPT | Performed by: NURSE PRACTITIONER

## 2018-07-25 PROCEDURE — G0463 HOSPITAL OUTPT CLINIC VISIT: HCPCS | Performed by: NURSE PRACTITIONER

## 2018-07-25 RX ORDER — CARVEDILOL 3.12 MG/1
TABLET ORAL
Qty: 180 TABLET | Refills: 1 | Status: SHIPPED | OUTPATIENT
Start: 2018-07-25 | End: 2018-08-06 | Stop reason: ALTCHOICE

## 2018-07-25 RX ORDER — CARVEDILOL 3.12 MG/1
3.12 TABLET ORAL 2 TIMES DAILY WITH MEALS
Qty: 60 TABLET | Refills: 0 | Status: SHIPPED | OUTPATIENT
Start: 2018-07-25 | End: 2018-07-25

## 2018-07-25 NOTE — PATIENT INSTRUCTIONS
1. Call pharmacy and see if you have recalled valsartan  2. Elevate feet and use compression stockings  3. Take torsemide 20mg twice a day for 5 days then resume once a day  4. Stop Clonidine and start coreg 3.125mg BID next day  5.  Arterial doppler

## 2018-07-25 NOTE — PROGRESS NOTES
Ami Hair is a 76year old female. Patient presents with:   Follow - Up  Atrial Fibrillation  Pacemaker Reprogramming  Edema: lower extremity edema  Leg Pain: severe rt leg pain   Fatigue  Dyspnea    HPI:   Patient comes in today for a checkup for lower TABLET BY MOUTH DAILY Disp: 90 tablet Rfl: 0   VALSARTAN 320 MG Oral Tab TAKE 1 TABLET BY MOUTH EVERY DAY Disp: 90 tablet Rfl: 0   Nystatin 034994 UNIT/GM External Powder Apply to affected areas bid Disp: 45 g Rfl: 1   TraMADol HCl 50 MG Oral Tab TAKE 1 TA unspecified    • Other and unspecified hyperlipidemia    • Pacemaker    • Unspecified essential hypertension       Social History:  Smoking status: Former Smoker                                                              Packs/day: 1.00      Years: 25.00 start carvedilol 3.125 following day. She also needs to go for her stress test.  Like to see her back with Dr. Adriana Davison in the next few weeks to monitor her progress.   She is also advised to see her primary doctor for this pain that could be sciatic pain but

## 2018-07-26 DIAGNOSIS — I10 HYPERTENSION, UNSPECIFIED TYPE: Primary | ICD-10-CM

## 2018-07-26 NOTE — TELEPHONE ENCOUNTER
Stop valsartan and switch to losartan 100mg and check BMP in 1 wk.  Stay on coreg, may refill torsemide

## 2018-07-26 NOTE — TELEPHONE ENCOUNTER
Sara, Allergy/contraindication came up for cetylpyridinium chloride when I entered losartan. Please adivse.

## 2018-07-26 NOTE — TELEPHONE ENCOUNTER
Please advise on alternative for Valsartan due to a recall. Regarding Torsemide refill. Chart reviewed. Rx was sent on 7/5/18 for 90d supply with 1 refill.  Sent to [facility]

## 2018-07-27 ENCOUNTER — HOSPITAL ENCOUNTER (OUTPATIENT)
Dept: ULTRASOUND IMAGING | Facility: HOSPITAL | Age: 78
Discharge: HOME | End: 2018-07-27
Attending: NURSE PRACTITIONER
Payer: MEDICARE

## 2018-07-27 DIAGNOSIS — I73.9 CLAUDICATION (HCC): ICD-10-CM

## 2018-07-27 PROCEDURE — 93923 UPR/LXTR ART STDY 3+ LVLS: CPT | Performed by: NURSE PRACTITIONER

## 2018-07-30 ENCOUNTER — TELEPHONE (OUTPATIENT)
Dept: CARDIOLOGY CLINIC | Facility: CLINIC | Age: 78
End: 2018-07-30

## 2018-07-30 ENCOUNTER — TELEPHONE (OUTPATIENT)
Dept: NEPHROLOGY | Facility: CLINIC | Age: 78
End: 2018-07-30

## 2018-07-30 RX ORDER — LOSARTAN POTASSIUM 100 MG/1
100 TABLET ORAL DAILY
Qty: 90 TABLET | Refills: 1 | Status: ON HOLD | OUTPATIENT
Start: 2018-07-30 | End: 2018-09-15

## 2018-07-30 RX ORDER — TORSEMIDE 20 MG/1
20 TABLET ORAL 2 TIMES DAILY
Qty: 180 TABLET | Refills: 1 | Status: SHIPPED | OUTPATIENT
Start: 2018-07-30 | End: 2018-08-06

## 2018-07-30 NOTE — TELEPHONE ENCOUNTER
Patient contacted. She saw Dr. Héctor Sol. Hctz was replaced with Torsemide. They discontinued Clonidine and replaced it with Carvedilol. Patient was also taking Valsartan which is no longer on her med list. See 7/26/18 Refill encounter.  Dr. Héctor Sol prescribed Joe Liz

## 2018-07-30 NOTE — TELEPHONE ENCOUNTER
Rx for Losartan 100 mg daily, quantity of 90 tablets with 1 additional refill, was approved and sent to patient's preferred pharmacy as instructed by Dr. Binta Schwartz. No further action is required at this time.

## 2018-07-30 NOTE — TELEPHONE ENCOUNTER
Pt calling to advise that pt has stopped taking rx: Valsartan due to recall, pt wanting to discuss other medications as well,pls call at:[phone],thanks.

## 2018-07-30 NOTE — TELEPHONE ENCOUNTER
Patient was called. States ELVA Ruiz increased dosage of Torsemide 20 mg to twice daily during her last Consultation on 07/25/18. APN's notes from 07/25/18 were reviewed and refill for Torsemide 20 mg twice daily, quantity of 180 tablets with 1 additional refill, was sent to patient's preferred pharmacy. No further questions / concerns at this time.

## 2018-08-01 RX ORDER — TORSEMIDE 20 MG/1
TABLET ORAL 2 TIMES DAILY
Qty: 30 TABLET | Refills: 0 | Status: ON HOLD | OUTPATIENT
Start: 2018-08-01 | End: 2019-01-01

## 2018-08-01 NOTE — TELEPHONE ENCOUNTER
Re Torsemide Reviewed LOV w/RH Torsemide 20 mg bid noted and noted to pharmacy    Meets refill protocol criteria.  Refilled per protocol    Hypertensive Medications  Protocol Criteria:  · Appointment scheduled with Cardiology in the past 12 months or in the CO2 30 07/09/2018   GLOBULIN 3.3 06/18/2018   AGRATIO 1.1 09/22/2016   ANIONGAP 10 07/09/2018   OSMOCALC 282 07/09/2018

## 2018-08-02 ENCOUNTER — HOSPITAL ENCOUNTER (OUTPATIENT)
Dept: NUCLEAR MEDICINE | Facility: HOSPITAL | Age: 78
Discharge: HOME | End: 2018-08-02
Attending: NURSE PRACTITIONER
Payer: MEDICARE

## 2018-08-02 ENCOUNTER — HOSPITAL ENCOUNTER (OUTPATIENT)
Dept: CV DIAGNOSTICS | Facility: HOSPITAL | Age: 78
Discharge: HOME | End: 2018-08-02
Attending: NURSE PRACTITIONER
Payer: MEDICARE

## 2018-08-02 DIAGNOSIS — I42.9 CARDIOMYOPATHY, UNSPECIFIED TYPE (HCC): ICD-10-CM

## 2018-08-02 PROCEDURE — 78452 HT MUSCLE IMAGE SPECT MULT: CPT | Performed by: NURSE PRACTITIONER

## 2018-08-02 PROCEDURE — A4216 STERILE WATER/SALINE, 10 ML: HCPCS

## 2018-08-02 PROCEDURE — 93018 CV STRESS TEST I&R ONLY: CPT | Performed by: NURSE PRACTITIONER

## 2018-08-02 PROCEDURE — 93017 CV STRESS TEST TRACING ONLY: CPT | Performed by: NURSE PRACTITIONER

## 2018-08-02 PROCEDURE — 93016 CV STRESS TEST SUPVJ ONLY: CPT | Performed by: NURSE PRACTITIONER

## 2018-08-02 RX ORDER — 0.9 % SODIUM CHLORIDE 0.9 %
VIAL (ML) INJECTION
Status: COMPLETED
Start: 2018-08-02 | End: 2018-08-02

## 2018-08-02 RX ADMIN — 0.9 % SODIUM CHLORIDE: 0.9 % VIAL (ML) INJECTION at 12:49:00

## 2018-08-03 ENCOUNTER — TELEPHONE (OUTPATIENT)
Dept: CARDIOLOGY CLINIC | Facility: CLINIC | Age: 78
End: 2018-08-03

## 2018-08-03 NOTE — TELEPHONE ENCOUNTER
Received a phone call from [facility] @ [facility] to notify Dr. Ezra Rehman of Abnormal Jose Rodriguez results ( cardiac testing done 08/02/18 ). Patient has been scheduled for Consultation with Dr. Ezra Rehman on 08/06/18. FYI message is being sent to Cardio pool to follow up on this particular case.

## 2018-08-06 ENCOUNTER — OFFICE VISIT (OUTPATIENT)
Dept: CARDIOLOGY CLINIC | Facility: CLINIC | Age: 78
End: 2018-08-06
Payer: MEDICARE

## 2018-08-06 VITALS
HEIGHT: 65 IN | SYSTOLIC BLOOD PRESSURE: 140 MMHG | BODY MASS INDEX: 37.49 KG/M2 | HEART RATE: 68 BPM | RESPIRATION RATE: 18 BRPM | WEIGHT: 225 LBS | DIASTOLIC BLOOD PRESSURE: 80 MMHG

## 2018-08-06 DIAGNOSIS — I42.0 DILATED CARDIOMYOPATHY (HCC): ICD-10-CM

## 2018-08-06 DIAGNOSIS — I10 ESSENTIAL HYPERTENSION: ICD-10-CM

## 2018-08-06 DIAGNOSIS — R60.0 LOCALIZED EDEMA: Primary | ICD-10-CM

## 2018-08-06 DIAGNOSIS — I48.20 CHRONIC ATRIAL FIBRILLATION (HCC): ICD-10-CM

## 2018-08-06 PROCEDURE — 99214 OFFICE O/P EST MOD 30 MIN: CPT | Performed by: INTERNAL MEDICINE

## 2018-08-06 PROCEDURE — G0463 HOSPITAL OUTPT CLINIC VISIT: HCPCS | Performed by: INTERNAL MEDICINE

## 2018-08-06 RX ORDER — METOPROLOL SUCCINATE 25 MG/1
25 TABLET, EXTENDED RELEASE ORAL DAILY
Qty: 30 TABLET | Refills: 12 | Status: SHIPPED | OUTPATIENT
Start: 2018-08-06 | End: 2019-01-01

## 2018-08-06 NOTE — PATIENT INSTRUCTIONS
Discontinue carvedilol    Start metoprolol XL 25 mg per day. You may take this at night if you would like.     See  chava in 2 or 3 weeks for blood pressure check    If you have increasing edema you may take an extra dose of torsemide in the afternoon for 2

## 2018-08-06 NOTE — PROGRESS NOTES
Stephanie Kaba is a 76year old female. Patient presents with:   Follow - Up: midepigastric pressure after coughing today  Leg Pain: bilateral leg pain  Edema: lower extremity edema  Fatigue  Atrial Fibrillation  Cardiomyopathy  Hypertension    HPI:   Mason sulfate (2.5 MG/3ML) 0.083% Inhalation Nebu Soln Take 3 mL (2.5 mg total) by nebulization every 6 (six) hours as needed for Wheezing. Disp: 25 vial Rfl: 3   Glucose Blood (ACCU-CHEK SABRINA) In Vitro Strip Test blood sugar daily.  Dx E11.9 Type 2 non insulin Negative. Respiratory: Negative. Negative for shortness of breath. Cardiovascular: Positive for leg swelling. Negative for chest pain and palpitations. Gastrointestinal: Negative. Genitourinary: Negative. Musculoskeletal: Negative.     Skin: understanding of these issues and agrees to the plan.            Zac iHcks MD  8/6/2018  4:15 PM

## 2018-08-09 ENCOUNTER — ANTI-COAG VISIT (OUTPATIENT)
Dept: INTERNAL MEDICINE CLINIC | Facility: CLINIC | Age: 78
End: 2018-08-09
Payer: MEDICARE

## 2018-08-09 DIAGNOSIS — I48.20 CHRONIC ATRIAL FIBRILLATION (HCC): ICD-10-CM

## 2018-08-09 LAB — INR: 3.7 (ref 2–3)

## 2018-08-09 PROCEDURE — G0463 HOSPITAL OUTPT CLINIC VISIT: HCPCS | Performed by: INTERNAL MEDICINE

## 2018-08-13 RX ORDER — WARFARIN SODIUM 5 MG/1
TABLET ORAL
Qty: 60 TABLET | Refills: 5 | Status: ON HOLD | OUTPATIENT
Start: 2018-08-13 | End: 2019-01-01

## 2018-08-13 NOTE — TELEPHONE ENCOUNTER
LOV 6/20/18. Last coumadin clinic visit 8/9/18. He is taking 2.5 mg 5 days a week and 5 mg 2 days a week.

## 2018-08-23 ENCOUNTER — ANTI-COAG VISIT (OUTPATIENT)
Dept: INTERNAL MEDICINE CLINIC | Facility: CLINIC | Age: 78
End: 2018-08-23
Payer: MEDICARE

## 2018-08-23 DIAGNOSIS — I48.20 CHRONIC ATRIAL FIBRILLATION (HCC): ICD-10-CM

## 2018-08-23 LAB — INR: 2 (ref 2–3)

## 2018-08-23 PROCEDURE — G0463 HOSPITAL OUTPT CLINIC VISIT: HCPCS

## 2018-08-23 PROCEDURE — 85610 PROTHROMBIN TIME: CPT

## 2018-08-23 PROCEDURE — 36416 COLLJ CAPILLARY BLOOD SPEC: CPT

## 2018-08-28 ENCOUNTER — OFFICE VISIT (OUTPATIENT)
Dept: CARDIOLOGY CLINIC | Facility: CLINIC | Age: 78
End: 2018-08-28
Payer: MEDICARE

## 2018-08-28 VITALS
SYSTOLIC BLOOD PRESSURE: 118 MMHG | HEART RATE: 66 BPM | BODY MASS INDEX: 37 KG/M2 | WEIGHT: 224 LBS | RESPIRATION RATE: 18 BRPM | DIASTOLIC BLOOD PRESSURE: 70 MMHG

## 2018-08-28 DIAGNOSIS — R60.0 LOCALIZED EDEMA: Primary | ICD-10-CM

## 2018-08-28 PROCEDURE — 99213 OFFICE O/P EST LOW 20 MIN: CPT | Performed by: NURSE PRACTITIONER

## 2018-08-28 PROCEDURE — G0463 HOSPITAL OUTPT CLINIC VISIT: HCPCS | Performed by: NURSE PRACTITIONER

## 2018-08-28 NOTE — PROGRESS NOTES
Prateek Harris is a 76year old female. Patient presents with:   Follow - Up  Atrial Fibrillation  Cardiomyopathy  Hypertension  Edema: lower extremity edema, accompanied by bilateral leg pain  Dyspnea: on exertion, hot weather  Fatigue    HPI:   Patient com HCl 50 MG Oral Tab TAKE 1 TABLET BY MOUTH THREE TIMES DAILY AS NEEDED FOR PAIN Disp: 30 tablet Rfl: 1   METFORMIN HCL  MG Oral Tablet 24 Hr TAKE 2 TABLETS BY MOUTH BEFORE DINNER (Patient taking differently: TAKE 1 TABLETS BY MOUTH BEFORE DINNER) Disp unusual skin lesions or rashes  RESPIRATORY: denies shortness of breath with exertion  CARDIOVASCULAR: no chest pain  GI: denies abdominal pain and denies heartburn  NEURO: denies headaches    EXAM:   /70   Pulse 66   Resp 18   Wt 224 lb (101.6 kg)

## 2018-08-28 NOTE — PATIENT INSTRUCTIONS
1. Increase metoprolol to 37.5mg one and 1/2 pills daily at night  2. Limit salt   3. Weigh daily   4.  Follow up with Dr. Herb Matthews in 1 month

## 2018-09-08 ENCOUNTER — HOSPITAL ENCOUNTER (INPATIENT)
Facility: HOSPITAL | Age: 78
LOS: 7 days | Discharge: HOME | DRG: 394 | End: 2018-09-15
Attending: EMERGENCY MEDICINE | Admitting: HOSPITALIST
Payer: MEDICARE

## 2018-09-08 ENCOUNTER — APPOINTMENT (OUTPATIENT)
Dept: CT IMAGING | Facility: HOSPITAL | Age: 78
DRG: 394 | End: 2018-09-08
Attending: EMERGENCY MEDICINE
Payer: MEDICARE

## 2018-09-08 DIAGNOSIS — K52.9 COLITIS: ICD-10-CM

## 2018-09-08 DIAGNOSIS — K62.5 RECTAL BLEEDING: Primary | ICD-10-CM

## 2018-09-08 LAB
ALBUMIN SERPL BCP-MCNC: 3.1 G/DL (ref 3.5–4.8)
ALP SERPL-CCNC: 35 U/L (ref 32–100)
ALT SERPL-CCNC: 19 U/L (ref 14–54)
ANION GAP SERPL CALC-SCNC: 8 MMOL/L (ref 0–18)
ANTIBODY SCREEN: NEGATIVE
AST SERPL-CCNC: 28 U/L (ref 15–41)
BACTERIA UR QL AUTO: NEGATIVE /HPF
BASOPHILS # BLD: 0.1 K/UL (ref 0–0.2)
BASOPHILS NFR BLD: 1 %
BILIRUB DIRECT SERPL-MCNC: 0.5 MG/DL (ref 0–0.2)
BILIRUB SERPL-MCNC: 1.7 MG/DL (ref 0.3–1.2)
BILIRUB UR QL: NEGATIVE
BUN SERPL-MCNC: 16 MG/DL (ref 8–20)
BUN/CREAT SERPL: 17.4 (ref 10–20)
CALCIUM SERPL-MCNC: 8.8 MG/DL (ref 8.5–10.5)
CHLORIDE SERPL-SCNC: 99 MMOL/L (ref 95–110)
CLARITY UR: CLEAR
CO2 SERPL-SCNC: 29 MMOL/L (ref 22–32)
COLOR UR: YELLOW
CREAT SERPL-MCNC: 0.92 MG/DL (ref 0.5–1.5)
EOSINOPHIL # BLD: 0 K/UL (ref 0–0.7)
EOSINOPHIL NFR BLD: 0 %
ERYTHROCYTE [DISTWIDTH] IN BLOOD BY AUTOMATED COUNT: 14.7 % (ref 11–15)
GLUCOSE BLDC GLUCOMTR-MCNC: 121 MG/DL (ref 70–99)
GLUCOSE SERPL-MCNC: 163 MG/DL (ref 70–99)
GLUCOSE UR-MCNC: NEGATIVE MG/DL
HBA1C MFR BLD: 5.7 % (ref 4–6)
HCT VFR BLD AUTO: 37.7 % (ref 35–48)
HGB BLD-MCNC: 12.3 G/DL (ref 12–16)
INR BLD: 2.3 (ref 0.9–1.2)
KETONES UR-MCNC: NEGATIVE MG/DL
LACTATE SERPL-SCNC: 0.8 MMOL/L (ref 0.5–2.2)
LEUKOCYTE ESTERASE UR QL STRIP.AUTO: NEGATIVE
LYMPHOCYTES # BLD: 1.1 K/UL (ref 1–4)
LYMPHOCYTES NFR BLD: 10 %
MCH RBC QN AUTO: 29.5 PG (ref 27–32)
MCHC RBC AUTO-ENTMCNC: 32.7 G/DL (ref 32–37)
MCV RBC AUTO: 90.1 FL (ref 80–100)
MONOCYTES # BLD: 1.2 K/UL (ref 0–1)
MONOCYTES NFR BLD: 10 %
NEUTROPHILS # BLD AUTO: 9.4 K/UL (ref 1.8–7.7)
NEUTROPHILS NFR BLD: 80 %
NITRITE UR QL STRIP.AUTO: NEGATIVE
OSMOLALITY UR CALC.SUM OF ELEC: 287 MOSM/KG (ref 275–295)
PH UR: 5 [PH] (ref 5–8)
PLATELET # BLD AUTO: 174 K/UL (ref 140–400)
PMV BLD AUTO: 7.3 FL (ref 7.4–10.3)
POTASSIUM SERPL-SCNC: 3.5 MMOL/L (ref 3.3–5.1)
PROT SERPL-MCNC: 6.6 G/DL (ref 5.9–8.4)
PROT UR-MCNC: NEGATIVE MG/DL
PROTHROMBIN TIME: 24.3 SECONDS (ref 11.8–14.5)
RBC # BLD AUTO: 4.19 M/UL (ref 3.7–5.4)
RBC #/AREA URNS AUTO: 3 /HPF
RH BLOOD TYPE: POSITIVE
SODIUM SERPL-SCNC: 136 MMOL/L (ref 136–144)
UROBILINOGEN UR STRIP-ACNC: <2
VIT C UR-MCNC: NEGATIVE MG/DL
WBC # BLD AUTO: 11.8 K/UL (ref 4–11)
WBC #/AREA URNS AUTO: 2 /HPF

## 2018-09-08 PROCEDURE — 99223 1ST HOSP IP/OBS HIGH 75: CPT | Performed by: HOSPITALIST

## 2018-09-08 PROCEDURE — 74177 CT ABD & PELVIS W/CONTRAST: CPT | Performed by: EMERGENCY MEDICINE

## 2018-09-08 RX ORDER — DEXTROSE MONOHYDRATE 25 G/50ML
50 INJECTION, SOLUTION INTRAVENOUS AS NEEDED
Status: DISCONTINUED | OUTPATIENT
Start: 2018-09-08 | End: 2018-09-15

## 2018-09-08 RX ORDER — FOLIC ACID 1 MG/1
1 TABLET ORAL DAILY
Status: DISCONTINUED | OUTPATIENT
Start: 2018-09-08 | End: 2018-09-15

## 2018-09-08 RX ORDER — LOSARTAN POTASSIUM 100 MG/1
100 TABLET ORAL DAILY
Status: DISCONTINUED | OUTPATIENT
Start: 2018-09-08 | End: 2018-09-12

## 2018-09-08 RX ORDER — ONDANSETRON 2 MG/ML
4 INJECTION INTRAMUSCULAR; INTRAVENOUS ONCE
Status: COMPLETED | OUTPATIENT
Start: 2018-09-08 | End: 2018-09-08

## 2018-09-08 RX ORDER — SODIUM PHOSPHATE, DIBASIC AND SODIUM PHOSPHATE, MONOBASIC 7; 19 G/133ML; G/133ML
1 ENEMA RECTAL ONCE AS NEEDED
Status: DISCONTINUED | OUTPATIENT
Start: 2018-09-08 | End: 2018-09-15

## 2018-09-08 RX ORDER — POLYETHYLENE GLYCOL 3350 17 G/17G
17 POWDER, FOR SOLUTION ORAL DAILY PRN
Status: DISCONTINUED | OUTPATIENT
Start: 2018-09-08 | End: 2018-09-15

## 2018-09-08 RX ORDER — BISACODYL 10 MG
10 SUPPOSITORY, RECTAL RECTAL
Status: DISCONTINUED | OUTPATIENT
Start: 2018-09-08 | End: 2018-09-15

## 2018-09-08 RX ORDER — ACETAMINOPHEN 325 MG/1
650 TABLET ORAL EVERY 4 HOURS PRN
Status: DISCONTINUED | OUTPATIENT
Start: 2018-09-08 | End: 2018-09-15

## 2018-09-08 RX ORDER — DOCUSATE SODIUM 100 MG/1
100 CAPSULE, LIQUID FILLED ORAL 2 TIMES DAILY
Status: DISCONTINUED | OUTPATIENT
Start: 2018-09-08 | End: 2018-09-15

## 2018-09-08 RX ORDER — TRAMADOL HYDROCHLORIDE 50 MG/1
50 TABLET ORAL EVERY 6 HOURS PRN
Status: DISCONTINUED | OUTPATIENT
Start: 2018-09-08 | End: 2018-09-15

## 2018-09-08 RX ORDER — ALBUTEROL SULFATE 2.5 MG/3ML
2.5 SOLUTION RESPIRATORY (INHALATION) EVERY 6 HOURS PRN
Status: DISCONTINUED | OUTPATIENT
Start: 2018-09-08 | End: 2018-09-15

## 2018-09-08 RX ORDER — MORPHINE SULFATE 4 MG/ML
4 INJECTION, SOLUTION INTRAMUSCULAR; INTRAVENOUS ONCE
Status: COMPLETED | OUTPATIENT
Start: 2018-09-08 | End: 2018-09-08

## 2018-09-08 RX ORDER — HYDROCODONE BITARTRATE AND ACETAMINOPHEN 5; 325 MG/1; MG/1
1 TABLET ORAL EVERY 4 HOURS PRN
Status: DISCONTINUED | OUTPATIENT
Start: 2018-09-08 | End: 2018-09-15

## 2018-09-08 RX ORDER — TORSEMIDE 20 MG/1
20 TABLET ORAL DAILY
Status: DISCONTINUED | OUTPATIENT
Start: 2018-09-08 | End: 2018-09-15

## 2018-09-08 RX ORDER — MORPHINE SULFATE 2 MG/ML
1 INJECTION, SOLUTION INTRAMUSCULAR; INTRAVENOUS EVERY 2 HOUR PRN
Status: DISCONTINUED | OUTPATIENT
Start: 2018-09-08 | End: 2018-09-15

## 2018-09-08 RX ORDER — METOPROLOL SUCCINATE 25 MG/1
25 TABLET, EXTENDED RELEASE ORAL DAILY
Status: DISCONTINUED | OUTPATIENT
Start: 2018-09-08 | End: 2018-09-10

## 2018-09-08 RX ORDER — ONDANSETRON 2 MG/ML
4 INJECTION INTRAMUSCULAR; INTRAVENOUS EVERY 6 HOURS PRN
Status: DISCONTINUED | OUTPATIENT
Start: 2018-09-08 | End: 2018-09-15

## 2018-09-08 RX ORDER — ZOLPIDEM TARTRATE 5 MG/1
2.5 TABLET ORAL NIGHTLY PRN
Status: DISCONTINUED | OUTPATIENT
Start: 2018-09-08 | End: 2018-09-15

## 2018-09-08 RX ORDER — ONDANSETRON 2 MG/ML
INJECTION INTRAMUSCULAR; INTRAVENOUS
Status: COMPLETED
Start: 2018-09-08 | End: 2018-09-08

## 2018-09-08 RX ORDER — HYDROCODONE BITARTRATE AND ACETAMINOPHEN 5; 325 MG/1; MG/1
2 TABLET ORAL EVERY 4 HOURS PRN
Status: DISCONTINUED | OUTPATIENT
Start: 2018-09-08 | End: 2018-09-15

## 2018-09-08 RX ORDER — MORPHINE SULFATE 4 MG/ML
4 INJECTION, SOLUTION INTRAMUSCULAR; INTRAVENOUS EVERY 2 HOUR PRN
Status: DISCONTINUED | OUTPATIENT
Start: 2018-09-08 | End: 2018-09-15

## 2018-09-08 RX ORDER — SODIUM CHLORIDE 0.9 % (FLUSH) 0.9 %
3 SYRINGE (ML) INJECTION AS NEEDED
Status: DISCONTINUED | OUTPATIENT
Start: 2018-09-08 | End: 2018-09-15

## 2018-09-08 RX ORDER — NYSTATIN 100000 U/G
CREAM TOPICAL 2 TIMES DAILY
Status: DISCONTINUED | OUTPATIENT
Start: 2018-09-08 | End: 2018-09-15

## 2018-09-08 RX ORDER — MORPHINE SULFATE 2 MG/ML
2 INJECTION, SOLUTION INTRAMUSCULAR; INTRAVENOUS EVERY 2 HOUR PRN
Status: DISCONTINUED | OUTPATIENT
Start: 2018-09-08 | End: 2018-09-15

## 2018-09-08 RX ORDER — METOCLOPRAMIDE HYDROCHLORIDE 5 MG/ML
10 INJECTION INTRAMUSCULAR; INTRAVENOUS EVERY 8 HOURS PRN
Status: DISCONTINUED | OUTPATIENT
Start: 2018-09-08 | End: 2018-09-15

## 2018-09-08 RX ORDER — SODIUM CHLORIDE 9 MG/ML
INJECTION, SOLUTION INTRAVENOUS CONTINUOUS
Status: DISCONTINUED | OUTPATIENT
Start: 2018-09-08 | End: 2018-09-13

## 2018-09-08 RX ORDER — ACETAMINOPHEN 325 MG/1
650 TABLET ORAL EVERY 6 HOURS PRN
Status: DISCONTINUED | OUTPATIENT
Start: 2018-09-08 | End: 2018-09-15

## 2018-09-08 NOTE — ED PROVIDER NOTES
Patient Seen in: [facility] AND [facility] Emergency Department    History   Patient presents with:  GI Bleeding (gastrointestinal)    Stated Complaint: Abdominal pain and rectal bleeding     HPI    75 yo F with PMH afib on warfarin, DM, HTN, HL, CHF (20-25% EF) p FOR PAIN   METFORMIN HCL  MG Oral Tablet 24 Hr,  TAKE 2 TABLETS BY MOUTH BEFORE DINNER  Patient taking differently: TAKE 1 TABLETS BY MOUTH BEFORE DINNER   albuterol sulfate (2.5 MG/3ML) 0.083% Inhalation Nebu Soln,  Take 3 mL (2.5 mg total) by nebul Triage Vitals [09/08/18 1537]   /63   Pulse 65   Resp 18   Temp 98.7 °F (37.1 °C)   Temp src Oral   SpO2 97 %   O2 Device None (Room air)       Current:/63   Pulse 65   Temp 98.7 °F (37.1 °C) (Oral)   Resp 18   Ht 165.1 cm (5' 5\")   Wt 100.2 k DRAW BLUE   RAINBOW DRAW LAVENDER   RAINBOW DRAW DARK GREEN   RAINBOW DRAW LIGHT GREEN   RAINBOW DRAW GOLD   RAINBOW DRAW LAVENDER TALL (BNP)   CBC W/ DIFFERENTIAL     EKG    Rate, intervals and axes as noted on EKG Report.   Rate: 66  Rhythm: paced  Readin Multiple well circumscribed renal hypodensities are present and are not completely characterized, but statistically likely represent cysts. GI/MESENTERY:  There is no evidence of bowel obstruction.  In the left lower quadrant, there is an anastomosis at the which is presumably reactive in nature. No evidence of free intraperitoneal air or drainable intra-abdominal fluid collection. 2. Sigmoid colonic diverticulosis without CT evidence of acute diverticulitis. Periampullary duodenal diverticulum.  3. Circumfere 20-25%. Disposition and Plan     Clinical Impression:  Rectal bleeding  (primary encounter diagnosis)  Colitis    Disposition:  Admit    Follow-up:  No follow-up provider specified.     Medications Prescribed:  Current Discharge Medication List

## 2018-09-08 NOTE — PROGRESS NOTES
[facility] Note: Antimicrobial Weight Dose Adjustment for: ceftriaxone (ROCEPHIN)    Mauricio Macias is a 76year old female who has been prescribed ceftriaxone (ROCEPHIN) 1000 mg x1.   CrCl is estimated creatinine clearance is 44.6 mL/min (based on SCr of

## 2018-09-09 ENCOUNTER — APPOINTMENT (OUTPATIENT)
Dept: GENERAL RADIOLOGY | Facility: HOSPITAL | Age: 78
DRG: 394 | End: 2018-09-09
Attending: HOSPITALIST
Payer: MEDICARE

## 2018-09-09 LAB
ALBUMIN SERPL BCP-MCNC: 2.6 G/DL (ref 3.5–4.8)
ALBUMIN/GLOB SERPL: 0.8 {RATIO} (ref 1–2)
ALP SERPL-CCNC: 35 U/L (ref 32–100)
ALT SERPL-CCNC: 19 U/L (ref 14–54)
ANION GAP SERPL CALC-SCNC: 6 MMOL/L (ref 0–18)
AST SERPL-CCNC: 27 U/L (ref 15–41)
BASOPHILS # BLD: 0 K/UL (ref 0–0.2)
BASOPHILS NFR BLD: 1 %
BILIRUB SERPL-MCNC: 1.3 MG/DL (ref 0.3–1.2)
BUN SERPL-MCNC: 14 MG/DL (ref 8–20)
BUN/CREAT SERPL: 15.7 (ref 10–20)
CALCIUM SERPL-MCNC: 8.2 MG/DL (ref 8.5–10.5)
CHLORIDE SERPL-SCNC: 99 MMOL/L (ref 95–110)
CO2 SERPL-SCNC: 29 MMOL/L (ref 22–32)
CREAT SERPL-MCNC: 0.89 MG/DL (ref 0.5–1.5)
EOSINOPHIL # BLD: 0.1 K/UL (ref 0–0.7)
EOSINOPHIL NFR BLD: 2 %
ERYTHROCYTE [DISTWIDTH] IN BLOOD BY AUTOMATED COUNT: 14.4 % (ref 11–15)
GLOBULIN PLAS-MCNC: 3.1 G/DL (ref 2.5–3.7)
GLUCOSE BLDC GLUCOMTR-MCNC: 112 MG/DL (ref 70–99)
GLUCOSE BLDC GLUCOMTR-MCNC: 117 MG/DL (ref 70–99)
GLUCOSE BLDC GLUCOMTR-MCNC: 123 MG/DL (ref 70–99)
GLUCOSE BLDC GLUCOMTR-MCNC: 140 MG/DL (ref 70–99)
GLUCOSE SERPL-MCNC: 110 MG/DL (ref 70–99)
HCT VFR BLD AUTO: 32.7 % (ref 35–48)
HGB BLD-MCNC: 10.8 G/DL (ref 12–16)
INR BLD: 2.1 (ref 0.9–1.2)
LYMPHOCYTES # BLD: 0.9 K/UL (ref 1–4)
LYMPHOCYTES NFR BLD: 12 %
MAGNESIUM SERPL-MCNC: 2 MG/DL (ref 1.8–2.5)
MCH RBC QN AUTO: 29.7 PG (ref 27–32)
MCHC RBC AUTO-ENTMCNC: 33 G/DL (ref 32–37)
MCV RBC AUTO: 90.1 FL (ref 80–100)
MONOCYTES # BLD: 0.8 K/UL (ref 0–1)
MONOCYTES NFR BLD: 10 %
NEUTROPHILS # BLD AUTO: 6.1 K/UL (ref 1.8–7.7)
NEUTROPHILS NFR BLD: 76 %
OSMOLALITY UR CALC.SUM OF ELEC: 279 MOSM/KG (ref 275–295)
PLATELET # BLD AUTO: 151 K/UL (ref 140–400)
PMV BLD AUTO: 7.2 FL (ref 7.4–10.3)
POTASSIUM SERPL-SCNC: 2.8 MMOL/L (ref 3.3–5.1)
POTASSIUM SERPL-SCNC: 3.8 MMOL/L (ref 3.3–5.1)
PROT SERPL-MCNC: 5.7 G/DL (ref 5.9–8.4)
PROTHROMBIN TIME: 22.8 SECONDS (ref 11.8–14.5)
RBC # BLD AUTO: 3.63 M/UL (ref 3.7–5.4)
SODIUM SERPL-SCNC: 134 MMOL/L (ref 136–144)
TSH SERPL-ACNC: 2.09 UIU/ML (ref 0.45–5.33)
WBC # BLD AUTO: 8 K/UL (ref 4–11)

## 2018-09-09 PROCEDURE — 99233 SBSQ HOSP IP/OBS HIGH 50: CPT | Performed by: HOSPITALIST

## 2018-09-09 PROCEDURE — 71046 X-RAY EXAM CHEST 2 VIEWS: CPT | Performed by: HOSPITALIST

## 2018-09-09 PROCEDURE — 99223 1ST HOSP IP/OBS HIGH 75: CPT | Performed by: INTERNAL MEDICINE

## 2018-09-09 RX ORDER — POTASSIUM CHLORIDE 14.9 MG/ML
20 INJECTION INTRAVENOUS ONCE
Status: COMPLETED | OUTPATIENT
Start: 2018-09-09 | End: 2018-09-09

## 2018-09-09 NOTE — PROGRESS NOTES
[facility]    Progress Note    Prateek Harris Patient Status:  Inpatient    10/14/1940 MRN [id number]   Location [facility] 5SW/SE Attending Honey Jung,*   Hosp Day # 1 PCP Kalani Arndt MD        Subjective: Continue medications. 9.       Exquisite back pain appears to be a paravertebral muscle spasm. We will give Flexeril. The patient denies taking any NSAIDs.   Please note the patient had no reaction to the morphine given here in the hospital at all, so we Circumferential urinary bladder wall thickening, which may reflect under distention or cystitis. Please correlate with urinalysis. 4. Large hiatal hernia. 5. Cirrhotic hepatic morphology.  Stable subcentimeter hypodense lesion in the right hepatic lobe, whi

## 2018-09-09 NOTE — CONSULTS
Gastroenterology consultation note    Reason for consultation:  Abdominal pain, rectal bleeding, abnormal CT scan      History of present illness: The patient is a 76year old female who is known to me from previous evaluation.   Her most recent GI care ha obstruction. The patient also describes intermittent solid food dysphagia which has been chronic.   She drank clear liquids today and feels somewhat short of breath and feels that the liquids are \"stuck\" although liquids and softer foods are typically chloride 0.9 % 250 mL IVPB 40 mEq Intravenous Once   Followed by      potassium chloride IVPB premix 20 mEq 20 mEq Intravenous Once   albuterol sulfate (VENTOLIN) (2.5 MG/3ML) 0.083% nebulizer solution 2.5 mg 2.5 mg Nebulization H5D PRN   folic acid (FOLVI UNIT/ML injection 1-5 Units 1-5 Units Subcutaneous 4 times per day   0.9%  NaCl infusion  Intravenous Continuous   Piperacillin Sod-Tazobactam So (ZOSYN) 4.5 g in dextrose 5 % 100 mL ADD-vantage 4.5 g Intravenous Q8H       Outpatient Medications Marked as 100 each Rfl: 5   FOLIC ACID OR Take by mouth. Disp:  Rfl:    glycerin, laxative, 1.2 G Rectal Suppos Place 1 suppository rectally daily as needed.  Disp:  Rfl:    OneTouch UltraSoft Lancets Does not apply Misc Test 2-3 times per day Disp:  Rfl:        Soci Pertinent positives and negatives noted in the the HPI. Physical examination:  GEN:  Pleasant well-developed well-nourished female who is complaining of shortness of breath but is not tachypneic or tachycardic. HEENT: Negative for scleral icterus. inflammatory, or ischemic colitis. There is small volume free fluid along the left paracolic gutter, liver, and dependently in the pelvis, which is presumably reactive in nature.  No evidence of free intraperitoneal air or drainable intra-abdominal fluid co partial obstruction at or near the anastomosis at the area of angulation described by Dr. Jody Flores in 2015 could be an additional precipitant. A GI PCR panel has been ordered. I would continue IV antibiotics and clear liquids.   There is no need for urgent

## 2018-09-10 ENCOUNTER — APPOINTMENT (OUTPATIENT)
Dept: GENERAL RADIOLOGY | Facility: HOSPITAL | Age: 78
DRG: 394 | End: 2018-09-10
Attending: HOSPITALIST
Payer: MEDICARE

## 2018-09-10 LAB
AFP-TM SERPL-MCNC: 2.6 NG/ML (ref 0–8.9)
ANION GAP SERPL CALC-SCNC: 4 MMOL/L (ref 0–18)
BASOPHILS # BLD: 0 K/UL (ref 0–0.2)
BASOPHILS NFR BLD: 1 %
BUN SERPL-MCNC: 12 MG/DL (ref 8–20)
BUN/CREAT SERPL: 11.7 (ref 10–20)
CALCIUM SERPL-MCNC: 8.5 MG/DL (ref 8.5–10.5)
CHLORIDE SERPL-SCNC: 103 MMOL/L (ref 95–110)
CO2 SERPL-SCNC: 31 MMOL/L (ref 22–32)
CREAT SERPL-MCNC: 1.03 MG/DL (ref 0.5–1.5)
EOSINOPHIL # BLD: 0.2 K/UL (ref 0–0.7)
EOSINOPHIL NFR BLD: 4 %
ERYTHROCYTE [DISTWIDTH] IN BLOOD BY AUTOMATED COUNT: 14.4 % (ref 11–15)
GLUCOSE BLDC GLUCOMTR-MCNC: 113 MG/DL (ref 70–99)
GLUCOSE BLDC GLUCOMTR-MCNC: 125 MG/DL (ref 70–99)
GLUCOSE BLDC GLUCOMTR-MCNC: 125 MG/DL (ref 70–99)
GLUCOSE BLDC GLUCOMTR-MCNC: 133 MG/DL (ref 70–99)
GLUCOSE SERPL-MCNC: 107 MG/DL (ref 70–99)
HCT VFR BLD AUTO: 33.3 % (ref 35–48)
HGB BLD-MCNC: 11 G/DL (ref 12–16)
INR BLD: 1.4 (ref 0.9–1.2)
LYMPHOCYTES # BLD: 1.1 K/UL (ref 1–4)
LYMPHOCYTES NFR BLD: 18 %
MAGNESIUM SERPL-MCNC: 1.9 MG/DL (ref 1.8–2.5)
MCH RBC QN AUTO: 30 PG (ref 27–32)
MCHC RBC AUTO-ENTMCNC: 33 G/DL (ref 32–37)
MCV RBC AUTO: 90.9 FL (ref 80–100)
MONOCYTES # BLD: 0.7 K/UL (ref 0–1)
MONOCYTES NFR BLD: 11 %
NEUTROPHILS # BLD AUTO: 4.3 K/UL (ref 1.8–7.7)
NEUTROPHILS NFR BLD: 67 %
OSMOLALITY UR CALC.SUM OF ELEC: 286 MOSM/KG (ref 275–295)
PLATELET # BLD AUTO: 164 K/UL (ref 140–400)
PMV BLD AUTO: 7.5 FL (ref 7.4–10.3)
POTASSIUM SERPL-SCNC: 3.6 MMOL/L (ref 3.3–5.1)
POTASSIUM SERPL-SCNC: 3.6 MMOL/L (ref 3.3–5.1)
POTASSIUM SERPL-SCNC: 4.2 MMOL/L (ref 3.3–5.1)
PROTHROMBIN TIME: 16.3 SECONDS (ref 11.8–14.5)
RBC # BLD AUTO: 3.66 M/UL (ref 3.7–5.4)
SODIUM SERPL-SCNC: 138 MMOL/L (ref 136–144)
WBC # BLD AUTO: 6.4 K/UL (ref 4–11)

## 2018-09-10 PROCEDURE — 71100 X-RAY EXAM RIBS UNI 2 VIEWS: CPT | Performed by: HOSPITALIST

## 2018-09-10 PROCEDURE — 73502 X-RAY EXAM HIP UNI 2-3 VIEWS: CPT | Performed by: HOSPITALIST

## 2018-09-10 PROCEDURE — 99233 SBSQ HOSP IP/OBS HIGH 50: CPT | Performed by: HOSPITALIST

## 2018-09-10 PROCEDURE — 99232 SBSQ HOSP IP/OBS MODERATE 35: CPT | Performed by: INTERNAL MEDICINE

## 2018-09-10 RX ORDER — METOPROLOL SUCCINATE 25 MG/1
25 TABLET, EXTENDED RELEASE ORAL DAILY
Status: DISCONTINUED | OUTPATIENT
Start: 2018-09-10 | End: 2018-09-15

## 2018-09-10 RX ORDER — POTASSIUM CHLORIDE 14.9 MG/ML
20 INJECTION INTRAVENOUS ONCE
Status: COMPLETED | OUTPATIENT
Start: 2018-09-10 | End: 2018-09-10

## 2018-09-10 RX ORDER — POTASSIUM CHLORIDE 20 MEQ/1
40 TABLET, EXTENDED RELEASE ORAL EVERY 4 HOURS
Status: COMPLETED | OUTPATIENT
Start: 2018-09-10 | End: 2018-09-10

## 2018-09-10 NOTE — PLAN OF CARE
Problem: Patient/Family Goals  Goal: Patient/Family Long Term Goal  Patient's Long Term Goal: Normal bowel regimen    Interventions:  - Continue to advance diet as tolerated.   - PRN laxative/stool softener for constipation  - adequate hydration  - See rika Notify MD/LIP if interventions unsuccessful or patient reports new pain  - Anticipate increased pain with activity and pre-medicate as appropriate  Outcome: Progressing  Pt c/o r leg and abd pain; Declining pain medication at this time    Problem: RISK FOR physician/LIP order or complex needs related to functional status, cognitive ability or social support system  Outcome: Progressing  Plan to D/C with [facility]    Problem: GASTROINTESTINAL - ADULT  Goal: Minimal or absence of nausea and vomiting  INTERVENTIONS: Progressing  Skin intact

## 2018-09-10 NOTE — PLAN OF CARE
Problem: Patient/Family Goals  Goal: Patient/Family Long Term Goal  Patient's Long Term Goal: Normal bowel regimen    Interventions:  - Continue to advance diet as tolerated.   - PRN laxative/stool softener for constipation  - adequate hydration  - See rika pain, prn morphine given.  Patient stated she feels it came on after drinking her broth    Problem: RISK FOR INFECTION - ADULT  Goal: Absence of fever/infection during anticipated neutropenic period  INTERVENTIONS  - Monitor WBC  - Administer growth factors

## 2018-09-10 NOTE — PROGRESS NOTES
[place] [facility]    Progress Note    Al Cerise Patient Status:  Inpatient    10/14/1940 MRN [id number]   Location [facility] 5SW/SE Attending Remigio Lamb Day # 2 PCP Tommy Barnes MD        Subjective: be a paravertebral muscle spasm.  We will give Flexeril.  The patient denies taking any NSAIDs.  Please note the patient had no reaction to the morphine given here in the hospital at all, so we will use that for pain for now and start the patient on clear infectious, inflammatory, or ischemic colitis. There is small volume free fluid along the left paracolic gutter, liver, and dependently in the pelvis, which is presumably reactive in nature.  No evidence of free intraperitoneal air or drainable intra-abdomi

## 2018-09-11 LAB
GLUCOSE BLDC GLUCOMTR-MCNC: 110 MG/DL (ref 70–99)
GLUCOSE BLDC GLUCOMTR-MCNC: 129 MG/DL (ref 70–99)
GLUCOSE BLDC GLUCOMTR-MCNC: 142 MG/DL (ref 70–99)
GLUCOSE BLDC GLUCOMTR-MCNC: 167 MG/DL (ref 70–99)

## 2018-09-11 PROCEDURE — 99233 SBSQ HOSP IP/OBS HIGH 50: CPT | Performed by: INTERNAL MEDICINE

## 2018-09-11 PROCEDURE — 99233 SBSQ HOSP IP/OBS HIGH 50: CPT | Performed by: HOSPITALIST

## 2018-09-11 RX ORDER — CYCLOBENZAPRINE HCL 10 MG
10 TABLET ORAL 3 TIMES DAILY PRN
Status: DISCONTINUED | OUTPATIENT
Start: 2018-09-11 | End: 2018-09-15

## 2018-09-11 RX ORDER — LIDOCAINE 50 MG/G
1 PATCH TOPICAL EVERY 24 HOURS
Status: DISCONTINUED | OUTPATIENT
Start: 2018-09-11 | End: 2018-09-15

## 2018-09-11 NOTE — PROGRESS NOTES
[place] [facility] - [facility]    Progress Note    Good Cowart Patient Status:  Inpatient    10/14/1940 MRN [id number]   Location [facility] 5SW/SE Attending Remigio Lamb Day # 3 PCP Harinder Gary MD        Subjective: hospital at all, so we will use that for pain for now and start the patient on clear liquids. 10.    Sob seems anxious cxr no new changes  11. Hip and right lower rib pain checked xray arthritis ? If from disk disease in back  12.      Hep c with liver vascular calcification.      Dictated by (CST): Pastor Hallman MD on 9/11/2018 at 10:57     Approved by (CST): Pastor Hallman MD on 9/11/2018 at 11:00                       Millicent Gotti MD  9/11/2018

## 2018-09-11 NOTE — PROGRESS NOTES
[facility]     Gastroenterology Progress Note    Christina Martel Patient Status:  Inpatient    10/14/1940 MRN [id number]   Location [facility] 5SW/SE Attending Kaylee Minaya,*   Hosp Day # 2 PCP Rebecca Piedra MD 18 97 % —   09/10/18 0852 122/55 98.8 °F (37.1 °C) Oral 65 18 94 % —   09/10/18 0700 — — — — — — 224 lb 9.6 oz (101.9 kg)     Body mass index is 37.38 kg/m².     Gen- Patient appears much more comfortable and in no acute distress  HEENT:Negative for scleral 09/09/2018    INR 2.3 (H) 09/08/2018       Xr Chest Pa + Lat Chest (cpt=71046)    Result Date: 9/9/2018  CONCLUSION:  Marked cardiomegaly, unipolar pacing electrode, and small to moderate hiatal hernia without radiographically evident acute intrathoracic p lobe 5 mm nodule since 2015. This is likely benign. 11. Cardiomegaly. 12. Lesser incidental findings as above.      Dictated by (CST): Vanita Aguiar MD on 9/08/2018 at 18:08     Approved by (CST): Vanita Aguiar MD on 9/08/2018 at 18:19            Ekg 12

## 2018-09-11 NOTE — DIETARY NOTE
ADULT NUTRITION INITIAL ASSESSMENT    Pt is at moderate nutrition risk. Pt does not meet malnutrition criteria.       RECOMMENDATIONS TO MD:  None at this time      NUTRITION DIAGNOSIS/PROBLEM: Inadequate oral intake related to altered GI function due to C lb)  07/25/18 : 101.6 kg (224 lb)  07/11/18 : 100.7 kg (222 lb)  07/02/18 : 103 kg (227 lb)    Patient Weight(s) for the past 336 hrs:   Weight   09/11/18 0621 102.7 kg (226 lb 6.4 oz)   09/10/18 0700 101.9 kg (224 lb 9.6 oz)   09/08/18 2107 100.9 kg (222 Dietitian  [phone]

## 2018-09-11 NOTE — PLAN OF CARE
Problem: Patient/Family Goals  Goal: Patient/Family Long Term Goal  Patient's Long Term Goal: Normal bowel regimen    Interventions:  - Continue to advance diet as tolerated.   - PRN laxative/stool softener for constipation  - adequate hydration  - See rika as appropriate  - Consider OT/PT consult to assist with strengthening/mobility  - Encourage toileting schedule  Outcome: Progressing  Bed alarm on, call light within reach. Pt instructed to call for help.     Problem: DISCHARGE PLANNING  Goal: Discharge to

## 2018-09-11 NOTE — PLAN OF CARE
Problem: Patient/Family Goals  Goal: Patient/Family Long Term Goal  Patient's Long Term Goal: Normal bowel regimen    Interventions:  - Continue to advance diet as tolerated.   - PRN laxative/stool softener for constipation  - adequate hydration  - See rika RISK FOR INFECTION - ADULT  Goal: Absence of fever/infection during anticipated neutropenic period  INTERVENTIONS  - Monitor WBC  - Administer growth factors as ordered  - Implement neutropenic guidelines  Outcome: Progressing      Problem: SAFETY ADULT - as ordered  - Evaluate effectiveness of ordered antiemetic medications  - Provide nonpharmacologic comfort measures as appropriate  - Advance diet as tolerated, if ordered  - Obtain nutritional consult as needed  - Evaluate fluid balance  Outcome: Progress

## 2018-09-11 NOTE — PROGRESS NOTES
[facility]  GI SERVICE PROGRESS NOTE    Irwin Little Patient Status:  Inpatient    10/14/1940 MRN [id number]   Location [facility] 5SW/SE Attending Remigio Lamb [place] Day # 3 PCP Demarcus Garcia MD       Subjective 4.2   CL  99  99   --   103   --    CO2  29  29   --   31   --    ALKPHO  35  35   --    --    --    AST  28  27   --    --    --    ALT  19  19   --    --    --    BILT  1.7*  1.3*   --    --    --    TP  6.6  5.7*   --    --    --        No results for i anastomosis August 2015 likely related to a spinal surgery and pain medications.   She has struggled with a combination of chronic constipation symptoms, likely abdominal adhesions and possibly obstructive symptoms from this unusual sigmoid colon anastomosi Elective upper endoscopy advised to exclude high risk varices.     3. Intermittent solid food dysphagia  Evaluation with elective endoscopy.     Recommend:  1. Continue IV Zosyn antibiotic. 2.  Continue clear liquids for now.   Will likely advance diet

## 2018-09-12 ENCOUNTER — APPOINTMENT (OUTPATIENT)
Dept: CT IMAGING | Facility: HOSPITAL | Age: 78
DRG: 394 | End: 2018-09-12
Attending: INTERNAL MEDICINE
Payer: MEDICARE

## 2018-09-12 LAB
ANION GAP SERPL CALC-SCNC: 4 MMOL/L (ref 0–18)
BASOPHILS # BLD: 0 K/UL (ref 0–0.2)
BASOPHILS NFR BLD: 1 %
BUN SERPL-MCNC: 8 MG/DL (ref 8–20)
BUN/CREAT SERPL: 9.5 (ref 10–20)
CALCIUM SERPL-MCNC: 8.4 MG/DL (ref 8.5–10.5)
CHLORIDE SERPL-SCNC: 103 MMOL/L (ref 95–110)
CO2 SERPL-SCNC: 29 MMOL/L (ref 22–32)
CREAT SERPL-MCNC: 0.84 MG/DL (ref 0.5–1.5)
EOSINOPHIL # BLD: 0.2 K/UL (ref 0–0.7)
EOSINOPHIL NFR BLD: 4 %
ERYTHROCYTE [DISTWIDTH] IN BLOOD BY AUTOMATED COUNT: 13.9 % (ref 11–15)
GLUCOSE BLDC GLUCOMTR-MCNC: 139 MG/DL (ref 70–99)
GLUCOSE BLDC GLUCOMTR-MCNC: 141 MG/DL (ref 70–99)
GLUCOSE BLDC GLUCOMTR-MCNC: 146 MG/DL (ref 70–99)
GLUCOSE BLDC GLUCOMTR-MCNC: 153 MG/DL (ref 70–99)
GLUCOSE BLDC GLUCOMTR-MCNC: 218 MG/DL (ref 70–99)
GLUCOSE SERPL-MCNC: 133 MG/DL (ref 70–99)
HCT VFR BLD AUTO: 32.2 % (ref 35–48)
HGB BLD-MCNC: 10.7 G/DL (ref 12–16)
LYMPHOCYTES # BLD: 0.7 K/UL (ref 1–4)
LYMPHOCYTES NFR BLD: 15 %
MAGNESIUM SERPL-MCNC: 1.7 MG/DL (ref 1.8–2.5)
MCH RBC QN AUTO: 29.8 PG (ref 27–32)
MCHC RBC AUTO-ENTMCNC: 33.2 G/DL (ref 32–37)
MCV RBC AUTO: 89.8 FL (ref 80–100)
MONOCYTES # BLD: 0.5 K/UL (ref 0–1)
MONOCYTES NFR BLD: 12 %
NEUTROPHILS # BLD AUTO: 3.2 K/UL (ref 1.8–7.7)
NEUTROPHILS NFR BLD: 69 %
OSMOLALITY UR CALC.SUM OF ELEC: 282 MOSM/KG (ref 275–295)
PLATELET # BLD AUTO: 179 K/UL (ref 140–400)
PMV BLD AUTO: 7.5 FL (ref 7.4–10.3)
POTASSIUM SERPL-SCNC: 3 MMOL/L (ref 3.3–5.1)
POTASSIUM SERPL-SCNC: 3.3 MMOL/L (ref 3.3–5.1)
RBC # BLD AUTO: 3.58 M/UL (ref 3.7–5.4)
SODIUM SERPL-SCNC: 136 MMOL/L (ref 136–144)
WBC # BLD AUTO: 4.6 K/UL (ref 4–11)

## 2018-09-12 PROCEDURE — 99233 SBSQ HOSP IP/OBS HIGH 50: CPT | Performed by: HOSPITALIST

## 2018-09-12 PROCEDURE — 74174 CTA ABD&PLVS W/CONTRAST: CPT | Performed by: INTERNAL MEDICINE

## 2018-09-12 PROCEDURE — 99232 SBSQ HOSP IP/OBS MODERATE 35: CPT | Performed by: INTERNAL MEDICINE

## 2018-09-12 RX ORDER — WARFARIN SODIUM 5 MG/1
5 TABLET ORAL
Status: COMPLETED | OUTPATIENT
Start: 2018-09-12 | End: 2018-09-12

## 2018-09-12 RX ORDER — POTASSIUM CHLORIDE 20 MEQ/1
40 TABLET, EXTENDED RELEASE ORAL EVERY 4 HOURS
Status: DISPENSED | OUTPATIENT
Start: 2018-09-12 | End: 2018-09-13

## 2018-09-12 RX ORDER — MAGNESIUM SULFATE HEPTAHYDRATE 40 MG/ML
2 INJECTION, SOLUTION INTRAVENOUS ONCE
Status: COMPLETED | OUTPATIENT
Start: 2018-09-12 | End: 2018-09-12

## 2018-09-12 RX ORDER — LOSARTAN POTASSIUM 50 MG/1
50 TABLET ORAL DAILY
Status: DISCONTINUED | OUTPATIENT
Start: 2018-09-13 | End: 2018-09-15

## 2018-09-12 RX ORDER — POTASSIUM CHLORIDE 20 MEQ/1
40 TABLET, EXTENDED RELEASE ORAL EVERY 4 HOURS
Status: DISPENSED | OUTPATIENT
Start: 2018-09-12 | End: 2018-09-12

## 2018-09-12 NOTE — PLAN OF CARE
Problem: Patient/Family Goals  Goal: Patient/Family Long Term Goal  Patient's Long Term Goal: Normal bowel regimen    Interventions:  - Continue to advance diet as tolerated.   - PRN laxative/stool softener for constipation  - adequate hydration  - See rika management  - Manage/alleviate anxiety  - Utilize distraction and/or relaxation techniques  - Monitor for opioid side effects  - Notify MD/LIP if interventions unsuccessful or patient reports new pain  - Anticipate increased pain with activity and pre-medi health  - Refer to Case Management Department for coordinating discharge planning if the patient needs post-hospital services based on physician/LIP order or complex needs related to functional status, cognitive ability or social support system  Outcome: P

## 2018-09-12 NOTE — PROGRESS NOTES
[facility]    Progress Note    Royce Landry Patient Status:  Inpatient    10/14/1940 MRN [id number]   Location [facility] 5SW/SE Attending Noelle Gandhi, [address] [facility] Day # 4 PCP Sasha Huber MD       Subjective:   Carol Ann Mcmillan (TYLENOL) tab 650 mg 650 mg Oral Q4H PRN   Or      HYDROcodone-acetaminophen (NORCO) 5-325 MG per tab 1 tablet 1 tablet Oral Q4H PRN   Or      HYDROcodone-acetaminophen (NORCO) 5-325 MG per tab 2 tablet 2 tablet Oral Q4H PRN   morphINE sulfate (PF) 2 MG/ML 09/10/2018    PT 19.3 (H) 11/19/2013    T4F 1.05 06/27/2018    TSH 2.09 09/09/2018     (H) 11/08/2011    MG 1.7 (L) 09/12/2018    TROP 0.04 (HH) 06/27/2018       Xr Ribs, Unilateral (2 Views), Right (cpt=71100)    Result Date: 9/11/2018  CONCLUSION: day #4  - reviewed Dr Antoine's note. Will decrease losartan to 50 mg daily   - cont NS at 83 per hour ; pos 5 L. Wt up to 227. 3.       Obesity, BMI 37.67.  May need CABRERA workup. 4.       Diabetes mellitus. - cont insulin sliding scale.     5.      h/

## 2018-09-12 NOTE — PROGRESS NOTES
[facility]     Gastroenterology Progress Note    Cierra Melo Patient Status:  Inpatient    10/14/1940 MRN [id number]   Location [facility] 5SW/SE Attending Susan Eden, [address] [facility] Day # 4 PCP Abner Montez MD nature. Will need c-scope at some point. #Chronic HCV- ongoing discussions per Dr. Sudhir Browning. Outpatient f/u needed, especially for indeterminate liver lesion. #Intermittent solid food dysphagia - will need outpatient EGD.     Recommend:  -CT angio   -A

## 2018-09-13 LAB
ANION GAP SERPL CALC-SCNC: 5 MMOL/L (ref 0–18)
BASOPHILS # BLD: 0 K/UL (ref 0–0.2)
BASOPHILS NFR BLD: 1 %
BUN SERPL-MCNC: 4 MG/DL (ref 8–20)
BUN/CREAT SERPL: 5.1 (ref 10–20)
CALCIUM SERPL-MCNC: 8.8 MG/DL (ref 8.5–10.5)
CHLORIDE SERPL-SCNC: 105 MMOL/L (ref 95–110)
CO2 SERPL-SCNC: 27 MMOL/L (ref 22–32)
CREAT SERPL-MCNC: 0.78 MG/DL (ref 0.5–1.5)
EOSINOPHIL # BLD: 0.2 K/UL (ref 0–0.7)
EOSINOPHIL NFR BLD: 4 %
ERYTHROCYTE [DISTWIDTH] IN BLOOD BY AUTOMATED COUNT: 13.9 % (ref 11–15)
GLUCOSE BLDC GLUCOMTR-MCNC: 140 MG/DL (ref 70–99)
GLUCOSE BLDC GLUCOMTR-MCNC: 143 MG/DL (ref 70–99)
GLUCOSE BLDC GLUCOMTR-MCNC: 146 MG/DL (ref 70–99)
GLUCOSE BLDC GLUCOMTR-MCNC: 169 MG/DL (ref 70–99)
GLUCOSE BLDC GLUCOMTR-MCNC: 190 MG/DL (ref 70–99)
GLUCOSE SERPL-MCNC: 125 MG/DL (ref 70–99)
HCT VFR BLD AUTO: 33.5 % (ref 35–48)
HGB BLD-MCNC: 10.9 G/DL (ref 12–16)
INR BLD: 1.3 (ref 0.9–1.2)
LYMPHOCYTES # BLD: 1 K/UL (ref 1–4)
LYMPHOCYTES NFR BLD: 19 %
MAGNESIUM SERPL-MCNC: 1.6 MG/DL (ref 1.8–2.5)
MCH RBC QN AUTO: 29.6 PG (ref 27–32)
MCHC RBC AUTO-ENTMCNC: 32.7 G/DL (ref 32–37)
MCV RBC AUTO: 90.6 FL (ref 80–100)
MONOCYTES # BLD: 0.6 K/UL (ref 0–1)
MONOCYTES NFR BLD: 11 %
NEUTROPHILS # BLD AUTO: 3.5 K/UL (ref 1.8–7.7)
NEUTROPHILS NFR BLD: 66 %
OSMOLALITY UR CALC.SUM OF ELEC: 282 MOSM/KG (ref 275–295)
PLATELET # BLD AUTO: 177 K/UL (ref 140–400)
PMV BLD AUTO: 7.3 FL (ref 7.4–10.3)
POTASSIUM SERPL-SCNC: 3.3 MMOL/L (ref 3.3–5.1)
POTASSIUM SERPL-SCNC: 3.3 MMOL/L (ref 3.3–5.1)
POTASSIUM SERPL-SCNC: 3.6 MMOL/L (ref 3.3–5.1)
PROTHROMBIN TIME: 16 SECONDS (ref 11.8–14.5)
RBC # BLD AUTO: 3.7 M/UL (ref 3.7–5.4)
SODIUM SERPL-SCNC: 137 MMOL/L (ref 136–144)
WBC # BLD AUTO: 5.3 K/UL (ref 4–11)

## 2018-09-13 PROCEDURE — 99232 SBSQ HOSP IP/OBS MODERATE 35: CPT | Performed by: INTERNAL MEDICINE

## 2018-09-13 PROCEDURE — 99233 SBSQ HOSP IP/OBS HIGH 50: CPT | Performed by: HOSPITALIST

## 2018-09-13 RX ORDER — WARFARIN SODIUM 5 MG/1
5 TABLET ORAL NIGHTLY
Status: DISCONTINUED | OUTPATIENT
Start: 2018-09-13 | End: 2018-09-15

## 2018-09-13 RX ORDER — MAGNESIUM OXIDE 400 MG (241.3 MG MAGNESIUM) TABLET
400 TABLET ONCE
Status: COMPLETED | OUTPATIENT
Start: 2018-09-13 | End: 2018-09-13

## 2018-09-13 NOTE — PROGRESS NOTES
[facility]  GI SERVICE PROGRESS NOTE    Ami Mao Patient Status:  Inpatient    10/14/1940 MRN [id number]   Location [facility] 5SW/SE Attending Nguyễn Pollack,*   Hosp Day # 5 PCP Abran Felder MD       Subjective >60   --    GFRNAA  59*  >60   --   52*   --   >60   --   >60   --    CA  8.8  8.2*   --   8.5   --   8.4*   --   8.8   --    ALB  3.1*  2.6*   --    --    --    --    --    --    --    NA  136  134*   --   138   --   136   --   137   --    K  3.5  2.8* Approved by (CST): Roman Parada MD on 9/12/2018 at 17:22              Assessment and Plan:     76year old woman with complex medical history as above including diabetes, atrial fibrillation, heart disease and recent cardiac pacemaker placement 2015. see my office note of June 2018. 7.  Elective upper endoscopy. 8.  As above, caution with any recent antihypertensive medication changes. I strongly suggest reducing the recent losartan dose in light of recent events.   Hypertensive here past 48 hours; c

## 2018-09-13 NOTE — PROGRESS NOTES
[place] [facility] - [facility]    Progress Note    Shruthi Feli Patient Status:  Inpatient    10/14/1940 MRN [id number]   Location [facility] 5SW/SE Attending Victorino Chin, [address] [facility] Day # 5 PCP Sofie Vargas MD       Subjective:   Desirae Garcia 650 mg 650 mg Oral Q6H PRN   acetaminophen (TYLENOL) tab 650 mg 650 mg Oral Q4H PRN   Or      HYDROcodone-acetaminophen (NORCO) 5-325 MG per tab 1 tablet 1 tablet Oral Q4H PRN   Or      HYDROcodone-acetaminophen (NORCO) 5-325 MG per tab 2 tablet 2 tablet O INR 1.3 (H) 09/13/2018    PT 19.3 (H) 11/19/2013    T4F 1.05 06/27/2018    TSH 2.09 09/09/2018     (H) 11/08/2011    MG 1.6 (L) 09/13/2018    TROP 0.04 (HH) 06/27/2018       Cta Abd/pel (cpt=74174)    Result Date: 9/12/2018  CONCLUSION:  1.  Charlotte Zamora CL  103   --   103   --   105   CO2  31   --   29   --   27    < > = values in this interval not displayed. Assessment and Plan:           1.       Acute ischemic colitis not diverticulitis with history of diverticulosis and previous diverticu

## 2018-09-14 LAB
ANION GAP SERPL CALC-SCNC: 8 MMOL/L (ref 0–18)
BASOPHILS # BLD: 0 K/UL (ref 0–0.2)
BASOPHILS NFR BLD: 1 %
BUN SERPL-MCNC: 6 MG/DL (ref 8–20)
BUN/CREAT SERPL: 7.1 (ref 10–20)
CALCIUM SERPL-MCNC: 9.1 MG/DL (ref 8.5–10.5)
CHLORIDE SERPL-SCNC: 104 MMOL/L (ref 95–110)
CO2 SERPL-SCNC: 26 MMOL/L (ref 22–32)
CREAT SERPL-MCNC: 0.84 MG/DL (ref 0.5–1.5)
EOSINOPHIL # BLD: 0.2 K/UL (ref 0–0.7)
EOSINOPHIL NFR BLD: 3 %
ERYTHROCYTE [DISTWIDTH] IN BLOOD BY AUTOMATED COUNT: 14.2 % (ref 11–15)
GLUCOSE BLDC GLUCOMTR-MCNC: 140 MG/DL (ref 70–99)
GLUCOSE BLDC GLUCOMTR-MCNC: 140 MG/DL (ref 70–99)
GLUCOSE BLDC GLUCOMTR-MCNC: 150 MG/DL (ref 70–99)
GLUCOSE BLDC GLUCOMTR-MCNC: 165 MG/DL (ref 70–99)
GLUCOSE SERPL-MCNC: 150 MG/DL (ref 70–99)
HCT VFR BLD AUTO: 34 % (ref 35–48)
HGB BLD-MCNC: 11.4 G/DL (ref 12–16)
INR BLD: 1.5 (ref 0.9–1.2)
LYMPHOCYTES # BLD: 1 K/UL (ref 1–4)
LYMPHOCYTES NFR BLD: 16 %
MAGNESIUM SERPL-MCNC: 1.7 MG/DL (ref 1.8–2.5)
MCH RBC QN AUTO: 30 PG (ref 27–32)
MCHC RBC AUTO-ENTMCNC: 33.4 G/DL (ref 32–37)
MCV RBC AUTO: 89.8 FL (ref 80–100)
MONOCYTES # BLD: 0.6 K/UL (ref 0–1)
MONOCYTES NFR BLD: 10 %
NEUTROPHILS # BLD AUTO: 4.3 K/UL (ref 1.8–7.7)
NEUTROPHILS NFR BLD: 70 %
OSMOLALITY UR CALC.SUM OF ELEC: 286 MOSM/KG (ref 275–295)
PLATELET # BLD AUTO: 199 K/UL (ref 140–400)
PMV BLD AUTO: 7.6 FL (ref 7.4–10.3)
POTASSIUM SERPL-SCNC: 3.4 MMOL/L (ref 3.3–5.1)
POTASSIUM SERPL-SCNC: 3.4 MMOL/L (ref 3.3–5.1)
PROTHROMBIN TIME: 17.8 SECONDS (ref 11.8–14.5)
RBC # BLD AUTO: 3.78 M/UL (ref 3.7–5.4)
SODIUM SERPL-SCNC: 138 MMOL/L (ref 136–144)
WBC # BLD AUTO: 6.1 K/UL (ref 4–11)

## 2018-09-14 PROCEDURE — 99233 SBSQ HOSP IP/OBS HIGH 50: CPT | Performed by: HOSPITALIST

## 2018-09-14 RX ORDER — MAGNESIUM OXIDE 400 MG (241.3 MG MAGNESIUM) TABLET
400 TABLET ONCE
Status: COMPLETED | OUTPATIENT
Start: 2018-09-14 | End: 2018-09-14

## 2018-09-14 NOTE — PROGRESS NOTES
[place] [facility] - [facility]    Progress Note    Al Cerise Patient Status:  Inpatient    10/14/1940 MRN [id number]   Location [facility] 5SW/SE Attending Samantha Valle, [address] [facility] Day # 6 PCP Tommy Barnes MD       Subjective:   Sandro Lockwood 650 mg 650 mg Oral Q6H PRN   acetaminophen (TYLENOL) tab 650 mg 650 mg Oral Q4H PRN   Or      HYDROcodone-acetaminophen (NORCO) 5-325 MG per tab 1 tablet 1 tablet Oral Q4H PRN   Or      HYDROcodone-acetaminophen (NORCO) 5-325 MG per tab 2 tablet 2 tablet O (H) 11/08/2011    MG 1.7 (L) 09/14/2018    TROP 0.04 (HH) 06/27/2018                 Results:     CBC:    Lab Results   Component Value Date    WBC 6.1 09/14/2018    WBC 5.3 09/13/2018    WBC 4.6 09/12/2018     Lab Results   Component Value Date    HGB 11. metoprolol 25 daily   - cont torsemide 20 qd     6.       Neutrophilia, probably from diverticulitis. Resolved     7.       Chronic kidney disease, stage 3. At baseline     8.       Hypertension.  Uncontrolled   - decrease losartan and maintain bp at higher

## 2018-09-14 NOTE — PLAN OF CARE
Problem: Patient/Family Goals  Goal: Patient/Family Long Term Goal  Patient's Long Term Goal: Normal bowel regimen    Interventions:  - Continue to advance diet as tolerated.   - PRN laxative/stool softener for constipation  - adequate hydration  - See rika appropriate  Outcome: Progressing      Problem: RISK FOR INFECTION - ADULT  Goal: Absence of fever/infection during anticipated neutropenic period  INTERVENTIONS  - Monitor WBC  - Administer growth factors as ordered  - Implement neutropenic guidelines  Ou with IV or PO as ordered and tolerated  - Nasogastric tube to low intermittent suction as ordered  - Evaluate effectiveness of ordered antiemetic medications  - Provide nonpharmacologic comfort measures as appropriate  - Advance diet as tolerated, if order consult as needed  - Instruct patient on self management of diabetes  Outcome: Progressing

## 2018-09-15 VITALS
OXYGEN SATURATION: 96 % | WEIGHT: 223.63 LBS | HEIGHT: 65 IN | RESPIRATION RATE: 20 BRPM | DIASTOLIC BLOOD PRESSURE: 80 MMHG | SYSTOLIC BLOOD PRESSURE: 167 MMHG | BODY MASS INDEX: 37.26 KG/M2 | TEMPERATURE: 98 F | HEART RATE: 66 BPM

## 2018-09-15 LAB
C DIFF TOX B STL QL: NEGATIVE
GLUCOSE BLDC GLUCOMTR-MCNC: 154 MG/DL (ref 70–99)
MAGNESIUM SERPL-MCNC: 1.5 MG/DL (ref 1.8–2.5)
POTASSIUM SERPL-SCNC: 3.3 MMOL/L (ref 3.3–5.1)

## 2018-09-15 PROCEDURE — 99239 HOSP IP/OBS DSCHRG MGMT >30: CPT | Performed by: HOSPITALIST

## 2018-09-15 RX ORDER — MAGNESIUM OXIDE 400 MG (241.3 MG MAGNESIUM) TABLET
800 TABLET ONCE
Status: COMPLETED | OUTPATIENT
Start: 2018-09-15 | End: 2018-09-15

## 2018-09-15 RX ORDER — POTASSIUM CHLORIDE 20 MEQ/1
40 TABLET, EXTENDED RELEASE ORAL EVERY 4 HOURS
Status: DISCONTINUED | OUTPATIENT
Start: 2018-09-15 | End: 2018-09-15

## 2018-09-15 RX ORDER — AMOXICILLIN AND CLAVULANATE POTASSIUM 875; 125 MG/1; MG/1
1 TABLET, FILM COATED ORAL 2 TIMES DAILY
Qty: 14 TABLET | Refills: 0 | Status: SHIPPED | OUTPATIENT
Start: 2018-09-15 | End: 2018-09-22

## 2018-09-15 RX ORDER — LOSARTAN POTASSIUM 50 MG/1
50 TABLET ORAL DAILY
Qty: 30 TABLET | Refills: 2 | Status: SHIPPED | OUTPATIENT
Start: 2018-09-15 | End: 2018-01-01

## 2018-09-15 RX ORDER — ONDANSETRON 4 MG/1
4 TABLET, FILM COATED ORAL EVERY 6 HOURS PRN
Qty: 20 TABLET | Refills: 1 | Status: SHIPPED | OUTPATIENT
Start: 2018-09-15 | End: 2019-01-01 | Stop reason: ALTCHOICE

## 2018-09-15 NOTE — DISCHARGE SUMMARY
[facility]  Discharge Summary     Gretchen   : 10/14/1940    Status: Inpatient  Day #: 7    Attending: Marlys Greene MD  PCP: Willy Snyder MD     Date of Admission: 2018  Date of Discharge: 9/15/2018     Hospital Discharge Diagno sounds  Musculoskeletal:  No joint swelling  Extremities:  No edema, no cyanosis, no clubbing  Neurologic:  nonfocal  Psychiatric:  Normal affect, calm and appropriate  Skin:  No rash, no lesion         Discharge Medications      START taking these medicat Test blood sugar daily. Dx E11.9 Type 2 non insulin diabetic   Quantity:  50 strip  Refills:  5     glycerin (laxative) 1.2 g Supp      Place 1 suppository rectally daily as needed.    Refills:  0     Metoprolol Succinate ER 25 MG Tb24  Commonly known as hospital.        I spent >30 minutes on this discharge, counseling patient and discussing discharge plans. All questions answered.       Ebony Coronel MD

## 2018-09-17 ENCOUNTER — ANTI-COAG VISIT (OUTPATIENT)
Dept: INTERNAL MEDICINE CLINIC | Facility: CLINIC | Age: 78
End: 2018-09-17

## 2018-09-17 ENCOUNTER — TELEPHONE (OUTPATIENT)
Dept: NEPHROLOGY | Facility: CLINIC | Age: 78
End: 2018-09-17

## 2018-09-17 ENCOUNTER — PATIENT OUTREACH (OUTPATIENT)
Dept: CASE MANAGEMENT | Age: 78
End: 2018-09-17

## 2018-09-17 DIAGNOSIS — I48.20 CHRONIC ATRIAL FIBRILLATION (HCC): ICD-10-CM

## 2018-09-17 NOTE — TELEPHONE ENCOUNTER
Pt discharged from [facility] on 9/15/18 . Please call to schedule TCM follow up with Primary Care Physician.  Book by 9/29/18  Thanks

## 2018-09-17 NOTE — PROGRESS NOTES
Initial Post Discharge Follow Up   Discharge Date: 9/15/18  Contact Date: 9/17/2018    Consent Verification:  Assessment Completed With: Patient  HIPAA Verified?   Yes    Discharge Dx:  Acute ischemic colitis    General:   • How have you been since your d 1 tablet (25 mg total) by mouth daily.  Disp: 30 tablet Rfl: 12   TORSEMIDE 20 MG Oral Tab TAKE 1 TABLET(20 MG) BY MOUTH DAILY Disp: 30 tablet Rfl: 0   NYSTATIN 107523 UNIT/GM External Cream APPLY EXTERNALLY TO THE AFFECTED AREA TWICE DAILY AS NEEDED FOR DR no  o Do you have any questions about your new medication?  No  • Did you  your discharge medications when you left the hospital? Yes  • May I go over your medications with you to make sure we are not missing anything?no  Are you having any concerns hospital?   The food is pretty good but its the same menu over and over  Dr Rocael Spence was excellent, Dr Ellen Sheridan was right on the money  I liked her too        Interventions by NCM: pt called for TCM outreach , Pt states she is feeling better though still ha

## 2018-09-17 NOTE — TELEPHONE ENCOUNTER
Patient contacted.  [facility] follow up appointment booked with Dr. Kelvin Johnson on 9/25/18 3:40 pm.

## 2018-09-18 ENCOUNTER — ANTI-COAG VISIT (OUTPATIENT)
Dept: INTERNAL MEDICINE CLINIC | Facility: CLINIC | Age: 78
End: 2018-09-18
Payer: MEDICARE

## 2018-09-18 DIAGNOSIS — I48.20 CHRONIC ATRIAL FIBRILLATION (HCC): ICD-10-CM

## 2018-09-18 LAB — INR: 2.5 (ref 2–3)

## 2018-09-18 PROCEDURE — 36416 COLLJ CAPILLARY BLOOD SPEC: CPT

## 2018-09-18 PROCEDURE — 85610 PROTHROMBIN TIME: CPT

## 2018-09-18 PROCEDURE — G0463 HOSPITAL OUTPT CLINIC VISIT: HCPCS

## 2018-09-25 ENCOUNTER — OFFICE VISIT (OUTPATIENT)
Dept: NEPHROLOGY | Facility: CLINIC | Age: 78
End: 2018-09-25
Payer: MEDICARE

## 2018-09-25 VITALS
HEIGHT: 65.5 IN | HEART RATE: 65 BPM | DIASTOLIC BLOOD PRESSURE: 78 MMHG | WEIGHT: 219.63 LBS | SYSTOLIC BLOOD PRESSURE: 143 MMHG | BODY MASS INDEX: 36.15 KG/M2

## 2018-09-25 DIAGNOSIS — E11.9 TYPE 2 DIABETES MELLITUS WITHOUT COMPLICATION, WITHOUT LONG-TERM CURRENT USE OF INSULIN (HCC): Primary | ICD-10-CM

## 2018-09-25 DIAGNOSIS — I10 ESSENTIAL HYPERTENSION: ICD-10-CM

## 2018-09-25 DIAGNOSIS — I48.20 CHRONIC ATRIAL FIBRILLATION (HCC): ICD-10-CM

## 2018-09-25 PROCEDURE — G0463 HOSPITAL OUTPT CLINIC VISIT: HCPCS | Performed by: INTERNAL MEDICINE

## 2018-09-25 PROCEDURE — 1111F DSCHRG MED/CURRENT MED MERGE: CPT | Performed by: INTERNAL MEDICINE

## 2018-09-25 PROCEDURE — 99215 OFFICE O/P EST HI 40 MIN: CPT | Performed by: INTERNAL MEDICINE

## 2018-09-25 RX ORDER — TRAMADOL HYDROCHLORIDE 50 MG/1
TABLET ORAL
Qty: 30 TABLET | Refills: 1 | Status: ON HOLD | OUTPATIENT
Start: 2018-09-25 | End: 2019-01-01

## 2018-09-25 NOTE — TELEPHONE ENCOUNTER
Pt indicates she had appt today and forgot to request refill for rx:Tramadol, confirmed default pharm/[facility], pls call at:  [phone],KAYLA.

## 2018-09-26 NOTE — PATIENT INSTRUCTIONS
See 1 of our general surgeons to evaluate your right-sided abdominal hernia. You should also follow-up with Dr. Lloyd Ash.

## 2018-09-27 RX ORDER — GLIPIZIDE 10 MG/1
TABLET, FILM COATED, EXTENDED RELEASE ORAL
Qty: 90 TABLET | Refills: 1 | Status: SHIPPED | OUTPATIENT
Start: 2018-09-27 | End: 2019-01-01

## 2018-09-27 RX ORDER — METFORMIN HYDROCHLORIDE 500 MG/1
TABLET, EXTENDED RELEASE ORAL
Qty: 180 TABLET | Refills: 1 | Status: SHIPPED | OUTPATIENT
Start: 2018-09-27 | End: 2019-01-01

## 2018-09-27 RX ORDER — BLOOD SUGAR DIAGNOSTIC
STRIP MISCELLANEOUS
Qty: 100 STRIP | Refills: 3 | Status: ON HOLD | OUTPATIENT
Start: 2018-09-27 | End: 2019-01-01

## 2018-09-27 RX ORDER — LANCETS
EACH MISCELLANEOUS
Qty: 100 EACH | Refills: 3 | Status: SHIPPED | OUTPATIENT
Start: 2018-09-27

## 2018-10-02 ENCOUNTER — OFFICE VISIT (OUTPATIENT)
Dept: CARDIOLOGY CLINIC | Facility: CLINIC | Age: 78
End: 2018-10-02
Payer: MEDICARE

## 2018-10-02 VITALS
TEMPERATURE: 98 F | BODY MASS INDEX: 35.82 KG/M2 | HEIGHT: 65 IN | DIASTOLIC BLOOD PRESSURE: 72 MMHG | HEART RATE: 65 BPM | RESPIRATION RATE: 15 BRPM | SYSTOLIC BLOOD PRESSURE: 128 MMHG | WEIGHT: 215 LBS

## 2018-10-02 DIAGNOSIS — E87.6 HYPOKALEMIA: Primary | ICD-10-CM

## 2018-10-02 PROCEDURE — G0463 HOSPITAL OUTPT CLINIC VISIT: HCPCS | Performed by: NURSE PRACTITIONER

## 2018-10-02 PROCEDURE — 99214 OFFICE O/P EST MOD 30 MIN: CPT | Performed by: NURSE PRACTITIONER

## 2018-10-02 NOTE — PROGRESS NOTES
Shruthi Edmond is a 76year old female. Patient presents with:  Edema: BLE, recent hospitalization. Fatigue. HPI:   Patient comes in today for a checkup. She sees Dr. Kajal Rees and myself.   She has a history of newly discovered cardiomyopathy with an echoca Ondansetron HCl (ZOFRAN) 4 mg tablet Take 1 tablet (4 mg total) by mouth every 6 (six) hours as needed for Nausea.  Disp: 20 tablet Rfl: 1   Warfarin Sodium 5 MG Oral Tab Take 2.5 mg five days per week and 5 mg two days per week (Patient taking differentl Types: Cigarettes        Quit date: 1977        Years since quittin.7      Smokeless tobacco: Never Used      Tobacco comment: Quit     Alcohol use: No      Alcohol/week: 0.0 oz    Drug use: No       REVIEW OF SYSTEMS:   GENERAL HEALTH: f PM

## 2018-10-11 ENCOUNTER — ANTI-COAG VISIT (OUTPATIENT)
Dept: INTERNAL MEDICINE CLINIC | Facility: CLINIC | Age: 78
End: 2018-10-11
Payer: MEDICARE

## 2018-10-11 DIAGNOSIS — I48.20 CHRONIC ATRIAL FIBRILLATION (HCC): ICD-10-CM

## 2018-10-11 PROCEDURE — 85610 PROTHROMBIN TIME: CPT

## 2018-10-11 PROCEDURE — G0463 HOSPITAL OUTPT CLINIC VISIT: HCPCS

## 2018-10-11 PROCEDURE — 36416 COLLJ CAPILLARY BLOOD SPEC: CPT

## 2018-10-25 ENCOUNTER — ANTI-COAG VISIT (OUTPATIENT)
Dept: INTERNAL MEDICINE CLINIC | Facility: CLINIC | Age: 78
End: 2018-10-25
Payer: MEDICARE

## 2018-10-25 ENCOUNTER — APPOINTMENT (OUTPATIENT)
Dept: LAB | Age: 78
End: 2018-10-25
Attending: NURSE PRACTITIONER
Payer: MEDICARE

## 2018-10-25 DIAGNOSIS — Z51.81 ENCOUNTER FOR THERAPEUTIC DRUG MONITORING: Primary | ICD-10-CM

## 2018-10-25 DIAGNOSIS — E87.6 HYPOKALEMIA: ICD-10-CM

## 2018-10-25 DIAGNOSIS — I48.20 CHRONIC ATRIAL FIBRILLATION (HCC): ICD-10-CM

## 2018-10-25 DIAGNOSIS — Z79.01 LONG TERM (CURRENT) USE OF ANTICOAGULANTS: ICD-10-CM

## 2018-10-25 PROCEDURE — 36415 COLL VENOUS BLD VENIPUNCTURE: CPT

## 2018-10-25 PROCEDURE — 80048 BASIC METABOLIC PNL TOTAL CA: CPT

## 2018-10-25 PROCEDURE — 85610 PROTHROMBIN TIME: CPT

## 2018-10-25 PROCEDURE — 36416 COLLJ CAPILLARY BLOOD SPEC: CPT

## 2018-11-05 ENCOUNTER — TELEPHONE (OUTPATIENT)
Dept: INTERNAL MEDICINE CLINIC | Facility: CLINIC | Age: 78
End: 2018-11-05

## 2018-11-05 DIAGNOSIS — I48.20 CHRONIC ATRIAL FIBRILLATION (HCC): Primary | ICD-10-CM

## 2018-11-05 DIAGNOSIS — Z51.81 ENCOUNTER FOR THERAPEUTIC DRUG MONITORING: ICD-10-CM

## 2018-11-05 DIAGNOSIS — Z79.01 LONG TERM (CURRENT) USE OF ANTICOAGULANTS: ICD-10-CM

## 2018-11-11 RX ORDER — NYSTATIN 100000 [USP'U]/G
POWDER TOPICAL
Qty: 45 G | Refills: 0 | Status: ON HOLD | OUTPATIENT
Start: 2018-11-11 | End: 2019-01-01

## 2018-11-15 NOTE — PROGRESS NOTES
[employer] pacemaker implanted by Dr. Diane Celaya in 2015  Pt. C/o feeling palpitations recently. Native rhythm: AF 30's  No VHR. Native EKG: AF (on Warfarin)  Ventricular pacing 98%. Satisfactory pacemaker function with stable thresholds and impedences.     [employer]

## 2018-12-10 NOTE — TELEPHONE ENCOUNTER
LM for Pedro Luis Newell to call back to clarify why she was calling. The encounter message was unclear.

## 2018-12-10 NOTE — TELEPHONE ENCOUNTER
Edwina Hooker from [facility] is calling in regards to have rx that was received needs update initial and date fax number change please call thank you   [phone]

## 2018-12-11 NOTE — TELEPHONE ENCOUNTER
Feliciano Johnson returned call - she will fax a new order and requesting [facility] to sign off. Pls call - requesting to speak with RN. Thank you.

## 2018-12-13 NOTE — TELEPHONE ENCOUNTER
Contacted [facility]. She confirmed that patient doesn't have any refills left on file for losartan. Rx was last prescribed by hospitalist, Dr. Ryan Flanagan, on 9/15/18 for #30 with 2 RFs.  The refill request was most likely sent to him as he was the last prescribe

## 2018-12-13 NOTE — TELEPHONE ENCOUNTER
Krysten Peres requesting to speak with Rn re: Losartan rx - states pharm told pt rx has been denied. Pls call. Thank you.

## 2018-12-20 NOTE — PROGRESS NOTES
HPI:    Patient ID: Paulette Lundy is a [de-identified]year old female. This 24-year-old diabetic presents for evaluation and care. She saw Bora Child on September 25, 2018. Her most recent A1c was 5.7 and her fasting blood sugar today was 114.       ROS:   I did re hours as needed for Wheezing.  Disp: 25 vial Rfl: 3   Blood Glucose Monitoring Suppl (ACCU-CHEK SABRINA) Does not apply Device Test blood sugar dailyDx E11.9 Type 2 non insulin diabetic Disp: 1 Device Rfl: 0   Lancets Does not apply Misc For ACCU-Check softcl weight shift to the arch if unsuccessful there is consideration for further accommodation and cortisone injection.   If not resolved in 2 weeks plan follow-up           ASSESSMENT/PLAN:   Plantar fasciitis of left foot  (primary encounter diagnosis)  Type 2

## 2019-01-01 ENCOUNTER — TELEPHONE (OUTPATIENT)
Dept: GASTROENTEROLOGY | Facility: CLINIC | Age: 79
End: 2019-01-01

## 2019-01-01 ENCOUNTER — TELEPHONE (OUTPATIENT)
Dept: NEPHROLOGY | Facility: CLINIC | Age: 79
End: 2019-01-01

## 2019-01-01 ENCOUNTER — TELEPHONE (OUTPATIENT)
Dept: INTERNAL MEDICINE CLINIC | Facility: CLINIC | Age: 79
End: 2019-01-01

## 2019-01-01 ENCOUNTER — TELEPHONE (OUTPATIENT)
Dept: RADIATION ONCOLOGY | Facility: HOSPITAL | Age: 79
End: 2019-01-01

## 2019-01-01 ENCOUNTER — TELEPHONE (OUTPATIENT)
Dept: SURGERY | Facility: CLINIC | Age: 79
End: 2019-01-01

## 2019-01-01 ENCOUNTER — OFFICE VISIT (OUTPATIENT)
Dept: RADIATION ONCOLOGY | Facility: HOSPITAL | Age: 79
End: 2019-01-01
Attending: RADIOLOGY
Payer: MEDICARE

## 2019-01-01 ENCOUNTER — ANESTHESIA EVENT (OUTPATIENT)
Dept: ENDOSCOPY | Facility: HOSPITAL | Age: 79
DRG: 391 | End: 2019-01-01
Payer: MEDICARE

## 2019-01-01 ENCOUNTER — OFFICE VISIT (OUTPATIENT)
Dept: OTOLARYNGOLOGY | Facility: CLINIC | Age: 79
End: 2019-01-01
Payer: MEDICARE

## 2019-01-01 ENCOUNTER — APPOINTMENT (OUTPATIENT)
Dept: GENERAL RADIOLOGY | Facility: HOSPITAL | Age: 79
DRG: 393 | End: 2019-01-01
Attending: NURSE PRACTITIONER
Payer: MEDICARE

## 2019-01-01 ENCOUNTER — TELEPHONE (OUTPATIENT)
Dept: HEMATOLOGY/ONCOLOGY | Facility: HOSPITAL | Age: 79
End: 2019-01-01

## 2019-01-01 ENCOUNTER — TELEPHONE (OUTPATIENT)
Dept: CARDIOLOGY CLINIC | Facility: CLINIC | Age: 79
End: 2019-01-01

## 2019-01-01 ENCOUNTER — LAB REQUISITION (OUTPATIENT)
Dept: LAB | Facility: HOSPITAL | Age: 79
End: 2019-01-01
Payer: MEDICARE

## 2019-01-01 ENCOUNTER — PATIENT MESSAGE (OUTPATIENT)
Dept: CARDIOLOGY CLINIC | Facility: CLINIC | Age: 79
End: 2019-01-01

## 2019-01-01 ENCOUNTER — APPOINTMENT (OUTPATIENT)
Dept: ULTRASOUND IMAGING | Facility: HOSPITAL | Age: 79
DRG: 393 | End: 2019-01-01
Attending: NURSE PRACTITIONER
Payer: MEDICARE

## 2019-01-01 ENCOUNTER — APPOINTMENT (OUTPATIENT)
Dept: CT IMAGING | Facility: HOSPITAL | Age: 79
DRG: 492 | End: 2019-01-01
Attending: ORTHOPAEDIC SURGERY
Payer: MEDICARE

## 2019-01-01 ENCOUNTER — PATIENT MESSAGE (OUTPATIENT)
Dept: NEPHROLOGY | Facility: CLINIC | Age: 79
End: 2019-01-01

## 2019-01-01 ENCOUNTER — ANTI-COAG VISIT (OUTPATIENT)
Dept: INTERNAL MEDICINE CLINIC | Facility: CLINIC | Age: 79
End: 2019-01-01

## 2019-01-01 ENCOUNTER — HOSPITAL ENCOUNTER (OUTPATIENT)
Facility: HOSPITAL | Age: 79
Setting detail: OBSERVATION
Discharge: HOME HEALTH | End: 2019-01-01
Attending: EMERGENCY MEDICINE | Admitting: HOSPITALIST
Payer: MEDICARE

## 2019-01-01 ENCOUNTER — ANESTHESIA (OUTPATIENT)
Dept: ENDOSCOPY | Facility: HOSPITAL | Age: 79
DRG: 391 | End: 2019-01-01
Payer: MEDICARE

## 2019-01-01 ENCOUNTER — APPOINTMENT (OUTPATIENT)
Dept: GENERAL RADIOLOGY | Facility: HOSPITAL | Age: 79
DRG: 356 | End: 2019-01-01
Attending: HOSPITALIST
Payer: MEDICARE

## 2019-01-01 ENCOUNTER — APPOINTMENT (OUTPATIENT)
Dept: HEMATOLOGY/ONCOLOGY | Facility: HOSPITAL | Age: 79
End: 2019-01-01
Attending: INTERNAL MEDICINE
Payer: MEDICARE

## 2019-01-01 ENCOUNTER — MED REC SCAN ONLY (OUTPATIENT)
Dept: INTERNAL MEDICINE CLINIC | Facility: CLINIC | Age: 79
End: 2019-01-01

## 2019-01-01 ENCOUNTER — ANCILLARY PROCEDURE (OUTPATIENT)
Dept: CARDIOLOGY | Age: 79
End: 2019-01-01
Attending: INTERNAL MEDICINE

## 2019-01-01 ENCOUNTER — HOSPITAL ENCOUNTER (INPATIENT)
Facility: HOSPITAL | Age: 79
LOS: 17 days | Discharge: NURSING, REHAB OR LONG-TERM CARE | DRG: 393 | End: 2019-01-01
Attending: EMERGENCY MEDICINE | Admitting: HOSPITALIST
Payer: MEDICARE

## 2019-01-01 ENCOUNTER — APPOINTMENT (OUTPATIENT)
Dept: CV DIAGNOSTICS | Facility: HOSPITAL | Age: 79
DRG: 356 | End: 2019-01-01
Attending: INTERNAL MEDICINE
Payer: MEDICARE

## 2019-01-01 ENCOUNTER — APPOINTMENT (OUTPATIENT)
Dept: RADIATION ONCOLOGY | Facility: HOSPITAL | Age: 79
End: 2019-01-01
Attending: SURGERY
Payer: MEDICARE

## 2019-01-01 ENCOUNTER — TELEPHONE (OUTPATIENT)
Dept: CARDIOLOGY | Age: 79
End: 2019-01-01

## 2019-01-01 ENCOUNTER — TELEPHONE (OUTPATIENT)
Dept: INTERNAL MEDICINE UNIT | Facility: HOSPITAL | Age: 79
End: 2019-01-01

## 2019-01-01 ENCOUNTER — PATIENT MESSAGE (OUTPATIENT)
Dept: SURGERY | Facility: CLINIC | Age: 79
End: 2019-01-01

## 2019-01-01 ENCOUNTER — PATIENT MESSAGE (OUTPATIENT)
Dept: GASTROENTEROLOGY | Facility: CLINIC | Age: 79
End: 2019-01-01

## 2019-01-01 ENCOUNTER — DOCUMENTATION ONLY (OUTPATIENT)
Dept: SURGERY | Facility: CLINIC | Age: 79
End: 2019-01-01

## 2019-01-01 ENCOUNTER — DIETICIAN VISIT (OUTPATIENT)
Dept: NUTRITION | Facility: HOSPITAL | Age: 79
End: 2019-01-01

## 2019-01-01 ENCOUNTER — ANESTHESIA EVENT (OUTPATIENT)
Dept: SURGERY | Facility: HOSPITAL | Age: 79
DRG: 492 | End: 2019-01-01
Payer: MEDICARE

## 2019-01-01 ENCOUNTER — OFFICE VISIT (OUTPATIENT)
Dept: SURGERY | Facility: CLINIC | Age: 79
End: 2019-01-01
Payer: MEDICARE

## 2019-01-01 ENCOUNTER — TELEPHONE (OUTPATIENT)
Dept: ALLERGY | Facility: CLINIC | Age: 79
End: 2019-01-01

## 2019-01-01 ENCOUNTER — HOSPITAL ENCOUNTER (OUTPATIENT)
Age: 79
Discharge: HOME | End: 2019-01-01
Attending: EMERGENCY MEDICINE
Payer: MEDICARE

## 2019-01-01 ENCOUNTER — MED REC SCAN ONLY (OUTPATIENT)
Dept: CARDIOLOGY CLINIC | Facility: CLINIC | Age: 79
End: 2019-01-01

## 2019-01-01 ENCOUNTER — PATIENT MESSAGE (OUTPATIENT)
Dept: HEMATOLOGY/ONCOLOGY | Facility: HOSPITAL | Age: 79
End: 2019-01-01

## 2019-01-01 ENCOUNTER — HOSPITAL ENCOUNTER (OUTPATIENT)
Dept: NUCLEAR MEDICINE | Facility: HOSPITAL | Age: 79
Discharge: HOME | End: 2019-01-01
Attending: RADIOLOGY
Payer: MEDICARE

## 2019-01-01 ENCOUNTER — APPOINTMENT (OUTPATIENT)
Dept: GENERAL RADIOLOGY | Facility: HOSPITAL | Age: 79
DRG: 356 | End: 2019-01-01
Attending: EMERGENCY MEDICINE
Payer: MEDICARE

## 2019-01-01 ENCOUNTER — HOSPITAL ENCOUNTER (INPATIENT)
Facility: HOSPITAL | Age: 79
LOS: 16 days | Discharge: NURSING, REHAB OR LONG-TERM CARE | DRG: 356 | End: 2019-01-01
Attending: EMERGENCY MEDICINE | Admitting: HOSPITALIST
Payer: MEDICARE

## 2019-01-01 ENCOUNTER — APPOINTMENT (OUTPATIENT)
Dept: LAB | Facility: HOSPITAL | Age: 79
End: 2019-01-01
Attending: INTERNAL MEDICINE
Payer: MEDICARE

## 2019-01-01 ENCOUNTER — APPOINTMENT (OUTPATIENT)
Dept: GENERAL RADIOLOGY | Facility: HOSPITAL | Age: 79
DRG: 391 | End: 2019-01-01
Attending: HOSPITALIST
Payer: MEDICARE

## 2019-01-01 ENCOUNTER — ANTI-COAG VISIT (OUTPATIENT)
Dept: INTERNAL MEDICINE CLINIC | Facility: CLINIC | Age: 79
End: 2019-01-01
Payer: MEDICARE

## 2019-01-01 ENCOUNTER — APPOINTMENT (OUTPATIENT)
Dept: CT IMAGING | Facility: HOSPITAL | Age: 79
DRG: 492 | End: 2019-01-01
Attending: HOSPITALIST
Payer: MEDICARE

## 2019-01-01 ENCOUNTER — APPOINTMENT (OUTPATIENT)
Dept: CARDIOLOGY | Age: 79
End: 2019-01-01
Attending: INTERNAL MEDICINE

## 2019-01-01 ENCOUNTER — MED REC SCAN ONLY (OUTPATIENT)
Dept: GASTROENTEROLOGY | Facility: CLINIC | Age: 79
End: 2019-01-01

## 2019-01-01 ENCOUNTER — APPOINTMENT (OUTPATIENT)
Dept: GENERAL RADIOLOGY | Facility: HOSPITAL | Age: 79
DRG: 492 | End: 2019-01-01
Attending: ORTHOPAEDIC SURGERY
Payer: MEDICARE

## 2019-01-01 ENCOUNTER — OFFICE VISIT (OUTPATIENT)
Dept: NEPHROLOGY | Facility: CLINIC | Age: 79
End: 2019-01-01
Payer: MEDICARE

## 2019-01-01 ENCOUNTER — SNF VISIT (OUTPATIENT)
Dept: INTERNAL MEDICINE CLINIC | Facility: SKILLED NURSING FACILITY | Age: 79
End: 2019-01-01

## 2019-01-01 ENCOUNTER — LAB ENCOUNTER (OUTPATIENT)
Dept: LAB | Age: 79
End: 2019-01-01
Attending: INTERNAL MEDICINE
Payer: MEDICARE

## 2019-01-01 ENCOUNTER — HOSPITAL ENCOUNTER (EMERGENCY)
Facility: HOSPITAL | Age: 79
Discharge: HOME | End: 2019-01-01
Attending: EMERGENCY MEDICINE
Payer: MEDICARE

## 2019-01-01 ENCOUNTER — APPOINTMENT (OUTPATIENT)
Dept: HEMATOLOGY/ONCOLOGY | Facility: HOSPITAL | Age: 79
End: 2019-01-01
Payer: MEDICARE

## 2019-01-01 ENCOUNTER — ANCILLARY ORDERS (OUTPATIENT)
Dept: CARDIOLOGY | Age: 79
End: 2019-01-01

## 2019-01-01 ENCOUNTER — APPOINTMENT (OUTPATIENT)
Dept: CT IMAGING | Facility: HOSPITAL | Age: 79
DRG: 391 | End: 2019-01-01
Attending: EMERGENCY MEDICINE
Payer: MEDICARE

## 2019-01-01 ENCOUNTER — APPOINTMENT (OUTPATIENT)
Dept: INTERVENTIONAL RADIOLOGY/VASCULAR | Facility: HOSPITAL | Age: 79
DRG: 356 | End: 2019-01-01
Attending: INTERNAL MEDICINE
Payer: MEDICARE

## 2019-01-01 ENCOUNTER — HOSPITAL ENCOUNTER (INPATIENT)
Facility: HOSPITAL | Age: 79
LOS: 10 days | Discharge: HOSPICE | DRG: 871 | End: 2019-01-01
Attending: EMERGENCY MEDICINE | Admitting: HOSPITALIST
Payer: MEDICARE

## 2019-01-01 ENCOUNTER — PATIENT OUTREACH (OUTPATIENT)
Dept: CASE MANAGEMENT | Age: 79
End: 2019-01-01

## 2019-01-01 ENCOUNTER — ANESTHESIA (OUTPATIENT)
Dept: SURGERY | Facility: HOSPITAL | Age: 79
DRG: 356 | End: 2019-01-01
Payer: MEDICARE

## 2019-01-01 ENCOUNTER — APPOINTMENT (OUTPATIENT)
Dept: GENERAL RADIOLOGY | Facility: HOSPITAL | Age: 79
DRG: 393 | End: 2019-01-01
Attending: HOSPITALIST
Payer: MEDICARE

## 2019-01-01 ENCOUNTER — APPOINTMENT (OUTPATIENT)
Dept: GENERAL RADIOLOGY | Facility: HOSPITAL | Age: 79
DRG: 356 | End: 2019-01-01
Attending: INTERNAL MEDICINE
Payer: MEDICARE

## 2019-01-01 ENCOUNTER — OFFICE VISIT (OUTPATIENT)
Dept: INTERNAL MEDICINE CLINIC | Facility: CLINIC | Age: 79
End: 2019-01-01
Payer: MEDICARE

## 2019-01-01 ENCOUNTER — APPOINTMENT (OUTPATIENT)
Dept: GENERAL RADIOLOGY | Facility: HOSPITAL | Age: 79
DRG: 492 | End: 2019-01-01
Attending: EMERGENCY MEDICINE
Payer: MEDICARE

## 2019-01-01 ENCOUNTER — HOSPITAL ENCOUNTER (OUTPATIENT)
Dept: CT IMAGING | Facility: HOSPITAL | Age: 79
Discharge: HOME | End: 2019-01-01
Attending: INTERNAL MEDICINE
Payer: MEDICARE

## 2019-01-01 ENCOUNTER — HOSPITAL ENCOUNTER (OUTPATIENT)
Dept: INTERVENTIONAL RADIOLOGY/VASCULAR | Facility: HOSPITAL | Age: 79
Discharge: OTHER INSTITUTION | End: 2019-01-01
Attending: INTERNAL MEDICINE | Admitting: INTERNAL MEDICINE
Payer: MEDICARE

## 2019-01-01 ENCOUNTER — TELEPHONE (OUTPATIENT)
Dept: OTOLARYNGOLOGY | Facility: CLINIC | Age: 79
End: 2019-01-01

## 2019-01-01 ENCOUNTER — HOSPITAL ENCOUNTER (OUTPATIENT)
Dept: INTERVENTIONAL RADIOLOGY/VASCULAR | Facility: HOSPITAL | Age: 79
Discharge: HOME | End: 2019-01-01
Attending: INTERNAL MEDICINE | Admitting: INTERNAL MEDICINE
Payer: MEDICARE

## 2019-01-01 ENCOUNTER — INITIAL APN SNF VISIT (OUTPATIENT)
Dept: INTERNAL MEDICINE CLINIC | Facility: SKILLED NURSING FACILITY | Age: 79
End: 2019-01-01

## 2019-01-01 ENCOUNTER — ANESTHESIA (OUTPATIENT)
Dept: INTERVENTIONAL RADIOLOGY/VASCULAR | Facility: HOSPITAL | Age: 79
DRG: 356 | End: 2019-01-01
Payer: MEDICARE

## 2019-01-01 ENCOUNTER — APPOINTMENT (OUTPATIENT)
Dept: CT IMAGING | Facility: HOSPITAL | Age: 79
DRG: 393 | End: 2019-01-01
Attending: INTERNAL MEDICINE
Payer: MEDICARE

## 2019-01-01 ENCOUNTER — ANESTHESIA EVENT (OUTPATIENT)
Dept: SURGERY | Facility: HOSPITAL | Age: 79
DRG: 356 | End: 2019-01-01
Payer: MEDICARE

## 2019-01-01 ENCOUNTER — DOCUMENTATION ONLY (OUTPATIENT)
Dept: RADIATION ONCOLOGY | Facility: HOSPITAL | Age: 79
End: 2019-01-01

## 2019-01-01 ENCOUNTER — APPOINTMENT (OUTPATIENT)
Dept: ULTRASOUND IMAGING | Facility: HOSPITAL | Age: 79
End: 2019-01-01
Attending: EMERGENCY MEDICINE
Payer: MEDICARE

## 2019-01-01 ENCOUNTER — TELEPHONE (OUTPATIENT)
Dept: OBGYN CLINIC | Facility: CLINIC | Age: 79
End: 2019-01-01

## 2019-01-01 ENCOUNTER — OFFICE VISIT (OUTPATIENT)
Dept: GASTROENTEROLOGY | Facility: CLINIC | Age: 79
End: 2019-01-01
Payer: MEDICARE

## 2019-01-01 ENCOUNTER — APPOINTMENT (OUTPATIENT)
Dept: CT IMAGING | Facility: HOSPITAL | Age: 79
DRG: 393 | End: 2019-01-01
Attending: EMERGENCY MEDICINE
Payer: MEDICARE

## 2019-01-01 ENCOUNTER — EXTERNAL FACILITY (OUTPATIENT)
Dept: INTERNAL MEDICINE CLINIC | Facility: CLINIC | Age: 79
End: 2019-01-01

## 2019-01-01 ENCOUNTER — APPOINTMENT (OUTPATIENT)
Dept: GENERAL RADIOLOGY | Facility: HOSPITAL | Age: 79
DRG: 391 | End: 2019-01-01
Attending: SURGERY
Payer: MEDICARE

## 2019-01-01 ENCOUNTER — ANESTHESIA (OUTPATIENT)
Dept: SURGERY | Facility: HOSPITAL | Age: 79
DRG: 492 | End: 2019-01-01
Payer: MEDICARE

## 2019-01-01 ENCOUNTER — HOSPITAL ENCOUNTER (INPATIENT)
Facility: HOSPITAL | Age: 79
LOS: 10 days | Discharge: HOME HEALTH | DRG: 391 | End: 2019-01-01
Attending: EMERGENCY MEDICINE | Admitting: HOSPITALIST
Payer: MEDICARE

## 2019-01-01 ENCOUNTER — TELEPHONE (OUTPATIENT)
Dept: ORTHOPEDICS CLINIC | Facility: CLINIC | Age: 79
End: 2019-01-01

## 2019-01-01 ENCOUNTER — HOSPITAL ENCOUNTER (INPATIENT)
Facility: HOSPITAL | Age: 79
LOS: 8 days | Discharge: NURSING, REHAB OR LONG-TERM CARE | DRG: 492 | End: 2019-01-01
Attending: EMERGENCY MEDICINE | Admitting: HOSPITALIST
Payer: MEDICARE

## 2019-01-01 ENCOUNTER — HOSPITAL ENCOUNTER (OUTPATIENT)
Dept: ULTRASOUND IMAGING | Facility: HOSPITAL | Age: 79
Discharge: HOME | End: 2019-01-01
Attending: OTOLARYNGOLOGY | Admitting: OTOLARYNGOLOGY
Payer: MEDICARE

## 2019-01-01 ENCOUNTER — APPOINTMENT (OUTPATIENT)
Dept: CT IMAGING | Facility: HOSPITAL | Age: 79
DRG: 871 | End: 2019-01-01
Attending: EMERGENCY MEDICINE
Payer: MEDICARE

## 2019-01-01 ENCOUNTER — APPOINTMENT (OUTPATIENT)
Dept: GENERAL RADIOLOGY | Facility: HOSPITAL | Age: 79
End: 2019-01-01
Attending: EMERGENCY MEDICINE
Payer: MEDICARE

## 2019-01-01 ENCOUNTER — APPOINTMENT (OUTPATIENT)
Dept: GENERAL RADIOLOGY | Facility: HOSPITAL | Age: 79
DRG: 393 | End: 2019-01-01
Attending: CLINICAL NURSE SPECIALIST
Payer: MEDICARE

## 2019-01-01 ENCOUNTER — APPOINTMENT (OUTPATIENT)
Dept: CT IMAGING | Facility: HOSPITAL | Age: 79
DRG: 356 | End: 2019-01-01
Attending: SURGERY
Payer: MEDICARE

## 2019-01-01 VITALS
SYSTOLIC BLOOD PRESSURE: 127 MMHG | HEART RATE: 66 BPM | WEIGHT: 187.38 LBS | RESPIRATION RATE: 18 BRPM | HEIGHT: 65 IN | BODY MASS INDEX: 31.22 KG/M2 | DIASTOLIC BLOOD PRESSURE: 59 MMHG | TEMPERATURE: 97 F | OXYGEN SATURATION: 97 %

## 2019-01-01 VITALS
SYSTOLIC BLOOD PRESSURE: 158 MMHG | DIASTOLIC BLOOD PRESSURE: 65 MMHG | HEART RATE: 65 BPM | TEMPERATURE: 98 F | BODY MASS INDEX: 34.82 KG/M2 | WEIGHT: 209 LBS | OXYGEN SATURATION: 95 % | HEIGHT: 65 IN | RESPIRATION RATE: 15 BRPM

## 2019-01-01 VITALS
TEMPERATURE: 98 F | RESPIRATION RATE: 18 BRPM | WEIGHT: 221.38 LBS | DIASTOLIC BLOOD PRESSURE: 52 MMHG | SYSTOLIC BLOOD PRESSURE: 145 MMHG | OXYGEN SATURATION: 98 % | HEIGHT: 65 IN | HEART RATE: 65 BPM | BODY MASS INDEX: 36.89 KG/M2

## 2019-01-01 VITALS
TEMPERATURE: 99 F | HEART RATE: 65 BPM | OXYGEN SATURATION: 99 % | RESPIRATION RATE: 16 BRPM | DIASTOLIC BLOOD PRESSURE: 66 MMHG | SYSTOLIC BLOOD PRESSURE: 156 MMHG

## 2019-01-01 VITALS
OXYGEN SATURATION: 99 % | SYSTOLIC BLOOD PRESSURE: 141 MMHG | WEIGHT: 190.13 LBS | HEIGHT: 64 IN | HEART RATE: 65 BPM | TEMPERATURE: 98 F | BODY MASS INDEX: 32.46 KG/M2 | DIASTOLIC BLOOD PRESSURE: 59 MMHG | RESPIRATION RATE: 18 BRPM

## 2019-01-01 VITALS
BODY MASS INDEX: 37.61 KG/M2 | HEART RATE: 66 BPM | HEIGHT: 62.25 IN | DIASTOLIC BLOOD PRESSURE: 61 MMHG | WEIGHT: 207 LBS | TEMPERATURE: 98 F | OXYGEN SATURATION: 98 % | RESPIRATION RATE: 16 BRPM | SYSTOLIC BLOOD PRESSURE: 161 MMHG

## 2019-01-01 VITALS
HEIGHT: 65 IN | DIASTOLIC BLOOD PRESSURE: 87 MMHG | SYSTOLIC BLOOD PRESSURE: 159 MMHG | BODY MASS INDEX: 36.32 KG/M2 | HEART RATE: 65 BPM | WEIGHT: 218 LBS

## 2019-01-01 VITALS
OXYGEN SATURATION: 97 % | RESPIRATION RATE: 17 BRPM | SYSTOLIC BLOOD PRESSURE: 162 MMHG | WEIGHT: 227.06 LBS | BODY MASS INDEX: 40 KG/M2 | DIASTOLIC BLOOD PRESSURE: 62 MMHG | HEART RATE: 64 BPM

## 2019-01-01 VITALS
DIASTOLIC BLOOD PRESSURE: 72 MMHG | HEIGHT: 63 IN | RESPIRATION RATE: 23 BRPM | HEART RATE: 65 BPM | DIASTOLIC BLOOD PRESSURE: 64 MMHG | HEART RATE: 65 BPM | OXYGEN SATURATION: 94 % | BODY MASS INDEX: 36.86 KG/M2 | WEIGHT: 208 LBS | SYSTOLIC BLOOD PRESSURE: 112 MMHG | SYSTOLIC BLOOD PRESSURE: 141 MMHG

## 2019-01-01 VITALS
HEART RATE: 65 BPM | DIASTOLIC BLOOD PRESSURE: 42 MMHG | OXYGEN SATURATION: 98 % | RESPIRATION RATE: 16 BRPM | TEMPERATURE: 98 F | SYSTOLIC BLOOD PRESSURE: 129 MMHG

## 2019-01-01 VITALS
WEIGHT: 192.63 LBS | SYSTOLIC BLOOD PRESSURE: 113 MMHG | DIASTOLIC BLOOD PRESSURE: 72 MMHG | HEIGHT: 64 IN | BODY MASS INDEX: 32.89 KG/M2 | HEART RATE: 66 BPM

## 2019-01-01 VITALS
DIASTOLIC BLOOD PRESSURE: 61 MMHG | BODY MASS INDEX: 34 KG/M2 | SYSTOLIC BLOOD PRESSURE: 132 MMHG | WEIGHT: 186.06 LBS | OXYGEN SATURATION: 97 % | RESPIRATION RATE: 18 BRPM | HEART RATE: 64 BPM | TEMPERATURE: 98 F

## 2019-01-01 VITALS
HEIGHT: 63 IN | RESPIRATION RATE: 18 BRPM | TEMPERATURE: 98 F | HEART RATE: 64 BPM | BODY MASS INDEX: 39.93 KG/M2 | DIASTOLIC BLOOD PRESSURE: 68 MMHG | WEIGHT: 225.38 LBS | OXYGEN SATURATION: 96 % | SYSTOLIC BLOOD PRESSURE: 148 MMHG

## 2019-01-01 VITALS
DIASTOLIC BLOOD PRESSURE: 49 MMHG | HEART RATE: 66 BPM | RESPIRATION RATE: 20 BRPM | SYSTOLIC BLOOD PRESSURE: 132 MMHG | TEMPERATURE: 99 F

## 2019-01-01 VITALS
SYSTOLIC BLOOD PRESSURE: 131 MMHG | HEART RATE: 65 BPM | BODY MASS INDEX: 38.15 KG/M2 | WEIGHT: 210 LBS | DIASTOLIC BLOOD PRESSURE: 74 MMHG | HEIGHT: 62.25 IN

## 2019-01-01 VITALS
HEIGHT: 70 IN | TEMPERATURE: 99 F | BODY MASS INDEX: 29.1 KG/M2 | HEART RATE: 65 BPM | RESPIRATION RATE: 18 BRPM | DIASTOLIC BLOOD PRESSURE: 44 MMHG | WEIGHT: 203.25 LBS | SYSTOLIC BLOOD PRESSURE: 120 MMHG | OXYGEN SATURATION: 97 %

## 2019-01-01 VITALS
RESPIRATION RATE: 20 BRPM | HEART RATE: 64 BPM | TEMPERATURE: 98 F | DIASTOLIC BLOOD PRESSURE: 61 MMHG | SYSTOLIC BLOOD PRESSURE: 170 MMHG

## 2019-01-01 VITALS
HEART RATE: 65 BPM | DIASTOLIC BLOOD PRESSURE: 56 MMHG | BODY MASS INDEX: 39.37 KG/M2 | HEIGHT: 63 IN | OXYGEN SATURATION: 96 % | RESPIRATION RATE: 18 BRPM | TEMPERATURE: 98 F | WEIGHT: 222.19 LBS | SYSTOLIC BLOOD PRESSURE: 140 MMHG

## 2019-01-01 VITALS
DIASTOLIC BLOOD PRESSURE: 55 MMHG | TEMPERATURE: 98 F | WEIGHT: 186 LBS | RESPIRATION RATE: 18 BRPM | OXYGEN SATURATION: 97 % | HEART RATE: 65 BPM | SYSTOLIC BLOOD PRESSURE: 119 MMHG | BODY MASS INDEX: 34.23 KG/M2 | HEIGHT: 62 IN

## 2019-01-01 VITALS
RESPIRATION RATE: 16 BRPM | BODY MASS INDEX: 31.58 KG/M2 | SYSTOLIC BLOOD PRESSURE: 132 MMHG | HEART RATE: 65 BPM | TEMPERATURE: 97 F | OXYGEN SATURATION: 100 % | HEIGHT: 64 IN | WEIGHT: 185 LBS | DIASTOLIC BLOOD PRESSURE: 50 MMHG

## 2019-01-01 VITALS
HEART RATE: 65 BPM | DIASTOLIC BLOOD PRESSURE: 60 MMHG | HEIGHT: 62.25 IN | WEIGHT: 211 LBS | BODY MASS INDEX: 38.34 KG/M2 | OXYGEN SATURATION: 96 % | RESPIRATION RATE: 16 BRPM | SYSTOLIC BLOOD PRESSURE: 147 MMHG | TEMPERATURE: 98 F

## 2019-01-01 VITALS
TEMPERATURE: 98 F | BODY MASS INDEX: 37 KG/M2 | OXYGEN SATURATION: 98 % | WEIGHT: 211 LBS | SYSTOLIC BLOOD PRESSURE: 143 MMHG | HEART RATE: 65 BPM | RESPIRATION RATE: 20 BRPM | DIASTOLIC BLOOD PRESSURE: 65 MMHG

## 2019-01-01 VITALS
DIASTOLIC BLOOD PRESSURE: 55 MMHG | TEMPERATURE: 99 F | RESPIRATION RATE: 20 BRPM | HEART RATE: 65 BPM | SYSTOLIC BLOOD PRESSURE: 133 MMHG

## 2019-01-01 VITALS
DIASTOLIC BLOOD PRESSURE: 59 MMHG | SYSTOLIC BLOOD PRESSURE: 149 MMHG | HEART RATE: 65 BPM | BODY MASS INDEX: 38.64 KG/M2 | RESPIRATION RATE: 16 BRPM | HEIGHT: 62 IN | WEIGHT: 210 LBS | TEMPERATURE: 98 F

## 2019-01-01 VITALS
HEART RATE: 66 BPM | BODY MASS INDEX: 37.17 KG/M2 | SYSTOLIC BLOOD PRESSURE: 130 MMHG | DIASTOLIC BLOOD PRESSURE: 50 MMHG | RESPIRATION RATE: 20 BRPM | HEIGHT: 62 IN | WEIGHT: 202 LBS

## 2019-01-01 VITALS
WEIGHT: 130 LBS | RESPIRATION RATE: 18 BRPM | TEMPERATURE: 98 F | OXYGEN SATURATION: 99 % | HEIGHT: 63 IN | DIASTOLIC BLOOD PRESSURE: 69 MMHG | HEART RATE: 81 BPM | SYSTOLIC BLOOD PRESSURE: 131 MMHG | BODY MASS INDEX: 23.04 KG/M2

## 2019-01-01 VITALS
HEART RATE: 65 BPM | RESPIRATION RATE: 20 BRPM | BODY MASS INDEX: 37 KG/M2 | SYSTOLIC BLOOD PRESSURE: 190 MMHG | TEMPERATURE: 98 F | DIASTOLIC BLOOD PRESSURE: 79 MMHG | WEIGHT: 211 LBS

## 2019-01-01 VITALS
OXYGEN SATURATION: 98 % | RESPIRATION RATE: 16 BRPM | TEMPERATURE: 98 F | SYSTOLIC BLOOD PRESSURE: 125 MMHG | HEART RATE: 65 BPM | DIASTOLIC BLOOD PRESSURE: 74 MMHG

## 2019-01-01 VITALS
BODY MASS INDEX: 39 KG/M2 | RESPIRATION RATE: 16 BRPM | DIASTOLIC BLOOD PRESSURE: 75 MMHG | OXYGEN SATURATION: 96 % | TEMPERATURE: 98 F | SYSTOLIC BLOOD PRESSURE: 141 MMHG | HEART RATE: 65 BPM | WEIGHT: 222.63 LBS

## 2019-01-01 VITALS
WEIGHT: 207 LBS | RESPIRATION RATE: 20 BRPM | SYSTOLIC BLOOD PRESSURE: 140 MMHG | BODY MASS INDEX: 37.14 KG/M2 | RESPIRATION RATE: 16 BRPM | TEMPERATURE: 98 F | DIASTOLIC BLOOD PRESSURE: 49 MMHG | TEMPERATURE: 99 F | OXYGEN SATURATION: 98 % | SYSTOLIC BLOOD PRESSURE: 135 MMHG | DIASTOLIC BLOOD PRESSURE: 58 MMHG | SYSTOLIC BLOOD PRESSURE: 158 MMHG | DIASTOLIC BLOOD PRESSURE: 78 MMHG | TEMPERATURE: 97 F | HEART RATE: 65 BPM | HEART RATE: 66 BPM | HEIGHT: 62.5 IN

## 2019-01-01 VITALS
RESPIRATION RATE: 16 BRPM | HEART RATE: 66 BPM | SYSTOLIC BLOOD PRESSURE: 135 MMHG | DIASTOLIC BLOOD PRESSURE: 49 MMHG | TEMPERATURE: 98 F | OXYGEN SATURATION: 98 %

## 2019-01-01 VITALS
DIASTOLIC BLOOD PRESSURE: 82 MMHG | RESPIRATION RATE: 16 BRPM | TEMPERATURE: 98 F | OXYGEN SATURATION: 97 % | HEART RATE: 65 BPM | SYSTOLIC BLOOD PRESSURE: 160 MMHG

## 2019-01-01 VITALS — WEIGHT: 197 LBS | BODY MASS INDEX: 36 KG/M2

## 2019-01-01 VITALS — BODY MASS INDEX: 37 KG/M2 | WEIGHT: 208 LBS

## 2019-01-01 DIAGNOSIS — R76.8 HEPATITIS C ANTIBODY TEST POSITIVE: ICD-10-CM

## 2019-01-01 DIAGNOSIS — I10 ESSENTIAL HYPERTENSION: ICD-10-CM

## 2019-01-01 DIAGNOSIS — D23.30 OTHER BENIGN NEOPLASM OF SKIN OF UNSPECIFIED PART OF FACE: Primary | ICD-10-CM

## 2019-01-01 DIAGNOSIS — R68.89 EXCESSIVE ORAL SECRETIONS: ICD-10-CM

## 2019-01-01 DIAGNOSIS — Z95.0 CARDIAC PACEMAKER: ICD-10-CM

## 2019-01-01 DIAGNOSIS — L72.0 INCLUSION CYST: ICD-10-CM

## 2019-01-01 DIAGNOSIS — I48.20 CHRONIC ATRIAL FIBRILLATION (HCC): ICD-10-CM

## 2019-01-01 DIAGNOSIS — T45.1X5A CHEMOTHERAPY-INDUCED NEUTROPENIA (HCC): ICD-10-CM

## 2019-01-01 DIAGNOSIS — Z79.01 LONG TERM (CURRENT) USE OF ANTICOAGULANTS: ICD-10-CM

## 2019-01-01 DIAGNOSIS — R30.0 DYSURIA: Primary | ICD-10-CM

## 2019-01-01 DIAGNOSIS — C15.5 MALIGNANT NEOPLASM OF LOWER THIRD OF ESOPHAGUS (HCC): Primary | ICD-10-CM

## 2019-01-01 DIAGNOSIS — N30.00 ACUTE CYSTITIS WITHOUT HEMATURIA: ICD-10-CM

## 2019-01-01 DIAGNOSIS — R59.0 ENLARGED LYMPH NODE IN NECK: Primary | ICD-10-CM

## 2019-01-01 DIAGNOSIS — K90.9 INTESTINAL MALABSORPTION, UNSPECIFIED TYPE: ICD-10-CM

## 2019-01-01 DIAGNOSIS — E43 SEVERE PROTEIN-CALORIE MALNUTRITION (HCC): ICD-10-CM

## 2019-01-01 DIAGNOSIS — Z51.81 ENCOUNTER FOR THERAPEUTIC DRUG MONITORING: ICD-10-CM

## 2019-01-01 DIAGNOSIS — I10 HYPERTENSION, UNSPECIFIED TYPE: ICD-10-CM

## 2019-01-01 DIAGNOSIS — C16.0 ADENOCARCINOMA OF GASTROESOPHAGEAL JUNCTION (HCC): ICD-10-CM

## 2019-01-01 DIAGNOSIS — L02.211 ABDOMINAL WALL ABSCESS: Primary | ICD-10-CM

## 2019-01-01 DIAGNOSIS — R07.89 ATYPICAL CHEST PAIN: ICD-10-CM

## 2019-01-01 DIAGNOSIS — E11.9 TYPE 2 DIABETES MELLITUS WITHOUT COMPLICATION, WITHOUT LONG-TERM CURRENT USE OF INSULIN (HCC): ICD-10-CM

## 2019-01-01 DIAGNOSIS — R11.2 CHEMOTHERAPY INDUCED NAUSEA AND VOMITING: Primary | ICD-10-CM

## 2019-01-01 DIAGNOSIS — Z51.11 ENCOUNTER FOR CHEMOTHERAPY MANAGEMENT: ICD-10-CM

## 2019-01-01 DIAGNOSIS — R59.0 ENLARGED LYMPH NODE IN NECK: ICD-10-CM

## 2019-01-01 DIAGNOSIS — E87.2 LACTIC ACIDOSIS: ICD-10-CM

## 2019-01-01 DIAGNOSIS — R10.9 ABDOMINAL PAIN, ACUTE: ICD-10-CM

## 2019-01-01 DIAGNOSIS — T45.1X5A CHEMOTHERAPY INDUCED NAUSEA AND VOMITING: Primary | ICD-10-CM

## 2019-01-01 DIAGNOSIS — K56.41 FECAL IMPACTION IN RECTUM (HCC): ICD-10-CM
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
## 2019-01-01 DIAGNOSIS — D37.9 NEOPLASM OF UNCERTAIN BEHAVIOR OF DIGESTIVE ORGAN: ICD-10-CM

## 2019-01-01 DIAGNOSIS — I83.899 RUPTURED VARICOSE VEIN: Primary | ICD-10-CM

## 2019-01-01 DIAGNOSIS — R59.0 CERVICAL LYMPHADENOPATHY: ICD-10-CM

## 2019-01-01 DIAGNOSIS — C15.5 MALIGNANT NEOPLASM OF LOWER THIRD OF ESOPHAGUS (HCC): ICD-10-CM

## 2019-01-01 DIAGNOSIS — R11.0 NAUSEA: ICD-10-CM

## 2019-01-01 DIAGNOSIS — C15.9 ESOPHAGEAL CANCER (HCC): ICD-10-CM

## 2019-01-01 DIAGNOSIS — R52 INTRACTABLE PAIN: ICD-10-CM

## 2019-01-01 DIAGNOSIS — L03.311 CELLULITIS, ABDOMINAL WALL: Primary | ICD-10-CM

## 2019-01-01 DIAGNOSIS — F41.9 ANXIETY: ICD-10-CM

## 2019-01-01 DIAGNOSIS — K94.19 JEJUNOSTOMY TUBE SITE PAIN (HCC): Primary | ICD-10-CM

## 2019-01-01 DIAGNOSIS — C15.9 ESOPHAGEAL CARCINOMA (HCC): ICD-10-CM

## 2019-01-01 DIAGNOSIS — K94.19 PAIN OF JEJUNOSTOMY TUBE SITE (HCC): ICD-10-CM

## 2019-01-01 DIAGNOSIS — L02.211 ABDOMINAL WALL ABSCESS: ICD-10-CM

## 2019-01-01 DIAGNOSIS — K94.19 JEJUNOSTOMY TUBE SITE PAIN (HCC): ICD-10-CM

## 2019-01-01 DIAGNOSIS — K57.92 ACUTE DIVERTICULITIS: Primary | ICD-10-CM

## 2019-01-01 DIAGNOSIS — C16.0 ADENOCARCINOMA OF GASTROESOPHAGEAL JUNCTION (HCC): Primary | ICD-10-CM

## 2019-01-01 DIAGNOSIS — S80.02XA CONTUSION OF LEFT KNEE, INITIAL ENCOUNTER: ICD-10-CM

## 2019-01-01 DIAGNOSIS — E86.0 DEHYDRATION: ICD-10-CM

## 2019-01-01 DIAGNOSIS — T81.49XA INFECTED SURGICAL WOUND: ICD-10-CM

## 2019-01-01 DIAGNOSIS — E11.9 DIABETES MELLITUS WITHOUT COMPLICATION (HCC): ICD-10-CM

## 2019-01-01 DIAGNOSIS — K59.01 SLOW TRANSIT CONSTIPATION: ICD-10-CM

## 2019-01-01 DIAGNOSIS — K52.9 COLITIS: ICD-10-CM

## 2019-01-01 DIAGNOSIS — R16.0 HEPATOMEGALY: ICD-10-CM

## 2019-01-01 DIAGNOSIS — E86.0 DEHYDRATION: Primary | ICD-10-CM

## 2019-01-01 DIAGNOSIS — E87.6 HYPOKALEMIA: ICD-10-CM

## 2019-01-01 DIAGNOSIS — E87.1 HYPONATREMIA: ICD-10-CM

## 2019-01-01 DIAGNOSIS — E11.9 DIABETES MELLITUS WITHOUT COMPLICATION (HCC): Primary | ICD-10-CM

## 2019-01-01 DIAGNOSIS — R13.10 DYSPHAGIA, UNSPECIFIED TYPE: ICD-10-CM

## 2019-01-01 DIAGNOSIS — K59.00 CONSTIPATION, UNSPECIFIED CONSTIPATION TYPE: ICD-10-CM

## 2019-01-01 DIAGNOSIS — D70.1 CHEMOTHERAPY-INDUCED NEUTROPENIA (HCC): ICD-10-CM

## 2019-01-01 DIAGNOSIS — Z01.89 ENCOUNTER FOR OTHER SPECIFIED SPECIAL EXAMINATIONS: ICD-10-CM

## 2019-01-01 DIAGNOSIS — Z02.9 ENCOUNTERS FOR ADMINISTRATIVE PURPOSE: ICD-10-CM

## 2019-01-01 DIAGNOSIS — T85.528A DISLODGED JEJUNOSTOMY TUBE: Primary | ICD-10-CM

## 2019-01-01 DIAGNOSIS — R19.7 DIARRHEA, UNSPECIFIED TYPE: Primary | ICD-10-CM

## 2019-01-01 DIAGNOSIS — R13.19 OTHER DYSPHAGIA: Primary | ICD-10-CM

## 2019-01-01 DIAGNOSIS — R60.0 LOCALIZED EDEMA: ICD-10-CM

## 2019-01-01 DIAGNOSIS — C15.9 ESOPHAGEAL CANCER (HCC): Primary | ICD-10-CM

## 2019-01-01 DIAGNOSIS — R10.13 EPIGASTRIC PAIN: Primary | ICD-10-CM

## 2019-01-01 DIAGNOSIS — J02.0 STREPTOCOCCAL SORE THROAT: Primary | ICD-10-CM

## 2019-01-01 DIAGNOSIS — C15.9 ESOPHAGEAL CARCINOMA (HCC): Primary | ICD-10-CM

## 2019-01-01 DIAGNOSIS — R30.0 DYSURIA: ICD-10-CM

## 2019-01-01 DIAGNOSIS — S42.202A CLOSED FRACTURE OF PROXIMAL END OF LEFT HUMERUS, UNSPECIFIED FRACTURE MORPHOLOGY, INITIAL ENCOUNTER: ICD-10-CM

## 2019-01-01 DIAGNOSIS — R10.13 EPIGASTRIC ABDOMINAL PAIN: ICD-10-CM

## 2019-01-01 LAB
ALBUMIN SERPL-MCNC: 1.9 G/DL (ref 3.4–5)
ALBUMIN SERPL-MCNC: 2 G/DL (ref 3.4–5)
ALBUMIN SERPL-MCNC: 2.9 G/DL (ref 3.4–5)
ALBUMIN SERPL-MCNC: 3.5 G/DL (ref 3.4–5)
ALBUMIN SERPL-MCNC: 3.5 G/DL (ref 3.4–5)
ALBUMIN/GLOB SERPL: 0.4 {RATIO} (ref 1–2)
ALBUMIN/GLOB SERPL: 0.4 {RATIO} (ref 1–2)
ALBUMIN/GLOB SERPL: 0.5 {RATIO} (ref 1–2)
ALBUMIN/GLOB SERPL: 0.8 {RATIO} (ref 1–2)
ALP LIVER SERPL-CCNC: 50 U/L (ref 55–142)
ALP LIVER SERPL-CCNC: 51 U/L (ref 55–142)
ALP LIVER SERPL-CCNC: 51 U/L (ref 55–142)
ALP LIVER SERPL-CCNC: 56 U/L (ref 55–142)
ALP LIVER SERPL-CCNC: 56 U/L (ref 55–142)
ALT SERPL-CCNC: 16 U/L (ref 13–56)
ALT SERPL-CCNC: 17 U/L (ref 13–56)
ALT SERPL-CCNC: 28 U/L (ref 13–56)
ALT SERPL-CCNC: 28 U/L (ref 13–56)
ALT SERPL-CCNC: 30 U/L (ref 13–56)
ANION GAP SERPL CALC-SCNC: 4 MMOL/L (ref 0–18)
ANION GAP SERPL CALC-SCNC: 5 MMOL/L (ref 0–18)
ANION GAP SERPL CALC-SCNC: 6 MMOL/L (ref 0–18)
ANION GAP SERPL CALC-SCNC: 7 MMOL/L (ref 0–18)
ANION GAP SERPL CALC-SCNC: 8 MMOL/L (ref 0–18)
ANION GAP SERPL CALC-SCNC: 8 MMOL/L (ref 0–18)
ANTIBODY SCREEN: NEGATIVE
ANTIBODY SCREEN: NEGATIVE
AST SERPL-CCNC: 17 U/L (ref 15–37)
AST SERPL-CCNC: 19 U/L (ref 15–37)
AST SERPL-CCNC: 27 U/L (ref 15–37)
AST SERPL-CCNC: 29 U/L (ref 15–37)
AST SERPL-CCNC: 33 U/L (ref 15–37)
BASOPHILS # BLD AUTO: 0.02 X10(3) UL (ref 0–0.2)
BASOPHILS # BLD AUTO: 0.03 X10(3) UL (ref 0–0.2)
BASOPHILS # BLD AUTO: 0.04 X10(3) UL (ref 0–0.2)
BASOPHILS # BLD AUTO: 0.05 X10(3) UL (ref 0–0.2)
BASOPHILS # BLD AUTO: 0.08 X10(3) UL (ref 0–0.2)
BASOPHILS NFR BLD AUTO: 0.4 %
BASOPHILS NFR BLD AUTO: 0.4 %
BASOPHILS NFR BLD AUTO: 0.5 %
BASOPHILS NFR BLD AUTO: 0.6 %
BASOPHILS NFR BLD AUTO: 0.7 %
BASOPHILS NFR BLD AUTO: 0.8 %
BASOPHILS NFR BLD AUTO: 0.9 %
BASOPHILS NFR BLD AUTO: 0.9 %
BASOPHILS NFR BLD AUTO: 1.1 %
BASOPHILS NFR BLD AUTO: 1.7 %
BILIRUB DIRECT SERPL-MCNC: 0.2 MG/DL (ref 0–0.2)
BILIRUB SERPL-MCNC: 0.3 MG/DL (ref 0.1–2)
BILIRUB SERPL-MCNC: 0.3 MG/DL (ref 0.1–2)
BILIRUB SERPL-MCNC: 0.4 MG/DL (ref 0.1–2)
BILIRUB SERPL-MCNC: 0.6 MG/DL (ref 0.1–2)
BILIRUB SERPL-MCNC: 0.7 MG/DL (ref 0.1–2)
BILIRUB UR QL: NEGATIVE
BILIRUB UR QL: NEGATIVE
BLOOD TYPE BARCODE: 6200
BUN BLD-MCNC: 10 MG/DL (ref 7–18)
BUN BLD-MCNC: 12 MG/DL (ref 7–18)
BUN BLD-MCNC: 17 MG/DL (ref 7–18)
BUN BLD-MCNC: 17 MG/DL (ref 7–18)
BUN BLD-MCNC: 18 MG/DL (ref 7–18)
BUN BLD-MCNC: 19 MG/DL (ref 7–18)
BUN BLD-MCNC: 19 MG/DL (ref 7–18)
BUN BLD-MCNC: 20 MG/DL (ref 7–18)
BUN BLD-MCNC: 21 MG/DL (ref 7–18)
BUN BLD-MCNC: 22 MG/DL (ref 7–18)
BUN BLD-MCNC: 23 MG/DL (ref 7–18)
BUN BLD-MCNC: 24 MG/DL (ref 7–18)
BUN BLD-MCNC: 33 MG/DL (ref 7–18)
BUN BLD-MCNC: 36 MG/DL (ref 7–18)
BUN BLD-MCNC: 38 MG/DL (ref 7–18)
BUN BLD-MCNC: 5 MG/DL (ref 7–18)
BUN BLD-MCNC: 6 MG/DL (ref 7–18)
BUN BLD-MCNC: 6 MG/DL (ref 7–18)
BUN BLD-MCNC: 8 MG/DL (ref 7–18)
BUN BLD-MCNC: 8 MG/DL (ref 7–18)
BUN/CREAT SERPL: 10.5 (ref 10–20)
BUN/CREAT SERPL: 11 (ref 10–20)
BUN/CREAT SERPL: 12.5 (ref 10–20)
BUN/CREAT SERPL: 14.3 (ref 10–20)
BUN/CREAT SERPL: 18.4 (ref 10–20)
BUN/CREAT SERPL: 18.7 (ref 10–20)
BUN/CREAT SERPL: 20 (ref 10–20)
BUN/CREAT SERPL: 23 (ref 10–20)
BUN/CREAT SERPL: 23.5 (ref 10–20)
BUN/CREAT SERPL: 25.3 (ref 10–20)
BUN/CREAT SERPL: 26.2 (ref 10–20)
BUN/CREAT SERPL: 27.7 (ref 10–20)
BUN/CREAT SERPL: 29.7 (ref 10–20)
BUN/CREAT SERPL: 29.9 (ref 10–20)
BUN/CREAT SERPL: 30 (ref 10–20)
BUN/CREAT SERPL: 31.9 (ref 10–20)
BUN/CREAT SERPL: 33.3 (ref 10–20)
BUN/CREAT SERPL: 39.7 (ref 10–20)
BUN/CREAT SERPL: 6.8 (ref 10–20)
BUN/CREAT SERPL: 7.7 (ref 10–20)
BUN/CREAT SERPL: 8.1 (ref 10–20)
CALCIUM BLD-MCNC: 7.2 MG/DL (ref 8.5–10.1)
CALCIUM BLD-MCNC: 7.7 MG/DL (ref 8.5–10.1)
CALCIUM BLD-MCNC: 7.9 MG/DL (ref 8.5–10.1)
CALCIUM BLD-MCNC: 8.1 MG/DL (ref 8.5–10.1)
CALCIUM BLD-MCNC: 8.1 MG/DL (ref 8.5–10.1)
CALCIUM BLD-MCNC: 8.2 MG/DL (ref 8.5–10.1)
CALCIUM BLD-MCNC: 8.4 MG/DL (ref 8.5–10.1)
CALCIUM BLD-MCNC: 8.5 MG/DL (ref 8.5–10.1)
CALCIUM BLD-MCNC: 8.6 MG/DL (ref 8.5–10.1)
CALCIUM BLD-MCNC: 8.6 MG/DL (ref 8.5–10.1)
CALCIUM BLD-MCNC: 8.8 MG/DL (ref 8.5–10.1)
CALCIUM BLD-MCNC: 9.1 MG/DL (ref 8.5–10.1)
CALCIUM BLD-MCNC: 9.3 MG/DL (ref 8.5–10.1)
CALCIUM BLD-MCNC: 9.3 MG/DL (ref 8.5–10.1)
CALCIUM BLD-MCNC: 9.6 MG/DL (ref 8.5–10.1)
CALCIUM BLD-MCNC: 9.6 MG/DL (ref 8.5–10.1)
CHLORIDE SERPL-SCNC: 100 MMOL/L (ref 98–112)
CHLORIDE SERPL-SCNC: 101 MMOL/L (ref 98–112)
CHLORIDE SERPL-SCNC: 102 MMOL/L (ref 98–107)
CHLORIDE SERPL-SCNC: 102 MMOL/L (ref 98–112)
CHLORIDE SERPL-SCNC: 102 MMOL/L (ref 98–112)
CHLORIDE SERPL-SCNC: 104 MMOL/L (ref 98–107)
CHLORIDE SERPL-SCNC: 104 MMOL/L (ref 98–112)
CHLORIDE SERPL-SCNC: 104 MMOL/L (ref 98–112)
CHLORIDE SERPL-SCNC: 105 MMOL/L (ref 98–112)
CHLORIDE SERPL-SCNC: 106 MMOL/L (ref 98–112)
CHLORIDE SERPL-SCNC: 108 MMOL/L (ref 98–112)
CHLORIDE SERPL-SCNC: 108 MMOL/L (ref 98–112)
CHLORIDE SERPL-SCNC: 89 MMOL/L (ref 98–112)
CHLORIDE SERPL-SCNC: 91 MMOL/L (ref 98–112)
CHLORIDE SERPL-SCNC: 91 MMOL/L (ref 98–112)
CHLORIDE SERPL-SCNC: 96 MMOL/L (ref 98–112)
CHLORIDE SERPL-SCNC: 97 MMOL/L (ref 98–112)
CHLORIDE SERPL-SCNC: 98 MMOL/L (ref 98–112)
CHLORIDE SERPL-SCNC: 99 MMOL/L (ref 98–112)
CHOLEST SMN-MCNC: 137 MG/DL (ref ?–200)
CO2 SERPL-SCNC: 23 MMOL/L (ref 21–32)
CO2 SERPL-SCNC: 24 MMOL/L (ref 21–32)
CO2 SERPL-SCNC: 24 MMOL/L (ref 21–32)
CO2 SERPL-SCNC: 27 MMOL/L (ref 21–32)
CO2 SERPL-SCNC: 28 MMOL/L (ref 21–32)
CO2 SERPL-SCNC: 29 MMOL/L (ref 21–32)
CO2 SERPL-SCNC: 30 MMOL/L (ref 21–32)
CO2 SERPL-SCNC: 31 MMOL/L (ref 21–32)
CO2 SERPL-SCNC: 32 MMOL/L (ref 21–32)
CO2 SERPL-SCNC: 32 MMOL/L (ref 21–32)
CO2 SERPL-SCNC: 33 MMOL/L (ref 21–32)
CO2 SERPL-SCNC: 35 MMOL/L (ref 21–32)
CO2 SERPL-SCNC: 38 MMOL/L (ref 21–32)
CO2 SERPL-SCNC: 40 MMOL/L (ref 21–32)
COLOR UR: YELLOW
COLOR UR: YELLOW
CREAT BLD-MCNC: 0.58 MG/DL (ref 0.55–1.02)
CREAT BLD-MCNC: 0.63 MG/DL (ref 0.55–1.02)
CREAT BLD-MCNC: 0.63 MG/DL (ref 0.55–1.02)
CREAT BLD-MCNC: 0.64 MG/DL (ref 0.55–1.02)
CREAT BLD-MCNC: 0.7 MG/DL (ref 0.55–1.02)
CREAT BLD-MCNC: 0.72 MG/DL (ref 0.55–1.02)
CREAT BLD-MCNC: 0.73 MG/DL (ref 0.55–1.02)
CREAT BLD-MCNC: 0.73 MG/DL (ref 0.55–1.02)
CREAT BLD-MCNC: 0.74 MG/DL (ref 0.55–1.02)
CREAT BLD-MCNC: 0.76 MG/DL (ref 0.55–1.02)
CREAT BLD-MCNC: 0.78 MG/DL (ref 0.55–1.02)
CREAT BLD-MCNC: 0.8 MG/DL (ref 0.55–1.02)
CREAT BLD-MCNC: 0.81 MG/DL (ref 0.55–1.02)
CREAT BLD-MCNC: 0.83 MG/DL (ref 0.55–1.02)
CREAT BLD-MCNC: 0.84 MG/DL (ref 0.55–1.02)
CREAT BLD-MCNC: 0.84 MG/DL (ref 0.55–1.02)
CREAT BLD-MCNC: 0.85 MG/DL (ref 0.55–1.02)
CREAT BLD-MCNC: 0.87 MG/DL (ref 0.55–1.02)
CREAT BLD-MCNC: 0.91 MG/DL (ref 0.55–1.02)
CREAT BLD-MCNC: 0.98 MG/DL (ref 0.55–1.02)
CREAT BLD-MCNC: 1.13 MG/DL (ref 0.55–1.02)
CREAT BLD-MCNC: 1.19 MG/DL (ref 0.55–1.02)
CREAT BLD-MCNC: 1.27 MG/DL (ref 0.55–1.02)
DEPRECATED HBV CORE AB SER IA-ACNC: 62.7 NG/ML (ref 18–340)
DEPRECATED RDW RBC AUTO: 45.2 FL (ref 35.1–46.3)
DEPRECATED RDW RBC AUTO: 45.3 FL (ref 35.1–46.3)
DEPRECATED RDW RBC AUTO: 45.8 FL (ref 35.1–46.3)
DEPRECATED RDW RBC AUTO: 45.9 FL (ref 35.1–46.3)
DEPRECATED RDW RBC AUTO: 46 FL (ref 35.1–46.3)
DEPRECATED RDW RBC AUTO: 46 FL (ref 35.1–46.3)
DEPRECATED RDW RBC AUTO: 46.1 FL (ref 35.1–46.3)
DEPRECATED RDW RBC AUTO: 46.1 FL (ref 35.1–46.3)
DEPRECATED RDW RBC AUTO: 46.5 FL (ref 35.1–46.3)
DEPRECATED RDW RBC AUTO: 46.6 FL (ref 35.1–46.3)
DEPRECATED RDW RBC AUTO: 46.8 FL (ref 35.1–46.3)
DEPRECATED RDW RBC AUTO: 46.9 FL (ref 35.1–46.3)
DEPRECATED RDW RBC AUTO: 47.4 FL (ref 35.1–46.3)
DEPRECATED RDW RBC AUTO: 47.5 FL (ref 35.1–46.3)
DEPRECATED RDW RBC AUTO: 47.9 FL (ref 35.1–46.3)
DEPRECATED RDW RBC AUTO: 48.4 FL (ref 35.1–46.3)
DEPRECATED RDW RBC AUTO: 48.9 FL (ref 35.1–46.3)
DEPRECATED RDW RBC AUTO: 49.3 FL (ref 35.1–46.3)
DEPRECATED RDW RBC AUTO: 49.6 FL (ref 35.1–46.3)
DEPRECATED RDW RBC AUTO: 49.7 FL (ref 35.1–46.3)
DEPRECATED RDW RBC AUTO: 50.1 FL (ref 35.1–46.3)
DEPRECATED RDW RBC AUTO: 51.9 FL (ref 35.1–46.3)
DEPRECATED RDW RBC AUTO: 52.3 FL (ref 35.1–46.3)
DEPRECATED RDW RBC AUTO: 53.2 FL (ref 35.1–46.3)
EOSINOPHIL # BLD AUTO: 0.05 X10(3) UL (ref 0–0.7)
EOSINOPHIL # BLD AUTO: 0.05 X10(3) UL (ref 0–0.7)
EOSINOPHIL # BLD AUTO: 0.08 X10(3) UL (ref 0–0.7)
EOSINOPHIL # BLD AUTO: 0.12 X10(3) UL (ref 0–0.7)
EOSINOPHIL # BLD AUTO: 0.14 X10(3) UL (ref 0–0.7)
EOSINOPHIL # BLD AUTO: 0.15 X10(3) UL (ref 0–0.7)
EOSINOPHIL # BLD AUTO: 0.16 X10(3) UL (ref 0–0.7)
EOSINOPHIL # BLD AUTO: 0.17 X10(3) UL (ref 0–0.7)
EOSINOPHIL # BLD AUTO: 0.18 X10(3) UL (ref 0–0.7)
EOSINOPHIL # BLD AUTO: 0.19 X10(3) UL (ref 0–0.7)
EOSINOPHIL # BLD AUTO: 0.21 X10(3) UL (ref 0–0.7)
EOSINOPHIL # BLD AUTO: 0.21 X10(3) UL (ref 0–0.7)
EOSINOPHIL # BLD AUTO: 0.22 X10(3) UL (ref 0–0.7)
EOSINOPHIL # BLD AUTO: 0.22 X10(3) UL (ref 0–0.7)
EOSINOPHIL # BLD AUTO: 0.23 X10(3) UL (ref 0–0.7)
EOSINOPHIL # BLD AUTO: 0.23 X10(3) UL (ref 0–0.7)
EOSINOPHIL # BLD AUTO: 0.24 X10(3) UL (ref 0–0.7)
EOSINOPHIL # BLD AUTO: 0.26 X10(3) UL (ref 0–0.7)
EOSINOPHIL # BLD AUTO: 0.31 X10(3) UL (ref 0–0.7)
EOSINOPHIL NFR BLD AUTO: 0.8 %
EOSINOPHIL NFR BLD AUTO: 1.3 %
EOSINOPHIL NFR BLD AUTO: 1.8 %
EOSINOPHIL NFR BLD AUTO: 3 %
EOSINOPHIL NFR BLD AUTO: 3.2 %
EOSINOPHIL NFR BLD AUTO: 3.3 %
EOSINOPHIL NFR BLD AUTO: 3.5 %
EOSINOPHIL NFR BLD AUTO: 3.5 %
EOSINOPHIL NFR BLD AUTO: 3.7 %
EOSINOPHIL NFR BLD AUTO: 3.8 %
EOSINOPHIL NFR BLD AUTO: 3.8 %
EOSINOPHIL NFR BLD AUTO: 3.9 %
EOSINOPHIL NFR BLD AUTO: 3.9 %
EOSINOPHIL NFR BLD AUTO: 4 %
EOSINOPHIL NFR BLD AUTO: 4.3 %
EOSINOPHIL NFR BLD AUTO: 4.4 %
EOSINOPHIL NFR BLD AUTO: 4.6 %
EOSINOPHIL NFR BLD AUTO: 4.8 %
EOSINOPHIL NFR BLD AUTO: 5 %
EOSINOPHIL NFR BLD AUTO: 5 %
EOSINOPHIL NFR BLD AUTO: 6.6 %
ERYTHROCYTE [DISTWIDTH] IN BLOOD BY AUTOMATED COUNT: 12.9 % (ref 11–15)
ERYTHROCYTE [DISTWIDTH] IN BLOOD BY AUTOMATED COUNT: 13 % (ref 11–15)
ERYTHROCYTE [DISTWIDTH] IN BLOOD BY AUTOMATED COUNT: 13.1 % (ref 11–15)
ERYTHROCYTE [DISTWIDTH] IN BLOOD BY AUTOMATED COUNT: 13.2 % (ref 11–15)
ERYTHROCYTE [DISTWIDTH] IN BLOOD BY AUTOMATED COUNT: 13.3 % (ref 11–15)
ERYTHROCYTE [DISTWIDTH] IN BLOOD BY AUTOMATED COUNT: 13.4 % (ref 11–15)
ERYTHROCYTE [DISTWIDTH] IN BLOOD BY AUTOMATED COUNT: 13.4 % (ref 11–15)
ERYTHROCYTE [DISTWIDTH] IN BLOOD BY AUTOMATED COUNT: 13.7 % (ref 11–15)
ERYTHROCYTE [DISTWIDTH] IN BLOOD BY AUTOMATED COUNT: 13.8 % (ref 11–15)
ERYTHROCYTE [DISTWIDTH] IN BLOOD BY AUTOMATED COUNT: 14.1 % (ref 11–15)
ERYTHROCYTE [DISTWIDTH] IN BLOOD BY AUTOMATED COUNT: 14.2 % (ref 11–15)
ERYTHROCYTE [DISTWIDTH] IN BLOOD BY AUTOMATED COUNT: 14.2 % (ref 11–15)
ERYTHROCYTE [DISTWIDTH] IN BLOOD BY AUTOMATED COUNT: 14.6 % (ref 11–15)
ERYTHROCYTE [DISTWIDTH] IN BLOOD BY AUTOMATED COUNT: 14.6 % (ref 11–15)
ERYTHROCYTE [DISTWIDTH] IN BLOOD BY AUTOMATED COUNT: 15.1 % (ref 11–15)
ERYTHROCYTE [DISTWIDTH] IN BLOOD BY AUTOMATED COUNT: 15.3 % (ref 11–15)
EST. AVERAGE GLUCOSE BLD GHB EST-MCNC: 114 MG/DL (ref 68–126)
EST. AVERAGE GLUCOSE BLD GHB EST-MCNC: 131 MG/DL (ref 68–126)
EST. AVERAGE GLUCOSE BLD GHB EST-MCNC: 137 MG/DL (ref 68–126)
GLOBULIN PLAS-MCNC: 4.6 G/DL (ref 2.8–4.4)
GLOBULIN PLAS-MCNC: 4.6 G/DL (ref 2.8–4.4)
GLOBULIN PLAS-MCNC: 4.8 G/DL (ref 2.8–4.4)
GLOBULIN PLAS-MCNC: 5.8 G/DL (ref 2.8–4.4)
GLUCOSE BLD-MCNC: 100 MG/DL (ref 70–99)
GLUCOSE BLD-MCNC: 113 MG/DL (ref 70–99)
GLUCOSE BLD-MCNC: 115 MG/DL (ref 70–99)
GLUCOSE BLD-MCNC: 120 MG/DL (ref 70–99)
GLUCOSE BLD-MCNC: 122 MG/DL (ref 70–99)
GLUCOSE BLD-MCNC: 125 MG/DL (ref 70–99)
GLUCOSE BLD-MCNC: 130 MG/DL (ref 70–99)
GLUCOSE BLD-MCNC: 131 MG/DL (ref 70–99)
GLUCOSE BLD-MCNC: 139 MG/DL (ref 70–99)
GLUCOSE BLD-MCNC: 149 MG/DL (ref 70–99)
GLUCOSE BLD-MCNC: 150 MG/DL (ref 70–99)
GLUCOSE BLD-MCNC: 155 MG/DL (ref 70–99)
GLUCOSE BLD-MCNC: 164 MG/DL (ref 70–99)
GLUCOSE BLD-MCNC: 170 MG/DL (ref 70–99)
GLUCOSE BLD-MCNC: 174 MG/DL (ref 70–99)
GLUCOSE BLD-MCNC: 178 MG/DL (ref 70–99)
GLUCOSE BLD-MCNC: 188 MG/DL (ref 70–99)
GLUCOSE BLD-MCNC: 198 MG/DL (ref 70–99)
GLUCOSE BLD-MCNC: 205 MG/DL (ref 70–99)
GLUCOSE BLD-MCNC: 209 MG/DL (ref 70–99)
GLUCOSE BLD-MCNC: 225 MG/DL (ref 70–99)
GLUCOSE BLD-MCNC: 249 MG/DL (ref 70–99)
GLUCOSE BLD-MCNC: 312 MG/DL (ref 70–99)
GLUCOSE BLDC GLUCOMTR-MCNC: 101 MG/DL (ref 70–99)
GLUCOSE BLDC GLUCOMTR-MCNC: 108 MG/DL (ref 70–99)
GLUCOSE BLDC GLUCOMTR-MCNC: 114 MG/DL (ref 70–99)
GLUCOSE BLDC GLUCOMTR-MCNC: 115 MG/DL (ref 70–99)
GLUCOSE BLDC GLUCOMTR-MCNC: 118 MG/DL (ref 70–99)
GLUCOSE BLDC GLUCOMTR-MCNC: 119 MG/DL (ref 70–99)
GLUCOSE BLDC GLUCOMTR-MCNC: 120 MG/DL (ref 70–99)
GLUCOSE BLDC GLUCOMTR-MCNC: 124 MG/DL (ref 70–99)
GLUCOSE BLDC GLUCOMTR-MCNC: 125 MG/DL (ref 70–99)
GLUCOSE BLDC GLUCOMTR-MCNC: 126 MG/DL (ref 70–99)
GLUCOSE BLDC GLUCOMTR-MCNC: 127 MG/DL (ref 70–99)
GLUCOSE BLDC GLUCOMTR-MCNC: 129 MG/DL (ref 70–99)
GLUCOSE BLDC GLUCOMTR-MCNC: 130 MG/DL (ref 70–99)
GLUCOSE BLDC GLUCOMTR-MCNC: 131 MG/DL (ref 70–99)
GLUCOSE BLDC GLUCOMTR-MCNC: 132 MG/DL (ref 70–99)
GLUCOSE BLDC GLUCOMTR-MCNC: 132 MG/DL (ref 70–99)
GLUCOSE BLDC GLUCOMTR-MCNC: 134 MG/DL (ref 70–99)
GLUCOSE BLDC GLUCOMTR-MCNC: 135 MG/DL (ref 70–99)
GLUCOSE BLDC GLUCOMTR-MCNC: 138 MG/DL (ref 70–99)
GLUCOSE BLDC GLUCOMTR-MCNC: 138 MG/DL (ref 70–99)
GLUCOSE BLDC GLUCOMTR-MCNC: 139 MG/DL (ref 70–99)
GLUCOSE BLDC GLUCOMTR-MCNC: 140 MG/DL (ref 70–99)
GLUCOSE BLDC GLUCOMTR-MCNC: 140 MG/DL (ref 70–99)
GLUCOSE BLDC GLUCOMTR-MCNC: 141 MG/DL (ref 70–99)
GLUCOSE BLDC GLUCOMTR-MCNC: 141 MG/DL (ref 70–99)
GLUCOSE BLDC GLUCOMTR-MCNC: 143 MG/DL (ref 70–99)
GLUCOSE BLDC GLUCOMTR-MCNC: 147 MG/DL (ref 70–99)
GLUCOSE BLDC GLUCOMTR-MCNC: 151 MG/DL (ref 70–99)
GLUCOSE BLDC GLUCOMTR-MCNC: 152 MG/DL (ref 70–99)
GLUCOSE BLDC GLUCOMTR-MCNC: 154 MG/DL (ref 70–99)
GLUCOSE BLDC GLUCOMTR-MCNC: 156 MG/DL (ref 70–99)
GLUCOSE BLDC GLUCOMTR-MCNC: 158 MG/DL (ref 70–99)
GLUCOSE BLDC GLUCOMTR-MCNC: 160 MG/DL (ref 70–99)
GLUCOSE BLDC GLUCOMTR-MCNC: 162 MG/DL (ref 70–99)
GLUCOSE BLDC GLUCOMTR-MCNC: 162 MG/DL (ref 70–99)
GLUCOSE BLDC GLUCOMTR-MCNC: 165 MG/DL (ref 70–99)
GLUCOSE BLDC GLUCOMTR-MCNC: 167 MG/DL (ref 70–99)
GLUCOSE BLDC GLUCOMTR-MCNC: 168 MG/DL (ref 70–99)
GLUCOSE BLDC GLUCOMTR-MCNC: 169 MG/DL (ref 70–99)
GLUCOSE BLDC GLUCOMTR-MCNC: 170 MG/DL (ref 70–99)
GLUCOSE BLDC GLUCOMTR-MCNC: 171 MG/DL (ref 70–99)
GLUCOSE BLDC GLUCOMTR-MCNC: 172 MG/DL (ref 70–99)
GLUCOSE BLDC GLUCOMTR-MCNC: 173 MG/DL (ref 70–99)
GLUCOSE BLDC GLUCOMTR-MCNC: 177 MG/DL (ref 70–99)
GLUCOSE BLDC GLUCOMTR-MCNC: 178 MG/DL (ref 70–99)
GLUCOSE BLDC GLUCOMTR-MCNC: 178 MG/DL (ref 70–99)
GLUCOSE BLDC GLUCOMTR-MCNC: 181 MG/DL (ref 70–99)
GLUCOSE BLDC GLUCOMTR-MCNC: 183 MG/DL (ref 70–99)
GLUCOSE BLDC GLUCOMTR-MCNC: 184 MG/DL (ref 70–99)
GLUCOSE BLDC GLUCOMTR-MCNC: 187 MG/DL (ref 70–99)
GLUCOSE BLDC GLUCOMTR-MCNC: 188 MG/DL (ref 70–99)
GLUCOSE BLDC GLUCOMTR-MCNC: 189 MG/DL (ref 70–99)
GLUCOSE BLDC GLUCOMTR-MCNC: 189 MG/DL (ref 70–99)
GLUCOSE BLDC GLUCOMTR-MCNC: 198 MG/DL (ref 70–99)
GLUCOSE BLDC GLUCOMTR-MCNC: 198 MG/DL (ref 70–99)
GLUCOSE BLDC GLUCOMTR-MCNC: 200 MG/DL (ref 70–99)
GLUCOSE BLDC GLUCOMTR-MCNC: 203 MG/DL (ref 70–99)
GLUCOSE BLDC GLUCOMTR-MCNC: 206 MG/DL (ref 70–99)
GLUCOSE BLDC GLUCOMTR-MCNC: 211 MG/DL (ref 70–99)
GLUCOSE BLDC GLUCOMTR-MCNC: 213 MG/DL (ref 70–99)
GLUCOSE BLDC GLUCOMTR-MCNC: 215 MG/DL (ref 70–99)
GLUCOSE BLDC GLUCOMTR-MCNC: 216 MG/DL (ref 70–99)
GLUCOSE BLDC GLUCOMTR-MCNC: 218 MG/DL (ref 70–99)
GLUCOSE BLDC GLUCOMTR-MCNC: 218 MG/DL (ref 70–99)
GLUCOSE BLDC GLUCOMTR-MCNC: 222 MG/DL (ref 70–99)
GLUCOSE BLDC GLUCOMTR-MCNC: 236 MG/DL (ref 70–99)
GLUCOSE BLDC GLUCOMTR-MCNC: 239 MG/DL (ref 70–99)
GLUCOSE BLDC GLUCOMTR-MCNC: 244 MG/DL (ref 70–99)
GLUCOSE BLDC GLUCOMTR-MCNC: 251 MG/DL (ref 70–99)
GLUCOSE BLDC GLUCOMTR-MCNC: 255 MG/DL (ref 70–99)
GLUCOSE BLDC GLUCOMTR-MCNC: 280 MG/DL (ref 70–99)
GLUCOSE BLDC GLUCOMTR-MCNC: 296 MG/DL (ref 70–99)
GLUCOSE BLDC GLUCOMTR-MCNC: 327 MG/DL (ref 70–99)
GLUCOSE BLDC GLUCOMTR-MCNC: 375 MG/DL (ref 70–99)
GLUCOSE BLDC GLUCOMTR-MCNC: 75 MG/DL (ref 70–99)
GLUCOSE BLDC GLUCOMTR-MCNC: 97 MG/DL (ref 70–99)
GLUCOSE UR-MCNC: NEGATIVE MG/DL
GLUCOSE UR-MCNC: NEGATIVE MG/DL
HAV IGM SER QL: 1.8 MG/DL (ref 1.6–2.6)
HAV IGM SER QL: 1.9 MG/DL (ref 1.6–2.6)
HAV IGM SER QL: 2 MG/DL (ref 1.6–2.6)
HAV IGM SER QL: 2.1 MG/DL (ref 1.6–2.6)
HAV IGM SER QL: 2.1 MG/DL (ref 1.6–2.6)
HAV IGM SER QL: 2.3 MG/DL (ref 1.6–2.6)
HAV IGM SER QL: 2.4 MG/DL (ref 1.6–2.6)
HAV IGM SER QL: 2.5 MG/DL (ref 1.6–2.6)
HBA1C MFR BLD HPLC: 5.6 % (ref ?–5.7)
HBA1C MFR BLD HPLC: 6.2 % (ref ?–5.7)
HBA1C MFR BLD HPLC: 6.4 % (ref ?–5.7)
HCT VFR BLD AUTO: 20.7 % (ref 35–48)
HCT VFR BLD AUTO: 22.4 % (ref 35–48)
HCT VFR BLD AUTO: 23.6 % (ref 35–48)
HCT VFR BLD AUTO: 23.6 % (ref 35–48)
HCT VFR BLD AUTO: 24.3 % (ref 35–48)
HCT VFR BLD AUTO: 24.6 % (ref 35–48)
HCT VFR BLD AUTO: 26 % (ref 35–48)
HCT VFR BLD AUTO: 26.1 % (ref 35–48)
HCT VFR BLD AUTO: 26.9 % (ref 35–48)
HCT VFR BLD AUTO: 27.3 % (ref 35–48)
HCT VFR BLD AUTO: 28.1 % (ref 35–48)
HCT VFR BLD AUTO: 28.2 % (ref 35–48)
HCT VFR BLD AUTO: 29 % (ref 35–48)
HCT VFR BLD AUTO: 29.3 % (ref 35–48)
HCT VFR BLD AUTO: 29.6 % (ref 35–48)
HCT VFR BLD AUTO: 30 % (ref 35–48)
HCT VFR BLD AUTO: 30.2 % (ref 35–48)
HCT VFR BLD AUTO: 30.3 % (ref 35–48)
HCT VFR BLD AUTO: 30.4 % (ref 35–48)
HCT VFR BLD AUTO: 30.6 % (ref 35–48)
HCT VFR BLD AUTO: 31.1 % (ref 35–48)
HCT VFR BLD AUTO: 31.8 % (ref 35–48)
HCT VFR BLD AUTO: 32.3 % (ref 35–48)
HCT VFR BLD AUTO: 32.5 % (ref 35–48)
HCT VFR BLD AUTO: 36.4 % (ref 35–48)
HCT VFR BLD AUTO: 37.9 % (ref 35–48)
HDLC SERPL-MCNC: 59 MG/DL (ref 40–59)
HEMOCCULT STL QL: NEGATIVE
HGB BLD-MCNC: 10 G/DL (ref 12–16)
HGB BLD-MCNC: 10.1 G/DL (ref 12–16)
HGB BLD-MCNC: 10.2 G/DL (ref 12–16)
HGB BLD-MCNC: 10.4 G/DL (ref 12–16)
HGB BLD-MCNC: 10.5 G/DL (ref 12–16)
HGB BLD-MCNC: 10.6 G/DL (ref 12–16)
HGB BLD-MCNC: 11 G/DL (ref 12–16)
HGB BLD-MCNC: 11.5 G/DL (ref 12–16)
HGB BLD-MCNC: 11.9 G/DL (ref 12–16)
HGB BLD-MCNC: 6.7 G/DL (ref 12–16)
HGB BLD-MCNC: 7.4 G/DL (ref 12–16)
HGB BLD-MCNC: 7.6 G/DL (ref 12–16)
HGB BLD-MCNC: 7.9 G/DL (ref 12–16)
HGB BLD-MCNC: 8 G/DL (ref 12–16)
HGB BLD-MCNC: 8.2 G/DL (ref 12–16)
HGB BLD-MCNC: 8.5 G/DL (ref 12–16)
HGB BLD-MCNC: 8.6 G/DL (ref 12–16)
HGB BLD-MCNC: 8.7 G/DL (ref 12–16)
HGB BLD-MCNC: 9 G/DL (ref 12–16)
HGB BLD-MCNC: 9 G/DL (ref 12–16)
HGB BLD-MCNC: 9.1 G/DL (ref 12–16)
HGB BLD-MCNC: 9.3 G/DL (ref 12–16)
HGB BLD-MCNC: 9.3 G/DL (ref 12–16)
HGB BLD-MCNC: 9.7 G/DL (ref 12–16)
HGB BLD-MCNC: 9.7 G/DL (ref 12–16)
HGB BLD-MCNC: 9.9 G/DL (ref 12–16)
HGB UR QL STRIP.AUTO: NEGATIVE
HYALINE CASTS #/AREA URNS AUTO: 1 /LPF
IMM GRANULOCYTES # BLD AUTO: 0.01 X10(3) UL (ref 0–1)
IMM GRANULOCYTES # BLD AUTO: 0.02 X10(3) UL (ref 0–1)
IMM GRANULOCYTES # BLD AUTO: 0.03 X10(3) UL (ref 0–1)
IMM GRANULOCYTES # BLD AUTO: 0.04 X10(3) UL (ref 0–1)
IMM GRANULOCYTES # BLD AUTO: 0.04 X10(3) UL (ref 0–1)
IMM GRANULOCYTES # BLD AUTO: 0.05 X10(3) UL (ref 0–1)
IMM GRANULOCYTES # BLD AUTO: 0.05 X10(3) UL (ref 0–1)
IMM GRANULOCYTES NFR BLD: 0.2 %
IMM GRANULOCYTES NFR BLD: 0.3 %
IMM GRANULOCYTES NFR BLD: 0.4 %
IMM GRANULOCYTES NFR BLD: 0.5 %
IMM GRANULOCYTES NFR BLD: 0.6 %
IMM GRANULOCYTES NFR BLD: 0.7 %
IMM GRANULOCYTES NFR BLD: 0.8 %
IMM GRANULOCYTES NFR BLD: 0.8 %
IMM GRANULOCYTES NFR BLD: 1.1 %
INR BLD: 1.34 (ref 0.9–1.2)
INR: 1.2 (ref 2–3)
INR: 1.9 (ref 2–3)
INR: 1.9 (ref 2–3)
INR: 2.2 (ref 2–3)
INR: 2.2 (ref 2–3)
INR: 2.3 (ref 2–3)
INR: 2.4 (ref 2–3)
INR: 2.5 (ref 2–3)
INR: 2.5 (ref 2–3)
IRON SATURATION: 30 % (ref 15–50)
IRON SERPL-MCNC: 105 UG/DL (ref 50–170)
KETONES UR-MCNC: NEGATIVE MG/DL
KETONES UR-MCNC: NEGATIVE MG/DL
LDLC SERPL CALC-MCNC: 63 MG/DL (ref ?–100)
LIPASE SERPL-CCNC: 232 U/L (ref 73–393)
LYMPHOCYTES # BLD AUTO: 0.22 X10(3) UL (ref 1–4)
LYMPHOCYTES # BLD AUTO: 0.25 X10(3) UL (ref 1–4)
LYMPHOCYTES # BLD AUTO: 0.26 X10(3) UL (ref 1–4)
LYMPHOCYTES # BLD AUTO: 0.27 X10(3) UL (ref 1–4)
LYMPHOCYTES # BLD AUTO: 0.28 X10(3) UL (ref 1–4)
LYMPHOCYTES # BLD AUTO: 0.28 X10(3) UL (ref 1–4)
LYMPHOCYTES # BLD AUTO: 0.29 X10(3) UL (ref 1–4)
LYMPHOCYTES # BLD AUTO: 0.29 X10(3) UL (ref 1–4)
LYMPHOCYTES # BLD AUTO: 0.3 X10(3) UL (ref 1–4)
LYMPHOCYTES # BLD AUTO: 0.3 X10(3) UL (ref 1–4)
LYMPHOCYTES # BLD AUTO: 0.31 X10(3) UL (ref 1–4)
LYMPHOCYTES # BLD AUTO: 0.31 X10(3) UL (ref 1–4)
LYMPHOCYTES # BLD AUTO: 0.32 X10(3) UL (ref 1–4)
LYMPHOCYTES # BLD AUTO: 0.34 X10(3) UL (ref 1–4)
LYMPHOCYTES # BLD AUTO: 0.36 X10(3) UL (ref 1–4)
LYMPHOCYTES # BLD AUTO: 0.36 X10(3) UL (ref 1–4)
LYMPHOCYTES # BLD AUTO: 0.38 X10(3) UL (ref 1–4)
LYMPHOCYTES # BLD AUTO: 0.41 X10(3) UL (ref 1–4)
LYMPHOCYTES # BLD AUTO: 0.45 X10(3) UL (ref 1–4)
LYMPHOCYTES # BLD AUTO: 1.09 X10(3) UL (ref 1–4)
LYMPHOCYTES # BLD AUTO: 1.72 X10(3) UL (ref 1–4)
LYMPHOCYTES NFR BLD AUTO: 22.9 %
LYMPHOCYTES NFR BLD AUTO: 35.6 %
LYMPHOCYTES NFR BLD AUTO: 4.4 %
LYMPHOCYTES NFR BLD AUTO: 5.3 %
LYMPHOCYTES NFR BLD AUTO: 5.4 %
LYMPHOCYTES NFR BLD AUTO: 5.4 %
LYMPHOCYTES NFR BLD AUTO: 5.8 %
LYMPHOCYTES NFR BLD AUTO: 6 %
LYMPHOCYTES NFR BLD AUTO: 6.2 %
LYMPHOCYTES NFR BLD AUTO: 6.4 %
LYMPHOCYTES NFR BLD AUTO: 6.4 %
LYMPHOCYTES NFR BLD AUTO: 6.5 %
LYMPHOCYTES NFR BLD AUTO: 6.6 %
LYMPHOCYTES NFR BLD AUTO: 6.7 %
LYMPHOCYTES NFR BLD AUTO: 7 %
LYMPHOCYTES NFR BLD AUTO: 7.1 %
LYMPHOCYTES NFR BLD AUTO: 7.5 %
LYMPHOCYTES NFR BLD AUTO: 7.6 %
LYMPHOCYTES NFR BLD AUTO: 7.8 %
LYMPHOCYTES NFR BLD AUTO: 8.3 %
LYMPHOCYTES NFR BLD AUTO: 8.8 %
LYMPHOCYTES NFR BLD AUTO: 9 %
LYMPHOCYTES NFR BLD AUTO: 9.1 %
M PROTEIN MFR SERPL ELPH: 6.5 G/DL (ref 6.4–8.2)
M PROTEIN MFR SERPL ELPH: 6.8 G/DL (ref 6.4–8.2)
M PROTEIN MFR SERPL ELPH: 8 G/DL (ref 6.4–8.2)
M PROTEIN MFR SERPL ELPH: 8.1 G/DL (ref 6.4–8.2)
M PROTEIN MFR SERPL ELPH: 8.7 G/DL (ref 6.4–8.2)
MCH RBC QN AUTO: 28.6 PG (ref 26–34)
MCH RBC QN AUTO: 28.7 PG (ref 26–34)
MCH RBC QN AUTO: 31.4 PG (ref 26–34)
MCH RBC QN AUTO: 31.5 PG (ref 26–34)
MCH RBC QN AUTO: 31.6 PG (ref 26–34)
MCH RBC QN AUTO: 31.7 PG (ref 26–34)
MCH RBC QN AUTO: 31.8 PG (ref 26–34)
MCH RBC QN AUTO: 32 PG (ref 26–34)
MCH RBC QN AUTO: 32.1 PG (ref 26–34)
MCH RBC QN AUTO: 32.2 PG (ref 26–34)
MCH RBC QN AUTO: 32.3 PG (ref 26–34)
MCH RBC QN AUTO: 32.3 PG (ref 26–34)
MCH RBC QN AUTO: 32.5 PG (ref 26–34)
MCH RBC QN AUTO: 32.5 PG (ref 26–34)
MCH RBC QN AUTO: 32.6 PG (ref 26–34)
MCH RBC QN AUTO: 32.8 PG (ref 26–34)
MCH RBC QN AUTO: 32.8 PG (ref 26–34)
MCH RBC QN AUTO: 33 PG (ref 26–34)
MCHC RBC AUTO-ENTMCNC: 31.4 G/DL (ref 31–37)
MCHC RBC AUTO-ENTMCNC: 31.6 G/DL (ref 31–37)
MCHC RBC AUTO-ENTMCNC: 32.4 G/DL (ref 31–37)
MCHC RBC AUTO-ENTMCNC: 32.5 G/DL (ref 31–37)
MCHC RBC AUTO-ENTMCNC: 32.6 G/DL (ref 31–37)
MCHC RBC AUTO-ENTMCNC: 32.9 G/DL (ref 31–37)
MCHC RBC AUTO-ENTMCNC: 33 G/DL (ref 31–37)
MCHC RBC AUTO-ENTMCNC: 33.1 G/DL (ref 31–37)
MCHC RBC AUTO-ENTMCNC: 33.3 G/DL (ref 31–37)
MCHC RBC AUTO-ENTMCNC: 33.4 G/DL (ref 31–37)
MCHC RBC AUTO-ENTMCNC: 33.5 G/DL (ref 31–37)
MCHC RBC AUTO-ENTMCNC: 33.5 G/DL (ref 31–37)
MCHC RBC AUTO-ENTMCNC: 33.8 G/DL (ref 31–37)
MCV RBC AUTO: 90.5 FL (ref 80–100)
MCV RBC AUTO: 91.3 FL (ref 80–100)
MCV RBC AUTO: 94.4 FL (ref 80–100)
MCV RBC AUTO: 94.9 FL (ref 80–100)
MCV RBC AUTO: 95.1 FL (ref 80–100)
MCV RBC AUTO: 95.3 FL (ref 80–100)
MCV RBC AUTO: 95.9 FL (ref 80–100)
MCV RBC AUTO: 96.1 FL (ref 80–100)
MCV RBC AUTO: 96.2 FL (ref 80–100)
MCV RBC AUTO: 96.2 FL (ref 80–100)
MCV RBC AUTO: 96.5 FL (ref 80–100)
MCV RBC AUTO: 96.7 FL (ref 80–100)
MCV RBC AUTO: 96.7 FL (ref 80–100)
MCV RBC AUTO: 97 FL (ref 80–100)
MCV RBC AUTO: 97 FL (ref 80–100)
MCV RBC AUTO: 97.1 FL (ref 80–100)
MCV RBC AUTO: 97.2 FL (ref 80–100)
MCV RBC AUTO: 97.8 FL (ref 80–100)
MCV RBC AUTO: 97.9 FL (ref 80–100)
MCV RBC AUTO: 98 FL (ref 80–100)
MCV RBC AUTO: 98 FL (ref 80–100)
MCV RBC AUTO: 98.1 FL (ref 80–100)
MCV RBC AUTO: 98.7 FL (ref 80–100)
MCV RBC AUTO: 99.5 FL (ref 80–100)
MONOCYTES # BLD AUTO: 0.46 X10(3) UL (ref 0.1–1)
MONOCYTES # BLD AUTO: 0.51 X10(3) UL (ref 0.1–1)
MONOCYTES # BLD AUTO: 0.52 X10(3) UL (ref 0.1–1)
MONOCYTES # BLD AUTO: 0.53 X10(3) UL (ref 0.1–1)
MONOCYTES # BLD AUTO: 0.53 X10(3) UL (ref 0.1–1)
MONOCYTES # BLD AUTO: 0.57 X10(3) UL (ref 0.1–1)
MONOCYTES # BLD AUTO: 0.57 X10(3) UL (ref 0.1–1)
MONOCYTES # BLD AUTO: 0.59 X10(3) UL (ref 0.1–1)
MONOCYTES # BLD AUTO: 0.61 X10(3) UL (ref 0.1–1)
MONOCYTES # BLD AUTO: 0.61 X10(3) UL (ref 0.1–1)
MONOCYTES # BLD AUTO: 0.63 X10(3) UL (ref 0.1–1)
MONOCYTES # BLD AUTO: 0.65 X10(3) UL (ref 0.1–1)
MONOCYTES # BLD AUTO: 0.65 X10(3) UL (ref 0.1–1)
MONOCYTES # BLD AUTO: 0.7 X10(3) UL (ref 0.1–1)
MONOCYTES # BLD AUTO: 0.7 X10(3) UL (ref 0.1–1)
MONOCYTES # BLD AUTO: 0.74 X10(3) UL (ref 0.1–1)
MONOCYTES # BLD AUTO: 0.75 X10(3) UL (ref 0.1–1)
MONOCYTES # BLD AUTO: 0.79 X10(3) UL (ref 0.1–1)
MONOCYTES # BLD AUTO: 0.83 X10(3) UL (ref 0.1–1)
MONOCYTES # BLD AUTO: 0.94 X10(3) UL (ref 0.1–1)
MONOCYTES # BLD AUTO: 1.14 X10(3) UL (ref 0.1–1)
MONOCYTES NFR BLD AUTO: 11.3 %
MONOCYTES NFR BLD AUTO: 12.2 %
MONOCYTES NFR BLD AUTO: 12.3 %
MONOCYTES NFR BLD AUTO: 12.6 %
MONOCYTES NFR BLD AUTO: 12.6 %
MONOCYTES NFR BLD AUTO: 13.2 %
MONOCYTES NFR BLD AUTO: 13.4 %
MONOCYTES NFR BLD AUTO: 13.5 %
MONOCYTES NFR BLD AUTO: 13.7 %
MONOCYTES NFR BLD AUTO: 13.8 %
MONOCYTES NFR BLD AUTO: 14.5 %
MONOCYTES NFR BLD AUTO: 14.6 %
MONOCYTES NFR BLD AUTO: 14.7 %
MONOCYTES NFR BLD AUTO: 15.1 %
MONOCYTES NFR BLD AUTO: 15.6 %
MONOCYTES NFR BLD AUTO: 15.9 %
MONOCYTES NFR BLD AUTO: 16.8 %
MONOCYTES NFR BLD AUTO: 16.9 %
MONOCYTES NFR BLD AUTO: 19.2 %
MONOCYTES NFR BLD AUTO: 9.7 %
NEUTROPHILS # BLD AUTO: 2.21 X10 (3) UL (ref 1.5–7.7)
NEUTROPHILS # BLD AUTO: 2.21 X10(3) UL (ref 1.5–7.7)
NEUTROPHILS # BLD AUTO: 2.72 X10 (3) UL (ref 1.5–7.7)
NEUTROPHILS # BLD AUTO: 2.72 X10(3) UL (ref 1.5–7.7)
NEUTROPHILS # BLD AUTO: 2.75 X10 (3) UL (ref 1.5–7.7)
NEUTROPHILS # BLD AUTO: 2.75 X10(3) UL (ref 1.5–7.7)
NEUTROPHILS # BLD AUTO: 2.87 X10 (3) UL (ref 1.5–7.7)
NEUTROPHILS # BLD AUTO: 2.87 X10(3) UL (ref 1.5–7.7)
NEUTROPHILS # BLD AUTO: 2.89 X10 (3) UL (ref 1.5–7.7)
NEUTROPHILS # BLD AUTO: 2.89 X10(3) UL (ref 1.5–7.7)
NEUTROPHILS # BLD AUTO: 2.94 X10 (3) UL (ref 1.5–7.7)
NEUTROPHILS # BLD AUTO: 2.94 X10(3) UL (ref 1.5–7.7)
NEUTROPHILS # BLD AUTO: 2.96 X10 (3) UL (ref 1.5–7.7)
NEUTROPHILS # BLD AUTO: 2.96 X10(3) UL (ref 1.5–7.7)
NEUTROPHILS # BLD AUTO: 3.07 X10 (3) UL (ref 1.5–7.7)
NEUTROPHILS # BLD AUTO: 3.07 X10(3) UL (ref 1.5–7.7)
NEUTROPHILS # BLD AUTO: 3.18 X10 (3) UL (ref 1.5–7.7)
NEUTROPHILS # BLD AUTO: 3.18 X10(3) UL (ref 1.5–7.7)
NEUTROPHILS # BLD AUTO: 3.2 X10 (3) UL (ref 1.5–7.7)
NEUTROPHILS # BLD AUTO: 3.2 X10 (3) UL (ref 1.5–7.7)
NEUTROPHILS # BLD AUTO: 3.2 X10(3) UL (ref 1.5–7.7)
NEUTROPHILS # BLD AUTO: 3.2 X10(3) UL (ref 1.5–7.7)
NEUTROPHILS # BLD AUTO: 3.35 X10 (3) UL (ref 1.5–7.7)
NEUTROPHILS # BLD AUTO: 3.35 X10(3) UL (ref 1.5–7.7)
NEUTROPHILS # BLD AUTO: 3.43 X10 (3) UL (ref 1.5–7.7)
NEUTROPHILS # BLD AUTO: 3.43 X10(3) UL (ref 1.5–7.7)
NEUTROPHILS # BLD AUTO: 3.46 X10 (3) UL (ref 1.5–7.7)
NEUTROPHILS # BLD AUTO: 3.46 X10(3) UL (ref 1.5–7.7)
NEUTROPHILS # BLD AUTO: 3.5 X10 (3) UL (ref 1.5–7.7)
NEUTROPHILS # BLD AUTO: 3.5 X10(3) UL (ref 1.5–7.7)
NEUTROPHILS # BLD AUTO: 3.52 X10 (3) UL (ref 1.5–7.7)
NEUTROPHILS # BLD AUTO: 3.52 X10(3) UL (ref 1.5–7.7)
NEUTROPHILS # BLD AUTO: 3.67 X10 (3) UL (ref 1.5–7.7)
NEUTROPHILS # BLD AUTO: 3.67 X10(3) UL (ref 1.5–7.7)
NEUTROPHILS # BLD AUTO: 3.71 X10 (3) UL (ref 1.5–7.7)
NEUTROPHILS # BLD AUTO: 3.71 X10(3) UL (ref 1.5–7.7)
NEUTROPHILS # BLD AUTO: 3.88 X10 (3) UL (ref 1.5–7.7)
NEUTROPHILS # BLD AUTO: 3.88 X10(3) UL (ref 1.5–7.7)
NEUTROPHILS # BLD AUTO: 4 X10 (3) UL (ref 1.5–7.7)
NEUTROPHILS # BLD AUTO: 4 X10(3) UL (ref 1.5–7.7)
NEUTROPHILS # BLD AUTO: 4.19 X10 (3) UL (ref 1.5–7.7)
NEUTROPHILS # BLD AUTO: 4.19 X10(3) UL (ref 1.5–7.7)
NEUTROPHILS # BLD AUTO: 4.4 X10 (3) UL (ref 1.5–7.7)
NEUTROPHILS # BLD AUTO: 4.4 X10(3) UL (ref 1.5–7.7)
NEUTROPHILS # BLD AUTO: 5.06 X10 (3) UL (ref 1.5–7.7)
NEUTROPHILS # BLD AUTO: 5.06 X10(3) UL (ref 1.5–7.7)
NEUTROPHILS NFR BLD AUTO: 45.7 %
NEUTROPHILS NFR BLD AUTO: 57.7 %
NEUTROPHILS NFR BLD AUTO: 68.2 %
NEUTROPHILS NFR BLD AUTO: 70.4 %
NEUTROPHILS NFR BLD AUTO: 71.8 %
NEUTROPHILS NFR BLD AUTO: 72.5 %
NEUTROPHILS NFR BLD AUTO: 72.9 %
NEUTROPHILS NFR BLD AUTO: 73 %
NEUTROPHILS NFR BLD AUTO: 73 %
NEUTROPHILS NFR BLD AUTO: 73.5 %
NEUTROPHILS NFR BLD AUTO: 73.9 %
NEUTROPHILS NFR BLD AUTO: 74.3 %
NEUTROPHILS NFR BLD AUTO: 74.4 %
NEUTROPHILS NFR BLD AUTO: 74.4 %
NEUTROPHILS NFR BLD AUTO: 74.5 %
NEUTROPHILS NFR BLD AUTO: 74.6 %
NEUTROPHILS NFR BLD AUTO: 74.6 %
NEUTROPHILS NFR BLD AUTO: 74.8 %
NEUTROPHILS NFR BLD AUTO: 74.9 %
NEUTROPHILS NFR BLD AUTO: 75.9 %
NEUTROPHILS NFR BLD AUTO: 76.9 %
NEUTROPHILS NFR BLD AUTO: 78.4 %
NEUTROPHILS NFR BLD AUTO: 83.4 %
NITRITE UR QL STRIP.AUTO: NEGATIVE
NITRITE UR QL STRIP.AUTO: NEGATIVE
NONHDLC SERPL-MCNC: 78 MG/DL (ref ?–130)
OSMOLALITY SERPL CALC.SUM OF ELEC: 284 MOSM/KG (ref 275–295)
OSMOLALITY SERPL CALC.SUM OF ELEC: 285 MOSM/KG (ref 275–295)
OSMOLALITY SERPL CALC.SUM OF ELEC: 285 MOSM/KG (ref 275–295)
OSMOLALITY SERPL CALC.SUM OF ELEC: 286 MOSM/KG (ref 275–295)
OSMOLALITY SERPL CALC.SUM OF ELEC: 287 MOSM/KG (ref 275–295)
OSMOLALITY SERPL CALC.SUM OF ELEC: 289 MOSM/KG (ref 275–295)
OSMOLALITY SERPL CALC.SUM OF ELEC: 291 MOSM/KG (ref 275–295)
OSMOLALITY SERPL CALC.SUM OF ELEC: 292 MOSM/KG (ref 275–295)
OSMOLALITY SERPL CALC.SUM OF ELEC: 293 MOSM/KG (ref 275–295)
OSMOLALITY SERPL CALC.SUM OF ELEC: 296 MOSM/KG (ref 275–295)
OSMOLALITY SERPL CALC.SUM OF ELEC: 302 MOSM/KG (ref 275–295)
PATIENT FASTING: NO
PATIENT FASTING: YES
PH UR: 7 [PH] (ref 5–8)
PH UR: 8 [PH] (ref 5–8)
PHOSPHATE SERPL-MCNC: 2.2 MG/DL (ref 2.5–4.9)
PHOSPHATE SERPL-MCNC: 2.5 MG/DL (ref 2.5–4.9)
PHOSPHATE SERPL-MCNC: 2.8 MG/DL (ref 2.5–4.9)
PHOSPHATE SERPL-MCNC: 3.2 MG/DL (ref 2.5–4.9)
PHOSPHATE SERPL-MCNC: 3.4 MG/DL (ref 2.5–4.9)
PLATELET # BLD AUTO: 146 10(3)UL (ref 150–450)
PLATELET # BLD AUTO: 166 10(3)UL (ref 150–450)
PLATELET # BLD AUTO: 172 10(3)UL (ref 150–450)
PLATELET # BLD AUTO: 173 10(3)UL (ref 150–450)
PLATELET # BLD AUTO: 176 10(3)UL (ref 150–450)
PLATELET # BLD AUTO: 182 10(3)UL (ref 150–450)
PLATELET # BLD AUTO: 183 10(3)UL (ref 150–450)
PLATELET # BLD AUTO: 184 10(3)UL (ref 150–450)
PLATELET # BLD AUTO: 185 10(3)UL (ref 150–450)
PLATELET # BLD AUTO: 185 10(3)UL (ref 150–450)
PLATELET # BLD AUTO: 193 10(3)UL (ref 150–450)
PLATELET # BLD AUTO: 198 10(3)UL (ref 150–450)
PLATELET # BLD AUTO: 199 10(3)UL (ref 150–450)
PLATELET # BLD AUTO: 200 10(3)UL (ref 150–450)
PLATELET # BLD AUTO: 201 10(3)UL (ref 150–450)
PLATELET # BLD AUTO: 201 10(3)UL (ref 150–450)
PLATELET # BLD AUTO: 202 10(3)UL (ref 150–450)
PLATELET # BLD AUTO: 202 10(3)UL (ref 150–450)
PLATELET # BLD AUTO: 208 10(3)UL (ref 150–450)
PLATELET # BLD AUTO: 209 10(3)UL (ref 150–450)
PLATELET # BLD AUTO: 216 10(3)UL (ref 150–450)
PLATELET # BLD AUTO: 218 10(3)UL (ref 150–450)
PLATELET # BLD AUTO: 221 10(3)UL (ref 150–450)
PLATELET # BLD AUTO: 224 10(3)UL (ref 150–450)
POTASSIUM SERPL-SCNC: 2.6 MMOL/L (ref 3.5–5.1)
POTASSIUM SERPL-SCNC: 2.9 MMOL/L (ref 3.5–5.1)
POTASSIUM SERPL-SCNC: 3.1 MMOL/L (ref 3.5–5.1)
POTASSIUM SERPL-SCNC: 3.1 MMOL/L (ref 3.5–5.1)
POTASSIUM SERPL-SCNC: 3.2 MMOL/L (ref 3.5–5.1)
POTASSIUM SERPL-SCNC: 3.2 MMOL/L (ref 3.5–5.1)
POTASSIUM SERPL-SCNC: 3.4 MMOL/L (ref 3.5–5.1)
POTASSIUM SERPL-SCNC: 3.5 MMOL/L (ref 3.5–5.1)
POTASSIUM SERPL-SCNC: 3.6 MMOL/L (ref 3.5–5.1)
POTASSIUM SERPL-SCNC: 3.7 MMOL/L (ref 3.5–5.1)
POTASSIUM SERPL-SCNC: 3.8 MMOL/L (ref 3.5–5.1)
POTASSIUM SERPL-SCNC: 3.9 MMOL/L (ref 3.5–5.1)
POTASSIUM SERPL-SCNC: 4.2 MMOL/L (ref 3.5–5.1)
POTASSIUM SERPL-SCNC: 4.3 MMOL/L (ref 3.5–5.1)
POTASSIUM SERPL-SCNC: 4.4 MMOL/L (ref 3.5–5.1)
POTASSIUM SERPL-SCNC: 4.6 MMOL/L (ref 3.5–5.1)
PROT UR-MCNC: NEGATIVE MG/DL
PROT UR-MCNC: NEGATIVE MG/DL
PROTHROMBIN TIME: 16.5 SECONDS (ref 11.8–14.5)
RBC # BLD AUTO: 2.08 X10(6)UL (ref 3.8–5.3)
RBC # BLD AUTO: 2.33 X10(6)UL (ref 3.8–5.3)
RBC # BLD AUTO: 2.46 X10(6)UL (ref 3.8–5.3)
RBC # BLD AUTO: 2.58 X10(6)UL (ref 3.8–5.3)
RBC # BLD AUTO: 2.68 X10(6)UL (ref 3.8–5.3)
RBC # BLD AUTO: 2.7 X10(6)UL (ref 3.8–5.3)
RBC # BLD AUTO: 2.85 X10(6)UL (ref 3.8–5.3)
RBC # BLD AUTO: 2.87 X10(6)UL (ref 3.8–5.3)
RBC # BLD AUTO: 2.9 X10(6)UL (ref 3.8–5.3)
RBC # BLD AUTO: 2.96 X10(6)UL (ref 3.8–5.3)
RBC # BLD AUTO: 2.96 X10(6)UL (ref 3.8–5.3)
RBC # BLD AUTO: 3.02 X10(6)UL (ref 3.8–5.3)
RBC # BLD AUTO: 3.03 X10(6)UL (ref 3.8–5.3)
RBC # BLD AUTO: 3.1 X10(6)UL (ref 3.8–5.3)
RBC # BLD AUTO: 3.11 X10(6)UL (ref 3.8–5.3)
RBC # BLD AUTO: 3.12 X10(6)UL (ref 3.8–5.3)
RBC # BLD AUTO: 3.13 X10(6)UL (ref 3.8–5.3)
RBC # BLD AUTO: 3.14 X10(6)UL (ref 3.8–5.3)
RBC # BLD AUTO: 3.15 X10(6)UL (ref 3.8–5.3)
RBC # BLD AUTO: 3.28 X10(6)UL (ref 3.8–5.3)
RBC # BLD AUTO: 3.3 X10(6)UL (ref 3.8–5.3)
RBC # BLD AUTO: 3.38 X10(6)UL (ref 3.8–5.3)
RBC # BLD AUTO: 4.02 X10(6)UL (ref 3.8–5.3)
RBC # BLD AUTO: 4.15 X10(6)UL (ref 3.8–5.3)
RBC #/AREA URNS AUTO: 1 /HPF
RBC #/AREA URNS AUTO: 4 /HPF
RH BLOOD TYPE: POSITIVE
RH BLOOD TYPE: POSITIVE
S PYO AG THROAT QL: POSITIVE
SODIUM SERPL-SCNC: 131 MMOL/L (ref 136–145)
SODIUM SERPL-SCNC: 132 MMOL/L (ref 136–145)
SODIUM SERPL-SCNC: 133 MMOL/L (ref 136–145)
SODIUM SERPL-SCNC: 134 MMOL/L (ref 136–145)
SODIUM SERPL-SCNC: 134 MMOL/L (ref 136–145)
SODIUM SERPL-SCNC: 135 MMOL/L (ref 136–145)
SODIUM SERPL-SCNC: 136 MMOL/L (ref 136–145)
SODIUM SERPL-SCNC: 137 MMOL/L (ref 136–145)
SODIUM SERPL-SCNC: 138 MMOL/L (ref 136–145)
SODIUM SERPL-SCNC: 139 MMOL/L (ref 136–145)
SODIUM SERPL-SCNC: 140 MMOL/L (ref 136–145)
SODIUM SERPL-SCNC: 140 MMOL/L (ref 136–145)
SODIUM SERPL-SCNC: 142 MMOL/L (ref 136–145)
SP GR UR STRIP: 1.01 (ref 1–1.03)
SP GR UR STRIP: 1.02 (ref 1–1.03)
TOTAL IRON BINDING CAPACITY: 350 UG/DL (ref 240–450)
TRANSFERRIN SERPL-MCNC: 235 MG/DL (ref 200–360)
TRIGL SERPL-MCNC: 77 MG/DL (ref 30–149)
TROPONIN I SERPL-MCNC: <0.045 NG/ML (ref ?–0.04)
UROBILINOGEN UR STRIP-ACNC: 2
UROBILINOGEN UR STRIP-ACNC: <2
VIT C UR-MCNC: 40 MG/DL
VIT C UR-MCNC: NEGATIVE MG/DL
VLDLC SERPL CALC-MCNC: 15 MG/DL (ref 0–30)
WBC # BLD AUTO: 3.7 X10(3) UL (ref 4–11)
WBC # BLD AUTO: 3.8 X10(3) UL (ref 4–11)
WBC # BLD AUTO: 3.9 X10(3) UL (ref 4–11)
WBC # BLD AUTO: 4 X10(3) UL (ref 4–11)
WBC # BLD AUTO: 4.1 X10(3) UL (ref 4–11)
WBC # BLD AUTO: 4.2 X10(3) UL (ref 4–11)
WBC # BLD AUTO: 4.3 X10(3) UL (ref 4–11)
WBC # BLD AUTO: 4.5 X10(3) UL (ref 4–11)
WBC # BLD AUTO: 4.5 X10(3) UL (ref 4–11)
WBC # BLD AUTO: 4.7 X10(3) UL (ref 4–11)
WBC # BLD AUTO: 4.8 X10(3) UL (ref 4–11)
WBC # BLD AUTO: 5.1 X10(3) UL (ref 4–11)
WBC # BLD AUTO: 5.3 X10(3) UL (ref 4–11)
WBC # BLD AUTO: 5.4 X10(3) UL (ref 4–11)
WBC # BLD AUTO: 5.9 X10(3) UL (ref 4–11)
WBC # BLD AUTO: 6 X10(3) UL (ref 4–11)
WBC # BLD AUTO: 6.1 X10(3) UL (ref 4–11)
WBC #/AREA URNS AUTO: 242 /HPF
WBC #/AREA URNS AUTO: 3 /HPF

## 2019-01-01 PROCEDURE — 0DHA3UZ INSERTION OF FEEDING DEVICE INTO JEJUNUM, PERCUTANEOUS APPROACH: ICD-10-PCS | Performed by: SURGERY

## 2019-01-01 PROCEDURE — 99222 1ST HOSP IP/OBS MODERATE 55: CPT | Performed by: INTERNAL MEDICINE

## 2019-01-01 PROCEDURE — 87077 CULTURE AEROBIC IDENTIFY: CPT

## 2019-01-01 PROCEDURE — 99233 SBSQ HOSP IP/OBS HIGH 50: CPT | Performed by: HOSPITALIST

## 2019-01-01 PROCEDURE — 99232 SBSQ HOSP IP/OBS MODERATE 35: CPT | Performed by: INTERNAL MEDICINE

## 2019-01-01 PROCEDURE — 99223 1ST HOSP IP/OBS HIGH 75: CPT | Performed by: HOSPITALIST

## 2019-01-01 PROCEDURE — 76000 FLUOROSCOPY <1 HR PHYS/QHP: CPT | Performed by: ORTHOPAEDIC SURGERY

## 2019-01-01 PROCEDURE — S0028 INJECTION, FAMOTIDINE, 20 MG: HCPCS

## 2019-01-01 PROCEDURE — 78815 PET IMAGE W/CT SKULL-THIGH: CPT | Performed by: RADIOLOGY

## 2019-01-01 PROCEDURE — 83690 ASSAY OF LIPASE: CPT | Performed by: EMERGENCY MEDICINE

## 2019-01-01 PROCEDURE — 99223 1ST HOSP IP/OBS HIGH 75: CPT | Performed by: NURSE PRACTITIONER

## 2019-01-01 PROCEDURE — 73060 X-RAY EXAM OF HUMERUS: CPT | Performed by: EMERGENCY MEDICINE

## 2019-01-01 PROCEDURE — 96376 TX/PRO/DX INJ SAME DRUG ADON: CPT

## 2019-01-01 PROCEDURE — 71260 CT THORAX DX C+: CPT | Performed by: SURGERY

## 2019-01-01 PROCEDURE — 85025 COMPLETE CBC W/AUTO DIFF WBC: CPT

## 2019-01-01 PROCEDURE — 99233 SBSQ HOSP IP/OBS HIGH 50: CPT | Performed by: NURSE PRACTITIONER

## 2019-01-01 PROCEDURE — 36415 COLL VENOUS BLD VENIPUNCTURE: CPT

## 2019-01-01 PROCEDURE — 99212 OFFICE O/P EST SF 10 MIN: CPT

## 2019-01-01 PROCEDURE — 77336 RADIATION PHYSICS CONSULT: CPT | Performed by: RADIOLOGY

## 2019-01-01 PROCEDURE — 43762 RPLC GTUBE NO REVJ TRC: CPT

## 2019-01-01 PROCEDURE — 74177 CT ABD & PELVIS W/CONTRAST: CPT | Performed by: EMERGENCY MEDICINE

## 2019-01-01 PROCEDURE — 87206 SMEAR FLUORESCENT/ACID STAI: CPT | Performed by: INTERNAL MEDICINE

## 2019-01-01 PROCEDURE — 99024 POSTOP FOLLOW-UP VISIT: CPT | Performed by: SURGERY

## 2019-01-01 PROCEDURE — 99232 SBSQ HOSP IP/OBS MODERATE 35: CPT | Performed by: NURSE PRACTITIONER

## 2019-01-01 PROCEDURE — 77386 HC IMRT COMPLEX: CPT | Performed by: RADIOLOGY

## 2019-01-01 PROCEDURE — 99233 SBSQ HOSP IP/OBS HIGH 50: CPT | Performed by: INTERNAL MEDICINE

## 2019-01-01 PROCEDURE — 96366 THER/PROPH/DIAG IV INF ADDON: CPT

## 2019-01-01 PROCEDURE — 80053 COMPREHEN METABOLIC PANEL: CPT

## 2019-01-01 PROCEDURE — 99215 OFFICE O/P EST HI 40 MIN: CPT | Performed by: INTERNAL MEDICINE

## 2019-01-01 PROCEDURE — 3E0H7KZ INTRODUCTION OF OTHER DIAGNOSTIC SUBSTANCE INTO LOWER GI, VIA NATURAL OR ARTIFICIAL OPENING: ICD-10-PCS | Performed by: RADIOLOGY

## 2019-01-01 PROCEDURE — 77470 SPECIAL RADIATION TREATMENT: CPT | Performed by: RADIOLOGY

## 2019-01-01 PROCEDURE — 99284 EMERGENCY DEPT VISIT MOD MDM: CPT

## 2019-01-01 PROCEDURE — 99203 OFFICE O/P NEW LOW 30 MIN: CPT | Performed by: PLASTIC SURGERY

## 2019-01-01 PROCEDURE — 96413 CHEMO IV INFUSION 1 HR: CPT

## 2019-01-01 PROCEDURE — 49465 FLUORO EXAM OF G/COLON TUBE: CPT | Performed by: CLINICAL NURSE SPECIALIST

## 2019-01-01 PROCEDURE — 99497 ADVNCD CARE PLAN 30 MIN: CPT | Performed by: HOSPITALIST

## 2019-01-01 PROCEDURE — 99153 MOD SED SAME PHYS/QHP EA: CPT

## 2019-01-01 PROCEDURE — 99232 SBSQ HOSP IP/OBS MODERATE 35: CPT | Performed by: HOSPITALIST

## 2019-01-01 PROCEDURE — 88312 SPECIAL STAINS GROUP 1: CPT | Performed by: INTERNAL MEDICINE

## 2019-01-01 PROCEDURE — 96417 CHEMO IV INFUS EACH ADDL SEQ: CPT

## 2019-01-01 PROCEDURE — 82962 GLUCOSE BLOOD TEST: CPT

## 2019-01-01 PROCEDURE — 99223 1ST HOSP IP/OBS HIGH 75: CPT | Performed by: SURGERY

## 2019-01-01 PROCEDURE — 99152 MOD SED SAME PHYS/QHP 5/>YRS: CPT

## 2019-01-01 PROCEDURE — 74177 CT ABD & PELVIS W/CONTRAST: CPT | Performed by: SURGERY

## 2019-01-01 PROCEDURE — 99223 1ST HOSP IP/OBS HIGH 75: CPT | Performed by: INTERNAL MEDICINE

## 2019-01-01 PROCEDURE — 88112 CYTOPATH CELL ENHANCE TECH: CPT | Performed by: INTERNAL MEDICINE

## 2019-01-01 PROCEDURE — 93971 EXTREMITY STUDY: CPT | Performed by: EMERGENCY MEDICINE

## 2019-01-01 PROCEDURE — 1111F DSCHRG MED/CURRENT MED MERGE: CPT | Performed by: INTERNAL MEDICINE

## 2019-01-01 PROCEDURE — 96367 TX/PROPH/DG ADDL SEQ IV INF: CPT

## 2019-01-01 PROCEDURE — 80048 BASIC METABOLIC PNL TOTAL CA: CPT

## 2019-01-01 PROCEDURE — 96375 TX/PRO/DX INJ NEW DRUG ADDON: CPT

## 2019-01-01 PROCEDURE — 87070 CULTURE OTHR SPECIMN AEROBIC: CPT | Performed by: INTERNAL MEDICINE

## 2019-01-01 PROCEDURE — G0463 HOSPITAL OUTPT CLINIC VISIT: HCPCS | Performed by: INTERNAL MEDICINE

## 2019-01-01 PROCEDURE — 80061 LIPID PANEL: CPT

## 2019-01-01 PROCEDURE — 99233 SBSQ HOSP IP/OBS HIGH 50: CPT | Performed by: REGISTERED NURSE

## 2019-01-01 PROCEDURE — 77301 RADIOTHERAPY DOSE PLAN IMRT: CPT | Performed by: RADIOLOGY

## 2019-01-01 PROCEDURE — 71260 CT THORAX DX C+: CPT | Performed by: INTERNAL MEDICINE

## 2019-01-01 PROCEDURE — 99214 OFFICE O/P EST MOD 30 MIN: CPT | Performed by: INTERNAL MEDICINE

## 2019-01-01 PROCEDURE — 93306 TTE W/DOPPLER COMPLETE: CPT | Performed by: INTERNAL MEDICINE

## 2019-01-01 PROCEDURE — 3E0H76Z INTRODUCTION OF NUTRITIONAL SUBSTANCE INTO LOWER GI, VIA NATURAL OR ARTIFICIAL OPENING: ICD-10-PCS | Performed by: SURGERY

## 2019-01-01 PROCEDURE — 73200 CT UPPER EXTREMITY W/O DYE: CPT | Performed by: ORTHOPAEDIC SURGERY

## 2019-01-01 PROCEDURE — 4A023N8 MEASUREMENT OF CARDIAC SAMPLING AND PRESSURE, BILATERAL, PERCUTANEOUS APPROACH: ICD-10-PCS | Performed by: INTERNAL MEDICINE

## 2019-01-01 PROCEDURE — G0463 HOSPITAL OUTPT CLINIC VISIT: HCPCS | Performed by: PLASTIC SURGERY

## 2019-01-01 PROCEDURE — 96415 CHEMO IV INFUSION ADDL HR: CPT

## 2019-01-01 PROCEDURE — 1111F DSCHRG MED/CURRENT MED MERGE: CPT

## 2019-01-01 PROCEDURE — 36591 DRAW BLOOD OFF VENOUS DEVICE: CPT

## 2019-01-01 PROCEDURE — 93793 ANTICOAG MGMT PT WARFARIN: CPT | Performed by: INTERNAL MEDICINE

## 2019-01-01 PROCEDURE — 77370 RADIATION PHYSICS CONSULT: CPT | Performed by: RADIOLOGY

## 2019-01-01 PROCEDURE — 88305 TISSUE EXAM BY PATHOLOGIST: CPT | Performed by: INTERNAL MEDICINE

## 2019-01-01 PROCEDURE — 36561 INSERT TUNNELED CV CATH: CPT

## 2019-01-01 PROCEDURE — 99220 INITIAL OBSERVATION CARE,LEVL III: CPT | Performed by: HOSPITALIST

## 2019-01-01 PROCEDURE — 80076 HEPATIC FUNCTION PANEL: CPT | Performed by: EMERGENCY MEDICINE

## 2019-01-01 PROCEDURE — X1114 CARDIAC DEVICE HOME CHECK - REMOTE UNSCHEDULED: HCPCS | Performed by: INTERNAL MEDICINE

## 2019-01-01 PROCEDURE — 71045 X-RAY EXAM CHEST 1 VIEW: CPT | Performed by: HOSPITALIST

## 2019-01-01 PROCEDURE — 76536 US EXAM OF HEAD AND NECK: CPT | Performed by: OTOLARYNGOLOGY

## 2019-01-01 PROCEDURE — 99205 OFFICE O/P NEW HI 60 MIN: CPT | Performed by: INTERNAL MEDICINE

## 2019-01-01 PROCEDURE — 99310 SBSQ NF CARE HIGH MDM 45: CPT | Performed by: NURSE PRACTITIONER

## 2019-01-01 PROCEDURE — 99283 EMERGENCY DEPT VISIT LOW MDM: CPT

## 2019-01-01 PROCEDURE — 99213 OFFICE O/P EST LOW 20 MIN: CPT | Performed by: INTERNAL MEDICINE

## 2019-01-01 PROCEDURE — 36416 COLLJ CAPILLARY BLOOD SPEC: CPT

## 2019-01-01 PROCEDURE — 30233N1 TRANSFUSION OF NONAUTOLOGOUS RED BLOOD CELLS INTO PERIPHERAL VEIN, PERCUTANEOUS APPROACH: ICD-10-PCS | Performed by: HOSPITALIST

## 2019-01-01 PROCEDURE — 99211 OFF/OP EST MAY X REQ PHY/QHP: CPT

## 2019-01-01 PROCEDURE — 76705 ECHO EXAM OF ABDOMEN: CPT | Performed by: NURSE PRACTITIONER

## 2019-01-01 PROCEDURE — 99239 HOSP IP/OBS DSCHRG MGMT >30: CPT | Performed by: HOSPITALIST

## 2019-01-01 PROCEDURE — 77332 RADIATION TREATMENT AID(S): CPT | Performed by: RADIOLOGY

## 2019-01-01 PROCEDURE — 0JH60XZ INSERTION OF TUNNELED VASCULAR ACCESS DEVICE INTO CHEST SUBCUTANEOUS TISSUE AND FASCIA, OPEN APPROACH: ICD-10-PCS | Performed by: RADIOLOGY

## 2019-01-01 PROCEDURE — 83036 HEMOGLOBIN GLYCOSYLATED A1C: CPT

## 2019-01-01 PROCEDURE — G0463 HOSPITAL OUTPT CLINIC VISIT: HCPCS | Performed by: OTOLARYNGOLOGY

## 2019-01-01 PROCEDURE — 99214 OFFICE O/P EST MOD 30 MIN: CPT

## 2019-01-01 PROCEDURE — 02H633Z INSERTION OF INFUSION DEVICE INTO RIGHT ATRIUM, PERCUTANEOUS APPROACH: ICD-10-PCS | Performed by: RADIOLOGY

## 2019-01-01 PROCEDURE — 87116 MYCOBACTERIA CULTURE: CPT | Performed by: INTERNAL MEDICINE

## 2019-01-01 PROCEDURE — 87430 STREP A AG IA: CPT

## 2019-01-01 PROCEDURE — 80048 BASIC METABOLIC PNL TOTAL CA: CPT | Performed by: EMERGENCY MEDICINE

## 2019-01-01 PROCEDURE — 49465 FLUORO EXAM OF G/COLON TUBE: CPT | Performed by: SURGERY

## 2019-01-01 PROCEDURE — 99213 OFFICE O/P EST LOW 20 MIN: CPT

## 2019-01-01 PROCEDURE — 71046 X-RAY EXAM CHEST 2 VIEWS: CPT | Performed by: NURSE PRACTITIONER

## 2019-01-01 PROCEDURE — 77300 RADIATION THERAPY DOSE PLAN: CPT | Performed by: RADIOLOGY

## 2019-01-01 PROCEDURE — 99232 SBSQ HOSP IP/OBS MODERATE 35: CPT | Performed by: SURGERY

## 2019-01-01 PROCEDURE — 84484 ASSAY OF TROPONIN QUANT: CPT | Performed by: EMERGENCY MEDICINE

## 2019-01-01 PROCEDURE — 93005 ELECTROCARDIOGRAM TRACING: CPT

## 2019-01-01 PROCEDURE — 77338 DESIGN MLC DEVICE FOR IMRT: CPT | Performed by: RADIOLOGY

## 2019-01-01 PROCEDURE — 81001 URINALYSIS AUTO W/SCOPE: CPT

## 2019-01-01 PROCEDURE — 77334 RADIATION TREATMENT AID(S): CPT | Performed by: RADIOLOGY

## 2019-01-01 PROCEDURE — 99231 SBSQ HOSP IP/OBS SF/LOW 25: CPT | Performed by: INTERNAL MEDICINE

## 2019-01-01 PROCEDURE — 71045 X-RAY EXAM CHEST 1 VIEW: CPT | Performed by: INTERNAL MEDICINE

## 2019-01-01 PROCEDURE — 74160 CT ABDOMEN W/CONTRAST: CPT | Performed by: INTERNAL MEDICINE

## 2019-01-01 PROCEDURE — 49465 FLUORO EXAM OF G/COLON TUBE: CPT | Performed by: EMERGENCY MEDICINE

## 2019-01-01 PROCEDURE — 99217 OBSERVATION CARE DISCHARGE: CPT | Performed by: HOSPITALIST

## 2019-01-01 PROCEDURE — 74018 RADEX ABDOMEN 1 VIEW: CPT | Performed by: EMERGENCY MEDICINE

## 2019-01-01 PROCEDURE — 93294 REM INTERROG EVL PM/LDLS PM: CPT | Performed by: INTERNAL MEDICINE

## 2019-01-01 PROCEDURE — 82378 CARCINOEMBRYONIC ANTIGEN: CPT

## 2019-01-01 PROCEDURE — 87086 URINE CULTURE/COLONY COUNT: CPT

## 2019-01-01 PROCEDURE — 85610 PROTHROMBIN TIME: CPT | Performed by: RADIOLOGY

## 2019-01-01 PROCEDURE — 99306 1ST NF CARE HIGH MDM 50: CPT | Performed by: INTERNAL MEDICINE

## 2019-01-01 PROCEDURE — 99226 SUBSEQUENT OBSERVATION CARE: CPT | Performed by: HOSPITALIST

## 2019-01-01 PROCEDURE — 0PSG04Z REPOSITION LEFT HUMERAL SHAFT WITH INTERNAL FIXATION DEVICE, OPEN APPROACH: ICD-10-PCS | Performed by: ORTHOPAEDIC SURGERY

## 2019-01-01 PROCEDURE — 74176 CT ABD & PELVIS W/O CONTRAST: CPT | Performed by: EMERGENCY MEDICINE

## 2019-01-01 PROCEDURE — 0FB20ZX EXCISION OF LEFT LOBE LIVER, OPEN APPROACH, DIAGNOSTIC: ICD-10-PCS | Performed by: SURGERY

## 2019-01-01 PROCEDURE — 99232 SBSQ HOSP IP/OBS MODERATE 35: CPT | Performed by: PHYSICIAN ASSISTANT

## 2019-01-01 PROCEDURE — 85610 PROTHROMBIN TIME: CPT

## 2019-01-01 PROCEDURE — 0H97XZZ DRAINAGE OF ABDOMEN SKIN, EXTERNAL APPROACH: ICD-10-PCS | Performed by: RADIOLOGY

## 2019-01-01 PROCEDURE — 99356 PROLONGED SERV,INPATIENT,1ST HR: CPT | Performed by: HOSPITALIST

## 2019-01-01 PROCEDURE — 74177 CT ABD & PELVIS W/CONTRAST: CPT | Performed by: INTERNAL MEDICINE

## 2019-01-01 PROCEDURE — 99356 PROLONGED SERV,INPATIENT,1ST HR: CPT | Performed by: NURSE PRACTITIONER

## 2019-01-01 PROCEDURE — 45330 DIAGNOSTIC SIGMOIDOSCOPY: CPT | Performed by: INTERNAL MEDICINE

## 2019-01-01 PROCEDURE — 10160 PNXR ASPIR ABSC HMTMA BULLA: CPT

## 2019-01-01 PROCEDURE — B2111ZZ FLUOROSCOPY OF MULTIPLE CORONARY ARTERIES USING LOW OSMOLAR CONTRAST: ICD-10-PCS | Performed by: INTERNAL MEDICINE

## 2019-01-01 PROCEDURE — 85025 COMPLETE CBC W/AUTO DIFF WBC: CPT | Performed by: EMERGENCY MEDICINE

## 2019-01-01 PROCEDURE — 74176 CT ABD & PELVIS W/O CONTRAST: CPT | Performed by: INTERNAL MEDICINE

## 2019-01-01 PROCEDURE — 77399 UNLISTED PX MED RADJ PHYSICS: CPT | Performed by: RADIOLOGY

## 2019-01-01 PROCEDURE — 76942 ECHO GUIDE FOR BIOPSY: CPT

## 2019-01-01 PROCEDURE — 99204 OFFICE O/P NEW MOD 45 MIN: CPT | Performed by: OTOLARYNGOLOGY

## 2019-01-01 PROCEDURE — 87205 SMEAR GRAM STAIN: CPT | Performed by: INTERNAL MEDICINE

## 2019-01-01 PROCEDURE — 93010 ELECTROCARDIOGRAM REPORT: CPT | Performed by: EMERGENCY MEDICINE

## 2019-01-01 PROCEDURE — 3E0336Z INTRODUCTION OF NUTRITIONAL SUBSTANCE INTO PERIPHERAL VEIN, PERCUTANEOUS APPROACH: ICD-10-PCS | Performed by: INTERNAL MEDICINE

## 2019-01-01 PROCEDURE — 73560 X-RAY EXAM OF KNEE 1 OR 2: CPT | Performed by: EMERGENCY MEDICINE

## 2019-01-01 PROCEDURE — 0DJD8ZZ INSPECTION OF LOWER INTESTINAL TRACT, VIA NATURAL OR ARTIFICIAL OPENING ENDOSCOPIC: ICD-10-PCS | Performed by: INTERNAL MEDICINE

## 2019-01-01 PROCEDURE — 86301 IMMUNOASSAY TUMOR CA 19-9: CPT

## 2019-01-01 PROCEDURE — 3E0336Z INTRODUCTION OF NUTRITIONAL SUBSTANCE INTO PERIPHERAL VEIN, PERCUTANEOUS APPROACH: ICD-10-PCS | Performed by: HOSPITALIST

## 2019-01-01 PROCEDURE — 77001 FLUOROGUIDE FOR VEIN DEVICE: CPT

## 2019-01-01 PROCEDURE — 87186 SC STD MICRODIL/AGAR DIL: CPT

## 2019-01-01 DEVICE — SCREW LOCKING 3.5X38 212.116: Type: IMPLANTABLE DEVICE | Site: ARM | Status: FUNCTIONAL

## 2019-01-01 DEVICE — SCREW LOCKING 3.5X36 212.115: Type: IMPLANTABLE DEVICE | Site: ARM | Status: FUNCTIONAL

## 2019-01-01 DEVICE — SCREW LCK 3.5X42MM ST W/STRDRV: Type: IMPLANTABLE DEVICE | Site: ARM | Status: FUNCTIONAL

## 2019-01-01 DEVICE — SCREW LCK 3.5X46MM ST W/STRDRV: Type: IMPLANTABLE DEVICE | Site: ARM | Status: FUNCTIONAL

## 2019-01-01 DEVICE — IMPLANTABLE DEVICE: Type: IMPLANTABLE DEVICE | Site: ARM | Status: FUNCTIONAL

## 2019-01-01 DEVICE — SCREW LOCKING 3.5X45 212.119: Type: IMPLANTABLE DEVICE | Site: ARM | Status: FUNCTIONAL

## 2019-01-01 DEVICE — GII QUICKANCHOR PLUS SIZE 2 (5 METRIC) PANACRYL BRAIDED ABSORBABLE SUTURE 36 INCHES (91CM), DOUBLE ARMED WITH CP-2 NEEDLES, WITH DISPOSABLE INSERTER.
Type: IMPLANTABLE DEVICE | Site: ARM | Status: FUNCTIONAL
Brand: GII QUICKANCHOR PANACRYL

## 2019-01-01 RX ORDER — HYDRALAZINE HYDROCHLORIDE 25 MG/1
25 TABLET, FILM COATED ORAL 2 TIMES DAILY
Status: DISCONTINUED | OUTPATIENT
Start: 2019-01-01 | End: 2019-01-01

## 2019-01-01 RX ORDER — ASPIRIN 81 MG/1
324 TABLET, CHEWABLE ORAL ONCE
Status: DISCONTINUED | OUTPATIENT
Start: 2019-01-01 | End: 2019-01-01 | Stop reason: HOSPADM

## 2019-01-01 RX ORDER — ALFUZOSIN HYDROCHLORIDE 10 MG/1
10 TABLET, EXTENDED RELEASE ORAL
Status: DISCONTINUED | OUTPATIENT
Start: 2019-01-01 | End: 2019-01-01

## 2019-01-01 RX ORDER — ETOMIDATE 2 MG/ML
INJECTION INTRAVENOUS AS NEEDED
Status: DISCONTINUED | OUTPATIENT
Start: 2019-01-01 | End: 2019-01-01 | Stop reason: SURG

## 2019-01-01 RX ORDER — SCOLOPAMINE TRANSDERMAL SYSTEM 1 MG/1
1 PATCH, EXTENDED RELEASE TRANSDERMAL
Status: DISCONTINUED | OUTPATIENT
Start: 2019-01-01 | End: 2019-01-01

## 2019-01-01 RX ORDER — POTASSIUM CHLORIDE 14.9 MG/ML
20 INJECTION INTRAVENOUS ONCE
Status: COMPLETED | OUTPATIENT
Start: 2019-01-01 | End: 2019-01-01

## 2019-01-01 RX ORDER — DEXTROSE MONOHYDRATE 25 G/50ML
50 INJECTION, SOLUTION INTRAVENOUS AS NEEDED
Status: DISCONTINUED | OUTPATIENT
Start: 2019-01-01 | End: 2019-01-01

## 2019-01-01 RX ORDER — MAGNESIUM CARB/ALUMINUM HYDROX 105-160MG
30 TABLET,CHEWABLE ORAL
Qty: 1 BOTTLE | Refills: 0 | Status: ON HOLD | OUTPATIENT
Start: 2019-01-01 | End: 2019-01-01 | Stop reason: ALTCHOICE

## 2019-01-01 RX ORDER — MORPHINE SULFATE 2 MG/ML
2 INJECTION, SOLUTION INTRAMUSCULAR; INTRAVENOUS EVERY 2 HOUR PRN
Status: DISCONTINUED | OUTPATIENT
Start: 2019-01-01 | End: 2019-01-01

## 2019-01-01 RX ORDER — HEPARIN SODIUM (PORCINE) LOCK FLUSH IV SOLN 100 UNIT/ML 100 UNIT/ML
5 SOLUTION INTRAVENOUS ONCE
Status: COMPLETED | OUTPATIENT
Start: 2019-01-01 | End: 2019-01-01

## 2019-01-01 RX ORDER — POTASSIUM CHLORIDE 29.8 MG/ML
40 INJECTION INTRAVENOUS ONCE
Status: COMPLETED | OUTPATIENT
Start: 2019-01-01 | End: 2019-01-01

## 2019-01-01 RX ORDER — POTASSIUM CHLORIDE 1.5 G/1.77G
40 POWDER, FOR SOLUTION ORAL ONCE
Status: COMPLETED | OUTPATIENT
Start: 2019-01-01 | End: 2019-01-01

## 2019-01-01 RX ORDER — ENOXAPARIN SODIUM 100 MG/ML
80 INJECTION SUBCUTANEOUS EVERY 12 HOURS SCHEDULED
Refills: 0 | Status: ON HOLD | COMMUNITY
Start: 2019-01-01 | End: 2019-01-01

## 2019-01-01 RX ORDER — HEPARIN SODIUM 5000 [USP'U]/ML
5000 INJECTION, SOLUTION INTRAVENOUS; SUBCUTANEOUS EVERY 8 HOURS SCHEDULED
Status: DISCONTINUED | OUTPATIENT
Start: 2019-01-01 | End: 2019-01-01

## 2019-01-01 RX ORDER — SPIRONOLACTONE 25 MG/1
25 TABLET ORAL DAILY
Qty: 30 TABLET | Refills: 1 | Status: ON HOLD | OUTPATIENT
Start: 2019-01-01 | End: 2019-01-01

## 2019-01-01 RX ORDER — DIPHENOXYLATE HCL/ATROPINE 2.5-.025/5
5 LIQUID (ML) ORAL 2 TIMES DAILY
Status: DISCONTINUED | OUTPATIENT
Start: 2019-01-01 | End: 2019-01-01

## 2019-01-01 RX ORDER — FUROSEMIDE 40 MG/5ML
40 SOLUTION ORAL DAILY
Status: DISCONTINUED | OUTPATIENT
Start: 2019-01-01 | End: 2019-01-01

## 2019-01-01 RX ORDER — SODIUM PHOSPHATE, DIBASIC AND SODIUM PHOSPHATE, MONOBASIC 7; 19 G/133ML; G/133ML
1 ENEMA RECTAL ONCE AS NEEDED
Status: COMPLETED | OUTPATIENT
Start: 2019-01-01 | End: 2019-01-01

## 2019-01-01 RX ORDER — 0.9 % SODIUM CHLORIDE 0.9 %
VIAL (ML) INJECTION
Status: DISCONTINUED
Start: 2019-01-01 | End: 2019-01-01

## 2019-01-01 RX ORDER — FUROSEMIDE 10 MG/ML
20 INJECTION INTRAMUSCULAR; INTRAVENOUS EVERY 6 HOURS
Status: COMPLETED | OUTPATIENT
Start: 2019-01-01 | End: 2019-01-01

## 2019-01-01 RX ORDER — HEPARIN SODIUM AND DEXTROSE 10000; 5 [USP'U]/100ML; G/100ML
INJECTION INTRAVENOUS CONTINUOUS
Status: DISPENSED | OUTPATIENT
Start: 2019-01-01 | End: 2019-01-01

## 2019-01-01 RX ORDER — GLYCOPYRROLATE 0.2 MG/ML
0.2 INJECTION, SOLUTION INTRAMUSCULAR; INTRAVENOUS ONCE
Status: DISCONTINUED | OUTPATIENT
Start: 2019-01-01 | End: 2019-01-01

## 2019-01-01 RX ORDER — HEPARIN SODIUM (PORCINE) LOCK FLUSH IV SOLN 100 UNIT/ML 100 UNIT/ML
5 SOLUTION INTRAVENOUS ONCE
Status: CANCELLED | OUTPATIENT
Start: 2019-01-01

## 2019-01-01 RX ORDER — MORPHINE SULFATE 10 MG/ML
6 INJECTION, SOLUTION INTRAMUSCULAR; INTRAVENOUS EVERY 10 MIN PRN
Status: DISCONTINUED | OUTPATIENT
Start: 2019-01-01 | End: 2019-01-01 | Stop reason: HOSPADM

## 2019-01-01 RX ORDER — FUROSEMIDE 20 MG/1
TABLET ORAL
Qty: 180 TABLET | Refills: 1 | Status: ON HOLD | OUTPATIENT
Start: 2019-01-01 | End: 2019-01-01

## 2019-01-01 RX ORDER — ACETAMINOPHEN 500 MG
1000 TABLET ORAL ONCE
Status: DISCONTINUED | OUTPATIENT
Start: 2019-01-01 | End: 2019-01-01 | Stop reason: HOSPADM

## 2019-01-01 RX ORDER — FLUTICASONE PROPIONATE 50 MCG
1 SPRAY, SUSPENSION (ML) NASAL DAILY
Qty: 1 BOTTLE | Refills: 0 | Status: SHIPPED | OUTPATIENT
Start: 2019-01-01

## 2019-01-01 RX ORDER — LORAZEPAM 2 MG/ML
1 CONCENTRATE ORAL EVERY 8 HOURS PRN
Qty: 30 ML | Refills: 0 | Status: ON HOLD | OUTPATIENT
Start: 2019-01-01 | End: 2019-01-01

## 2019-01-01 RX ORDER — METRONIDAZOLE 500 MG/100ML
500 INJECTION, SOLUTION INTRAVENOUS EVERY 8 HOURS
Status: DISCONTINUED | OUTPATIENT
Start: 2019-01-01 | End: 2019-01-01

## 2019-01-01 RX ORDER — SODIUM CHLORIDE 0.9 % (FLUSH) 0.9 %
3 SYRINGE (ML) INJECTION AS NEEDED
Status: DISCONTINUED | OUTPATIENT
Start: 2019-01-01 | End: 2019-01-01

## 2019-01-01 RX ORDER — ONDANSETRON 2 MG/ML
INJECTION INTRAMUSCULAR; INTRAVENOUS AS NEEDED
Status: DISCONTINUED | OUTPATIENT
Start: 2019-01-01 | End: 2019-01-01 | Stop reason: SURG

## 2019-01-01 RX ORDER — FAMOTIDINE 20 MG/1
20 TABLET ORAL ONCE
Status: DISCONTINUED | OUTPATIENT
Start: 2019-01-01 | End: 2019-01-01 | Stop reason: HOSPADM

## 2019-01-01 RX ORDER — OXYCODONE HYDROCHLORIDE AND ACETAMINOPHEN 5; 325 MG/1; MG/1
1 TABLET ORAL EVERY 4 HOURS PRN
Status: DISCONTINUED | OUTPATIENT
Start: 2019-01-01 | End: 2019-01-01

## 2019-01-01 RX ORDER — MORPHINE SULFATE 4 MG/ML
4 INJECTION, SOLUTION INTRAMUSCULAR; INTRAVENOUS ONCE
Status: COMPLETED | OUTPATIENT
Start: 2019-01-01 | End: 2019-01-01

## 2019-01-01 RX ORDER — BACITRACIN ZINC AND POLYMYXIN B SULFATE 500; 10000 [USP'U]/G; [USP'U]/G
OINTMENT TOPICAL 3 TIMES DAILY
Refills: 0 | Status: ON HOLD | COMMUNITY
Start: 2019-01-01 | End: 2019-01-01

## 2019-01-01 RX ORDER — HEPARIN SODIUM 1000 [USP'U]/ML
INJECTION, SOLUTION INTRAVENOUS; SUBCUTANEOUS
Status: COMPLETED
Start: 2019-01-01 | End: 2019-01-01

## 2019-01-01 RX ORDER — FUROSEMIDE 40 MG/5ML
20 SOLUTION ORAL DAILY
Status: DISCONTINUED | OUTPATIENT
Start: 2019-01-01 | End: 2019-01-01

## 2019-01-01 RX ORDER — SODIUM CHLORIDE 9 MG/ML
INJECTION, SOLUTION INTRAVENOUS
Status: COMPLETED
Start: 2019-01-01 | End: 2019-01-01

## 2019-01-01 RX ORDER — HYDRALAZINE HCL
10 POWDER (GRAM) MISCELLANEOUS EVERY 8 HOURS
Qty: 1 BOTTLE | Refills: 0 | Status: ON HOLD | OUTPATIENT
Start: 2019-01-01 | End: 2019-01-01

## 2019-01-01 RX ORDER — HYDRALAZINE HYDROCHLORIDE 25 MG/1
25 TABLET, FILM COATED ORAL EVERY 6 HOURS SCHEDULED
Status: DISCONTINUED | OUTPATIENT
Start: 2019-01-01 | End: 2019-01-01

## 2019-01-01 RX ORDER — SCOLOPAMINE TRANSDERMAL SYSTEM 1 MG/1
1 PATCH, EXTENDED RELEASE TRANSDERMAL
Qty: 10 PATCH | Refills: 0 | Status: SHIPPED | OUTPATIENT
Start: 2019-01-01

## 2019-01-01 RX ORDER — POTASSIUM CHLORIDE 14.9 MG/ML
20 INJECTION INTRAVENOUS ONCE
Status: CANCELLED
Start: 2019-01-01

## 2019-01-01 RX ORDER — 0.9 % SODIUM CHLORIDE 0.9 %
VIAL (ML) INJECTION
Status: COMPLETED
Start: 2019-01-01 | End: 2019-01-01

## 2019-01-01 RX ORDER — SPIRONOLACTONE 25 MG/1
TABLET ORAL
Qty: 90 TABLET | Refills: 1 | OUTPATIENT
Start: 2019-01-01

## 2019-01-01 RX ORDER — DEXAMETHASONE SODIUM PHOSPHATE 4 MG/ML
VIAL (ML) INJECTION AS NEEDED
Status: DISCONTINUED | OUTPATIENT
Start: 2019-01-01 | End: 2019-01-01 | Stop reason: SURG

## 2019-01-01 RX ORDER — HYOSCYAMINE SULFATE 0.125 MG
0.12 TABLET,DISINTEGRATING ORAL ONCE
Status: COMPLETED | OUTPATIENT
Start: 2019-01-01 | End: 2019-01-01

## 2019-01-01 RX ORDER — ACETAMINOPHEN 325 MG/1
650 TABLET ORAL EVERY 6 HOURS PRN
Status: DISCONTINUED | OUTPATIENT
Start: 2019-01-01 | End: 2019-01-01

## 2019-01-01 RX ORDER — SODIUM CHLORIDE, SODIUM LACTATE, POTASSIUM CHLORIDE, CALCIUM CHLORIDE 600; 310; 30; 20 MG/100ML; MG/100ML; MG/100ML; MG/100ML
INJECTION, SOLUTION INTRAVENOUS CONTINUOUS PRN
Status: DISCONTINUED | OUTPATIENT
Start: 2019-01-01 | End: 2019-01-01 | Stop reason: SURG

## 2019-01-01 RX ORDER — METOPROLOL TARTRATE 5 MG/5ML
5 INJECTION INTRAVENOUS AS NEEDED
Status: DISCONTINUED | OUTPATIENT
Start: 2019-01-01 | End: 2019-01-01

## 2019-01-01 RX ORDER — POTASSIUM CHLORIDE 14.9 MG/ML
INJECTION INTRAVENOUS
Status: COMPLETED
Start: 2019-01-01 | End: 2019-01-01

## 2019-01-01 RX ORDER — ENOXAPARIN SODIUM 100 MG/ML
1 INJECTION SUBCUTANEOUS EVERY 12 HOURS SCHEDULED
Status: DISCONTINUED | OUTPATIENT
Start: 2019-01-01 | End: 2019-01-01

## 2019-01-01 RX ORDER — MORPHINE SULFATE 4 MG/ML
2 INJECTION, SOLUTION INTRAMUSCULAR; INTRAVENOUS ONCE
Status: COMPLETED | OUTPATIENT
Start: 2019-01-01 | End: 2019-01-01

## 2019-01-01 RX ORDER — 0.9 % SODIUM CHLORIDE 0.9 %
10 VIAL (ML) INJECTION ONCE
Status: CANCELLED | OUTPATIENT
Start: 2019-01-01

## 2019-01-01 RX ORDER — METOCLOPRAMIDE HYDROCHLORIDE 5 MG/ML
10 INJECTION INTRAMUSCULAR; INTRAVENOUS EVERY 6 HOURS PRN
Status: DISCONTINUED | OUTPATIENT
Start: 2019-01-01 | End: 2019-01-01

## 2019-01-01 RX ORDER — HEPARIN SODIUM (PORCINE) LOCK FLUSH IV SOLN 100 UNIT/ML 100 UNIT/ML
SOLUTION INTRAVENOUS
Status: COMPLETED
Start: 2019-01-01 | End: 2019-01-01

## 2019-01-01 RX ORDER — SODIUM CHLORIDE, SODIUM LACTATE, POTASSIUM CHLORIDE, CALCIUM CHLORIDE 600; 310; 30; 20 MG/100ML; MG/100ML; MG/100ML; MG/100ML
INJECTION, SOLUTION INTRAVENOUS CONTINUOUS
Status: DISCONTINUED | OUTPATIENT
Start: 2019-01-01 | End: 2019-01-01 | Stop reason: HOSPADM

## 2019-01-01 RX ORDER — HYDRALAZINE HYDROCHLORIDE 10 MG/1
10 TABLET, FILM COATED ORAL EVERY 8 HOURS SCHEDULED
Status: DISCONTINUED | OUTPATIENT
Start: 2019-01-01 | End: 2019-01-01

## 2019-01-01 RX ORDER — XYLITOL/YERBA SANTA
5 AEROSOL, SPRAY WITH PUMP (ML) MUCOUS MEMBRANE AS NEEDED
Status: DISCONTINUED | OUTPATIENT
Start: 2019-01-01 | End: 2019-01-01

## 2019-01-01 RX ORDER — ALBUMIN (HUMAN) 12.5 G/50ML
12.5 SOLUTION INTRAVENOUS EVERY 6 HOURS
Status: COMPLETED | OUTPATIENT
Start: 2019-01-01 | End: 2019-01-01

## 2019-01-01 RX ORDER — PROCHLORPERAZINE MALEATE 10 MG
10 TABLET ORAL EVERY 6 HOURS PRN
Qty: 30 TABLET | Refills: 2 | Status: SHIPPED | OUTPATIENT
Start: 2019-01-01

## 2019-01-01 RX ORDER — FUROSEMIDE 10 MG/ML
40 INJECTION INTRAMUSCULAR; INTRAVENOUS ONCE
Status: CANCELLED | OUTPATIENT
Start: 2019-01-01

## 2019-01-01 RX ORDER — METOPROLOL TARTRATE 5 MG/5ML
2.5 INJECTION INTRAVENOUS AS NEEDED
Status: DISCONTINUED | OUTPATIENT
Start: 2019-01-01 | End: 2019-01-01

## 2019-01-01 RX ORDER — AMOXICILLIN AND CLAVULANATE POTASSIUM 400; 57 MG/5ML; MG/5ML
10 POWDER, FOR SUSPENSION ORAL 2 TIMES DAILY
Qty: 140 ML | Refills: 0 | Status: SHIPPED | OUTPATIENT
Start: 2019-01-01 | End: 2019-01-01

## 2019-01-01 RX ORDER — SODIUM CHLORIDE, SODIUM LACTATE, POTASSIUM CHLORIDE, CALCIUM CHLORIDE 600; 310; 30; 20 MG/100ML; MG/100ML; MG/100ML; MG/100ML
INJECTION, SOLUTION INTRAVENOUS CONTINUOUS
Status: DISCONTINUED | OUTPATIENT
Start: 2019-01-01 | End: 2019-01-01

## 2019-01-01 RX ORDER — MORPHINE SULFATE 2 MG/ML
2 INJECTION, SOLUTION INTRAMUSCULAR; INTRAVENOUS EVERY 10 MIN PRN
Status: DISCONTINUED | OUTPATIENT
Start: 2019-01-01 | End: 2019-01-01 | Stop reason: HOSPADM

## 2019-01-01 RX ORDER — DIPHENHYDRAMINE HYDROCHLORIDE 50 MG/ML
25 INJECTION INTRAMUSCULAR; INTRAVENOUS ONCE
Status: COMPLETED | OUTPATIENT
Start: 2019-01-01 | End: 2019-01-01

## 2019-01-01 RX ORDER — FLUTICASONE PROPIONATE 50 MCG
1 SPRAY, SUSPENSION (ML) NASAL DAILY
Status: DISCONTINUED | OUTPATIENT
Start: 2019-01-01 | End: 2019-01-01

## 2019-01-01 RX ORDER — LORAZEPAM 2 MG/ML
INJECTION INTRAMUSCULAR
Status: COMPLETED
Start: 2019-01-01 | End: 2019-01-01

## 2019-01-01 RX ORDER — LOSARTAN POTASSIUM 50 MG/1
TABLET ORAL
Qty: 90 TABLET | Refills: 0 | OUTPATIENT
Start: 2019-01-01

## 2019-01-01 RX ORDER — FAMOTIDINE 10 MG/ML
INJECTION, SOLUTION INTRAVENOUS
Status: COMPLETED
Start: 2019-01-01 | End: 2019-01-01

## 2019-01-01 RX ORDER — CLONAZEPAM 0.5 MG/1
0.25 TABLET ORAL EVERY 4 HOURS PRN
Status: DISCONTINUED | OUTPATIENT
Start: 2019-01-01 | End: 2019-01-01

## 2019-01-01 RX ORDER — LIDOCAINE HYDROCHLORIDE 10 MG/ML
INJECTION, SOLUTION EPIDURAL; INFILTRATION; INTRACAUDAL; PERINEURAL AS NEEDED
Status: DISCONTINUED | OUTPATIENT
Start: 2019-01-01 | End: 2019-01-01 | Stop reason: SURG

## 2019-01-01 RX ORDER — GLIPIZIDE 5 MG/1
TABLET ORAL
Qty: 60 TABLET | Refills: 5 | Status: SHIPPED | OUTPATIENT
Start: 2019-01-01 | End: 2019-01-01 | Stop reason: ALTCHOICE

## 2019-01-01 RX ORDER — DIPHENHYDRAMINE HYDROCHLORIDE 50 MG/ML
25 INJECTION INTRAMUSCULAR; INTRAVENOUS ONCE
Status: CANCELLED | OUTPATIENT
Start: 2019-01-01

## 2019-01-01 RX ORDER — ONDANSETRON 8 MG/1
8 TABLET, ORALLY DISINTEGRATING ORAL EVERY 8 HOURS PRN
Qty: 30 TABLET | Refills: 2 | Status: ON HOLD | OUTPATIENT
Start: 2019-01-01 | End: 2019-01-01

## 2019-01-01 RX ORDER — METHYLPREDNISOLONE SODIUM SUCCINATE 40 MG/ML
40 INJECTION, POWDER, LYOPHILIZED, FOR SOLUTION INTRAMUSCULAR; INTRAVENOUS ONCE
Status: COMPLETED | OUTPATIENT
Start: 2019-01-01 | End: 2019-01-01

## 2019-01-01 RX ORDER — BACITRACIN 50000 [USP'U]/1
INJECTION, POWDER, LYOPHILIZED, FOR SOLUTION INTRAMUSCULAR
Status: COMPLETED
Start: 2019-01-01 | End: 2019-01-01

## 2019-01-01 RX ORDER — ONDANSETRON 2 MG/ML
4 INJECTION INTRAMUSCULAR; INTRAVENOUS ONCE
Status: COMPLETED | OUTPATIENT
Start: 2019-01-01 | End: 2019-01-01

## 2019-01-01 RX ORDER — HYDRALAZINE HYDROCHLORIDE 20 MG/ML
5 INJECTION INTRAMUSCULAR; INTRAVENOUS EVERY 6 HOURS PRN
Status: DISCONTINUED | OUTPATIENT
Start: 2019-01-01 | End: 2019-01-01

## 2019-01-01 RX ORDER — HYDRALAZINE HYDROCHLORIDE 100 MG/1
10 TABLET, FILM COATED ORAL EVERY 8 HOURS
Status: DISCONTINUED | OUTPATIENT
Start: 2019-01-01 | End: 2019-01-01

## 2019-01-01 RX ORDER — HYDRALAZINE HYDROCHLORIDE 10 MG/1
TABLET, FILM COATED ORAL
Qty: 270 TABLET | Refills: 1 | Status: ON HOLD | OUTPATIENT
Start: 2019-01-01 | End: 2019-01-01

## 2019-01-01 RX ORDER — LOSARTAN POTASSIUM 50 MG/1
50 TABLET ORAL DAILY
Qty: 30 TABLET | Refills: 0 | Status: ON HOLD | OUTPATIENT
Start: 2019-01-01 | End: 2019-01-01

## 2019-01-01 RX ORDER — AMOXICILLIN AND CLAVULANATE POTASSIUM 875; 125 MG/1; MG/1
1 TABLET, FILM COATED ORAL 2 TIMES DAILY
Status: ON HOLD | COMMUNITY
Start: 2019-01-01 | End: 2019-01-01

## 2019-01-01 RX ORDER — ALBUMIN (HUMAN) 12.5 G/50ML
25 SOLUTION INTRAVENOUS EVERY 6 HOURS
Status: DISPENSED | OUTPATIENT
Start: 2019-01-01 | End: 2019-01-01

## 2019-01-01 RX ORDER — SPIRONOLACTONE 25 MG/1
25 TABLET ORAL DAILY
Qty: 30 TABLET | Refills: 5 | Status: SHIPPED | OUTPATIENT
Start: 2019-01-01

## 2019-01-01 RX ORDER — SPIRONOLACTONE 25 MG/1
25 TABLET ORAL DAILY
Status: DISCONTINUED | OUTPATIENT
Start: 2019-01-01 | End: 2019-01-01

## 2019-01-01 RX ORDER — ONDANSETRON 4 MG/1
4 TABLET, ORALLY DISINTEGRATING ORAL EVERY 8 HOURS PRN
Qty: 20 TABLET | Refills: 0 | Status: SHIPPED | OUTPATIENT
Start: 2019-01-01 | End: 2019-01-01

## 2019-01-01 RX ORDER — LORAZEPAM 0.5 MG/1
0.5 TABLET ORAL 2 TIMES DAILY PRN
Qty: 14 TABLET | Refills: 0 | Status: SHIPPED | OUTPATIENT
Start: 2019-01-01 | End: 2019-01-01

## 2019-01-01 RX ORDER — METOPROLOL TARTRATE 5 MG/5ML
2.5 INJECTION INTRAVENOUS ONCE
Status: DISCONTINUED | OUTPATIENT
Start: 2019-01-01 | End: 2019-01-01 | Stop reason: HOSPADM

## 2019-01-01 RX ORDER — MORPHINE SULFATE 4 MG/ML
1 INJECTION, SOLUTION INTRAMUSCULAR; INTRAVENOUS EVERY 4 HOURS PRN
Status: DISCONTINUED | OUTPATIENT
Start: 2019-01-01 | End: 2019-01-01

## 2019-01-01 RX ORDER — GLYCOPYRROLATE 0.2 MG/ML
0.2 INJECTION, SOLUTION INTRAMUSCULAR; INTRAVENOUS EVERY 6 HOURS PRN
Status: DISCONTINUED | OUTPATIENT
Start: 2019-01-01 | End: 2019-01-01

## 2019-01-01 RX ORDER — SODIUM CHLORIDE 9 MG/ML
INJECTION, SOLUTION INTRAVENOUS
Status: DISPENSED
Start: 2019-01-01 | End: 2019-01-01

## 2019-01-01 RX ORDER — NUT.TX.GLUCOSE INTOLERANCE,SOY
30 BAR ORAL
Status: DISCONTINUED | OUTPATIENT
Start: 2019-01-01 | End: 2019-01-01 | Stop reason: RX

## 2019-01-01 RX ORDER — FUROSEMIDE 40 MG/5ML
40 SOLUTION ORAL 2 TIMES DAILY
Qty: 1 BOTTLE | Refills: 0 | Status: ON HOLD | OUTPATIENT
Start: 2019-01-01 | End: 2019-01-01

## 2019-01-01 RX ORDER — CHLORHEXIDINE GLUCONATE 4 G/100ML
30 SOLUTION TOPICAL
Status: COMPLETED | OUTPATIENT
Start: 2019-01-01 | End: 2019-01-01

## 2019-01-01 RX ORDER — MORPHINE SULFATE 4 MG/ML
2 INJECTION, SOLUTION INTRAMUSCULAR; INTRAVENOUS EVERY 4 HOURS PRN
Status: DISCONTINUED | OUTPATIENT
Start: 2019-01-01 | End: 2019-01-01

## 2019-01-01 RX ORDER — MORPHINE SULFATE 4 MG/ML
2 INJECTION, SOLUTION INTRAMUSCULAR; INTRAVENOUS EVERY 10 MIN PRN
Status: DISCONTINUED | OUTPATIENT
Start: 2019-01-01 | End: 2019-01-01 | Stop reason: HOSPADM

## 2019-01-01 RX ORDER — TEMAZEPAM 7.5 MG/1
7.5 CAPSULE ORAL NIGHTLY PRN
Status: DISCONTINUED | OUTPATIENT
Start: 2019-01-01 | End: 2019-01-01

## 2019-01-01 RX ORDER — HYDROMORPHONE HYDROCHLORIDE 1 MG/ML
0.4 INJECTION, SOLUTION INTRAMUSCULAR; INTRAVENOUS; SUBCUTANEOUS EVERY 2 HOUR PRN
Status: DISCONTINUED | OUTPATIENT
Start: 2019-01-01 | End: 2019-01-01

## 2019-01-01 RX ORDER — ONDANSETRON 2 MG/ML
4 INJECTION INTRAMUSCULAR; INTRAVENOUS ONCE AS NEEDED
Status: DISCONTINUED | OUTPATIENT
Start: 2019-01-01 | End: 2019-01-01 | Stop reason: HOSPADM

## 2019-01-01 RX ORDER — DIPHENOXYLATE HCL/ATROPINE 2.5-.025/5
5 LIQUID (ML) ORAL 2 TIMES DAILY PRN
Refills: 0 | Status: ON HOLD | COMMUNITY
Start: 2019-01-01 | End: 2019-01-01

## 2019-01-01 RX ORDER — FAMOTIDINE 10 MG/ML
20 INJECTION, SOLUTION INTRAVENOUS ONCE
Status: CANCELLED | OUTPATIENT
Start: 2019-01-01

## 2019-01-01 RX ORDER — MAGNESIUM CARB/ALUMINUM HYDROX 105-160MG
30 TABLET,CHEWABLE ORAL
Status: DISCONTINUED | OUTPATIENT
Start: 2019-01-01 | End: 2019-01-01

## 2019-01-01 RX ORDER — ALPRAZOLAM 0.25 MG/1
0.25 TABLET ORAL NIGHTLY PRN
Status: DISCONTINUED | OUTPATIENT
Start: 2019-01-01 | End: 2019-01-01

## 2019-01-01 RX ORDER — SODIUM CHLORIDE 9 MG/ML
INJECTION, SOLUTION INTRAVENOUS
Status: DISCONTINUED
Start: 2019-01-01 | End: 2019-01-01

## 2019-01-01 RX ORDER — MAGNESIUM SULFATE HEPTAHYDRATE 40 MG/ML
2 INJECTION, SOLUTION INTRAVENOUS ONCE
Status: COMPLETED | OUTPATIENT
Start: 2019-01-01 | End: 2019-01-01

## 2019-01-01 RX ORDER — LIDOCAINE HYDROCHLORIDE 20 MG/ML
INJECTION, SOLUTION EPIDURAL; INFILTRATION; INTRACAUDAL; PERINEURAL
Status: COMPLETED
Start: 2019-01-01 | End: 2019-01-01

## 2019-01-01 RX ORDER — DEXTROSE AND SODIUM CHLORIDE 5; .9 G/100ML; G/100ML
INJECTION, SOLUTION INTRAVENOUS CONTINUOUS
Status: DISCONTINUED | OUTPATIENT
Start: 2019-01-01 | End: 2019-01-01

## 2019-01-01 RX ORDER — FUROSEMIDE 10 MG/ML
40 INJECTION INTRAMUSCULAR; INTRAVENOUS ONCE
Status: COMPLETED | OUTPATIENT
Start: 2019-01-01 | End: 2019-01-01

## 2019-01-01 RX ORDER — 0.9 % SODIUM CHLORIDE 0.9 %
VIAL (ML) INJECTION
Status: DISPENSED
Start: 2019-01-01 | End: 2019-01-01

## 2019-01-01 RX ORDER — ACETAMINOPHEN 160 MG/5ML
650 SUSPENSION ORAL EVERY 6 HOURS PRN
COMMUNITY

## 2019-01-01 RX ORDER — LORAZEPAM 2 MG/ML
1 CONCENTRATE ORAL EVERY 8 HOURS PRN
Status: DISCONTINUED | OUTPATIENT
Start: 2019-01-01 | End: 2019-01-01

## 2019-01-01 RX ORDER — XYLITOL/YERBA SANTA
AEROSOL, SPRAY WITH PUMP (ML) MUCOUS MEMBRANE AS NEEDED
Status: DISCONTINUED | OUTPATIENT
Start: 2019-01-01 | End: 2019-01-01

## 2019-01-01 RX ORDER — HYDROMORPHONE HYDROCHLORIDE 1 MG/ML
0.2 INJECTION, SOLUTION INTRAMUSCULAR; INTRAVENOUS; SUBCUTANEOUS EVERY 2 HOUR PRN
Status: DISCONTINUED | OUTPATIENT
Start: 2019-01-01 | End: 2019-01-01

## 2019-01-01 RX ORDER — HYDRALAZINE HYDROCHLORIDE 10 MG/1
10 TABLET, FILM COATED ORAL EVERY 8 HOURS SCHEDULED
Refills: 0 | Status: ON HOLD | COMMUNITY
Start: 2019-01-01 | End: 2019-01-01

## 2019-01-01 RX ORDER — HYDROMORPHONE HYDROCHLORIDE 1 MG/ML
0.4 INJECTION, SOLUTION INTRAMUSCULAR; INTRAVENOUS; SUBCUTANEOUS EVERY 5 MIN PRN
Status: DISCONTINUED | OUTPATIENT
Start: 2019-01-01 | End: 2019-01-01 | Stop reason: HOSPADM

## 2019-01-01 RX ORDER — NALOXONE HYDROCHLORIDE 0.4 MG/ML
80 INJECTION, SOLUTION INTRAMUSCULAR; INTRAVENOUS; SUBCUTANEOUS AS NEEDED
Status: DISCONTINUED | OUTPATIENT
Start: 2019-01-01 | End: 2019-01-01 | Stop reason: HOSPADM

## 2019-01-01 RX ORDER — CEFPODOXIME PROXETIL 100 MG/1
100 TABLET, FILM COATED ORAL 2 TIMES DAILY
Qty: 14 TABLET | Refills: 0 | Status: ON HOLD | OUTPATIENT
Start: 2019-01-01 | End: 2019-01-01

## 2019-01-01 RX ORDER — MULTIVITAMIN WITH IRON
1 TABLET ORAL DAILY
Status: DISCONTINUED | OUTPATIENT
Start: 2019-01-01 | End: 2019-01-01

## 2019-01-01 RX ORDER — FAMOTIDINE 10 MG/ML
20 INJECTION, SOLUTION INTRAVENOUS ONCE
Status: COMPLETED | OUTPATIENT
Start: 2019-01-01 | End: 2019-01-01

## 2019-01-01 RX ORDER — POLYETHYLENE GLYCOL 3350 17 G/17G
17 POWDER, FOR SOLUTION ORAL DAILY
Status: DISCONTINUED | OUTPATIENT
Start: 2019-01-01 | End: 2019-01-01

## 2019-01-01 RX ORDER — FUROSEMIDE 20 MG/1
TABLET ORAL
Qty: 60 TABLET | Refills: 0 | OUTPATIENT
Start: 2019-01-01

## 2019-01-01 RX ORDER — LORAZEPAM 2 MG/ML
0.5 INJECTION INTRAMUSCULAR ONCE
Status: CANCELLED
Start: 2019-01-01

## 2019-01-01 RX ORDER — HYDRALAZINE HYDROCHLORIDE 25 MG/1
25 TABLET, FILM COATED ORAL 2 TIMES DAILY
Status: ON HOLD | COMMUNITY
End: 2019-01-01

## 2019-01-01 RX ORDER — SODIUM CHLORIDE 0.9 % (FLUSH) 0.9 %
10 SYRINGE (ML) INJECTION AS NEEDED
Status: DISCONTINUED | OUTPATIENT
Start: 2019-01-01 | End: 2019-01-01

## 2019-01-01 RX ORDER — BISACODYL 10 MG
10 SUPPOSITORY, RECTAL RECTAL
Qty: 10 SUPPOSITORY | Refills: 3 | Status: ON HOLD | COMMUNITY
Start: 2019-01-01 | End: 2019-01-01

## 2019-01-01 RX ORDER — HYDROMORPHONE HYDROCHLORIDE 1 MG/ML
1 INJECTION, SOLUTION INTRAMUSCULAR; INTRAVENOUS; SUBCUTANEOUS EVERY 6 HOURS SCHEDULED
Status: DISCONTINUED | OUTPATIENT
Start: 2019-01-01 | End: 2019-01-01

## 2019-01-01 RX ORDER — LORAZEPAM 2 MG/ML
0.25 INJECTION INTRAMUSCULAR NIGHTLY PRN
Status: DISCONTINUED | OUTPATIENT
Start: 2019-01-01 | End: 2019-01-01

## 2019-01-01 RX ORDER — MORPHINE SULFATE 4 MG/ML
4 INJECTION, SOLUTION INTRAMUSCULAR; INTRAVENOUS EVERY 2 HOUR PRN
Status: DISCONTINUED | OUTPATIENT
Start: 2019-01-01 | End: 2019-01-01

## 2019-01-01 RX ORDER — FUROSEMIDE 10 MG/ML
40 INJECTION INTRAMUSCULAR; INTRAVENOUS
Status: CANCELLED | OUTPATIENT
Start: 2019-01-01

## 2019-01-01 RX ORDER — DIPHENHYDRAMINE HYDROCHLORIDE, ZINC ACETATE 2; .1 G/100G; G/100G
1 CREAM TOPICAL 3 TIMES DAILY PRN
Status: DISCONTINUED | OUTPATIENT
Start: 2019-01-01 | End: 2019-01-01

## 2019-01-01 RX ORDER — PHENYLEPHRINE HCL 10 MG/ML
VIAL (ML) INJECTION AS NEEDED
Status: DISCONTINUED | OUTPATIENT
Start: 2019-01-01 | End: 2019-01-01 | Stop reason: SURG

## 2019-01-01 RX ORDER — MORPHINE SULFATE 4 MG/ML
4 INJECTION, SOLUTION INTRAMUSCULAR; INTRAVENOUS EVERY 10 MIN PRN
Status: DISCONTINUED | OUTPATIENT
Start: 2019-01-01 | End: 2019-01-01 | Stop reason: HOSPADM

## 2019-01-01 RX ORDER — SODIUM CHLORIDE 9 MG/ML
INJECTION, SOLUTION INTRAVENOUS CONTINUOUS
Status: DISCONTINUED | OUTPATIENT
Start: 2019-01-01 | End: 2019-01-01

## 2019-01-01 RX ORDER — XYLITOL/YERBA SANTA
1 AEROSOL, SPRAY WITH PUMP (ML) MUCOUS MEMBRANE AS NEEDED
Status: DISCONTINUED | OUTPATIENT
Start: 2019-01-01 | End: 2019-01-01

## 2019-01-01 RX ORDER — SCOLOPAMINE TRANSDERMAL SYSTEM 1 MG/1
1 PATCH, EXTENDED RELEASE TRANSDERMAL
Refills: 0 | Status: ON HOLD | COMMUNITY
Start: 2019-01-01 | End: 2019-01-01

## 2019-01-01 RX ORDER — BACITRACIN ZINC AND POLYMYXIN B SULFATE 500; 10000 [USP'U]/G; [USP'U]/G
OINTMENT TOPICAL 3 TIMES DAILY
Status: DISCONTINUED | OUTPATIENT
Start: 2019-01-01 | End: 2019-01-01

## 2019-01-01 RX ORDER — ONDANSETRON 4 MG/1
4 TABLET, ORALLY DISINTEGRATING ORAL EVERY 8 HOURS PRN
Qty: 20 TABLET | Refills: 0 | Status: CANCELLED | OUTPATIENT
Start: 2019-01-01

## 2019-01-01 RX ORDER — METHYLPREDNISOLONE SODIUM SUCCINATE 40 MG/ML
INJECTION, POWDER, LYOPHILIZED, FOR SOLUTION INTRAMUSCULAR; INTRAVENOUS
Status: COMPLETED
Start: 2019-01-01 | End: 2019-01-01

## 2019-01-01 RX ORDER — OMEPRAZOLE 40 MG/1
CAPSULE, DELAYED RELEASE ORAL
Qty: 90 CAPSULE | Refills: 1 | OUTPATIENT
Start: 2019-01-01

## 2019-01-01 RX ORDER — FUROSEMIDE 40 MG/5ML
40 SOLUTION ORAL 2 TIMES DAILY
Qty: 1 BOTTLE | Refills: 0 | Status: SHIPPED | OUTPATIENT
Start: 2019-01-01 | End: 2019-01-01

## 2019-01-01 RX ORDER — POLYETHYLENE GLYCOL 3350 17 G/17G
17 POWDER, FOR SOLUTION ORAL DAILY PRN
Status: DISCONTINUED | OUTPATIENT
Start: 2019-01-01 | End: 2019-01-01

## 2019-01-01 RX ORDER — ALBUTEROL SULFATE 2.5 MG/3ML
2.5 SOLUTION RESPIRATORY (INHALATION) EVERY 6 HOURS PRN
Status: DISCONTINUED | OUTPATIENT
Start: 2019-01-01 | End: 2019-01-01

## 2019-01-01 RX ORDER — HYDRALAZINE HCL
10 POWDER (GRAM) MISCELLANEOUS EVERY 8 HOURS
Status: ON HOLD | COMMUNITY
End: 2019-01-01

## 2019-01-01 RX ORDER — DEXTROSE MONOHYDRATE 25 G/50ML
50 INJECTION, SOLUTION INTRAVENOUS
Status: DISCONTINUED | OUTPATIENT
Start: 2019-01-01 | End: 2019-01-01 | Stop reason: HOSPADM

## 2019-01-01 RX ORDER — ONDANSETRON 2 MG/ML
4 INJECTION INTRAMUSCULAR; INTRAVENOUS ONCE AS NEEDED
Status: ACTIVE | OUTPATIENT
Start: 2019-01-01 | End: 2019-01-01

## 2019-01-01 RX ORDER — ASPIRIN 300 MG
300 SUPPOSITORY, RECTAL RECTAL DAILY
Status: DISCONTINUED | OUTPATIENT
Start: 2019-01-01 | End: 2019-01-01

## 2019-01-01 RX ORDER — AMINO ACIDS/PROTEIN HYDROLYS 15G-100/30
30 LIQUID (ML) ORAL DAILY
Status: ON HOLD | COMMUNITY
End: 2019-01-01

## 2019-01-01 RX ORDER — FUROSEMIDE 10 MG/ML
20 INJECTION INTRAMUSCULAR; INTRAVENOUS ONCE
Status: COMPLETED | OUTPATIENT
Start: 2019-01-01 | End: 2019-01-01

## 2019-01-01 RX ORDER — HYDROCODONE BITARTRATE AND ACETAMINOPHEN 5; 325 MG/1; MG/1
1 TABLET ORAL AS NEEDED
Status: DISCONTINUED | OUTPATIENT
Start: 2019-01-01 | End: 2019-01-01 | Stop reason: HOSPADM

## 2019-01-01 RX ORDER — MORPHINE SULFATE 4 MG/ML
INJECTION, SOLUTION INTRAMUSCULAR; INTRAVENOUS
Status: COMPLETED
Start: 2019-01-01 | End: 2019-01-01

## 2019-01-01 RX ORDER — AMOXICILLIN 875 MG/1
875 TABLET, COATED ORAL 2 TIMES DAILY
Qty: 20 TABLET | Refills: 0 | Status: SHIPPED | OUTPATIENT
Start: 2019-01-01 | End: 2019-01-01

## 2019-01-01 RX ORDER — PROCHLORPERAZINE EDISYLATE 5 MG/ML
10 INJECTION INTRAMUSCULAR; INTRAVENOUS EVERY 6 HOURS PRN
Status: DISCONTINUED | OUTPATIENT
Start: 2019-01-01 | End: 2019-01-01

## 2019-01-01 RX ORDER — ONDANSETRON 2 MG/ML
4 INJECTION INTRAMUSCULAR; INTRAVENOUS EVERY 6 HOURS PRN
Status: DISCONTINUED | OUTPATIENT
Start: 2019-01-01 | End: 2019-01-01 | Stop reason: ALTCHOICE

## 2019-01-01 RX ORDER — ENOXAPARIN SODIUM 100 MG/ML
80 INJECTION SUBCUTANEOUS EVERY 12 HOURS SCHEDULED
Status: DISCONTINUED | OUTPATIENT
Start: 2019-01-01 | End: 2019-01-01

## 2019-01-01 RX ORDER — BISACODYL 10 MG
10 SUPPOSITORY, RECTAL RECTAL
Status: DISCONTINUED | OUTPATIENT
Start: 2019-01-01 | End: 2019-01-01

## 2019-01-01 RX ORDER — ONDANSETRON 2 MG/ML
4 INJECTION INTRAMUSCULAR; INTRAVENOUS EVERY 6 HOURS PRN
Status: DISCONTINUED | OUTPATIENT
Start: 2019-01-01 | End: 2019-01-01

## 2019-01-01 RX ORDER — DEXTROSE AND SODIUM CHLORIDE 5; .45 G/100ML; G/100ML
INJECTION, SOLUTION INTRAVENOUS CONTINUOUS
Status: DISCONTINUED | OUTPATIENT
Start: 2019-01-01 | End: 2019-01-01

## 2019-01-01 RX ORDER — ENOXAPARIN SODIUM 100 MG/ML
80 INJECTION SUBCUTANEOUS EVERY 12 HOURS SCHEDULED
Qty: 1 SYRINGE | Refills: 5 | Status: ON HOLD | OUTPATIENT
Start: 2019-01-01 | End: 2019-01-01 | Stop reason: ALTCHOICE

## 2019-01-01 RX ORDER — MORPHINE SULFATE 20 MG/ML
5 SOLUTION ORAL 4 TIMES DAILY PRN
Status: DISCONTINUED | OUTPATIENT
Start: 2019-01-01 | End: 2019-01-01

## 2019-01-01 RX ORDER — ONDANSETRON 2 MG/ML
4 INJECTION INTRAMUSCULAR; INTRAVENOUS ONCE AS NEEDED
Status: COMPLETED | OUTPATIENT
Start: 2019-01-01 | End: 2019-01-01

## 2019-01-01 RX ORDER — HYDROMORPHONE HYDROCHLORIDE 1 MG/ML
0.5 INJECTION, SOLUTION INTRAMUSCULAR; INTRAVENOUS; SUBCUTANEOUS EVERY 2 HOUR PRN
Status: DISCONTINUED | OUTPATIENT
Start: 2019-01-01 | End: 2019-01-01

## 2019-01-01 RX ORDER — MIDAZOLAM HYDROCHLORIDE 1 MG/ML
INJECTION INTRAMUSCULAR; INTRAVENOUS
Status: DISCONTINUED
Start: 2019-01-01 | End: 2019-01-01 | Stop reason: WASHOUT

## 2019-01-01 RX ORDER — HYDROMORPHONE HYDROCHLORIDE 1 MG/ML
0.6 INJECTION, SOLUTION INTRAMUSCULAR; INTRAVENOUS; SUBCUTANEOUS EVERY 5 MIN PRN
Status: DISCONTINUED | OUTPATIENT
Start: 2019-01-01 | End: 2019-01-01 | Stop reason: HOSPADM

## 2019-01-01 RX ORDER — NALOXONE HYDROCHLORIDE 0.4 MG/ML
80 INJECTION, SOLUTION INTRAMUSCULAR; INTRAVENOUS; SUBCUTANEOUS AS NEEDED
Status: ACTIVE | OUTPATIENT
Start: 2019-01-01 | End: 2019-01-01

## 2019-01-01 RX ORDER — HEPARIN SODIUM (PORCINE) LOCK FLUSH IV SOLN 100 UNIT/ML 100 UNIT/ML
5 SOLUTION INTRAVENOUS ONCE
Status: DISCONTINUED | OUTPATIENT
Start: 2019-01-01 | End: 2019-01-01

## 2019-01-01 RX ORDER — DIPHENHYDRAMINE HYDROCHLORIDE 50 MG/ML
INJECTION INTRAMUSCULAR; INTRAVENOUS
Status: COMPLETED
Start: 2019-01-01 | End: 2019-01-01

## 2019-01-01 RX ORDER — NUT.TX.GLUCOSE INTOLERANCE,SOY
30 BAR ORAL
Status: DISCONTINUED | OUTPATIENT
Start: 2019-01-01 | End: 2019-01-01

## 2019-01-01 RX ORDER — LORAZEPAM 2 MG/ML
1 CONCENTRATE ORAL EVERY 8 HOURS PRN
Qty: 30 ML | Refills: 0 | Status: SHIPPED
Start: 2019-01-01 | End: 2019-01-01

## 2019-01-01 RX ORDER — XYLITOL/YERBA SANTA
1 AEROSOL, SPRAY WITH PUMP (ML) MUCOUS MEMBRANE AS NEEDED
Refills: 0 | Status: ON HOLD | COMMUNITY
Start: 2019-01-01 | End: 2019-01-01

## 2019-01-01 RX ORDER — CEFAZOLIN SODIUM/WATER 2 G/20 ML
SYRINGE (ML) INTRAVENOUS AS NEEDED
Status: DISCONTINUED | OUTPATIENT
Start: 2019-01-01 | End: 2019-01-01 | Stop reason: SURG

## 2019-01-01 RX ORDER — HEPARIN SODIUM AND DEXTROSE 10000; 5 [USP'U]/100ML; G/100ML
INJECTION INTRAVENOUS CONTINUOUS
Status: DISCONTINUED | OUTPATIENT
Start: 2019-01-01 | End: 2019-01-01

## 2019-01-01 RX ORDER — HYDROMORPHONE HYDROCHLORIDE 1 MG/ML
1 INJECTION, SOLUTION INTRAMUSCULAR; INTRAVENOUS; SUBCUTANEOUS
Status: DISCONTINUED | OUTPATIENT
Start: 2019-01-01 | End: 2019-01-01

## 2019-01-01 RX ORDER — NUT.TX.GLUCOSE INTOLERANCE,SOY
30 BAR ORAL
Status: ON HOLD | COMMUNITY
End: 2019-01-01

## 2019-01-01 RX ORDER — MENTHOL 5.8 MG/1
LOZENGE ORAL DAILY
Status: ON HOLD | COMMUNITY
End: 2019-01-01 | Stop reason: DRUGHIGH

## 2019-01-01 RX ORDER — MORPHINE SULFATE 2 MG/ML
1 INJECTION, SOLUTION INTRAMUSCULAR; INTRAVENOUS EVERY 2 HOUR PRN
Status: DISCONTINUED | OUTPATIENT
Start: 2019-01-01 | End: 2019-01-01

## 2019-01-01 RX ORDER — LOPERAMIDE HCL 1 MG/7.5ML
2 SOLUTION ORAL 3 TIMES DAILY PRN
COMMUNITY
End: 2019-01-01

## 2019-01-01 RX ORDER — NYSTATIN 100000 U/G
OINTMENT TOPICAL 2 TIMES DAILY
Status: DISCONTINUED | OUTPATIENT
Start: 2019-01-01 | End: 2019-01-01

## 2019-01-01 RX ORDER — DIPHENHYDRAMINE HYDROCHLORIDE, ZINC ACETATE 2; .1 G/100G; G/100G
1 CREAM TOPICAL 3 TIMES DAILY PRN
Refills: 0 | Status: ON HOLD | COMMUNITY
Start: 2019-01-01 | End: 2019-01-01

## 2019-01-01 RX ORDER — LORAZEPAM 2 MG/ML
0.5 INJECTION INTRAMUSCULAR ONCE
Status: COMPLETED | OUTPATIENT
Start: 2019-01-01 | End: 2019-01-01

## 2019-01-01 RX ORDER — HYDRALAZINE HYDROCHLORIDE 20 MG/ML
10 INJECTION INTRAMUSCULAR; INTRAVENOUS EVERY 6 HOURS PRN
Status: DISCONTINUED | OUTPATIENT
Start: 2019-01-01 | End: 2019-01-01

## 2019-01-01 RX ORDER — HYDROMORPHONE HYDROCHLORIDE 1 MG/ML
0.5 INJECTION, SOLUTION INTRAMUSCULAR; INTRAVENOUS; SUBCUTANEOUS EVERY 8 HOURS PRN
Status: DISCONTINUED | OUTPATIENT
Start: 2019-01-01 | End: 2019-01-01

## 2019-01-01 RX ORDER — VANCOMYCIN HYDROCHLORIDE 250 MG/1
250 CAPSULE ORAL DAILY
Qty: 42 CAPSULE | Refills: 1 | Status: SHIPPED | OUTPATIENT
Start: 2019-01-01 | End: 2019-01-01

## 2019-01-01 RX ORDER — FUROSEMIDE 10 MG/ML
20 INJECTION INTRAMUSCULAR; INTRAVENOUS
Status: DISCONTINUED | OUTPATIENT
Start: 2019-01-01 | End: 2019-01-01

## 2019-01-01 RX ORDER — POLYETHYLENE GLYCOL 3350 17 G/17G
17 POWDER, FOR SOLUTION ORAL DAILY
Refills: 0 | Status: ON HOLD | COMMUNITY
Start: 2019-01-01 | End: 2019-01-01

## 2019-01-01 RX ORDER — CEFAZOLIN SODIUM/WATER 2 G/20 ML
SYRINGE (ML) INTRAVENOUS
Status: COMPLETED
Start: 2019-01-01 | End: 2019-01-01

## 2019-01-01 RX ORDER — SPIRONOLACTONE 25 MG/1
25 TABLET ORAL DAILY
Qty: 30 TABLET | Refills: 1 | OUTPATIENT
Start: 2019-01-01 | End: 2020-07-22

## 2019-01-01 RX ORDER — FLUTICASONE PROPIONATE 50 MCG
1 SPRAY, SUSPENSION (ML) NASAL DAILY
Refills: 0 | Status: ON HOLD | COMMUNITY
Start: 2019-01-01 | End: 2019-01-01

## 2019-01-01 RX ORDER — METOPROLOL TARTRATE 5 MG/5ML
2.5 INJECTION INTRAVENOUS
Status: DISCONTINUED | OUTPATIENT
Start: 2019-01-01 | End: 2019-01-01

## 2019-01-01 RX ORDER — ONDANSETRON 4 MG/1
4 TABLET, ORALLY DISINTEGRATING ORAL EVERY 8 HOURS PRN
Status: DISCONTINUED | OUTPATIENT
Start: 2019-01-01 | End: 2019-01-01 | Stop reason: ALTCHOICE

## 2019-01-01 RX ORDER — ENOXAPARIN SODIUM 100 MG/ML
1 INJECTION SUBCUTANEOUS EVERY 12 HOURS
Status: DISCONTINUED | OUTPATIENT
Start: 2019-01-01 | End: 2019-01-01

## 2019-01-01 RX ORDER — MAGNESIUM OXIDE 400 MG (241.3 MG MAGNESIUM) TABLET
400 TABLET ONCE
Status: COMPLETED | OUTPATIENT
Start: 2019-01-01 | End: 2019-01-01

## 2019-01-01 RX ORDER — HYDROMORPHONE HYDROCHLORIDE 1 MG/ML
1 INJECTION, SOLUTION INTRAMUSCULAR; INTRAVENOUS; SUBCUTANEOUS EVERY 2 HOUR PRN
Status: DISCONTINUED | OUTPATIENT
Start: 2019-01-01 | End: 2019-01-01

## 2019-01-01 RX ORDER — PANTOPRAZOLE SODIUM 40 MG/1
40 TABLET, DELAYED RELEASE ORAL 2 TIMES DAILY
Qty: 60 TABLET | Refills: 3 | Status: ON HOLD | OUTPATIENT
Start: 2019-01-01 | End: 2019-01-01

## 2019-01-01 RX ORDER — HEPARIN SODIUM AND DEXTROSE 10000; 5 [USP'U]/100ML; G/100ML
1000 INJECTION INTRAVENOUS ONCE
Status: COMPLETED | OUTPATIENT
Start: 2019-01-01 | End: 2019-01-01

## 2019-01-01 RX ORDER — ONDANSETRON HYDROCHLORIDE 4 MG/5ML
SOLUTION ORAL EVERY 4 HOURS PRN
Qty: 60 ML | Refills: 0 | Status: SHIPPED | OUTPATIENT
Start: 2019-01-01 | End: 2019-01-01

## 2019-01-01 RX ORDER — DIPHENOXYLATE HCL/ATROPINE 2.5-.025/5
5 LIQUID (ML) ORAL 2 TIMES DAILY PRN
Status: DISCONTINUED | OUTPATIENT
Start: 2019-01-01 | End: 2019-01-01

## 2019-01-01 RX ORDER — FUROSEMIDE 40 MG/5ML
40 SOLUTION ORAL DAILY
Qty: 1 BOTTLE | Refills: 0 | Status: ON HOLD | OUTPATIENT
Start: 2019-01-01 | End: 2019-01-01

## 2019-01-01 RX ORDER — CEFAZOLIN SODIUM/WATER 2 G/20 ML
2 SYRINGE (ML) INTRAVENOUS
Status: COMPLETED | OUTPATIENT
Start: 2019-01-01 | End: 2019-01-01

## 2019-01-01 RX ORDER — MIDAZOLAM HYDROCHLORIDE 1 MG/ML
INJECTION INTRAMUSCULAR; INTRAVENOUS
Status: COMPLETED
Start: 2019-01-01 | End: 2019-01-01

## 2019-01-01 RX ORDER — CEFDINIR 250 MG/5ML
300 POWDER, FOR SUSPENSION ORAL 2 TIMES DAILY
Qty: 72 ML | Refills: 0 | Status: SHIPPED
Start: 2019-01-01 | End: 2019-01-01

## 2019-01-01 RX ORDER — FUROSEMIDE 20 MG/1
TABLET ORAL
Qty: 180 TABLET | Refills: 1 | OUTPATIENT
Start: 2019-01-01

## 2019-01-01 RX ORDER — WHEAT DEXTRIN 3 G/3.8 G
1 POWDER (GRAM) ORAL 2 TIMES DAILY
Status: DISCONTINUED | OUTPATIENT
Start: 2019-01-01 | End: 2019-01-01

## 2019-01-01 RX ORDER — GLIPIZIDE 5 MG/1
5 TABLET ORAL
Qty: 30 TABLET | Refills: 5 | Status: ON HOLD | OUTPATIENT
Start: 2019-01-01 | End: 2019-01-01

## 2019-01-01 RX ORDER — CALCIUM CARB/VITAMIN D3/VIT K1 500-500-40
5 TABLET,CHEWABLE ORAL DAILY
Status: ON HOLD | COMMUNITY
End: 2019-01-01

## 2019-01-01 RX ORDER — DEXTROSE, SODIUM CHLORIDE, AND POTASSIUM CHLORIDE 5; .9; .15 G/100ML; G/100ML; G/100ML
INJECTION INTRAVENOUS CONTINUOUS
Status: DISCONTINUED | OUTPATIENT
Start: 2019-01-01 | End: 2019-01-01

## 2019-01-01 RX ORDER — HYDRALAZINE HYDROCHLORIDE 20 MG/ML
5 INJECTION INTRAMUSCULAR; INTRAVENOUS 3 TIMES DAILY
Status: DISCONTINUED | OUTPATIENT
Start: 2019-01-01 | End: 2019-01-01

## 2019-01-01 RX ORDER — ROCURONIUM BROMIDE 10 MG/ML
INJECTION, SOLUTION INTRAVENOUS AS NEEDED
Status: DISCONTINUED | OUTPATIENT
Start: 2019-01-01 | End: 2019-01-01 | Stop reason: SURG

## 2019-01-01 RX ORDER — FUROSEMIDE 10 MG/ML
20 INJECTION INTRAMUSCULAR; INTRAVENOUS DAILY
Status: DISCONTINUED | OUTPATIENT
Start: 2019-01-01 | End: 2019-01-01

## 2019-01-01 RX ORDER — ACETAMINOPHEN 160 MG/5ML
650 SOLUTION ORAL EVERY 6 HOURS PRN
Status: DISCONTINUED | OUTPATIENT
Start: 2019-01-01 | End: 2019-01-01

## 2019-01-01 RX ORDER — POTASSIUM CHLORIDE 1.5 G/1.77G
40 POWDER, FOR SOLUTION ORAL ONCE
Status: DISCONTINUED | OUTPATIENT
Start: 2019-01-01 | End: 2019-01-01

## 2019-01-01 RX ORDER — ACETAMINOPHEN 500 MG
1000 TABLET ORAL ONCE AS NEEDED
Status: DISCONTINUED | OUTPATIENT
Start: 2019-01-01 | End: 2019-01-01 | Stop reason: HOSPADM

## 2019-01-01 RX ORDER — CEFDINIR 250 MG/5ML
300 POWDER, FOR SUSPENSION ORAL 2 TIMES DAILY
COMMUNITY
End: 2019-01-01 | Stop reason: ALTCHOICE

## 2019-01-01 RX ORDER — LIDOCAINE AND PRILOCAINE 25; 25 MG/G; MG/G
CREAM TOPICAL
Qty: 1 TUBE | Refills: 2 | Status: ON HOLD | OUTPATIENT
Start: 2019-01-01 | End: 2019-01-01

## 2019-01-01 RX ORDER — FUROSEMIDE 40 MG/5ML
40 SOLUTION ORAL 2 TIMES DAILY
Qty: 1 BOTTLE | Refills: 0 | Status: SHIPPED | OUTPATIENT
Start: 2019-01-01

## 2019-01-01 RX ORDER — DEXTROSE AND SODIUM CHLORIDE 5; .9 G/100ML; G/100ML
INJECTION, SOLUTION INTRAVENOUS
Status: DISCONTINUED
Start: 2019-01-01 | End: 2019-01-01 | Stop reason: WASHOUT

## 2019-01-01 RX ORDER — METOCLOPRAMIDE 10 MG/1
10 TABLET ORAL ONCE
Status: DISCONTINUED | OUTPATIENT
Start: 2019-01-01 | End: 2019-01-01 | Stop reason: HOSPADM

## 2019-01-01 RX ORDER — HYDROMORPHONE HYDROCHLORIDE 1 MG/ML
1 INJECTION, SOLUTION INTRAMUSCULAR; INTRAVENOUS; SUBCUTANEOUS 4 TIMES DAILY
Status: DISCONTINUED | OUTPATIENT
Start: 2019-01-01 | End: 2019-01-01

## 2019-01-01 RX ORDER — FUROSEMIDE 40 MG/1
40 TABLET ORAL DAILY
Status: DISCONTINUED | OUTPATIENT
Start: 2019-01-01 | End: 2019-01-01

## 2019-01-01 RX ORDER — ALBUTEROL SULFATE 2.5 MG/3ML
2.5 SOLUTION RESPIRATORY (INHALATION) EVERY 6 HOURS PRN
Qty: 25 VIAL | Refills: 3 | Status: SHIPPED | OUTPATIENT
Start: 2019-01-01 | End: 2019-01-01 | Stop reason: ALTCHOICE

## 2019-01-01 RX ORDER — HYDROCODONE BITARTRATE AND ACETAMINOPHEN 5; 325 MG/1; MG/1
2 TABLET ORAL AS NEEDED
Status: DISCONTINUED | OUTPATIENT
Start: 2019-01-01 | End: 2019-01-01 | Stop reason: HOSPADM

## 2019-01-01 RX ORDER — NYSTATIN 100000 [USP'U]/G
1 POWDER TOPICAL 2 TIMES DAILY
COMMUNITY

## 2019-01-01 RX ORDER — METFORMIN HYDROCHLORIDE 500 MG/5ML
SOLUTION ORAL
Qty: 300 ML | Refills: 5 | Status: SHIPPED | OUTPATIENT
Start: 2019-01-01 | End: 2019-01-01

## 2019-01-01 RX ORDER — CEFDINIR 250 MG/5ML
300 POWDER, FOR SUSPENSION ORAL 2 TIMES DAILY
Qty: 60 ML | Refills: 0 | Status: SHIPPED
Start: 2019-01-01 | End: 2019-01-01

## 2019-01-01 RX ORDER — HYDRALAZINE HYDROCHLORIDE 25 MG/1
25 TABLET, FILM COATED ORAL EVERY 12 HOURS
Status: DISCONTINUED | OUTPATIENT
Start: 2019-01-01 | End: 2019-01-01

## 2019-01-01 RX ORDER — FUROSEMIDE 40 MG/5ML
40 SOLUTION ORAL DAILY
Refills: 0 | Status: ON HOLD | COMMUNITY
Start: 2019-01-01 | End: 2019-01-01

## 2019-01-01 RX ORDER — CLONAZEPAM 0.25 MG/1
0.25 TABLET, ORALLY DISINTEGRATING ORAL EVERY 4 HOURS PRN
Qty: 60 TABLET | Refills: 0 | Status: ON HOLD | OUTPATIENT
Start: 2019-01-01 | End: 2019-01-01

## 2019-01-01 RX ORDER — OXYCODONE HYDROCHLORIDE AND ACETAMINOPHEN 5; 325 MG/1; MG/1
1 TABLET ORAL EVERY 4 HOURS PRN
Qty: 15 TABLET | Refills: 0 | Status: SHIPPED | OUTPATIENT
Start: 2019-01-01

## 2019-01-01 RX ORDER — DEXTROSE MONOHYDRATE 25 G/50ML
50 INJECTION, SOLUTION INTRAVENOUS
Status: DISCONTINUED | OUTPATIENT
Start: 2019-01-01 | End: 2019-01-01

## 2019-01-01 RX ORDER — HYDROMORPHONE HYDROCHLORIDE 1 MG/ML
0.2 INJECTION, SOLUTION INTRAMUSCULAR; INTRAVENOUS; SUBCUTANEOUS EVERY 5 MIN PRN
Status: DISCONTINUED | OUTPATIENT
Start: 2019-01-01 | End: 2019-01-01 | Stop reason: HOSPADM

## 2019-01-01 RX ORDER — BISACODYL 10 MG
10 SUPPOSITORY, RECTAL RECTAL DAILY
Qty: 10 SUPPOSITORY | Refills: 3 | Status: ON HOLD | OUTPATIENT
Start: 2019-01-01 | End: 2019-01-01

## 2019-01-01 RX ORDER — CEFDINIR 250 MG/5ML
300 POWDER, FOR SUSPENSION ORAL 2 TIMES DAILY
Status: DISCONTINUED | OUTPATIENT
Start: 2019-01-01 | End: 2019-01-01

## 2019-01-01 RX ORDER — HALOPERIDOL 5 MG/ML
0.25 INJECTION INTRAMUSCULAR ONCE AS NEEDED
Status: DISCONTINUED | OUTPATIENT
Start: 2019-01-01 | End: 2019-01-01 | Stop reason: HOSPADM

## 2019-01-01 RX ORDER — SODIUM CHLORIDE 9 MG/ML
INJECTION, SOLUTION INTRAVENOUS ONCE
Status: COMPLETED | OUTPATIENT
Start: 2019-01-01 | End: 2019-01-01

## 2019-01-01 RX ORDER — METOCLOPRAMIDE HYDROCHLORIDE 5 MG/ML
5 INJECTION INTRAMUSCULAR; INTRAVENOUS ONCE
Status: COMPLETED | OUTPATIENT
Start: 2019-01-01 | End: 2019-01-01

## 2019-01-01 RX ORDER — LIDOCAINE HYDROCHLORIDE 10 MG/ML
10 INJECTION, SOLUTION EPIDURAL; INFILTRATION; INTRACAUDAL; PERINEURAL ONCE
Status: DISCONTINUED | OUTPATIENT
Start: 2019-01-01 | End: 2019-01-01

## 2019-01-01 RX ORDER — HYDROMORPHONE HYDROCHLORIDE 1 MG/ML
1 INJECTION, SOLUTION INTRAMUSCULAR; INTRAVENOUS; SUBCUTANEOUS EVERY 8 HOURS PRN
Status: DISCONTINUED | OUTPATIENT
Start: 2019-01-01 | End: 2019-01-01

## 2019-01-01 RX ORDER — SODIUM CHLORIDE 9 MG/ML
INJECTION, SOLUTION INTRAVENOUS CONTINUOUS
Status: ACTIVE | OUTPATIENT
Start: 2019-01-01 | End: 2019-01-01

## 2019-01-01 RX ORDER — SODIUM CHLORIDE 9 MG/ML
INJECTION, SOLUTION INTRAVENOUS ONCE
Status: DISCONTINUED | OUTPATIENT
Start: 2019-01-01 | End: 2019-01-01

## 2019-01-01 RX ORDER — MAGNESIUM HYDROXIDE 1200 MG/15ML
LIQUID ORAL CONTINUOUS PRN
Status: COMPLETED | OUTPATIENT
Start: 2019-01-01 | End: 2019-01-01

## 2019-01-01 RX ORDER — ONDANSETRON 4 MG/1
4 TABLET, ORALLY DISINTEGRATING ORAL EVERY 8 HOURS PRN
Status: DISCONTINUED | OUTPATIENT
Start: 2019-01-01 | End: 2019-01-01

## 2019-01-01 RX ORDER — LIDOCAINE HYDROCHLORIDE AND EPINEPHRINE 10; 10 MG/ML; UG/ML
10 INJECTION, SOLUTION INFILTRATION; PERINEURAL ONCE
Status: DISCONTINUED | OUTPATIENT
Start: 2019-01-01 | End: 2019-01-01

## 2019-01-01 RX ORDER — BISACODYL 10 MG
10 SUPPOSITORY, RECTAL RECTAL DAILY
Status: DISCONTINUED | OUTPATIENT
Start: 2019-01-01 | End: 2019-01-01

## 2019-01-01 RX ORDER — FUROSEMIDE 10 MG/ML
40 INJECTION INTRAMUSCULAR; INTRAVENOUS
Status: DISCONTINUED | OUTPATIENT
Start: 2019-01-01 | End: 2019-01-01

## 2019-01-01 RX ORDER — DEXTROSE, SODIUM CHLORIDE, AND POTASSIUM CHLORIDE 5; .45; .15 G/100ML; G/100ML; G/100ML
INJECTION INTRAVENOUS CONTINUOUS
Status: DISCONTINUED | OUTPATIENT
Start: 2019-01-01 | End: 2019-01-01

## 2019-01-01 RX ORDER — ACETAMINOPHEN 325 MG/1
650 TABLET ORAL EVERY 6 HOURS PRN
Status: ON HOLD | COMMUNITY
End: 2019-01-01

## 2019-01-01 RX ORDER — WHEAT DEXTRIN 3 G/3.8 G
1 POWDER (GRAM) ORAL 2 TIMES DAILY
Qty: 1 CAN | Refills: 0 | Status: ON HOLD | OUTPATIENT
Start: 2019-01-01 | End: 2019-01-01

## 2019-01-01 RX ORDER — POTASSIUM CHLORIDE 14.9 MG/ML
20 INJECTION INTRAVENOUS ONCE
Status: DISCONTINUED | OUTPATIENT
Start: 2019-01-01 | End: 2019-01-01

## 2019-01-01 RX ORDER — OXYCODONE HCL 20 MG/ML
5 CONCENTRATE, ORAL ORAL 4 TIMES DAILY PRN
Status: DISCONTINUED | OUTPATIENT
Start: 2019-01-01 | End: 2019-01-01 | Stop reason: RX

## 2019-01-01 RX ORDER — METOPROLOL TARTRATE 100 %
6.25 POWDER (GRAM) MISCELLANEOUS EVERY 6 HOURS
Status: ON HOLD | COMMUNITY
End: 2019-01-01

## 2019-01-01 RX ORDER — HYDRALAZINE HYDROCHLORIDE 100 MG/1
10 TABLET, FILM COATED ORAL EVERY 8 HOURS SCHEDULED
Status: DISCONTINUED | OUTPATIENT
Start: 2019-01-01 | End: 2019-01-01

## 2019-01-01 RX ORDER — HYOSCYAMINE SULFATE 0.125 MG
0.12 TABLET,DISINTEGRATING ORAL EVERY 4 HOURS PRN
Status: DISCONTINUED | OUTPATIENT
Start: 2019-01-01 | End: 2019-01-01

## 2019-01-01 RX ORDER — HALOPERIDOL 5 MG/ML
0.25 INJECTION INTRAMUSCULAR ONCE AS NEEDED
Status: ACTIVE | OUTPATIENT
Start: 2019-01-01 | End: 2019-01-01

## 2019-01-01 RX ORDER — ATROPINE SULFATE 10 MG/ML
3 SOLUTION/ DROPS OPHTHALMIC ONCE
Status: COMPLETED | OUTPATIENT
Start: 2019-01-01 | End: 2019-01-01

## 2019-01-01 RX ORDER — HYDRALAZINE HYDROCHLORIDE 25 MG/1
25 TABLET, FILM COATED ORAL EVERY 6 HOURS SCHEDULED
Qty: 120 TABLET | Refills: 0 | Status: ON HOLD | OUTPATIENT
Start: 2019-01-01 | End: 2019-01-01 | Stop reason: DRUGHIGH

## 2019-01-01 RX ORDER — METOPROLOL TARTRATE 5 MG/5ML
5 INJECTION INTRAVENOUS
Status: DISCONTINUED | OUTPATIENT
Start: 2019-01-01 | End: 2019-01-01

## 2019-01-01 RX ORDER — SODIUM CHLORIDE, SODIUM LACTATE, POTASSIUM CHLORIDE, CALCIUM CHLORIDE 600; 310; 30; 20 MG/100ML; MG/100ML; MG/100ML; MG/100ML
INJECTION, SOLUTION INTRAVENOUS ONCE
Status: COMPLETED | OUTPATIENT
Start: 2019-01-01 | End: 2019-01-01

## 2019-01-01 RX ORDER — ONDANSETRON 8 MG/1
8 TABLET, ORALLY DISINTEGRATING ORAL EVERY 8 HOURS PRN
Status: DISCONTINUED | OUTPATIENT
Start: 2019-01-01 | End: 2019-01-01

## 2019-01-01 RX ADMIN — ONDANSETRON 4 MG: 2 INJECTION INTRAMUSCULAR; INTRAVENOUS at 09:09:00

## 2019-01-01 RX ADMIN — DIPHENHYDRAMINE HYDROCHLORIDE 25 MG: 50 INJECTION INTRAMUSCULAR; INTRAVENOUS at 13:02:00

## 2019-01-01 RX ADMIN — FAMOTIDINE 20 MG: 10 INJECTION, SOLUTION INTRAVENOUS at 14:09:00

## 2019-01-01 RX ADMIN — ROCURONIUM BROMIDE 5 MG: 10 INJECTION, SOLUTION INTRAVENOUS at 08:41:00

## 2019-01-01 RX ADMIN — SODIUM CHLORIDE, SODIUM LACTATE, POTASSIUM CHLORIDE, CALCIUM CHLORIDE: 600; 310; 30; 20 INJECTION, SOLUTION INTRAVENOUS at 14:20:00

## 2019-01-01 RX ADMIN — DIPHENHYDRAMINE HYDROCHLORIDE 25 MG: 50 INJECTION INTRAMUSCULAR; INTRAVENOUS at 13:01:00

## 2019-01-01 RX ADMIN — PHENYLEPHRINE HCL 100 MCG: 10 MG/ML VIAL (ML) INJECTION at 07:55:00

## 2019-01-01 RX ADMIN — HEPARIN SODIUM (PORCINE) LOCK FLUSH IV SOLN 100 UNIT/ML 500 UNITS: 100 SOLUTION INTRAVENOUS at 15:20:00

## 2019-01-01 RX ADMIN — ETOMIDATE 12 MG: 2 INJECTION INTRAVENOUS at 07:49:00

## 2019-01-01 RX ADMIN — ROCURONIUM BROMIDE 10 MG: 10 INJECTION, SOLUTION INTRAVENOUS at 13:16:00

## 2019-01-01 RX ADMIN — POTASSIUM CHLORIDE 20 MEQ: 14.9 INJECTION INTRAVENOUS at 15:34:00

## 2019-01-01 RX ADMIN — LORAZEPAM 0.5 MG: 2 INJECTION INTRAMUSCULAR at 12:36:00

## 2019-01-01 RX ADMIN — SODIUM CHLORIDE, SODIUM LACTATE, POTASSIUM CHLORIDE, CALCIUM CHLORIDE: 600; 310; 30; 20 INJECTION, SOLUTION INTRAVENOUS at 12:37:00

## 2019-01-01 RX ADMIN — ETOMIDATE 24 MG: 2 INJECTION INTRAVENOUS at 12:15:00

## 2019-01-01 RX ADMIN — PHENYLEPHRINE HCL 100 MCG: 10 MG/ML VIAL (ML) INJECTION at 08:10:00

## 2019-01-01 RX ADMIN — ROCURONIUM BROMIDE 28 MG: 10 INJECTION, SOLUTION INTRAVENOUS at 07:56:00

## 2019-01-01 RX ADMIN — FAMOTIDINE 20 MG: 10 INJECTION, SOLUTION INTRAVENOUS at 12:14:00

## 2019-01-01 RX ADMIN — SODIUM CHLORIDE, SODIUM LACTATE, POTASSIUM CHLORIDE, CALCIUM CHLORIDE: 600; 310; 30; 20 INJECTION, SOLUTION INTRAVENOUS at 12:20:00

## 2019-01-01 RX ADMIN — HEPARIN SODIUM (PORCINE) LOCK FLUSH IV SOLN 100 UNIT/ML 500 UNITS: 100 SOLUTION INTRAVENOUS at 17:04:00

## 2019-01-01 RX ADMIN — DIPHENHYDRAMINE HYDROCHLORIDE 25 MG: 50 INJECTION INTRAMUSCULAR; INTRAVENOUS at 14:12:00

## 2019-01-01 RX ADMIN — ROCURONIUM BROMIDE 10 MG: 10 INJECTION, SOLUTION INTRAVENOUS at 12:15:00

## 2019-01-01 RX ADMIN — HEPARIN SODIUM (PORCINE) LOCK FLUSH IV SOLN 100 UNIT/ML 500 UNITS: 100 SOLUTION INTRAVENOUS at 17:52:00

## 2019-01-01 RX ADMIN — LIDOCAINE HYDROCHLORIDE 40 MG: 10 INJECTION, SOLUTION EPIDURAL; INFILTRATION; INTRACAUDAL; PERINEURAL at 07:49:00

## 2019-01-01 RX ADMIN — PHENYLEPHRINE HCL 50 MCG: 10 MG/ML VIAL (ML) INJECTION at 08:54:00

## 2019-01-01 RX ADMIN — PHENYLEPHRINE HCL 50 MCG: 10 MG/ML VIAL (ML) INJECTION at 13:48:00

## 2019-01-01 RX ADMIN — ROCURONIUM BROMIDE 10 MG: 10 INJECTION, SOLUTION INTRAVENOUS at 13:31:00

## 2019-01-01 RX ADMIN — SODIUM CHLORIDE, SODIUM LACTATE, POTASSIUM CHLORIDE, CALCIUM CHLORIDE: 600; 310; 30; 20 INJECTION, SOLUTION INTRAVENOUS at 13:01:00

## 2019-01-01 RX ADMIN — METHYLPREDNISOLONE SODIUM SUCCINATE 40 MG: 40 INJECTION, POWDER, LYOPHILIZED, FOR SOLUTION INTRAMUSCULAR; INTRAVENOUS at 12:51:00

## 2019-01-01 RX ADMIN — PHENYLEPHRINE HCL 50 MCG: 10 MG/ML VIAL (ML) INJECTION at 08:41:00

## 2019-01-01 RX ADMIN — ROCURONIUM BROMIDE 20 MG: 10 INJECTION, SOLUTION INTRAVENOUS at 12:54:00

## 2019-01-01 RX ADMIN — DEXAMETHASONE SODIUM PHOSPHATE 4 MG: 4 MG/ML VIAL (ML) INJECTION at 07:58:00

## 2019-01-01 RX ADMIN — LIDOCAINE HYDROCHLORIDE 50 MG: 10 INJECTION, SOLUTION EPIDURAL; INFILTRATION; INTRACAUDAL; PERINEURAL at 12:15:00

## 2019-01-01 RX ADMIN — FAMOTIDINE 20 MG: 10 INJECTION, SOLUTION INTRAVENOUS at 13:03:00

## 2019-01-01 RX ADMIN — LIDOCAINE HYDROCHLORIDE 50 MG: 10 INJECTION, SOLUTION EPIDURAL; INFILTRATION; INTRACAUDAL; PERINEURAL at 12:48:00

## 2019-01-01 RX ADMIN — ROCURONIUM BROMIDE 2 MG: 10 INJECTION, SOLUTION INTRAVENOUS at 07:49:00

## 2019-01-01 RX ADMIN — DIPHENHYDRAMINE HYDROCHLORIDE 25 MG: 50 INJECTION INTRAMUSCULAR; INTRAVENOUS at 12:15:00

## 2019-01-01 RX ADMIN — ONDANSETRON 4 MG: 2 INJECTION INTRAMUSCULAR; INTRAVENOUS at 14:18:00

## 2019-01-01 RX ADMIN — SODIUM CHLORIDE, SODIUM LACTATE, POTASSIUM CHLORIDE, CALCIUM CHLORIDE: 600; 310; 30; 20 INJECTION, SOLUTION INTRAVENOUS at 08:59:00

## 2019-01-01 RX ADMIN — PHENYLEPHRINE HCL 50 MCG: 10 MG/ML VIAL (ML) INJECTION at 12:48:00

## 2019-01-01 RX ADMIN — PHENYLEPHRINE HCL 50 MCG: 10 MG/ML VIAL (ML) INJECTION at 08:18:00

## 2019-01-01 RX ADMIN — SODIUM CHLORIDE, SODIUM LACTATE, POTASSIUM CHLORIDE, CALCIUM CHLORIDE: 600; 310; 30; 20 INJECTION, SOLUTION INTRAVENOUS at 07:34:00

## 2019-01-01 RX ADMIN — CEFAZOLIN SODIUM/WATER 2 G: 2 G/20 ML SYRINGE (ML) INTRAVENOUS at 08:01:00

## 2019-01-01 RX ADMIN — SODIUM CHLORIDE: 9 INJECTION, SOLUTION INTRAVENOUS at 13:26:00

## 2019-01-01 RX ADMIN — ROCURONIUM BROMIDE 10 MG: 10 INJECTION, SOLUTION INTRAVENOUS at 14:00:00

## 2019-01-01 RX ADMIN — HEPARIN SODIUM (PORCINE) LOCK FLUSH IV SOLN 100 UNIT/ML 500 UNITS: 100 SOLUTION INTRAVENOUS at 14:44:00

## 2019-02-26 NOTE — TELEPHONE ENCOUNTER
Pt was notified to start the pantoprazole BID immediately. She verbalizes understanding    She accepted appt for Monday.  Date time and location verified with the pt    Future Appointments   Date Time Provider Remigio Parham   3/4/2019  1:00 PM ONI Antoine

## 2019-02-26 NOTE — TELEPHONE ENCOUNTER
GI RNs–  As per my previous notes, this is a new complaint. Previously she has described multiple abdominal symptoms including the chronic constipation.     I do not see omeprazole/pantoprazole or other PPI medication on her current medication list.  She s

## 2019-02-26 NOTE — TELEPHONE ENCOUNTER
I spoke to Yen over the phone    She states she can't get any food to go down her esophagus. The food gets stuck in her throat and it's painful    She is living on Mika noodles and soft foods and she's tired of it.     This has been a gradual onset

## 2019-03-04 NOTE — TELEPHONE ENCOUNTER
Scheduled for:  [phone] with dilatation  Provider Name: Dr. Renny Martinez  Date:  3/21/19  Location:  [facility]  Sedation:  MAC  Time:  11:00 am, (pt is aware that Billy [facility] will call the day before to confirm arrival time)  Prep: NPO after midnight  Meds/Allergies Reconciled

## 2019-03-04 NOTE — PROGRESS NOTES
HPI:    Patient ID: Stephanie Kaba returns today with her  for follow up. Ms. Ruma Gale comes with her  who is extremely worried about her recent symptoms. There is a new swallowing problem which has been worsening over the past few months.   Gabi Man narcotics.     Wt Readings from Last 20 Encounters:  03/04/19 : 218 lb (98.9 kg)  10/02/18 : 215 lb (97.5 kg)  09/25/18 : 219 lb 9.6 oz (99.6 kg)  09/15/18 : 223 lb 9.6 oz (101.4 kg)  08/28/18 : 224 lb (101.6 kg)  08/06/18 : 225 lb (102.1 kg)  07/25/18 : 22 colon/anastamosis region. Small–moderate burden of stool proximal to this transverse colon and ascending colon.     Labs showed initial leukocytosis with WBC 11,800, down to 6400 on 9/10/2018. Hemoglobin 11.0g.   No stool testing performed beyond occult b fasciitis, bone spur, possibly malformation or disfigured heel. She needs custom fitted for custom made shoes. She is still waiting on either getting those measured or getting them delivered.     3.  Ongoing waxing and waning epigastric and right upper qu amy hematochezia. 1.  We reviewed the recent Hepatitis C diagnosis. She explains in detail how sick she was immediately after her emergency abdominal surgery in 1977. She believes that she received blood transfusion.   She recalls fevers and very hig thyroid evaluation. Last thyroid labs that I see in her chart are from 2013.   She requests thyroid testing.    ======================  Recent phone calls:    February 2, 2018       1:56 PM   Ebony Mondragon RN routed this conversation to Me     Note 1/15/2018:    Shanon Dsouza returns today for follow up. Donavon Thomson' Chief complaint this past month has been right-sided abdominal pain radiating around to the right flank and right back.   Severe right sided abdominal pain right flank and right back is a 12/21/17 5:20 PM   You routed this conversation to [facility] Clinical Staff   Me       GI RNs–  These call and advise the Froylan Benson family that I have ordered some labs to further evaluate her symptoms and the abnormal liver.   1 of them, the \"CEA\" marker for colo fatty change. This is most likely one of the consequences of being a bit overweight. Fatty liver is fairly common.   Her last liver tests (usually run in the basic chemistry panel which she has had 4 times since January 2016) have been repeatedly NORMAL 2 12/4/2017:    Telephone     December 14, 2017   Isabela Walsh RN       CT scan scheduled for today 12/14/17 @ 12:30pm.       December 5, 2017   8:56 AM   Mago Brooks RN routed this conversation to Me        I spoke to the pt.  She was given Dr MCCRARY Carlos Reynolds routed this conversation to [facility]       Pts  states that pt has been constipated and has been having abdominal pain for last few weeks. He is requesting appt with Dr. Tricia Coon or Genevieve sooner than first available.   P office visit 12/16/2016    Armida Carter returns with her  for follow-up of abdominal pain in the previous severe postoperative obstipation. Overall, things sound about the same.   She continues to complain of right upper quadrant abdominal pain believes that she is eating less. She describes focal right upper quadrant pain radiating around the right side towards the back.   At times, she describes a diffuse pain involving the entire upper abdomen, both right upper quadrant and left upper quadrant pain.     ======================  Previous office visit September 11th 2015 Dr. Shasta Solano: The patient is seen today along with her . Her history has been well documented in previous inpatient and outpatient notes.   In brief the patient has a very much and when she eats she typically will \"vomit\". She describes dry heaves and very little food. The patient typically has very small volume stools every 4 days with \"slime\". This is the case despite stool softeners and laxatives.   She has not After that tumor, Ms. Qiana Wilde made a gradual recovery  and was transferred over to a rehab facility. She was in significant pain  and apparently has been receiving regular doses of narcotic pain  medications since the surgery.   She and her  state that Diabetes. 3.   Hypertension. 4.   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 PAST SURGICAL HISTORY:  Includes:  1. The above thoracic laminectomy surgery.   2.   Apparently an emergency expl tenderness of uncertain  significance. EXTREMITIES:  Show no cyanosis or edema. SKIN:  Warm and moist with no rashes. NEUROLOGICAL:  Mental status and sensorium are intact. She is alert and  oriented and appropriate.      LABORATORY DATA:  Is reassuring 18th, 2015.   After inactivity, narcotics since the surgery superimposed on a  likely preceding constipation, she is here with a fairly severe obstipation  with a large massive stool impacted likely at her surgical anastomosis and  unable to pass even gas, CONTRAST  COMPARISON: [address], 7/18/2013, 11:29. INDICATIONS: Abdominal pelvic pain/discomfort. Previous partial colectomy. Guadalupe Riedel       FINDINGS:  LIVER: No enlargement, atrophy, abnormal density, or significant foc suggests a  nonspecific colitis. No obstructive pattern. .  2. Cholecystectomy. No biliary dilatation. 3. Large hiatal hernia. Chronic bibasilar atelectasis/scarring with bibasilar pleural reaction.     Dictated by (CST): Meaghan Hadley M.D. on 8/22/2 nodes.  BLADDER: Moderately distended. PELVIC ORGANS: Post hysterectomy. BONES: Multilevel degenerative changes of the visualized spine. No acute-appearing fracture  or suspicious osseous lesion.   LUNG BASES: There is atelectasis and/or scarring at the l Moderate-sized hiatal hernia. No evidence of gastric obstruction. Duodenum has a subcentimeter diverticulum near the ampulla. PANCREAS:      No lesion, fluid collection, ductal dilatation, or atrophy.     ADRENALS:      No defined mass or abnormal enl contrast using cirrhosis. The hepatic parenchyma is heterogeneous no discrete mass lesion is identified however arterial phase imaging was not performed.   Recommend   correlation with AFP level and liver protocol MRI abdomen without contrast.  2.  Post ch high-grade stenosis. Small branches/vasa recta adjacent to the colon are not well evaluated. INFERIOR MESENTERIC ARTERY: Calcified plaque with mild proximal narrowing. No high-grade stenosis.      ILIAC ARTERIES: Calcified plaques in the common iliac, i lung bases. OTHER:             Mild presacral edema-unchanged. Partly visualized cardiac pacemaker. BONES:             No suspect bone lesion or fracture. Right gluteal intermuscular lipoma is unchanged.     =====  CONCLUSION:   1.  Descending colon co patient has  been experiencing right-sided abdominal pain and some constipation. She is  overdue for surveillance colonoscope and presents today in light of her  history of polyps and her symptoms.        ENDOSCOPIC FINDINGS:  Preparation of the colon was fecal impaction. 2.  Colonic obstruction. POSTOPERATIVE DIAGNOSIS:  1. Sigmoid colon fecal impaction. 2.  Colonic obstruction. SEDATION:  Fentanyl 125 mcg and midazolam 5 mg given intravenously   before  and throughout the procedure.      SIGMOID of the areas of   the fecal  impaction likely due to the impaction and pressure against the   walls of the  colon. RECOMMENDATIONS:  1. Resume clear liquid diet and continue to observe.   Miss Lizzeth Sarmiento   should  pass the rest of this stool and prep in the part, I slowly torqued and advanced the scope around this   sharp  angulation into the stool ball itself. From here about 10 minutes   spent  flushing over 1 liter of irrigating sterile water into the stool   ball  breaking it up into debris.   The stool b Sodium 40 MG Oral Tab EC Take 1 tablet (40 mg total) by mouth 2 (two) times daily. Disp: 60 tablet Rfl: 3   Losartan Potassium 50 MG Oral Tab Take 1 tablet (50 mg total) by mouth daily.  Disp: 30 tablet Rfl: 2   Nystatin (NYSTOP) 968621 UNIT/GM External Pow rectally daily as needed. Disp:  Rfl:    OneTouch UltraSoft Lancets Does not apply Misc Test 2-3 times per day Disp:  Rfl:    Ondansetron HCl (ZOFRAN) 4 mg tablet Take 1 tablet (4 mg total) by mouth every 6 (six) hours as needed for Nausea.  Disp: 20 tablet thoracic laminectomy surgery August 18, 2015.   Known to Dr. Ilsa Mario and myself for issues including chronic likely multifactorial abdominal pain complaints, chronic constipation and recent episode of severe obstipation, fecal impaction up above her si regimen. Admitted for acute pain and concern for ischemic colitis September 2018. Reassuring CT angiography of the mesenteric and abdominal vessels; managed conservatively. Complete recovery.     Returns for expedited follow-up 3/4/2019 for a NEW dysph 2015.  · We again discussed the importance of a tolerable bowel regimen. Continue MiraLAX several times per week as tolerated. Take the MiraLAX twice daily on the bad days as before. · Previously I had suggested adding magnesium oxide supplements.   · [facility] [address] screening. Last alpha-fetoprotein and image were December–January 5745–1349. · Unable to perform MRI due to cardiac pacemaker. 6.  Abnormal biliary dilation on imaging.   · Imaging is also showing us \"stable\" diffuse biliary dilation in this pa

## 2019-03-04 NOTE — PATIENT INSTRUCTIONS
1. Schedule the esophagus XR test - [phone]    2. Schedule EGD (stomach endoscopy) exam at [facility])    This patient IS NOT appropriate for the [facility] endoscopy center. Body mass index is 36.28 kg/m².     MAC

## 2019-03-07 NOTE — H&P
Laura Rust is a [de-identified]year old female that presents with Patient presents with:  Lesion: Right side of nose and forehead  .     REFERRED BY: Melissa Johnson MD      Pacemaker: YES  Latex Allergy: no  Coumadin: YES, daily  Plavix: No  Other anticoagulants: TABLET BY MOUTH DAILY Disp: 90 tablet Rfl: 1   Glucose Blood (ACCU-CHEK SABRINA PLUS) In Vitro Strip TEST BLOOD SUGAR DAILY AS DIRECTED Dx E11.9 Type 2 non insulin using adult onset diabetes Disp: 100 strip Rfl: 3   ACCU-CHEK SOFTCLIX LANCETS Does not apply PALPITATIONS, DIZZINESS    Comment:Patient felt better after 15-20 minutes, went             home. Demerol  [Meperidin*    SHORTNESS OF BREATH    Comment:Other reaction(s):  Other (See Comments)             Jumpy  Epinephrine             SHORTNESS OF BREAT Years: 25.00        Pack years: 25        Types: Cigarettes        Quit date: 1977        Years since quittin.2      Smokeless tobacco: Never Used      Tobacco comment: Quit     Substance and Sexual Activity      Alcohol use: No        Alcohol History Narrative      Not on file    Family History   Problem Relation Age of Onset   • Heart Disease Father    • Stroke Father         CVA   • Hypertension Mother    • Thyroid Disorder Son        PHYSICAL EXAM:   CONSTITUTIONAL: Overall appearance - Norm

## 2019-03-10 NOTE — ED PROVIDER NOTES
Patient Seen in: [facility]    History   Patient presents with:  Sore Throat    Stated Complaint: sore throat     HPI    Patient here complaining of sore throat for 1 days.   Notes pain is described as sore and rates it as TABLET(20 MG) BY MOUTH DAILY  Patient taking differently: 20 mg daily. albuterol sulfate (2.5 MG/3ML) 0.083% Inhalation Nebu Soln,  Take 3 mL (2.5 mg total) by nebulization every 6 (six) hours as needed for Wheezing. FOLIC ACID OR,  Take by mouth. reviewed. All other systems reviewed and negative except as noted above. PSFH elements reviewed from today and agreed except as otherwise stated in HPI.     Physical Exam     ED Triage Vitals [03/10/19 1401]   /55   Pulse 65   Resp 18   Temp 9

## 2019-03-11 NOTE — TELEPHONE ENCOUNTER
Pt states she has strep throat and is on an antibiotic that she started yesterday. Pt states she is on amoxicillin   875MG two times a day. Pt states she will be on medication for 10 days. Pt inquiring if this will effect EGD scheduled for 3/21/19.  Please

## 2019-03-11 NOTE — TELEPHONE ENCOUNTER
I advised the pt that the Amoxicillin will not effect her upcoming EGD.  She verbalizes understanding

## 2019-03-14 NOTE — TELEPHONE ENCOUNTER
LOV 9/25/18. RTC in 3 mos (12/2018) No f/u scheduled. Refill pended and routed to Dr. Nabeel rBiceno.

## 2019-03-15 NOTE — TELEPHONE ENCOUNTER
Orders entered in [facility]. Patient contacted. Instructed to fast 12 hours. Appointment booked for Tuesday 3/26/19 at 3:40 pm. Patient is having an EGD on 3/21/19 with Dr. Tricia Coon for dysphagia.

## 2019-03-19 NOTE — ED INITIAL ASSESSMENT (HPI)
Pt presents to ED via [address] for epigastric pain that has been ongoing for 1 month that has been worsening. Pt states the pain radiated to her stomach and back. Pt also was dx with strep on Sunday.  Pt states she is scheduled for an endoscopy for

## 2019-03-19 NOTE — ED PROVIDER NOTES
Patient Seen in: [facility] Emergency Department    History   Patient presents with:  Epigastric Pain    Stated Complaint:     HPI    [de-identified] with hx of atrial fibrillation, currently off coumadin for future endoscopy, constipation, DM, HL, HTN, her comment: Quit 1977    Alcohol use: No      Alcohol/week: 0.0 oz    Drug use: No      Review of Systems   Constitutional: Negative for appetite change, fatigue and fever. HENT: Negative for congestion, ear pain and sore throat.     Eyes: Negative for pain, Normal range of motion. She exhibits no tenderness. Neurological: She is alert and oriented to person, place, and time.    5/5 strength in b/l UEs and LEs, normal sensation in all extremities, normal finger to nose b/l, normal gait, no facial asymmetry, n GFR, Non- 60 >=60    GFR, -American 69 >=60   CBC W/ DIFFERENTIAL    Collection Time: 03/19/19  1:35 AM   Result Value Ref Range    WBC 4.8 4.0 - 11.0 x10(3) uL    RBC 4.15 3.80 - 5.30 x10(6)uL    HGB 11.9 (L) 12.0 - 16.0 g/dL    HCT electrolytes reassuring  - pain meds offered, pt reports current improvement of pain and is declining   - repeat exam without return of pain - pt's endoscopy scheduled for 2 days from now - discussed importance of f/u given chronicity of this pain  - I do Impression:  Epigastric pain  (primary encounter diagnosis)    Disposition:  Discharge  3/19/2019  2:52 am    Follow-up:  Mell Mills MD  [facility]  [phone]    Schedule an appointment as soon as possible for a visit

## 2019-03-20 NOTE — TELEPHONE ENCOUNTER
Strep throat is very sensitive to antibiotics. If she started treatment on 3/9/2019, she should be making a full recovery by today 3/20/2019.   With her severe swallowing and dysphagia symptoms, we should proceed with tomorrow's exam.  Would cancel tomorro

## 2019-03-20 NOTE — TELEPHONE ENCOUNTER
Dr Bradley Urban. Pt started antibiotics for strep throat 3/9, last dose today. States \"just feels bad\". Throat still slightly sore, had chills all night, but doesn't have fever.  Had severe regurgitation last night, continues to have difficulty swallowing, is l

## 2019-03-20 NOTE — TELEPHONE ENCOUNTER
Pt contacted, given 's message and will proceed as planned. I left message on Chapis's voicemail below informing and requesting that she now contact pt to give her arrival time.

## 2019-03-20 NOTE — TELEPHONE ENCOUNTER
[facility] states pt was diagnosed with strep throat on 3/9/19 and finished antibiotics today. Chapis inquiring if pt should continue with scheduled EGD for tomorrow.   Please call [phone]

## 2019-03-21 NOTE — TELEPHONE ENCOUNTER
[place]. ... To Deidra Murrieta:    Vidal you might still be busy in Tumor Board and you are not answering your phone. I am not sure if you are the Go To person for esophageal cancers. 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 Sanjay Valle. EGD exam performed today over at the [facility] -- the report will be scanned in under \"MEDIA\" tab in Epic. This is a tight obstructing lesion and she is having pretty serious obstructive symptoms. Biopsies obtained. Ms. Sanjay Valle needs workup for palliative radiation therapy if possible. Not sure whether she is a surgical candidate at age [de-identified] with heart disease. Otherwise she may need a palliative esoph stent soon. I am ordering CT chest and abdomen today.     My mobile # [phone]    - Taisha Jones MD

## 2019-03-22 NOTE — TELEPHONE ENCOUNTER
Compounding is already in process at [facility] and will be ready tomorrow or Sunday.

## 2019-03-22 NOTE — TELEPHONE ENCOUNTER
Patients  dropped off list of medications his wife can take - list placed in Dr Karla Lambert 3-22-19- Please forward to [id number]  on [place] and Eveline Pereira - they DO Compound.

## 2019-03-22 NOTE — TELEPHONE ENCOUNTER
Patient contacted. The 3 meds that can be put into liquid form have to be changed from ER to regular dose.  Is there any change in directions for these before I call the pharmacy:  Metoprolol Succinate 25mg 1 tab daily, Metformin  mg 2 before dinner,

## 2019-03-22 NOTE — TELEPHONE ENCOUNTER
I called Richard Graf and her  to check in. We discussed yesterday's diagnosis and evolving plan. 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 I explained these findings to her.

## 2019-03-22 NOTE — TELEPHONE ENCOUNTER
[facility]  Davey Abdullahi   MR #: [id number]   : 10/14/1940   PHYSICIAN: Briana Carney MD   [facility]      OPERATIVE NOTE   DATE OF PROCEDURE: 2019    PREOPERATIVE DIAGNOSES: 1. Severe dysphagia. 2. Atypical chest pain. cardia on retroflexed views. Unable to establish where the Z-line, actual GE junction was. Unremarkable exam of the stomach, incisura, pylorus, duodenal bulb, and second portion of the duodenum.   Multiple biopsies then taken of this fairly friable firm les

## 2019-03-22 NOTE — TELEPHONE ENCOUNTER
Spoke to [facility]. They can convert these meds to liquid but will not be ready until Mon or Tue. She referred me to [address] [place] [address] which also is a compounding pharmacy.  Spoke to Jose Londono at [facility] and he is going to call me back as soon

## 2019-03-22 NOTE — TELEPHONE ENCOUNTER
Pharmacist at [facility] called and advised that the meds can be converted to liquid formulas and can be ready Saturday 3/23 or Amado 3/24. Prescriptions e-scripted to [facility] and Pharmacist will call patient.

## 2019-03-22 NOTE — TELEPHONE ENCOUNTER
Pt checking to see if [facility] reviewed medication list.  Pt states she has not taken anything. Pls call. Thank you.

## 2019-03-22 NOTE — TELEPHONE ENCOUNTER
If the 3 medicines you listed are the ones you want to switch to short acting change metoprolol to Lopressor 12.5 mg twice daily, change metformin to 500 mg twice daily and switch glipizide to 5 mg twice daily.

## 2019-03-22 NOTE — TELEPHONE ENCOUNTER
Patient contacted. She had an EGD on Thursday and biopsies of esophagus were done and she said a tumor was found. She was told to crush ALL her medications but some are extended release pills that cannot be crushed.  Advised to check with Pharmacist to see

## 2019-03-22 NOTE — TELEPHONE ENCOUNTER
Pt states she would like to speak to [facility] [place] in regards to crushing medications . Pt states dr arias informed her to start doing this. Pt has questions due some medications not being able to be crushed

## 2019-03-22 NOTE — TELEPHONE ENCOUNTER
Pts /[place] calling to gianluca bain rn, pt suggesting to issue rx with the ER component, pls call at:[phone],thanks.

## 2019-03-25 NOTE — TELEPHONE ENCOUNTER
Pt has been off of Coumadin since 03/17 due to endoscopy. Pt stated Dr. Meño Macario advised her to stay off Coumadin due to tumor in esophagus. Pt would like to know when she should resume Coumadin--pt is concerned due a-fib. Next INR scheduled for 03/27.   (

## 2019-03-25 NOTE — TELEPHONE ENCOUNTER
Need to clarify if coumadin can be restarted, will await Dr. Angelica Cantu recommendations and also include FYI to  Dr. Merlyn Bourne in conversation.

## 2019-03-26 PROBLEM — C15.9 ESOPHAGEAL CARCINOMA (HCC): Status: ACTIVE | Noted: 2019-01-01

## 2019-03-26 NOTE — CONSULTS
[facility]    PATIENT'S NAME: German Mcclain   RADIATION ONCOLOGIST: Raina Brunner.  Rafael Quesada MD   PATIENT ACCOUNT #: [de-identified] LOCATION: [address] [place] [address] RECORD #: [id number] YOB: 1940   CONSULTATION DATE: 03/26/2019       RADI right paraesophageal lymph nodes which are unclear as to whether or not these are metastatic or reactive. There are multiple subcentimeter retroperitoneal lymph nodes which are deemed indeterminate.   The patient does have a number of issues and prior abdo GENERAL:  An 78-year-old female who is pleasant, cooperative, alert, awake, oriented x3. She is in mild distress secondary to her chronic discomfort. She has an ECOG performance score of 2 and a current pain score of 4.   VITAL SIGNS:  Blood pressure 14 comorbidities, she may not be a good candidate for the operating room and perhaps could be treated with definitive chemoradiotherapy. I, therefore, will obtain a PET scan and refer her to Medical Oncology for evaluation.   I will expedite these things as completing radiation. I do not expect any long-term or permanent side effects, but there is the possibility of esophageal strictures.   She likely is at high risk for strictures based upon the significant inflammation and irritation that she has as a resul

## 2019-03-26 NOTE — PATIENT INSTRUCTIONS
Monitor your blood sugars regularly and let me know if they are getting too low. Let us know if your swallowing becomes much worse and you are and able to keep down fluids adequately.

## 2019-03-26 NOTE — PROGRESS NOTES
[facility]  Nephrology Daily Progress Note    Kim Rodrigues  [id number]  [de-identified]year old  Patient presents with:  Test Results      HPI:   Kim Rodrigues is a [de-identified]year old female.   70-year-old female with a history of adult onset diabetes mellitus, hypert Negative for gait disturbance  Psychiatric:  Negative for inappropriate interaction and psychiatric symptoms  Respiratory:  Negative for cough, dyspnea and wheezing      PHYSICAL EXAM:     Vitals History 3/26/2019 3/26/2019 3/26/2019   BP - 149/59 131/74 the last 6 months:  Recent Labs      03/25/19   0951   GLU  139*   NA  140   K  3.8   CL  104   CO2  30.0   BUN  12   CREATSERUM  0.84   CA  9.3   ALB  3.5   BUNCREA  14.3   ANIONGAP  6   OSMOCALC  292   GFRNAA  66   GFRAA  76       Imaging        Medicati Type 2 non insulin diabetic, Disp: 1 Device, Rfl: 0  •  Lancets Does not apply Misc, For ACCU-Check softclix device Dx E11.9 Type 2 non insulin dependant diabetes, Disp: 100 each, Rfl: 5  •  glycerin, laxative, 1.2 G Rectal Suppos, Place 1 suppository rect constipation     Dilated cardiomyopathy (HCC)     Rectal bleeding     Colitis     Long term (current) use of anticoagulants     Encounter for therapeutic drug monitoring     Esophageal carcinoma ([facility] [address]      Patient's volume status currently seems to be acce

## 2019-03-26 NOTE — PROGRESS NOTES
Nursing Consultation Note  Patient: Roni Brothers  YOB: 1940  Age: [de-identified]year old  Radiation Oncologist: Dr. Joe Crawford  Referring Physician: Braulio Boston  Diagnosis:No diagnosis found.   Consult Date: 3/26/2019      Chemotherapy: No BENZOCAINE  Cetylpyridinium Chl*        Comment:Other reaction(s): CETYLPYRIDINIUM CHLORIDE  Ephedrine                     Current Outpatient Medications:  metFORMIN HCl 500 MG/5ML Oral Solution Take 500 mg/5 ml 2 times every day (liquid) Disp: 300 mL Rfl: insulin diabetic Disp: 1 Device Rfl: 0   Lancets Does not apply Misc For ACCU-Check softclix device Dx E11.9 Type 2 non insulin dependant diabetes Disp: 100 each Rfl: 5   glycerin, laxative, 1.2 G Rectal Suppos Place 1 suppository rectally daily as needed. LESIONS(S) NOT AMENABLE TO RE  9/13/15   • TONSILLECTOMY         Social History    Socioeconomic History      Marital status:       Spouse name: Not on file      Number of children: Not on file      Years of education: Not on file      Highest educa Special Diet: Not Asked        Back Care: Not Asked        Exercise: Not Asked        Bike Helmet: Not Asked        Seat Belt: Not Asked        Self-Exams: Not Asked        Left Handed: Not Asked        Right Handed: Yes        Currently spends a great de

## 2019-03-26 NOTE — PROGRESS NOTES
Primary language:  English  Language line required?  no  Comprehension Ability:  good  Able to read?  yes  Able to write? yes  Communication tools:  na  Patient's ability to learn:  good  Readiness to learn:   Motivated  Learning preferences:  Discussion, treatment    Interventions:  Monitor and trend weight  Monitor fluid intake/loss  Monitor lab results  Assess dietary needs  Instruct patient on importance of caloric intake during treatment  Instruct regarding small frequent meals  Avoid spicy foods and h

## 2019-03-26 NOTE — PATIENT INSTRUCTIONS
Consultation with Dr Byrd Dec 3/27 @ 300pm.     Schedule PET scan once the call center lets you know that it has been approved. CT planning scan is scheduled for 3/28 @ 1015 am.     Please call with any questions or concerns.

## 2019-03-27 NOTE — PROGRESS NOTES
[facility] Consultation Note    Patient Name: Irwin Jonas   YOB: 1940   Medical Record Number: [id number]   CSN: [id number]   Consulting Physician: Andrés Copeland MD  Referring Physician(s): Demarcus Garcia  Date of Consultation: 3/27/2 hyperlipidemia    • Pacemaker     due to atrial fibrillation   • Peritonitis ([facility] [address] 1976    with bowel perfoation with anastamosis; pt had a colostomy for a year prior to reversal   • Unspecified essential hypertension        Past Surgical History:  Past Jorge per week: Not on file        Minutes per session: Not on file      Stress: Not on file    Relationships      Social connections:        Talks on phone: Not on file        Gets together: Not on file        Attends [employer] service: Not on file        Activ Allergies:     Definity                PALPITATIONS, DIZZINESS    Comment:Patient felt better after 15-20 minutes, went             home. Demerol  [Meperidin*    SHORTNESS OF BREATH    Comment:Other reaction(s):  Other (See Comments)             Jump mg five days per week and 5 mg two days per week (Patient taking differently: MWF 5mg TThSaSu 2.5mg ) Disp: 60 tablet Rfl: 5   TORSEMIDE 20 MG Oral Tab TAKE 1 TABLET(20 MG) BY MOUTH DAILY (Patient taking differently: 20 mg daily.  ) Disp: 30 tablet Rfl: 0 Signs:   /60 (BP Location: Left arm, Patient Position: Sitting, Cuff Size: large)   Pulse 65   Temp 98 °F (36.7 °C) (Oral)   Resp 16   Ht 1.581 m (5' 2.25\")   Wt 95.7 kg (211 lb)   SpO2 96%   BMI 38.28 kg/m²     Physical Examination:    General: Priya at least 2 intraluminal nodules present in the mid to distal esophagus. There is a large hiatal hernia. Findings are compatible with reported history of esophageal malignancy. Right paraesophageal lymph nodes may be reactive or   metastatic.       Pulmonar are some questionable lymphadenopathy being reported by CT scan and a plan for PET/CT imaging    --I have recommended the pt completes her PET/CT scan to determine if this disease is metastatic or not    --should she have localized disease and tx with chem

## 2019-03-27 NOTE — TELEPHONE ENCOUNTER
Kareem Sena called [facility] from [facility]. Pt had an EGD on 03/21/19 and was diagnosed with adenocarcinoma of the esophagus. Pt has been off coumadin for 5 days prior to procedure    Dr Christiano Lopez told her to hold the coumadin after the procedure.  Not ortiz

## 2019-03-27 NOTE — TELEPHONE ENCOUNTER
I spoke to Wendy Almanza from the [facility] and I advised her of Dr Clarita Orr recommendations.  She will forward the message to Dr Royal Greene and Dr Zaki Rahman

## 2019-03-27 NOTE — TELEPHONE ENCOUNTER
Spoke to representative with [facility] (formerly [address] [place] [address]).  She read back the phone & fax number they have for the contact information for reporting INRs and it is the coumadin clinic's info, not ours, so I'm not sure how this message got direct

## 2019-03-27 NOTE — TELEPHONE ENCOUNTER
Sure, she can restart it, but please let her and her providers know that she can start bleeding again from her known cancer on this medication and she should monitor for signs/symptoms of this.     Melissa Ohara MD  [facility], [facility] - Gastroenterology

## 2019-03-27 NOTE — TELEPHONE ENCOUNTER
Please see Dr. Ramirez Mt message below. CC left message for pt to take 2.5 mg of Warfarin tonight. Will call pt tomorrow, would like pt to test more often. If you would like a new INR range please let me know.  Thanks christopher

## 2019-03-27 NOTE — TELEPHONE ENCOUNTER
Berenice[facility] called and states that pt provided them w/ critical INR result - 1.2. She could not provide a call back # (Maurisio Fiore called from [phone]) but states they prefer that this office call pt directly at [phone].       A

## 2019-03-29 NOTE — TELEPHONE ENCOUNTER
DSK please advise in CAB absence. Not sure how this rx needs to be written. If ok I can call pharmacy and inquire.

## 2019-03-29 NOTE — TELEPHONE ENCOUNTER
Pts spouse/Lake calling to provide Pharm that provides liquid form f rx:Pantoprazole 40 MG  [address] can produce powder to liquid form since pt is unable to swallow whole pills due to esoph blockage

## 2019-03-29 NOTE — TELEPHONE ENCOUNTER
Verified HIPAA, LM that BmP is stable, pt needs to f/u with Dr. Herb Matthews as she is overdue for appt . To call back to make appt.

## 2019-03-29 NOTE — TELEPHONE ENCOUNTER
Yes, okay to call pharmacy and authorize pantoprazole 40mg PO (liquid form) BID x 30 days, with 3 refills.  Thank you

## 2019-03-29 NOTE — TELEPHONE ENCOUNTER
Pt states she got a call from a nurse to see dr Vineet Liang.  Offered pt first available pt states she would like to speak to a nurse please call thank you

## 2019-04-02 PROBLEM — C16.0 ADENOCARCINOMA OF GASTROESOPHAGEAL JUNCTION (HCC): Status: ACTIVE | Noted: 2019-01-01

## 2019-04-02 NOTE — PROGRESS NOTES
[facility] Progress Note    Patient Name: Stephanie Kaba   YOB: 1940   Medical Record Number: [id number]   CSN: [id number]   Consulting Physician: Meño Rush MD  Referring Physician(s): Pravin Ribera  Date of Visit: 4/02/2019     Chi pulmonary nodules. However, Jeanine Bullard, was found to have left parapharyngeal mass which is hypermetabolic lived to be secondary to a neoplastic process, which would likely be a secondary disease process unrelated to GE junction carcinoma.   Jeanine Bullard continues to fee file      Highest education level: Not on file    Occupational History      Not on file    Social Needs      Financial resource strain: Not on file      Food insecurity:        Worry: Not on file        Inability: Not on file      Transportation needs: Currently spends a great deal of time in the sun: Not Asked        Past Sunlamp Treatments for Acne: Not Asked        History of tanning: Not Asked        Hx of Spending [place] of Time in [place]: Not Asked        Bad sunburns in the past: Not Asked Oral Tab EC Take 1 tablet (40 mg total) by mouth 2 (two) times daily.  Disp: 60 tablet Rfl: 3   Nystatin (NYSTOP) 403906 UNIT/GM External Powder APPLY TO AFFECTED AREAS TWICE DAILY Disp: 45 g Rfl: 0   Glucose Blood (ACCU-CHEK SABRINA PLUS) In Vitro Strip TEST +weight loss. Eyes: Negative for visual disturbance, irritation and redness. Respiratory: Negative for cough, hemoptysis, chest pain, or dyspnea on exertion.   Gastrointestinal: Negative for odynophagia, reflux symptoms, nausea, vomiting, change in bowel 09:51 AM    HCT 36.4 03/25/2019 09:51 AM    MCV 90.5 03/25/2019 09:51 AM    MCH 28.6 03/25/2019 09:51 AM    MCHC 31.6 03/25/2019 09:51 AM    RDW 13.8 03/25/2019 09:51 AM    NEPRELIM 2.75 03/25/2019 09:51 AM    .0 03/25/2019 09:51 AM       Lab Result 2 eccentric nodules in the lumen of the esophagus seen on the recent CT study are not definitely seen on today's study however the esophagus is nondistended. The mass in the distal esophagus is intensely hypermetabolic.   This   measures 32 x 45 mm (image society guidelines is recommended  4.  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

## 2019-04-03 NOTE — PATIENT INSTRUCTIONS
Medication Education Record: IV Therapy    Learner:  Patient and Friend    Barriers / Limitations:  None    Psychosocial Assessment:  patient psychosocial response appropriate    Diagnosis:   Esophageal Carcinoma    IV Cancer Treatment Name(s): Taxol and C occurring. Recommended Anti-nausea medications (as directed by your provider):   Ondansetron (Zofran) 8 mg every 8 hours  Take as needed      Helpful hints during cancer treatment:    Diet:  o Avoid greasy or spicy foods on days surrounding chemotherapy Dinah  for NO BM in 2 DAYS. Follow direction on bottle for dosing  Skin Care  o Avoid direct sunlight.  o Wear a broad-spectrum sunscreen with an SPF of 30 or higher on any skin exposed to the sun.   Re-apply every 2 hours if in the sun and af loss; low counts; peripheral neuropathy    Please refer to the following link if you are interested in additional information about chemotherapy for yourself or family members: [email or web address] html        Safety and Handling of medications and for several months or years after therapy. Chemotherapy can have harmful side effects to the fetus, especially in the first trimester.   In addition, menstrual cycles can become irregular during and after treatment, so you may not know if y

## 2019-04-04 NOTE — TELEPHONE ENCOUNTER
Spoke with pt and informed that per Dr Sellers a biopsy of the left neck lymph node cannot be done at this time since the radiologist did not seen the lymph node on the US head/neck that she had done today 4-4-19, Dr Sellers will speak to Dr Juli Carrion and will

## 2019-04-08 NOTE — PROCEDURES
[facility]  Procedure Note    Jordan Ba Patient Status:  Outpatient in a Bed    10/14/1940 MRN [id number]   Location [facility] Attending Rosalind Tuttle MD   Hosp Day # 0 PCP Brissa Lynch MD     Proc

## 2019-04-08 NOTE — INTERVAL H&P NOTE
The above referenced H&P was reviewed by Celestina Sung MD on 4/8/2019, the patient was examined and no significant changes have occurred in the patient's condition since the H&P was performed.   Risks, benefits, alternative treatments and consequences

## 2019-04-08 NOTE — PRE-SEDATION ASSESSMENT
[facility]  IR Pre-Procedure Sedation Assessment    History of snoring or sleep or apnea?    No    History of previous problems with anesthesia or sedation  No    Physical Findings:  Neck: nl ROM  CV: RRR  PULM: normal respiratory rate/effor

## 2019-04-09 NOTE — TELEPHONE ENCOUNTER
Dr Dax Campa,    I spoke to Yen    She has not been able to have a bowel movement in the last 3-5 days. She is in a lot of pain and having cramping. She states she can't use stool softeners     She has used Miralax once a day for the past 3 days.  Sh

## 2019-04-09 NOTE — TELEPHONE ENCOUNTER
Pts /Lake calling for pt, indicates pt has not been able to eat well, or have a bowel movement for several days (5-6days). Pls call pt at:[phone],thanks.   *pt starts chemo/radiation tomorrow

## 2019-04-09 NOTE — TELEPHONE ENCOUNTER
Thanks Jenine Pallas for speaking to Yen. Please advise her that the only simple answer is to double up on the MiraLAX.     I will send in a prescription for Movantik on the off chance that it is covered -- very very expensive laxative for patients that are t

## 2019-04-10 NOTE — PROGRESS NOTES
Met with patient and spouse in Infusion today. Her bowel movements have slowed considerably but she still has some cramping. She was able to tolerate her RT treatment today.  Decision made with patient, Dr Garrett Scott and nurse to defer start of chemo til tomorr

## 2019-04-10 NOTE — TELEPHONE ENCOUNTER
Dr Yunier Farrar,    I spoke to Andra Pitts, Dr Montana Gallegos nurse. She also spoke to Yen this morning. Yen is going to still come to her appt. They may try the RT today since she is only on the table for 15 minutes.     They will probably postpo

## 2019-04-10 NOTE — PROGRESS NOTES
Pt to infusion for C1 Chemo. Assessed by CORDELIA Ansari RN and this RN. Pt reports having diarrhea from taking Miralax. Reports having abdominal \"contractions,\" wearing diaper, having constant loose stool. Denies any dizziness or light-headedness.  States

## 2019-04-10 NOTE — TELEPHONE ENCOUNTER
I called the pt this morning. Pt is now having a lot of diarrhea. She said little bits of diarrhea comes every 15 minutes or so.  She is experiencing a lot of burning in her rectum     She is on Augmentin since 04/04/19 which was prescribed by Dr Hanna Form

## 2019-04-10 NOTE — TELEPHONE ENCOUNTER
Pt called and said she was told to take Augmentin by Dr. Sellers and Imodium by Dr. Juli Carrion she states her diarrhea is not controlled and is not thinking she can make RT today. Justa Schmitt RN made aware and I spoke to her to update her.   Justa Schmitt stated she will luc

## 2019-04-10 NOTE — PROGRESS NOTES
pt reported number thru [facility], received [facility] fax with this 1.16 result on it for 4/9.  but on questions found result was from hospital 4/8.  Pt to recheck INR today and call result

## 2019-04-10 NOTE — TELEPHONE ENCOUNTER
Lobo Kearnsnehamedhat is complaining of \"constant\" diarrhea. She wants to know if she should take immodium.  She reports that she had been constipated for 3 - 4 days and yesterday, finally took miralax and she has been having cramping and liquid thick brown stools ever

## 2019-04-10 NOTE — TELEPHONE ENCOUNTER
Agree with advice given. Darryl Cole goes back and forth between severe extremely symptomatic constipation and diarrhea on the MiraLAX. Agree with telling her absolutely no Imodium. Oncology plan noted. Thank you for Sonal Mccann for speaking with her.

## 2019-04-11 NOTE — TELEPHONE ENCOUNTER
Left message on home # (okay per FYI in [facility] verbal release for YUNI [employer]IP[employer] to leave detailed message on home #) that RX was sent to pharmacy pt may call back with any questions/concerns.

## 2019-04-11 NOTE — TELEPHONE ENCOUNTER
No new recommendations. It is extremely important that Ms. Kanwal Fowler continue to take the MiraLAX every day. If her anal area is sore, she certainly could try hydrocortisone suppositories.   I can send in a prescription for the hydrocortisone suppositories as

## 2019-04-11 NOTE — TELEPHONE ENCOUNTER
I spoke to Nasir Sargent she has been having issues with constipation agian, pt took MiraLAX that started giving her cramping, \"just above anus area\" she felt that she was having \"contractions near anus\".   Denies seeing any blood in stool or rectal bleeding,

## 2019-04-11 NOTE — PROGRESS NOTES
Oncology Nutrition Assessment    Ht Readings from Last 1 Encounters:  04/05/19 : 160 cm (5' 3\")      Wt Readings from Last 1 Encounters:  04/11/19 : 94.3 kg (208 lb)    BMI Calculated: Body mass index is 36.85 kg/m². Weight History:   Wt Readings from [employer] loss    Estimated Nutritional Needs:    1700 calories (18 luc/kg)  78 protein (1.5 g protein/kg)  1800ml fluid needs    Nutritional Interventions:  Discussed importance of adequate intake and wt maintenance during tx.   Discussed importance of nutritional s

## 2019-04-11 NOTE — TELEPHONE ENCOUNTER
Patient notified that per Dr. Danny Umana it is extremely important that Vicente Avendaño continues to take MiraLAX every day. If her anal area is sore, Dr. Danny Umana could send over a RX for hydrocortisone suppositories to her pharmacy.      However, the most important thi

## 2019-04-11 NOTE — PROGRESS NOTES
Nabila Hals to infusion for C1 D1 Carbo/Taxol. Arrives Ambulating independently, accompanied by Spouse. She was to start treatment yesterday but was having abdominal and rectal cramps and diarrhea.  Patient states she suffers from constipation and started [facility] Port deaccessed, site covered with 2x2 gauze and paper tape.   Discharged to Home, Ambulating independently, accompanied by:Spouse    Outpatient Oncology Care Plan  Problem list:  diarrhea  loss of appetite  constipation  fatigue  knowledge deficit  nausea

## 2019-04-11 NOTE — PATIENT INSTRUCTIONS
Safety and Handling of Chemotherapy  While you or your family member is receiving chemotherapy, whether in the clinic or at home, the following precautions must be taken to lessen any exposure to the medication. Handling Body Waste:   1.  Caregivers mus Chemotherapy can have harmful side effects to the fetus, especially in the first trimester.   In addition, menstrual cycles can become irregular during and after treatment, so you may not know if you are at a time in your cycle when you could become pregnan cheese)  ? If nauseated, try dry foods, such as toast, crackers or pretzels; light or bland foods, such as applesauce or oatmeal.  ? Boost 2 - 3 cans per day     Fluid intake:  · Drink 8-10 cups of liquid a day and take a water bottle wherever you go.   Any the sun by wearing a hat and use sunscreen.   Apply alcohol-free moisturizer as needed.     When to call the doctor:  · Fever of 100.5 or greater or shaking chills  · Nausea/vomiting not controlled with anti-nausea medications: Unable to drink for 24 hours

## 2019-04-11 NOTE — TELEPHONE ENCOUNTER
states that pt is having a lot of pain due to hemorrhoids, so he wants to know if Dr can prescribed something for give the pt relief from the hemorrhoids. pts  states that the Preparation H is not helping.

## 2019-04-12 NOTE — PROGRESS NOTES
Vishnu Aguilar is a [de-identified]year old female.   Patient presents with:  Consult: pt here for lymph nose on left side of neck, pet scan done on 4/1/19      HISTORY OF PRESENT ILLNESS  He presents with a history of difficulty swallowing with a recent finding of eso is originally from [place].        Family History   Problem Relation Age of Onset   • Heart Disease Father    • Stroke Father         CVA   • Hypertension Mother    • Thyroid Disorder Son    • Cancer Neg    • Clotting Disorder Neg        Past Medical Histo 2.5\" (1.588 m)   Wt 207 lb (93.9 kg)   BMI 37.26 kg/m²        Constitutional Normal Overall appearance - Normal.   Psychiatric Normal Orientation - Oriented to time, place, person & situation. Appropriate mood and affect.    Neck Exam Normal Inspection - N mouth every morning before breakfast. Crush tablet, Disp: 30 tablet, Rfl: 5  •  Losartan Potassium 50 MG Oral Tab, Take 1 tablet (50 mg total) by mouth daily.  (Needs appointment), Disp: 30 tablet, Rfl: 0  •  Pantoprazole Sodium 40 MG Oral Tab EC, Take 1 ta Test 2-3 times per day, Disp: , Rfl:   •  Hydrocortisone Acetate 25 MG Rectal Suppos, Place 1 suppository (25 mg total) rectally daily. , Disp: 24 suppository, Rfl: 1  •  lidocaine-prilocaine 2.5-2.5 % External Cream, Apply to site 1 hour prior to port a ca in the fact that this is unusual for a noted to be present in this region from his esophageal primary cancer. We both discussed the possibility that this may be simply a reactive node due to her recent strep infection.   I did have a conversation with

## 2019-04-12 NOTE — TELEPHONE ENCOUNTER
Well being check day 2 carbo/taxol with RT. Per spouse, as patient is in the bathroom currently:  Slept over night without any ill effects except continued cramping and stooling. No nausea or vomiting. Taking in small amounts of fluid.    Will be in today f

## 2019-04-15 PROBLEM — K59.00 CONSTIPATION, UNSPECIFIED CONSTIPATION TYPE: Status: ACTIVE | Noted: 2019-01-01

## 2019-04-15 PROBLEM — R11.0 NAUSEA: Status: ACTIVE | Noted: 2019-01-01

## 2019-04-15 PROBLEM — R10.9 ABDOMINAL PAIN, ACUTE: Status: ACTIVE | Noted: 2019-01-01

## 2019-04-15 PROBLEM — E86.0 DEHYDRATION: Status: ACTIVE | Noted: 2019-01-01

## 2019-04-15 PROBLEM — N30.00 ACUTE CYSTITIS WITHOUT HEMATURIA: Status: ACTIVE | Noted: 2019-01-01

## 2019-04-15 PROBLEM — E87.6 HYPOKALEMIA: Status: ACTIVE | Noted: 2019-01-01

## 2019-04-15 NOTE — TELEPHONE ENCOUNTER
Received call from  stating \"Mellisa can't keep anything down. \" Pt states she is vomiting water, milk and ensure. Notified Dr David Dubois nurse, Kristina Robles and pt and her  instructed to go to the ED for hydration, and GI consult for GT placement.  U

## 2019-04-15 NOTE — ED PROVIDER NOTES
Patient Seen in: [facility] Emergency Department    History   Patient presents with:  Abdomen/Flank Pain (GI/)    Stated Complaint: abd pain, n/v    HPI    Patient presents to the emergency department with complaint of recent diagnosis of esopha :   Hydrocortisone Acetate 25 MG Rectal Suppos,  Place 1 suppository (25 mg total) rectally daily.    lidocaine-prilocaine 2.5-2.5 % External Cream,  Apply to site 1 hour prior to port a cath needle insertion   Naloxegol Oxalate (MOVANTIK) 25 MG Oral Tab, needed for Wheezing.    Blood Glucose Monitoring Suppl (ACCU-CHEK SABRINA) Does not apply Device,  Test blood sugar dailyDx E11.9 Type 2 non insulin diabetic   Lancets Does not apply Misc,  For ACCU-Check softclix device Dx E11.9 Type 2 non insulin dependant Vital signs noted. Eye:  No scleral icterus. Eyelids appear normal, no lesions. Cardiovascular:  Normal S1 and S2, no murmur, regular, with good peripheral perfusion. Respiratory:  Lungs clear to auscultation bilaterally with good effort.   No wheezes, BUN/CREA Ratio 20.8 (*)     All other components within normal limits   URINALYSIS WITH CULTURE REFLEX - Abnormal; Notable for the following components:    Protein Urine 30  (*)     Ketones Urine 80  (*)     Urobilinogen Urine 4.0 (*)     Leukocyte Esteras

## 2019-04-15 NOTE — H&P
[facility]    PATIENT'S NAME: Karinshadeanupam Mariscal   ATTENDING PHYSICIAN: Mikayla Zacarias MD   PATIENT ACCOUNT#:   [de-identified]    LOCATION:  [address]  MEDICAL RECORD #:   [id number]       YOB: 1940  ADMISSION DATE:       04/15/2 Jenny procedure, and later on colostomy takedown. She had history of cholecystectomy. MEDICATIONS:  Please see medication reconciliation list.      ALLERGIES:  Local anesthetics and macrolide antibiotics.       FAMILY HISTORY:  Father had heart disea admitted to general medical floor. IV fluids, n.p.o..  Monitor Accu-Cheks. Pain and nausea control. Gastroenterology consult, Dr. Celina Goel, and Oncology consult, Dr. Charis Kuhn. Patient might need a G-tube placement. Further recommendations to follow.     Tulio Whatley

## 2019-04-15 NOTE — H&P (VIEW-ONLY)
[facility]    Patient Name:  Vishnu Aguilar   YOB: 1940   Gender:  Female   Appt Date:  4/15/2019   Provider:  No name on file. Insurance:  MEDICARE PART A&B     PATIENT PROVIDERS  Referring Provider: No ref.  provider found History of this present illness dates back to 3 days ago when the patient started experiencing worsening dysphagia to include liquids. This progressed over the weekend to the point where she was not able to keep anything down.   At that point, the patient 4/4/2019: Ultrasound head and neck could not identify the hypermetabolic lymph node within the parapharyngeal region  4/8/2019: Port-A-Cath placement  Planned to undergo neoadjuvant chemoradiation [cross regimen with carboplatin and paclitaxel].   Completed right sided cataract surgery   • COLONOSCOPY  2007   • HYSTERECTOMY     • PACEMAKER/DEFIBRILLATOR  8/2015   • SIGMOIDOSCOPY, FLEXIBLE; WITH ABLATION OF TUMOR(S), POLYP(S), OR OTHER LESIONS(S) NOT AMENABLE TO RE  9/13/15   • TONSILLECTOMY        Reviewed S Cardiovascular: Negative for chest pain and leg swelling. Gastrointestinal: Positive for heartburn, nausea, vomiting and constipation. Negative for abdominal pain, diarrhea, blood in stool, abdominal distention and anal bleeding.    Endocrine: Negative fo TECHNIQUE:    CT images of the chest and abdomen were obtained with intravenous contrast material.  Automated exposure control for dose reduction was used. Adjustment of the mA and/or kV was done based on the patient's size.  Use of iterative reconstruction SPLEEN:           Normal size. Coarse calcifications in the spleen likely related to sequela of prior granulomatous disease. PANCREAS:      No fluid collection or ductal dilatation. ADRENALS:      No mass or enlargement.    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

## 2019-04-15 NOTE — CONSULTS
Oncology Consultation Note    Patient Name: Irwin Jonas   YOB: 1940   Medical Record Number: [id number]   CSN: [id number]   Consulting Physician: Andrés Copeland MD  Referring Physician(s): Marylene Emory, MD  Date of Consultation: 4/15/2019 HYSTERECTOMY     • PACEMAKER/DEFIBRILLATOR  8/2015   • SIGMOIDOSCOPY, FLEXIBLE; WITH ABLATION OF TUMOR(S), POLYP(S), OR OTHER LESIONS(S) NOT AMENABLE TO RE  9/13/15   • TONSILLECTOMY         Family Medical History:  Family History   Problem Relation Age of change in bowel habits, diarrhea, constipation and abdominal pain. Integument/breast: Negative for rash, skin lesions, and pruritus. Hematologic/lymphatic: Negative for easy bruising, bleeding, and lymphadenopathy.   Musculoskeletal: Negative for myalgias AM    ALB 3.5 03/25/2019 09:51 AM     04/15/2019 11:47 AM    K 2.8 (LL) 04/15/2019 11:47 AM     04/15/2019 11:47 AM    CO2 29.0 04/15/2019 11:47 AM    ALKPHO 56 03/25/2019 09:51 AM    AST 27 03/25/2019 09:51 AM    ALT 28 03/25/2019 09:51 AM values <7 g/dL, no role for G-CSF booster meds in this setting      Thank you for allowing us to take part in the care of this patient. Will continue to follow along with you.   Sima Tinajero MD  [facility]  Yesica Cam

## 2019-04-15 NOTE — TELEPHONE ENCOUNTER
Attempted to contact spouse to follow up. No answer and no option to LV. Phone rang and went to busy tone. Please transfer to RN if pt returns call. Thank you.

## 2019-04-15 NOTE — CONSULTS
[facility]    Patient Name:  Irwin Jonas   YOB: 1940   Gender:  Female   Appt Date:  4/15/2019   Provider:  No name on file. Insurance:  MEDICARE PART A&B     PATIENT PROVIDERS  Referring Provider: No ref.  provider found History of this present illness dates back to 3 days ago when the patient started experiencing worsening dysphagia to include liquids. This progressed over the weekend to the point where she was not able to keep anything down.   At that point, the patient 4/4/2019: Ultrasound head and neck could not identify the hypermetabolic lymph node within the parapharyngeal region  4/8/2019: Port-A-Cath placement  Planned to undergo neoadjuvant chemoradiation [cross regimen with carboplatin and paclitaxel].   Completed right sided cataract surgery   • COLONOSCOPY  2007   • HYSTERECTOMY     • PACEMAKER/DEFIBRILLATOR  8/2015   • SIGMOIDOSCOPY, FLEXIBLE; WITH ABLATION OF TUMOR(S), POLYP(S), OR OTHER LESIONS(S) NOT AMENABLE TO RE  9/13/15   • TONSILLECTOMY        Reviewed S Cardiovascular: Negative for chest pain and leg swelling. Gastrointestinal: Positive for heartburn, nausea, vomiting and constipation. Negative for abdominal pain, diarrhea, blood in stool, abdominal distention and anal bleeding.    Endocrine: Negative fo TECHNIQUE:    CT images of the chest and abdomen were obtained with intravenous contrast material.  Automated exposure control for dose reduction was used. Adjustment of the mA and/or kV was done based on the patient's size.  Use of iterative reconstruction SPLEEN:           Normal size. Coarse calcifications in the spleen likely related to sequela of prior granulomatous disease. PANCREAS:      No fluid collection or ductal dilatation. ADRENALS:      No mass or enlargement.    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

## 2019-04-16 NOTE — PLAN OF CARE
Patient arrived from ED. C/o abdominal pain but denies pain intervention at this time. IVF started. Patient NPO- reviewed with patient. PRN pain medication available. Accuchecks Q6H. Legs elevated for swelling. Safety measures in place.

## 2019-04-16 NOTE — DIETARY NOTE
ADULT NUTRITION INITIAL ASSESSMENT    Pt is at high nutrition risk. Pt meets malnutrition criteria.       CRITERIA FOR MALNUTRITION DIAGNOSIS:  Criteria for non-severe malnutrition diagnosis: acute illness/injury related to wt loss 5% in 1 month, energy supplements: none    - Feeding assistance: NPO    - Nutrition education: discussed basics of enteral nutrition management.       - Coordination of nutrition care: collaboration with other providers    - Discharge and transfer of nutrition care to [facility] Blocker/Cardiac   • potassium chloride  40 mEq Intravenous Once   • Insulin Regular Human  1-7 Units Subcutaneous 4 times per day   • bisacodyl  10 mg Rectal Daily   • metoprolol Tartrate  12.5 mg Oral BID       LABS: reviewed  Recent Labs     04/15/19  11

## 2019-04-16 NOTE — PROGRESS NOTES
[facility] HOSP - [facility]  Hematology/Oncology  Progress Note    Daniel Tineo Patient Status:  Inpatient    10/14/1940 MRN [id number]   Location [facility] 4W/SW/SE Attending Dwayne Fu MD   Hosp Day # 1 PCP MD Mason Olmedo and burning     Plan:     1.) GE junction adenocarcinoma with obstruction     --Radiation therapy began on Tuesday and 1st dose of weekly carbo/taxol began on Thursday; 4/11/19     -- pt is being planned for J-tube which can be placed by surgical oncology, gross evidence of free air.     Dictated by (CST): Wallace Jeans, MD on 4/15/2019 at 14:01     Approved by (CST): Wallace Jeans, MD on 4/15/2019 at 14:02                       Dorita Griffith MD  4/16/2019

## 2019-04-16 NOTE — PLAN OF CARE
Patient alert and oriented. Nausea continued overnight, Zofran and Reglan administered PRN. 1 episode of emesis noted. Glycerin suppository given, no BM noted. Patient refused ducolax suppository. IVF cont. Patient NPO. Accucheck Q6 within normal range.  No

## 2019-04-16 NOTE — CONSULTS
[facility]  Report of Consultation    Perez Heart Patient Status:  Inpatient    10/14/1940 MRN [id number]   Location [facility] 4W/SW/SE Attending Eduar Eubanks MD   Hosp Day # 0 PCP Adrian Faust MD     Reason for Consultation:  - hyperlipidemia    • Pacemaker     due to atrial fibrillation   • Peritonitis ([facility] [address] 1976    with bowel perfoation with anastamosis; pt had a colostomy for a year prior to reversal   • Unspecified essential hypertension      Past Surgical History:   Procedur Intravenous, Q2H PRN **OR** morphINE sulfate (PF) 2 MG/ML injection 2 mg, 2 mg, Intravenous, Q2H PRN **OR** morphINE sulfate (PF) 4 MG/ML injection 4 mg, 4 mg, Intravenous, Q2H PRN  •  ondansetron HCl (ZOFRAN) injection 4 mg, 4 mg, Intravenous, Q6H PRN  • therapeutic drug monitoring     Esophageal carcinoma (HCC)     Adenocarcinoma of gastroesophageal junction (HCC)     Dehydration     Constipation, unspecified constipation type     Abdominal pain, acute     Nausea     Hypokalemia     Acute cystitis without

## 2019-04-16 NOTE — PROGRESS NOTES
[facility]    Progress Note    Gretchen Master Patient Status:  Inpatient    10/14/1940 MRN [id number]   Location [facility] 4W/SW/SE Attending Laurel Carrillo MD   Hosp Day # 1 PCP Willy Snyder MD        Subjective:     Cons on hold  - monitor INR, 2.3 now  - prophylactic low molecular weight heparin if needed while off coumadin   - bb protocol     HTN  - po meds on hold  - bb protocol   - IV hydralazine prn     Malnutrition  Poor po intake  - npo now  - plan for j-tube Thursd

## 2019-04-16 NOTE — PLAN OF CARE
Problem: Patient Centered Care  Goal: Patient preferences are identified and integrated in the patient's plan of care  Description  Interventions:  - What would you like us to know as we care for you?  I have very painful hemorrhoids and require hydrocort

## 2019-04-16 NOTE — PROGRESS NOTES
[facility]    Progress Note    Lerma Smoke Patient Status:  Inpatient    10/14/1940 MRN [id number]   Location [facility] 4W/SW/SE Attending Sandford Duane, MD   Hosp Day # 1 PCP Russ Carlson MD     Subjective:  No acute emerita dullness. Normal excursions and effort. Abdomen: Soft, non-tender.          Labs:  Lab Results   Component Value Date    WBC 2.8 04/15/2019    RBC 3.68 04/15/2019    HGB 10.6 04/15/2019    HCT 32.9 04/15/2019    MCV 89.4 04/15/2019    MCH 28.8 04/15/2019 Malabsorption     Chronic atrial fibrillation (HCC)     Hepatitis C antibody test positive     Hepatomegaly     Slow transit constipation     Dilated cardiomyopathy (HCC)     Rectal bleeding     Colitis     Long term (current) use of anticoagulants     Enc

## 2019-04-17 NOTE — HISTORICAL OFFICE NOTE
Carmen Tavarez MD - 2015 12:00 AM CDT  Lisa Vasquez  : 10/14/1940  ACCOUNT: [id number]  [phone]  PCP: Dr. Eryn Villanueva    TODAY'S DATE: 2015  DICTATED BY: Alo Marmolejo MD]    CHIEF COMPLAINT: [edema, Followup of Atrial fibrillation, EXERCISE: no regular exercise. DIET: low sodium diet. MARITAL STATUS: . OCCUPATION: retired.      ALLERGIES: No Known Allergies    MEDICATIONS: Selected prescriptions see below    VITAL SIGNS: [B/P - 124/70, Pulse - 55, Respiration - 18, Weight - 248 08/05/15 Glipizide ER 5MG 3 tabs daily   08/05/15 Indapamide 2.5MG 1 daily   08/05/15 Multivitamins 1 daily   08/05/15 Pravastatin Sodium 10MG 1 daily   08/05/15 Valsartan 320MG 1 daily   08/05/15 Warfarin Sodium 5MG daily as directed     Jules Gums,

## 2019-04-17 NOTE — PLAN OF CARE
Patient alert and oriented. Pain managed with morphine IV PRN. Metoprolol IV administered for elevated Bp. IVF cont. NPO. BM slowing down s/p enema this afternoon. Will continue to monitor.     Problem: Patient Centered Care  Goal: Patient preferences are i

## 2019-04-17 NOTE — PROGRESS NOTES
[facility]     Gastroenterology Progress Note    Dietra Gip Patient Status:  Inpatient    10/14/1940 MRN [id number]   Location [facility] 4W/SW/SE Attending Tula Krabbe, MD   Hosp Day # 2 PCP Shanna Bay MD answered to the best of my ability.     Mirna [facility] Gastroenterology  4/17/2019  [phone]    Results:     Lab Results   Component Value Date    WBC 2.2 (L) 04/17/2019    HGB 9.8 (L) 04/17/2019    HCT 31.8 (L) 04/17/2019    PLT 13

## 2019-04-17 NOTE — CONSULTS
[facility] AND [facility]  [facility] Cardiology Consult Note    Akbar Baptiste Patient Status:  Inpatient    10/14/1940 MRN [id number]   Location [facility] 4W/SW/SE Attending Dolores Corcoran MD   Hosp Day # 2 PCP Ezio Hammer MD     [de-identified]year old female Atrial fibrillation (HCC)     on warfarin   • Constipation    • Dehydration    • Diabetes mellitus (HCC)    • Esophageal cancer (HCC)    • Hepatitis C antibody test positive    • Hiatal hernia    • Hypoglycemia    • Hypotension    • Obesity, unspecified excursions and effort. Abdomen: Soft, non-tender. BS-present. Extremities: Without clubbing, cyanosis or edema. Peripheral pulses are 2+. Neurologic: Alert and oriented, normal affect. Skin: Warm and dry.        Ashish North MD  4/17/2019  12:19 PM

## 2019-04-17 NOTE — PROGRESS NOTES
[facility]     Gastroenterology Progress Note    Carrington Feli Patient Status:  Inpatient    10/14/1940 MRN [id number]   Location [facility] 4W/SW/SE Attending Bria Beckman MD   Hosp Day # 1 PCP Sofie Vargas MD PT 19.3 (H) 11/19/2013    PT 22.3 (H) 06/28/2011    PT 15.7 (H) 02/08/2011    INR 2.31 (H) 04/16/2019    INR 1.1 (A) 04/10/2019    INR 1.16 04/08/2019       Xr Abdomen (1 View) (cpt=74018)    Result Date: 4/15/2019  CONCLUSION:  1.  Moderate feces throughou

## 2019-04-17 NOTE — CM/SW NOTE
Tentative referral sent to [facility] to check benefits for j-tube. Plan for j-tube placement on Thursday. Previous referral has been made to [facility]     Per RN, requesting SW to meet w/ pt another day for initial assessment due

## 2019-04-17 NOTE — PROGRESS NOTES
[facility]    Progress Note    Perez Heart Patient Status:  Inpatient    10/14/1940 MRN [id number]   Location [facility] 4W/SW/SE Attending Eduar Eubanks MD   Hosp Day # 2 PCP Adrian Faust MD     Subjective:  No acute emerita Cardiac: Regular rate and rhythm, S1, S2 normal, no murmur, rub or gallop. Lungs: Clear without wheezes, rales, rhonchi or dullness. Normal excursions and effort. Abdomen: Soft, non-tender.          Labs:  Lab Results   Component Value Date    WBC 2.2 04 Insulin Regular Human (NOVOLIN R) 100 UNIT/ML injection 1-7 Units 1-7 Units Subcutaneous 4 times per day   albuterol sulfate (VENTOLIN) (2.5 MG/3ML) 0.083% nebulizer solution 2.5 mg 2.5 mg Nebulization Q6H PRN   bisacodyl (DULCOLAX) rectal suppository 10 m [facility] Surgical On[facility]  [place] [phone]  Pager [phone]  [email or web address]

## 2019-04-18 PROBLEM — E46 MALNUTRITION (HCC): Status: ACTIVE | Noted: 2019-01-01

## 2019-04-18 NOTE — PROGRESS NOTES
[facility]    Progress Note    Gretchen Master Patient Status:  Inpatient    10/14/1940 MRN [id number]   Location [facility] 4W/SW/SE Attending Andria Lopez MD   Hosp Day # 3 PCP Willy Snyder MD        Subjective:     Resp HCT 31.6 (L) 04/18/2019    .0 (L) 04/18/2019    CREATSERUM 0.61 04/18/2019    BUN 13 04/18/2019     (H) 04/18/2019    K 3.9 04/18/2019     (H) 04/18/2019    CO2 26.0 04/18/2019     (H) 04/18/2019    CA 8.7 04/18/2019    ALB 3.5

## 2019-04-18 NOTE — PLAN OF CARE
- clot burden within the SOURAV seen on CT incidentally.   - pt on Warfarin for AF but is subtherapeutic for HemOnc testing/surgery  - case d/w Dr. Luz Elena Smith  - will bridge with Heparin gtt for now - low dose, no bolus  - primary to notify other teams        Be

## 2019-04-18 NOTE — PLAN OF CARE
Pt went for OR in am but came back no surgery was appropriate at this time, plan to start TPN tomorrow, continue chemo and radiation per oncology, zofran given for nausea, continue iv fluids, lasix given as ordered.

## 2019-04-18 NOTE — PROGRESS NOTES
[facility]    Progress Note    Boo Niño Patient Status:  Inpatient    10/14/1940 MRN [id number]   Location [facility] 4W/SW/SE Attending Benja Menendez MD   Hosp Day # 3 PCP Miley Gleason MD        Subjective:   Mariluz Nima placement tomorrow despite echo results per cards results    HTN  - po meds on hold  - bb protocol   - IV hydralazine prn     Severe Malnutrition  Poor po intake  - npo now  - dietician following   - TPN    Hiatal hernia   - ct to evaluate per Dr. Colin Almendarez

## 2019-04-18 NOTE — PROGRESS NOTES
[facility]    Progress Note    Perez Heart Patient Status:  Inpatient    10/14/1940 MRN [id number]   Location [facility] 4W/SW/SE Attending Lilia Rai MD   Hosp Day # 2 PCP Adrian Faust MD        Subjective:   [employer] results    HTN  - po meds on hold  - bb protocol   - IV hydralazine prn     Severe Malnutrition  Poor po intake  - npo now  - plan for j-tube Thursday  - dietician following      DVT: coumadin on hold for procedure tomorrow  CODE: Full  DISPO: pending    G

## 2019-04-18 NOTE — PLAN OF CARE
Problem: Patient Centered Care  Goal: Patient preferences are identified and integrated in the patient's plan of care  Description  Interventions:  - Provide timely, complete, and accurate information to patient/family  - Incorporate patient and family k Minimal or absence of nausea and vomiting  Description  INTERVENTIONS:  - Maintain adequate hydration with IV or PO as ordered and tolerated  - Nasogastric tube to low intermittent suction as ordered  - Evaluate effectiveness of ordered antiemetic medicati

## 2019-04-18 NOTE — PROGRESS NOTES
[facility]    Progress Note    Christina Martel Patient Status:  Inpatient    10/14/1940 MRN [id number]   Location [facility] 4W/SW/SE Attending Mark Perez MD   Hosp Day # 3 PCP Rebecca Piedra MD     Subjective:  No acute emerita Lungs: Clear without wheezes, rales, rhonchi or dullness. Normal excursions and effort. Abdomen: Soft, non-tender.          Labs:  Lab Results   Component Value Date    WBC 2.4 04/18/2019    RBC 3.39 04/18/2019    HGB 9.8 04/18/2019    HCT 31.6 04/18/2019 glucose (DEX4) oral liquid 15 g 15 g Oral Q15 Min PRN   Insulin Regular Human (NOVOLIN R) 100 UNIT/ML injection 1-7 Units 1-7 Units Subcutaneous 4 times per day   albuterol sulfate (VENTOLIN) (2.5 MG/3ML) 0.083% nebulizer solution 2.5 mg 2.5 mg Nebulizatio I offered placing the J tube through a laparotomy however the patient was very hesitant to move forward, fearing this procedure would add morbidly and possibly delay treatment. I explained that the only other option would be to consider TPN.  She would li

## 2019-04-18 NOTE — INTERVAL H&P NOTE
Patient seen and examined. No changes to the attached history and physical are noted. [de-identified]year old female presenting today for planned lap possible open J tube placement.     Destin Benites MD  [facility]  . [address]

## 2019-04-18 NOTE — PLAN OF CARE
Lety Abraham had echo done today. She will have j-tube placed tomorrow. Her INR was elevated, she received vitamin K.  Endorsed to oncoming RN to update doctor to determine if she will need FFP.   She was refusinig blood product earlier, but now is  on board with

## 2019-04-19 NOTE — CM/SW NOTE
SW self-referred to meet w/ pt due to case finding and diagnosis. SW met w/ pt to discuss eventual discharge needs. Pt lives at home w/ her  in [facility].  Pt lives in a 1 level house w/ 7 external steps and 2 steps down to the basement, where h

## 2019-04-19 NOTE — PROGRESS NOTES
[facility]    Progress Note    Lerma Smoke Patient Status:  Inpatient    10/14/1940 MRN [id number]   Location [facility] 4W/SW/SE Attending Sandford Duane, MD   [facility] Day # 4 PCP Russ Carlson MD     Subjective:  No acute emerita Neck: No JVD, carotids 2+ no bruits. Cardiac: Regular rate and rhythm, S1, S2 normal, no murmur, rub or gallop. Lungs: Clear without wheezes, rales, rhonchi or dullness. Normal excursions and effort. Abdomen: Soft, non-tender.          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

## 2019-04-19 NOTE — PLAN OF CARE
Problem: Patient Centered Care  Goal: Patient preferences are identified and integrated in the patient's plan of care  Description  Interventions:  - What would you like us to know as we care for you?   - Provide timely, complete, and accurate informatio Progressing     Problem: GASTROINTESTINAL - ADULT  Goal: Minimal or absence of nausea and vomiting  Description  INTERVENTIONS:  - Maintain adequate hydration with IV or PO as ordered and tolerated  - Nasogastric tube to low intermittent suction as ordered

## 2019-04-19 NOTE — PROGRESS NOTES
Cardiology follow-up    I have had to extremely long discussions with the patient and then with her . I told them that before any surgical procedure she should have a cardiac catheterization both right and left heart catheterization.   She does not

## 2019-04-19 NOTE — HOME CARE LIAISON
Received referral from Mary Jones for residential home health. Met with patient and  at the bedside. Patient is agreeable to Residential Home Health services upon discharge. Patient given brochure and liaison card.  All questions and concerns were addre

## 2019-04-19 NOTE — PROGRESS NOTES
[facility]    Progress Note    Daniel Tineo Patient Status:  Inpatient    10/14/1940 MRN [id number]   Location [facility] 4W/SW/SE Attending Robert Aguilar MD   [facility] Day # 4 PCP Blanka Issa MD        Subjective:   Cass Matute diarrhea but related to constipation, cont supportive care with bowel regimen as needed    Hypokalemia and dehydration.    - replace k   - continue IVF     Diabetes mellitus type 2.    -ss insulin     HTN  - po meds on hold  - bb protocol   - IV hydralazin represent a metastatic focus. This lymph node is slightly larger since previous exam, and this may represent reactive enlargement as this finding was not significantly FDG-avid on 4/1/19 PET-CT.  2.  Interval development of small bilateral pleural effusion

## 2019-04-19 NOTE — DIETARY NOTE
ADULT NUTRITION REASSESSMENT     Pt is at high nutrition risk. Pt meets malnutrition criteria.       CRITERIA FOR MALNUTRITION DIAGNOSIS:  Criteria for non-severe malnutrition diagnosis: acute illness/injury related to wt loss 5% in 1 month, energy intak pain, acute [R10.9]  Adenocarcinoma of gastroesophageal junction (HCC) [C16.0]  Acute cystitis without hematuria [N30.00]  Constipation, unspecified constipation type [K59.00]    Past Medical History   has a past medical history of [facility] times per day   • bisacodyl  10 mg Rectal Daily   • metoprolol Tartrate  12.5 mg Oral BID       LABS: reviewed  Recent Labs     04/16/19  0845 04/17/19  0456 04/18/19  0604 04/19/19  0730   * 155* 130* 117*   BUN 15 13 13 14   CREATSERUM 0.63 0.58 0

## 2019-04-19 NOTE — PROGRESS NOTES
[facility]    Cardiology Progress Note  [facility]    Lerma Smoke Patient Status:  Inpatient    10/14/1940 MRN [id number]   Location [facility] 4W/SW/SE Attending Ramesh Kaufman MD   Hosp Day # 4 PCP Leidy Jasso bisacodyl  10 mg Rectal Daily       Continuous Infusions:   • dextrose     • adult 3 in 1 TPN     • KCl in Dextrose-NaCl     • Continuous dose Heparin infusion         Exam    Pulm: Lungs clear, normal respiratory effort  CV: Heart with paced rhythm  Ext: PT 19.3 (H) 11/19/2013    T4F 1.05 06/27/2018    TSH 2.09 09/09/2018     03/19/2019     (H) 11/08/2011    MG 2.0 04/19/2019    PHOS 2.7 04/19/2019    TROP <0.045 03/19/2019       Ct Chest+abdomen+pelvis(all Cntrst Only)(cpt=71260/56115) moderate-large volume of fecal material throughout the colon, which could represent normal variation or constipation. 7 cm diameter focus of fecal material in the rectum with surrounding fat stranding. Correlate for fecal impaction.  7.  Post T4-T5 alejandro

## 2019-04-19 NOTE — PROGRESS NOTES
[facility] HOSP - [facility]  Hematology/Oncology  Progress Note    Na Funez Patient Status:  Inpatient    10/14/1940 MRN [id number]   Location [facility] 4W/SW/SE Attending Tj Barrientos MD   Hosp Day # 3 PCP MD Mason Dominique or liquids and UTI associated with urinary frequency and burning     Plan:     1.) GE junction adenocarcinoma with obstruction     --Radiation therapy began on Tuesday of last week and 1st dose of weekly carbo/taxol began on Thursday; 4/11/19     -- pt is (L) 04/18/2019    .0 (L) 04/18/2019    CREATSERUM 0.61 04/18/2019    BUN 13 04/18/2019     (H) 04/18/2019    K 3.9 04/18/2019     (H) 04/18/2019    CO2 26.0 04/18/2019     (H) 04/18/2019    CA 8.7 04/18/2019    ALB 3.5 03/25/2019 which may represent thrombus. This finding was not present on prior CT from 3/21/19. 5.  11 mm diameter nodule in the base of the left adrenal gland which was not FDG avid on 4/1/19 PET-CT favoring it represents an adenoma.  6.  There is evidence for prior

## 2019-04-19 NOTE — PLAN OF CARE
Heparin drip ordered, pt refused medication and requested to speak to dr, dr Avery Maldonado notified, no new orders, dr Lucy Monroe paged twice at this time dr Lucy Monroe called back and discussed with pt the importance of medication and pt agreed to starting medication.

## 2019-04-19 NOTE — PROGRESS NOTES
[facility] HOSP - [facility]  Hematology/Oncology  Progress Note    Matthew Herron Patient Status:  Inpatient    10/14/1940 MRN [id number]   Location [facility] 4W/SW/SE Attending Alyssa Hidalgo MD   Hosp Day # 4 PCP MD Mason Shukla or liquids and UTI associated with urinary frequency and burning     Plan:     1.) GE junction adenocarcinoma with obstruction     --Radiation therapy began on Tuesday of last week and 1st dose of weekly carbo/taxol began on Thursday; 4/11/19     -- pt is 04/19/2019     (H) 04/19/2019    CA 8.7 04/19/2019    ALB 3.5 03/25/2019    ALKPHO 56 03/25/2019    BILT 0.7 03/25/2019    TP 8.1 03/25/2019    AST 27 03/25/2019    ALT 28 03/25/2019    PTT 63.8 (H) 04/19/2019    INR 1.36 (H) 04/19/2019    PT 19.3 ( which was not FDG avid on 4/1/19 PET-CT favoring it represents an adenoma. 6.  There is evidence for prior proximal and distal colon resection with reanastomosis. No evidence for obstruction or inflammation at the surgical sites.   There is a moderate-larg

## 2019-04-20 NOTE — PROGRESS NOTES
[facility]    Progress Note    Estrellita Early Patient Status:  Inpatient    10/14/1940 MRN [id number]   Location [facility] 4W/SW/SE Attending Attila Zambrano MD   Hosp Day # 5 PCP Neema Nye MD     I came by to visit with Gaby Holland

## 2019-04-20 NOTE — PROGRESS NOTES
[facility]    Progress Note    Christina Martel Patient Status:  Inpatient    10/14/1940 MRN [id number]   Location [facility] 4W/SW/SE Attending Iza Garcia MD   [address] Day # 5 PCP Rebecca Piedra MD        Subjective:   [place] constipation, cont supportive care with bowel regimen as needed    Hypokalemia and dehydration.    - replace k   - continue IVF     Diabetes mellitus type 2.    -ss insulin     HTN  - po meds on hold  - bb protocol   - IV hydralazine prn     Severe Malnutr metastatic focus. This lymph node is slightly larger since previous exam, and this may represent reactive enlargement as this finding was not significantly FDG-avid on 4/1/19 PET-CT. 2.  Interval development of small bilateral pleural effusions.   Romi Jaeger

## 2019-04-20 NOTE — PROGRESS NOTES
[facility]    Cardiology Progress Note    Boo Niño Patient Status:  Inpatient    10/14/1940 MRN [id number]   Location [facility] 4W/SW/SE Attending Eric Berry MD   Hosp Day # 5 PCP Miley Gleason MD     Primary Cardio 8/2018  - invasive work-up has been declined in the past   - given need for surgery,  Left and Right Heart Cath has been recommended by Dr. Adriana Davison.       HTN  - elevated this AM  - patient not taking in PO - will schedule IV hydralazine with holding parameter representing atelectasis however correlate for symptoms of pneumonia/aspiration. 3.  Fat stranding right-lateral to the 1st-2nd portions of the duodenum, which was not present on prior exam.  Given history of emesis correlate for duodenitis.  Johanny Cleaves

## 2019-04-20 NOTE — PLAN OF CARE
Problem: Patient Centered Care  Goal: Patient preferences are identified and integrated in the patient's plan of care  Description  Interventions:  - What would you like us to know as we care for you?  - Provide timely, complete, and accurate information Progressing     Problem: GASTROINTESTINAL - ADULT  Goal: Minimal or absence of nausea and vomiting  Description  INTERVENTIONS:  - Maintain adequate hydration with IV or PO as ordered and tolerated  - Nasogastric tube to low intermittent suction as ordered

## 2019-04-21 NOTE — PROGRESS NOTES
[facility] HOSP - [facility]    Cardiology Progress Note    Akbar Baptiste Patient Status:  Inpatient    10/14/1940 MRN [id number]   Location [facility] 4W/SW/SE Attending Dolores Corcoran MD   Hosp Day # 6 PCP Ezio Hammer MD     Primary Cardio seen on CT and Afib history    Dilated cardiomyopathy, EF 20-25%  - compensated     Abnormal Stress Test 8/2018  - invasive work-up has been declined in the past   - given need for surgery,  Left and Right Heart Cath has been recommended by Dr. Marivel Canela.     We

## 2019-04-21 NOTE — PLAN OF CARE
Plan of care reviewed with patient and . TPN infusing at 75 ml/hr and heparin gtt continued at 10 cc/hr. Patient continues to c/o nausea- vomited x1. PRN zofran given. No c/o pain. Safety measures in place.      Problem: Patient Centered Care  Goal: activity based on assessment  - Modify environment to reduce risk of injury  - Provide assistive devices as appropriate  - Consider OT/PT consult to assist with strengthening/mobility  - Encourage toileting schedule  Outcome: Progressing

## 2019-04-21 NOTE — PROGRESS NOTES
[facility]    Progress Note    Peggyann Mask Patient Status:  Inpatient    10/14/1940 MRN [id number]   Location [facility] 4W/SW/SE Attending Leslie Nguyen MD   [facility] Day # 6 PCP Manisha Boyd MD        Subjective:   [place] which is likely not true diarrhea but related to constipation, cont supportive care with bowel regimen as needed    Hypokalemia and dehydration.    - replace k   - continue IVF     Diabetes mellitus type 2.    -ss insulin     HTN  - po meds on hold  - bb p

## 2019-04-21 NOTE — PROGRESS NOTES
[facility] HOSP - [facility]  Hematology/Oncology  Progress Note    Roni Brothers Patient Status:  Inpatient    10/14/1940 MRN [id number]   Location [facility] 4W/SW/SE Attending Carlie Hollingsworth MD   Hosp Day # 6 PCP MD Mason Rivers adenocarcinoma admitted with nausea, vomiting, inability to pass solids or liquids and UTI associated with urinary frequency and burning     Plan:     1.) GE junction adenocarcinoma with obstruction     --Radiation therapy began on Tuesday of last week and 04/21/2019    K 4.1 04/21/2019     (H) 04/21/2019    CO2 28.0 04/21/2019     (H) 04/21/2019    CA 8.5 04/21/2019    ALB 3.0 (L) 04/20/2019    ALKPHO 42 (L) 04/20/2019    BILT 0.6 04/20/2019    TP 7.0 04/20/2019    AST 22 04/20/2019    ALT 22 0

## 2019-04-21 NOTE — PLAN OF CARE
Patient states pain is improving. TPN and heparin gtt continued. Patient vomited x2 this afternoon. PRN Zofran given. Plan for cardiac cath. Tomorrow. Safety measures in place.    Problem: Patient Centered Care  Goal: Patient preferences are identified and reduce risk of injury  - Provide assistive devices as appropriate  - Consider OT/PT consult to assist with strengthening/mobility  - Encourage toileting schedule  Outcome: Progressing     Problem: GASTROINTESTINAL - ADULT  Goal: Minimal or absence of nause

## 2019-04-21 NOTE — PLAN OF CARE
Problem: Patient Centered Care  Goal: Patient preferences are identified and integrated in the patient's plan of care  Description  Interventions:    - Provide timely, complete, and accurate information to patient/family  - Incorporate patient and family and on wake up complaining of  abdominal pain and nausea . Pain relieved with Morphine 2 mg IVP , nausea resolved  by itself. Heparin drip  maintained at the same rate. Patient resting at present .

## 2019-04-21 NOTE — PROGRESS NOTES
[facility]    Progress Note    Shruthi Never Patient Status:  Inpatient    10/14/1940 MRN [id number]   Location [facility] 4W/SW/SE Attending Tesha Gonzalez MD   [facility] Day # 6 PCP Sofie Vargas MD     Subjective:  No acute emerita Lungs: Clear without wheezes, rales, rhonchi or dullness. Normal excursions and effort. Abdomen: Soft, non-tender.          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

## 2019-04-22 NOTE — TELEPHONE ENCOUNTER
Spoke to [facility] nurse. She is aware that patient is in the hospital currently.

## 2019-04-22 NOTE — PROGRESS NOTES
[facility]    Progress Note    Dory Barbosa Patient Status:  Inpatient    10/14/1940 MRN [id number]   Location [facility] 4W/SW/SE Attending Lo Bravo MD   [address] Day # 7 PCP Diana Mercado MD        Subjective:   [place] TWE  - hydrocortisone suppository for hemorrhoids   - now with \"diarrhea\" - which is likely not true diarrhea but related to constipation, cont supportive care with bowel regimen as needed    Hypokalemia and dehydration.    - replace k   - continue IVF Shama Lopez, [address]  4/22/2019    Patient seen and examined independently. Awaiting angiogram.  Nauseous this morning. No chest pain or sob. No fevers/chills. Further recs about proceeding with NicePeopleAtWorktube pending angio results.     Greater than 35 minutes spent, >

## 2019-04-22 NOTE — DIETARY NOTE
Brief Nutrition Note:    Assessments charted 4/16 and 4/19, refer to for details. Progressing TPN to meet 100% of needs;  Tonight ordered 1700ml, 80 g protein and 1300 non protein calories (750 dextrose and 600 fat) to provide 1670 total calories with

## 2019-04-22 NOTE — TELEPHONE ENCOUNTER
Parul/[facility] calling to clarify [address] will sign home health services orders.  Please call [phone]

## 2019-04-22 NOTE — PLAN OF CARE
Problem: Patient Centered Care  Goal: Patient preferences are identified and integrated in the patient's plan of care  Description  Interventions:    - Provide timely, complete, and accurate information to patient/family  - Incorporate patient and family assistive devices as appropriate  - Consider OT/PT consult to assist with strengthening/mobility  - Encourage toileting schedule  4/22/2019 0700 by Maggie Alexis RN  Outcome: Progressing  4/22/2019 0700 by Maggie Alexis, RN  Outcome: Progressing     P

## 2019-04-22 NOTE — PLAN OF CARE
Problem: Patient Centered Care  Goal: Patient preferences are identified and integrated in the patient's plan of care  Description  Interventions:  - What would you like us to know as we care for you?  I cannot swallow or lay flat due to my nausea and dys injury  Description  INTERVENTIONS:  - Assess pt frequently for physical needs  - Identify cognitive and physical deficits and behaviors that affect risk of falls.   - [place] fall precautions as indicated by assessment.  - Educate pt/family on patient sa

## 2019-04-22 NOTE — PROGRESS NOTES
[facility] HOSP - [facility]  Hematology/Oncology  Progress Note    Shruthi Feli Patient Status:  Inpatient    10/14/1940 MRN [id number]   Location [facility] 4W/SW/SE Attending Vicki Mccollum MD   Hosp Day # 7 PCP MD Mason Valencia GE junction adenocarcinoma admitted with nausea, vomiting, inability to pass solids or liquids and UTI associated with urinary frequency and burning     Plan:     1.) GE junction adenocarcinoma with obstruction     --Radiation therapy began on Tuesday of l 04/22/2019    HGB 10.4 (L) 04/22/2019    HCT 32.9 (L) 04/22/2019    .0 04/22/2019    CREATSERUM 0.66 04/22/2019    CREATSERUM 0.66 04/22/2019    BUN 21 (H) 04/22/2019    BUN 21 (H) 04/22/2019     (H) 04/22/2019     (H) 04/22/2019    K 4.

## 2019-04-23 NOTE — CONSULTS
I was asked by ELVA Fink, to evaluate patient for palliative care [address] discussion. The patient is currently out of the room and at a procedure. Thus, I will attempt to see the patient tomorrow. Notes reviewed and discussed with TRUPTI Pinto.

## 2019-04-23 NOTE — PROCEDURES
[facility]    PATIENT'S NAME: German Mcclain   ATTENDING PHYSICIAN: Ivett Acuna MD   OPERATING PHYSICIAN: Maribell Overton.  Zaki Rahmna MD   PATIENT ACCOUNT#:   [id number]    LOCATION:  [address] [place] [address] #:   [id number]       DATE OF BIRTH:  [address] [id number]  [facility]/

## 2019-04-23 NOTE — PROGRESS NOTES
Notified Dr Iftikhar Avila about pt not being able to tolerat being flat . He will talk to anesthesia regarding this matter. Notified him of pt not tolerating chewable aspirin as well. No new order given at this time.

## 2019-04-23 NOTE — ANESTHESIA PROCEDURE NOTES
Airway  Date/Time: 4/23/2019 12:17 PM  Urgency: elective    Airway not difficult    General Information and Staff    Patient location during procedure: OR  Anesthesiologist: Talat Cabral MD  Performed: anesthesiologist     Indications and Patient Co

## 2019-04-23 NOTE — PROGRESS NOTES
[facility] - [facility]    Progress Note    Irwin Little Patient Status:  Inpatient    10/14/1940 MRN [id number]   Location [facility] 4W/SW/SE Attending Anjel Durham MD   [address] Day # 7 PCP Demarcus Garcia MD     Subjective:  No acute emerita Lungs: Clear without wheezes, rales, rhonchi or dullness. Normal excursions and effort. Abdomen: Soft, non-tender.          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

## 2019-04-23 NOTE — PROGRESS NOTES
[facility]    Progress Note    Darryl Shaw Patient Status:  Inpatient    10/14/1940 MRN [id number]   Location [facility] 4W/SW/SE Attending Arminda Betancourt MD   [address] Day # 8 PCP Yeimy Dejesus MD        Subjective:   [place] Elise xray  - plan suppository and TWE  - hydrocortisone suppository for hemorrhoids   - now with \"diarrhea\" - which is likely not true diarrhea but related to constipation, cont supportive care with bowel regimen as needed    Hypokalemia and dehydration.     - ventricular pacemaker Pacemaker ECG, No further analysis INSUFFICIENT DATA When compared with ECG of 03/19/2019 01:28:58 no significant changes have occurred.  Electronically signed on 04/22/2019 at 16:38 by [facility]  4/23/2

## 2019-04-23 NOTE — CONSULTS
[facility]    Report of Consultation    Irwin Jonas Patient Status:  Inpatient    10/14/1940 MRN [id number]   Location [facility] 2W/SW Attending Torito Hagan MD   [address] Day # 8 PCP Demarcus Garcia MD     Date of Admission: Neg    • Clotting Disorder Neg        Social History  Patient Guardian Status:  Not on file    Other Topics            Concern  Caffeine Concern        Yes    Comment:2cups daily, soda, chocolate  Right Handed            Yes    Social History Narrative mg 4 mg Intravenous Once PRN   Prochlorperazine Edisylate (COMPAZINE) injection 5 mg 5 mg Intravenous Once PRN   haloperidol lactate (HALDOL) 5 MG/ML injection 0.25 mg 0.25 mg Intravenous Once PRN   Naloxone HCl (NARCAN) 0.4 MG/ML injection 80 mcg 80 mcg I Intravenous Q2H PRN   ondansetron HCl (ZOFRAN) injection 4 mg 4 mg Intravenous Q6H PRN   dextrose 50 % injection 50 mL 50 mL Intravenous PRN   Glucose-Vitamin C (DEX-4) 4-6 GM-MG chewable tab 4 tablet 4 tablet Oral Q15 Min PRN   glucose (DEX4) oral liquid total) by mouth daily. (Needs appointment)   Pantoprazole Sodium 40 MG Oral Tab EC Take 1 tablet (40 mg total) by mouth 2 (two) times daily.    Nystatin (NYSTOP) 353102 UNIT/GM External Powder APPLY TO AFFECTED AREAS TWICE DAILY   TraMADol HCl 50 MG Oral Ta BENZOCAINE  Cetylpyridinium Chl*        Comment:Other reaction(s): CETYLPYRIDINIUM CHLORIDE  Ephedrine                   Review of Systems:   Unable to obtain secondary to patient's current clinical condition        Physical Exam:   Blood pressure 151/74, Acute respiratory failure  3. Right atrial appendage thrombus  4. Dysphagia  5. Atrial fibrillation  6. Pancytopenia  7. Hypoalbuminemia  8. Diabetes mellitus  9. Hypertension    Plan   -Patient with evidence of nausea, vomiting on presentation.   Un

## 2019-04-23 NOTE — PLAN OF CARE
Problem: Patient Centered Care  Goal: Patient preferences are identified and integrated in the patient's plan of care  Description  Interventions:  - What would you like us to know as we care for you? \"when can that tube come out? \"  - Provide timely, com assistive devices as appropriate  - Consider OT/PT consult to assist with strengthening/mobility  - Encourage toileting schedule  Outcome: Progressing  CBR, s/p angiogram.      Problem: RESPIRATORY - ADULT  Goal: Achieves optimal ventilation and oxygenatio

## 2019-04-23 NOTE — PROGRESS NOTES
Dr Yany Desai requested patient to informed that surgery will be Thursday and open after speaking to Dr Maria R Sánchez regarding cardiac clearance. Patient informed and agrees completely.

## 2019-04-23 NOTE — PLAN OF CARE
Patient alert and oriented. No complaints of pain overnight. Nausea continues. Patient to be NPO at midnight, patient refused to have water taken away. TPN continues at 70.8 ml/hr.  Patient complaining of SOB after walking to bathroom, CXR ordered came back physical needs  - Identify cognitive and physical deficits and behaviors that affect risk of falls.   - [place] fall precautions as indicated by assessment.  - Educate pt/family on patient safety including physical limitations  - Instruct pt to call for a

## 2019-04-23 NOTE — PROGRESS NOTES
[facility] HOSP - [facility]  Hematology/Oncology  Progress Note    Darryl Shaw Patient Status:  Inpatient    10/14/1940 MRN [id number]   Location [facility] 4W/SW/SE Attending Ana Rosa Clemons MD   Hosp Day # 8 PCP MD Mason Anguiano with past medical history relevant for stage II GE junction adenocarcinoma admitted with nausea, vomiting, inability to pass solids or liquids and UTI associated with urinary frequency and burning     Plan:     1.) GE junction adenocarcinoma with obstructi thrombus    --pt had been on warfarin therapy for known atrial fibrillation; pt has been off oral meds for a while due to her inability to swallow.  She'd also been holding anticoagulation following procedures without resuming therapy             Thank you Andrew Arndt MD on 4/23/2019 at 6:41     Approved by (CST):  Andrew Arndt MD on 4/23/2019 at 6:44          Ekg 12-lead    Result Date: 4/23/2019  ECG Report  Interpretation  -------------------------- Electronic ventricular pacemaker Pacemaker ECG

## 2019-04-23 NOTE — RESPIRATORY THERAPY NOTE
RESPIRATORY THERAPY MECHANICAL VENTILATION PROGRESS NOTE    Ventilator Weaning:  Patient meets criteria for weaning? yes Weaning was attempted yes using pressure support 5 cmH2O + PEEP 5 cmH2O. The patient tolerated well for 30 minutes.  Pt extubated per MD

## 2019-04-23 NOTE — ANESTHESIA POSTPROCEDURE EVALUATION
Patient: Jordan Ba    Procedure Summary     Date:  04/23/19 Room / Location:  [facility]    Anesthesia Start:  3366 Anesthesia Stop:      Procedure:  CATH COMBO LEFT HEART AND RIGHT HEART CATHERIZATION Diagnosis:  (abd ech

## 2019-04-23 NOTE — PROGRESS NOTES
[facility]    Brief Cardiac Cath Note  Marcia Ellington Patient Status:  Inpatient    10/14/1940 MRN [id number]   Location [facility] 4W/SW/SE Attending Lena Russell MD   Hosp Day # 8 PCP Stephanie Kimble MD       Cardiologist:

## 2019-04-23 NOTE — PROGRESS NOTES
Patient transferred to [place] [address]. Writer brought patient's belongings from [facility] 48 to 235. PCCU RN Kt Lamb at the bedside, in which report was given. Patient's  Jamel Vickie notified of movement of patient belonging as well.

## 2019-04-23 NOTE — ANESTHESIA PREPROCEDURE EVALUATION
Anesthesia PreOp Note    HPI:     Patel Palacios is a [de-identified]year old female who presents for preoperative consultation requested by: * No surgeons listed *    Date of Surgery: 4/23/2019    * No procedures listed *  Indication: * No pre-op diagnosis entered * Noted: 03/27/2012      Cough         Date Noted: 06/30/2006        Past Medical History:   Diagnosis Date   • Atrial fibrillation ([facility]     on warfarin   • Constipation    • Dehydration    • Diabetes mellitus ([facility]    • Esophageal cancer (HCC)    • Hepatit Week at Unknown time   metFORMIN HCl 500 MG Oral Tab Take 1 tablet (500 mg total) by mouth 2 (two) times daily with meals.  Crush tablets Disp: 60 tablet Rfl: 5 Past Week at Unknown time   glipiZIDE 5 MG Oral Tab Take 1 tablet (5 mg total) by mouth every mo LANCETS Does not apply Misc TEST DAILY AS DIRECTED Disp: 100 each Rfl: 3 4/7/2019 at Unknown time   Blood Glucose Monitoring Suppl (ACCU-CHEK SABRINA) Does not apply Device Test blood sugar dailyDx E11.9 Type 2 non insulin diabetic Disp: 1 Device Rfl: 0 Unkn mg 25 mg Rectal BID PRN Irina Loron, APRN 25 mg at 04/16/19 1148   metoprolol Tartrate (LOPRESSOR) 5 MG/5ML injection 5 mg 5 mg Intravenous TID Beta Blocker/Cardiac Irina Loron, APRN 5 mg at 04/23/19 0549   Or        metoprolol Tartrate (Sirisha Green KENNETH Mejia Stopped at 04/15/19 1900   Glycerin (Laxative) 1 g pediatric rectal suppository 1 g 1 suppository Rectal Daily PRN KENNETH Mejia 1 g at 04/16/19 0126   Metoclopramide HCl (REGLAN) injection 10 mg 10 mg Intravenous Q6H PRN Alcohol use: No        Alcohol/week: 0.0 oz      Drug use: No      Sexual activity: Not on file    Lifestyle      Physical activity:        Days per week: Not on file        Minutes per session: Not on file      Stress: Not on file    Relationships teaches a bible study classes, 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 She enjoys crossword puzzles. Lety Rosario is originally from [place]. Available pre-op labs reviewed.   Lab Results   Component Value Date    WBC 2.6 (L) 04/23/2019    RBC 3.27 (L) 04 dental damage if relevant, major complications, and any alternative forms of anesthetic management. All of the patient's questions were answered to the best of my ability. The patient desires the anesthetic management as planned.   Evan Vieira  4/23

## 2019-04-23 NOTE — PROGRESS NOTES
Shanon Dsouza  [id number]  4/22/2019    Post procedure/ recovery hand-off report given to [facility]. Patient's vital signs stable, Case canceled per Dr Lily Elmore, will be done tomorrow with anesthesia.  Patient and  both stated \"how frustrating this

## 2019-04-23 NOTE — PROGRESS NOTES
Double RN skin check done prior to transfer off Unit. Skin check performed by this RN and Anne-Marie Cortez [facility]     Wounds are as follows: Vaginal and buttocks redness, cream applied, Mepilex placed on sacrum, generalized bruising.  Please note, patient was on the same

## 2019-04-24 PROBLEM — Z71.89 GOALS OF CARE, COUNSELING/DISCUSSION: Status: ACTIVE | Noted: 2019-01-01

## 2019-04-24 PROBLEM — Z71.89 ADVANCED CARE PLANNING/COUNSELING DISCUSSION: Status: ACTIVE | Noted: 2019-01-01

## 2019-04-24 PROBLEM — R68.89 EXCESSIVE ORAL SECRETIONS: Status: ACTIVE | Noted: 2019-01-01

## 2019-04-24 NOTE — PROGRESS NOTES
[facility] [facility]    Progress Note    Jordan Ba Patient Status:  Inpatient    10/14/1940 MRN [id number]   Location [facility] 4W/SW/SE Attending Emily Villa MD   [facility] Day # 9 PCP Brissa Lynch MD        Subjective:   [place] stool on xray  - plan suppository and TWE  - hydrocortisone suppository for hemorrhoids   - now with \"diarrhea\" - which is likely not true diarrhea but related to constipation, cont supportive care with bowel regimen as needed    Hypokalemia and dehydrat Demineralization. 4. Scoliosis. 5. Osteoarthritis. 6. 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 7. Pacemaker. Dictated by (CST): Sylvester Gay MD on 4/23/2019 at 6:41     Approved by (CST):  Sylvester Gay MD on 4/23/2019 at 6:44          Ekg 12-lead    Res

## 2019-04-24 NOTE — PROGRESS NOTES
[facility]    Cardiology Progress Note  [facility]    Xuan Hinton Patient Status:  Inpatient    10/14/1940 MRN [id number]   Location [facility] 4W/SW/SE Attending Minnie Nichole MD   Hosp Day # 9 [facility] compensated    Ok for surgery from our perspective          Results:     Lab Results   Component Value Date    WBC 2.3 (L) 04/24/2019    HGB 9.8 (L) 04/24/2019    HCT 31.3 (L) 04/24/2019    .0 (L) 04/24/2019    CREATSERUM 0.66 04/24/2019    BUN 22 ( Report  Interpretation  -------------------------- Electronic ventricular pacemaker Pacemaker ECG, No further analysis INSUFFICIENT DATA When compared with ECG of 03/19/2019 01:28:58 no significant changes have occurred.  Electronically signed on 04/22/2019

## 2019-04-24 NOTE — DIETARY NOTE
ADULT NUTRITION REASSESSMENT     Pt is at high nutrition risk. Pt meets malnutrition criteria.       CRITERIA FOR MALNUTRITION DIAGNOSIS:  Criteria for non-severe malnutrition diagnosis: acute illness/injury related to wt loss 5% in 1 month, energy intak unable to tolerate advancing rate Longer term can provide fdgs over a shorter period of time to allow freedom from pump (16hrs on, 8 hrs off), but unable to do bolus fdgs via j-tube.  (95ml rate for 16 hrs, 125ml rate for 12 hrs)    - Parenteral Nutrition: 04/22/19 0555 98 kg (216 lb)   04/19/19 0309 93 kg (205 lb)   04/15/19 1104 93 kg (205 lb)     Wt Readings from Last 10 Encounters:  04/23/19 : 97.5 kg (215 lb)  04/11/19 : 94.3 kg (208 lb)  04/05/19 : 94.3 kg (208 lb)  04/04/19 : 93.9 kg (207 lb)  04/02 PRESCRIPTION:  Diet: NPO, TPN    Oral Supplements: none  Estimated Nutritional Needs:  Calories: 2188-7983 calories/day (18-20 calories per kg Current wt)  Protein: 78 grams protein/day (1.5 grams protein per kg Ideal body wt (IBW))  Fluid needs: 5114-4576

## 2019-04-24 NOTE — PLAN OF CARE
Problem: Patient Centered Care  Goal: Patient preferences are identified and integrated in the patient's plan of care  Description  Interventions:  - What would you like us to know as we care for you? ***  - Provide timely, complete, and accurate informa Progressing     Problem: GASTROINTESTINAL - ADULT  Goal: Minimal or absence of nausea and vomiting  Description  INTERVENTIONS:  - Maintain adequate hydration with IV or PO as ordered and tolerated  - Nasogastric tube to low intermittent suction as ordered 4/25

## 2019-04-24 NOTE — OCCUPATIONAL THERAPY NOTE
Orders received, chart review complete, TRUPTI Hernandez approved pt participation in Occupational Therapy evaluation and treatment. Pt received supine in bed with spouse Ray at bedside.  Pt spouse reporting that patient is too tired to engage in evaluation, educate

## 2019-04-24 NOTE — PHYSICAL THERAPY NOTE
PHYSICAL THERAPY EVALUATION - INPATIENT     Room Number: 467/467-A  Evaluation Date: 4/24/2019  Type of Evaluation: Initial           Reason for Therapy: Mobility Dysfunction and Discharge Planning    PHYSICAL THERAPY ASSESSMENT     Patient is a [de-identified] year o perfoation with anastamosis; pt had a colostomy for a year prior to reversal   • Unspecified essential hypertension        Past Surgical History  Past Surgical History:   Procedure Laterality Date   • APPENDECTOMY     • BACK SURGERY  8/15    benign cyst re with a railing?: A Lot     AM-PAC Score:  Raw Score: 20   Approx Degree of Impairment: 35.83%   Standardized Score (AM-PAC Scale): 47.67   CMS Modifier (G-Code): CJ    FUNCTIONAL ABILITY STATUS  Gait Assessment   Gait Assistance: Supervision  Distance (ft)

## 2019-04-24 NOTE — PROGRESS NOTES
[facility]     Progress Note        Ray Dumont Patient Status:  Inpatient    10/14/1940 MRN [id number]   Location [facility] 4W/SW/SE Attending Ronold Castleman, MD   [facility] Day # 9 PCP Arnol Peter MD       Subjective:   [place] (ANUSOL-HC) suppository 25 mg 25 mg Rectal BID PRN   metoprolol Tartrate (LOPRESSOR) 5 MG/5ML injection 5 mg 5 mg Intravenous TID Beta Blocker/Cardiac   Or      metoprolol Tartrate (LOPRESSOR) 5 MG/5ML injection 2.5 mg 2.5 mg Intravenous TID Beta Blocker/C clear to auscultation bilaterally, no wheezing, rales, rhonchi, crackles  GI: abdomen soft, non tender  Extremities: no clubbing, cyanosis, edema  Neurologic: no gross motor deficits  Skin: warm, dry      Results:     Lab Results   Component Value Date Diabetes mellitus  9. Hypertension        Plan   -Patient with evidence of nausea, vomiting on presentation. Underlying history of distal esophageal adenocarcinoma with dysphagia  -Underwent left and right sided catheterization on 4/23/2019.   No signific

## 2019-04-24 NOTE — CM/SW NOTE
CTL progression of care note:  Patient is NPO. On TPN - plan for J-tube insertion on Thursday. Patient worked with therapy today - recommendation: home.

## 2019-04-24 NOTE — PROGRESS NOTES
[place] [facility]    Progress Note    Dominique De La Paz Patient Status:  Inpatient    10/14/1940 MRN [id number]   Location [facility] 4W/SW/SE Attending Zia De Dios MD   [facility] Day # 9 PCP Thersia Gaucher, MD        Subjective:   [place] Sun[place] stool on xray  - plan suppository and TWE  - hydrocortisone suppository for hemorrhoids   - now with \"diarrhea\" - which is likely not true diarrhea but related to constipation, cont supportive care with bowel regimen as needed    Hypokalemia and dehydrat mild-moderate CHF showing some improvement from April 21, 2019. 2. Atherosclerosis. 3. Demineralization. 4. Scoliosis. 5. Osteoarthritis. 6. 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 7. Pacemaker. Dictated by (CST):  Isabela Merlos MD on 4/23/2019 at 6:41     A

## 2019-04-24 NOTE — CONSULTS
[address] Patient Status:  Inpatient    10/14/1940 MRN [id number]   Location [facility] [place] 4W/SW/SE Attending Everardo Souza MD   [address] Day # 9 PCP Kalani Arndt MD     Date of C hypertension, hyperlipidemia, obesity, osteoarthritis, ischemic colitis, thoracic spine laminectomy, and h/o perforated diverticulitis s/p partial colostomy with subsequent reversal.     In the past year, there was one other hospitalization in September 20 secretions which she is unable to swallow. Thus, she must continuously spit these into a basin. If the secretions go down her throat, then she will become nauseated and vomit the secretions up.      With regard to pain, she mentions \"bowel\" pain which has pursue further radiation and chemotherapy. Her and Lake's biggest concern is that Ashlyn Simpler be able to lie still on the RT table while being able to manage her secretions.  I told them that I will need to consider what options might be available in the outpat AMENABLE TO RE  9/13/15   • TONSILLECTOMY         Substance History:  Smoking Status: former  Hx of Substance Use/Abuse: none    Hindu/Cultural Information  Hindu Affiliation: Sabianism   requested: No  Ethnicity:     Allergies: CONTINUOUS, 200-3,000 Units/hr, Intravenous, Continuous  •  nystatin (MYCOSTATIN) 128595 UNIT/GM ointment, , Topical, BID  •  Hydrocortisone Acetate (ANUSOL-HC) suppository 25 mg, 25 mg, Rectal, BID PRN  •  metoprolol Tartrate (LOPRESSOR) 5 MG/5ML injectio Results   Component Value Date    PT 19.3 (H) 11/19/2013    INR 1.16 04/24/2019    PTT 78.9 (H) 04/24/2019       Chemistry:  Lab Results   Component Value Date    CREATSERUM 0.66 04/24/2019    BUN 22 (H) 04/24/2019     04/24/2019    K 3.8 04/24/2019 deferred  Lungs: Normal excursions and effort.   Abdomen: Distended (overweight)  : deferred  General color: normal to pale  Neurologic: level of consciousness-alert  Orientation: x 4  Appearance: appropriately groomed  Affect: normal  Judgment: normal narrative for further details      Advanced care planning/counseling discussion  -#1 HCPOA is  James Jenkins -  to bring in paperwork  -Full code; no limits set at this time - blank POLST form provided for review  -See above narrative for further detai

## 2019-04-24 NOTE — PLAN OF CARE
Danika Henderson transferred to the unit s/p cardiac catheterization and some time in [place]. No complaints of pain overnight. Nausea continues. TPN continues at 70.8 ml/hr and NPO status cont. Q6 accuchecks stable. No calls from tele. Zofran PRN.   Heparin gtt @11.5, injury  Description  INTERVENTIONS:  - Assess pt frequently for physical needs  - Identify cognitive and physical deficits and behaviors that affect risk of falls.   - Vista fall precautions as indicated by assessment.  - Educate pt/family on patient sa Monitor vital signs, rhythm, and trends  - Monitor for bleeding, hypotension and signs of decreased cardiac output  - Evaluate effectiveness of vasoactive medications to optimize hemodynamic stability  - Monitor arterial and/or venous puncture sites for bl

## 2019-04-25 NOTE — ANESTHESIA PROCEDURE NOTES
Airway  Urgency: elective      General Information and Staff    Patient location during procedure: OR  Anesthesiologist: Kelley Miranda DO  Resident/CRNA: Mynor Martin CRNA  Performed: CRNA     Indications and Patient Condition  Indications for airwa

## 2019-04-25 NOTE — PLAN OF CARE
Stella called regarding patient going to surgery today at 0730 spoke with  [employer]. No intervention needed at this time may use magnet if needed. Anesthesia to call rep if any questions.

## 2019-04-25 NOTE — PALLIATIVE CARE NOTE
[facility]  Palliative Care Follow Up    Perez Heart Patient Status:  Inpatient    10/14/1940 MRN [id number]   Location [facility] 2W/SW Attending Lilia Rai MD   Hosp Day # 10 PCP Adrian Faust MD     Date of Consult: agreeable. Goals of Care counseling and discussion/Advance Care Planning counseling and discussion:  I commended Fabián Pabon for achieving her goal today of undergoing the J tube placement and waking up without the ETT in place.      We again discussed her prim reaction(s): BENZOCAINE  Cetylpyridinium Chl*        Comment:Other reaction(s): CETYLPYRIDINIUM CHLORIDE  Ephedrine                   Medications:     Current Facility-Administered Medications:   •  lactated ringers infusion, , Intravenous, Continuous  • PRN **OR** morphINE sulfate (PF) 2 MG/ML injection 2 mg, 2 mg, Intravenous, Q2H PRN **OR** morphINE sulfate (PF) 4 MG/ML injection 4 mg, 4 mg, Intravenous, Q2H PRN  •  ondansetron HCl (ZOFRAN) injection 4 mg, 4 mg, Intravenous, Q6H PRN  •  dextrose 50 % in day. * A catheter is seen in the superior vena cava. Dictated by (CST): Maci Perez MD on 4/23/2019 at 14:33     Approved by (CST):  Maci Perez MD on 4/23/2019 at 14:34            Palliative Performance Scale : 50%      Palliative Care Goals who seem to be coping adequately  -See above narrative for further details       Advanced care planning/counseling discussion  -#1 HCPOA is  Raulito Delaney -  to bring in paperwork  -Full code; no limits set at this time - blank POLST form provided for

## 2019-04-25 NOTE — PROGRESS NOTES
May start using J tube in 6 hours from now. Start at 10 cc's/hour and advance every 4 hours by 10 cc's/hour until goal is met. Flush every 4 hours with 50 cc's water. DO NOT CRUSH MEDICATIONS AND ADMINISTER THROUGH J TUBE PLEASE.     Ruel Duke MD  C

## 2019-04-25 NOTE — BRIEF OP NOTE
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

## 2019-04-25 NOTE — PROGRESS NOTES
[facility] - [facility]  Hematology/Oncology  Progress Note    Kim Rodrigues Patient Status:  Inpatient    10/14/1940 MRN [id number]   Location [facility] 4W/SW/SE Attending Renetta Reich MD   Hosp Day # 10 PCP MD Cornell Jones medical history relevant for stage II GE junction adenocarcinoma admitted with nausea, vomiting, inability to pass solids or liquids and UTI associated with urinary frequency and burning     Plan:     1.) GE junction adenocarcinoma with obstruction     --R her inability to swallow. She'd also been holding anticoagulation following procedures without resuming therapy             Thank you for allowing us to take part in the care of this patient. Will continue to follow along with you.   MD Zaria Yancey

## 2019-04-25 NOTE — PROGRESS NOTES
[facility]    Progress Note    Virginiajericho Raglandanupam Patient Status:  Inpatient    10/14/1940 MRN [id number]   Location [facility] 4W/SW/SE Attending Eric Carver MD   [facility] Day # 9 PCP Abner Montez MD     Subjective:  Doing well, RBC 3.40 04/24/2019    HGB 9.8 04/24/2019    HCT 31.3 04/24/2019    MCV 92.1 04/24/2019    MCH 28.8 04/24/2019    MCHC 31.3 04/24/2019    RDW 14.9 04/24/2019    .0 04/24/2019     Lab Results   Component Value Date    PT 19.3 (H) 11/19/2013    PT 22 hydrALAzine HCl (APRESOLINE) injection 10 mg 10 mg Intravenous Q6H PRN   Normal Saline Flush 0.9 % injection 3 mL 3 mL Intravenous PRN   morphINE sulfate (PF) 2 MG/ML injection 1 mg 1 mg Intravenous Q2H PRN   Or      morphINE sulfate (PF) 2 MG/ML injection Excessive oral secretions     Xerostomia    This is an 78-year-old female with multiple medical problems and extensive surgical history who was recently diagnosed with an infiltrating moderately differentiated adenocarcinoma of the distal esophagus/tomasa

## 2019-04-25 NOTE — INTERVAL H&P NOTE
Patient seen and examined. No changes to the attached history and physical are noted. [de-identified]year old female presenting today for planned open J tube placement.     Marlo Aburto MD  [facility] General Surgical Oncology  [place] [phone]  Pager [phone] XD

## 2019-04-25 NOTE — ANESTHESIA POSTPROCEDURE EVALUATION
Patient: Peggyann Mask    Procedure Summary     Date:  04/25/19 Room / Location:  [facility] OR  / [facility] OR    Anesthesia Start:  3391 Anesthesia Stop:  0466    Procedure:  JEJUNOSTOMY/GASTROSTOMY TUBE INSERTION (N/A ) Diagnosis:       Adenocarcinoma o

## 2019-04-25 NOTE — PROGRESS NOTES
[facility]    Progress Note    Stephanie Kaba Patient Status:  Inpatient    10/14/1940 MRN [id number]   Location [facility] 4W/SW/SE Attending Padmini Rocha MD   Hosp Day # 10 PCP Pravin Ribera MD        Subjective:   Singh Rae TWE  - hydrocortisone suppository for hemorrhoids   - now with \"diarrhea\" - which is likely not true diarrhea but related to constipation, cont supportive care with bowel regimen as needed    Hypokalemia and dehydration.    - replace k   - Hydration in T to lay flat in order to get radiation therapy  She did not outright reject them today  Also d/w Dr. Purvis Para, plan to restart hep gtt and start tube feeds  Also had biopsy of liver, will f/u path results  D/w cardiology, Dr. Purvis Para and Rianna Muniz,

## 2019-04-25 NOTE — ANESTHESIA PREPROCEDURE EVALUATION
Anesthesia PreOp Note    HPI:     Char Foster is a [de-identified]year old female who presents for preoperative consultation requested by: Laury Lopez MD    Date of Surgery: 4/15/2019 - 4/25/2019    Procedure(s):  JEJUNOSTOMY/GASTROSTOMY TUBE INSERTION  Indicat Date Noted: 10/14/2015      Sleep apnea         Date Noted: 07/07/2015      DM type 2 (diabetes mellitus, type 2) ([facility]         Date Noted: 12/03/2014      Atrial fibrillation [facility])         Date Noted: 07/24/2014      Essential hypertension ondansetron 8 MG Oral Tablet Dispersible Take 1 tablet (8 mg total) by mouth every 8 (eight) hours as needed for Nausea.  Disp: 30 tablet Rfl: 2 4/15/2019 at Unknown time   metoprolol Tartrate 25 MG Oral Tab Take 0.5 tablets (12.5 mg total) by mouth 2 (two) glycerin, laxative, 1.2 G Rectal Suppos Place 1 suppository rectally daily as needed.  Disp:  Rfl:  Past Month at Unknown time   Glucose Blood (ACCU-CHEK SABRINA PLUS) In Vitro Strip TEST BLOOD SUGAR DAILY AS DIRECTED Dx E11.9 Type 2 non insulin using adult o 0.9%  NaCl infusion  Intravenous Continuous Ady Jarrett MD Last Rate: 20 mL/hr at 04/22/19 1351   [MAR Hold] aspirin chewable tab 324 mg 324 mg Oral Once Ady Jarrett MD    [facility] Hold] furosemide (LASIX) injection 20 mg 20 mg Intravenous Daily Jazmin [MAR Hold] Normal Saline Flush 0.9 % injection 3 mL 3 mL Intravenous PRN KENNETH Quigley 3 mL at 04/24/19 1024   [MAR Hold] morphINE sulfate (PF) 2 MG/ML injection 1 mg 1 mg Intravenous Q2H PRN KENNETH Quigley    Or        [facility] Hold] angelaI Epinephrine             SHORTNESS OF BREATH, PALPITATIONS  Lincomycin              OTHER (SEE COMMENTS)    Comment:Heart almost stopped             Other reaction(s): LINCOMYCIN HCL  Benzocaine                  Comment:Other reaction(s): BENZOCAINE  Cetylp Fear of current or ex partner: Not on file        Emotionally abused: Not on file        Physically abused: Not on file        Forced sexual activity: Not on file    Other Topics      Concerns:         Service: Not Asked        Blood Transfus CREATSERUM 0.68 04/25/2019     (H) 04/25/2019    PGLU 150 (H) 04/25/2019    CA 8.6 04/25/2019     Lab Results   Component Value Date    INR 1.15 04/25/2019    INR 1.1 (A) 04/10/2019       Vital Signs: Body mass index is 38.79 kg/m². height is 1. I have informed Lee Bombard and/or legal guardian or family member of the nature of the anesthetic plan, benefits, risks including possible dental damage if relevant, major complications, and any alternative forms of anesthetic management.    All of the

## 2019-04-25 NOTE — ANESTHESIA PROCEDURE NOTES
Arterial Line  Performed by: Fiorella Calixto DO  Authorized by: Fiorella Calixto DO     Procedure Start:  4/25/2019 7:50 AM  Procedure End:  4/25/2019 7:55 AM  Site Identification: surface landmarks    Patient Location:  OR  Indication: continuous blood pre

## 2019-04-25 NOTE — RESPIRATORY THERAPY NOTE
CABRERA ASSESSMENT:    Pt does not have a previous diagnosis of CARBERA. Pt does not routinely use a CPAP device at home. Refused to use here, TRUPTI beasley.

## 2019-04-25 NOTE — PROGRESS NOTES
Robinul added as an allergy per patient request. Severe malaise and nausea noted after Robinul administration yesterday afternoon (4/24/19 at 14:58).

## 2019-04-26 NOTE — PALLIATIVE CARE NOTE
[place] [facility] - [facility]  Palliative Care Follow Up    Char Cristian Patient Status:  Inpatient    10/14/1940 MRN [id number]   Location [facility] 2W/SW Attending Fannie Mobley MD   [address] [facility] Day # 6 PCP Javy Barrientos MD     Date of Consult: and chemo at the [facility]. Objective:  Vital Signs:  Blood pressure 129/46, pulse 65, temperature 97.9 °F (36.6 °C), temperature source Temporal, resp. rate 13, height 5' 3\" (1.6 m), weight 219 lb (99.3 kg), SpO2 97 %.   Body mass index is 38.79 k Continuous  •  dextrose 10 % infusion, , Intravenous, Continuous PRN  •  heparin (PORCINE) drip 91926gehyw/250mL infusion CONTINUOUS, 200-3,000 Units/hr, Intravenous, Continuous  •  artificial saliva substitute (MOUTH KOTE) solution SOLN, , Mouth/Throat, P times per day  •  albuterol sulfate (VENTOLIN) (2.5 MG/3ML) 0.083% nebulizer solution 2.5 mg, 2.5 mg, Nebulization, Q6H PRN  •  bisacodyl (DULCOLAX) rectal suppository 10 mg, 10 mg, Rectal, Daily  •  Glycerin (Laxative) 1 g pediatric rectal suppository 1 g junction (HCC)       Constipation, unspecified constipation type       Abdominal pain, acute       Nausea  -Continue Zofran IVP PRN (pt did not want to try scheduled Zofran)  -Trial Ativan Intensol 0.5 mg SL Q 6 hours PRN (defer until pt if off the [address]

## 2019-04-26 NOTE — PLAN OF CARE
Problem: Patient Centered Care  Goal: Patient preferences are identified and integrated in the patient's plan of care  Description  Interventions:  - What would you like us to know as we care for you?   - Provide timely, complete, and accurate informatio levels are increased as tolerated post-operatively.       Problem: SAFETY ADULT - FALL  Goal: Free from fall injury  Description  INTERVENTIONS:  - Assess pt frequently for physical needs  - Identify cognitive and physical deficits and behaviors that affect oral hygiene and moisture  - Encourage food from home; allow for food preferences  - Enhance eating environment  Outcome: Progressing     Problem: RESPIRATORY - ADULT  Goal: Achieves optimal ventilation and oxygenation  Description  INTERVENTIONS:  - Asses PLANNING  Goal: Discharge to home or other facility with appropriate resources  Description  INTERVENTIONS:  - Identify barriers to discharge w/pt and caregiver  - Include patient/family/discharge partner in discharge planning  - Arrange for needed dischar Administer electrolyte replacement as ordered  - Monitor response to electrolyte replacements, including rhythm and repeat lab results as appropriate  - Fluid restriction as ordered  - Instruct patient on fluid and nutrition restrictions as appropriate  Ou maximize function  - Promote sitting position while performing ADLs such as feeding, grooming, and bathing  - Educate and encourage patient/family in tolerated functional activity level and precautions during self-care    Outcome: Progressing     Problem:

## 2019-04-26 NOTE — CM/SW NOTE
Received MDO for rehab. PT/OT recommending SNF. Patient has a new Jtube, TPN stopped. Patient going to weekly chemo & daily RT. Met with patient and  Ariella Tellez. Patient has been to [facility] in the past but did not have a good experience.  Discussed oth

## 2019-04-26 NOTE — DIETARY NOTE
Brief Nutrition Note:    See note from 4/24 for more details. Pt tolerating j-tube fdgs at 20ml/hr. RN to increase to 30ml/hr now. If tolerating at 30ml/hr x4 hrs advised RN to increase by 10ml q4hrs to goal rate of 75ml/hr.  When at 75ml/hr run for

## 2019-04-26 NOTE — PLAN OF CARE
Problem: PAIN - ADULT  Goal: Verbalizes/displays adequate comfort level or patient's stated pain goal  Description  INTERVENTIONS:  - Encourage pt to monitor pain and request assistance  - Assess pain using appropriate pain scale  - Administer analgesics b appropriate  - Advance diet as tolerated, if ordered  - Obtain nutritional consult as needed  - Evaluate fluid balance  Outcome: Progressing  Tolerating tube feedings so TPN stopped.      Problem: RESPIRATORY - ADULT  Goal: Achieves optimal ventilation and profile  - Implement strategies to promote bladder emptying  Outcome: Progressing   Balderas catheter for accurate I/O.     Problem: METABOLIC/FLUID AND ELECTROLYTES - ADULT  Goal: Glucose maintained within prescribed range  Description  INTERVENTIONS:  - Citlaly

## 2019-04-26 NOTE — PROGRESS NOTES
[facility]    Progress Note    Virginiajericho Raglandanupam Patient Status:  Inpatient    10/14/1940 MRN [id number]   Location [facility] 4W/SW/SE Attending Wilmer Mckeon MD   [facility] Day # 11 PCP Abner Montez MD        Subjective:   Basia Chute hemorrhoids   - now with \"diarrhea\" - which is likely not true diarrhea but related to constipation, cont supportive care with bowel regimen as needed    Hypokalemia and dehydration.    - replace k   - Hydration in TPN    Diabetes mellitus type 2.    -ss

## 2019-04-26 NOTE — PHYSICAL THERAPY NOTE
PHYSICAL THERAPY EVALUATION - INPATIENT     Room Number: 367/299-Q  Evaluation Date: 4/26/2019  Type of Evaluation: Re-evaluation   Physician Order: PT Eval and Treat    Presenting Problem: dehydration with JG tube insertion on 4/25  Reason for Therapy: M make progress toward IP PT goals, however, tolerance this date limited by abdominal pain. The patient's Approx Degree of Impairment: 54.16% has been calculated based on documentation in the [facility] '6 clicks' Inpatient Basic Mobility Short Form.   Research Laterality Date   • APPENDECTOMY     • BACK SURGERY  8/15    benign cyst removed from thoracic area   • CATARACT Right 2011    right sided cataract surgery   • COLONOSCOPY  2007   • HYSTERECTOMY     • JEJUNOSTOMY/GASTROSTOMY TUBE INSERTION N/A 4/25/2019 -   Moving to and from a bed to a chair (including a wheelchair)?: A Little   -   Need to walk in hospital room?: A Lot   -   Climbing 3-5 steps with a railing?: A Lot     AM-PAC Score:  Raw Score: 16   Approx Degree of Impairment: 54.16%   Standardized

## 2019-04-26 NOTE — PROGRESS NOTES
Patient transferred to new room in stable condition. Double RN skin check done prior to transfer off Unit. Skin check performed by this RN and Ally Pérez. Wounds are as follows: vaginal redness, arms with bruising, surgery incisions.     Will remain avai

## 2019-04-26 NOTE — PHYSICAL THERAPY NOTE
Attempted PT treatment- pt supine in bed and declining OOB activity due to nausea- will re-attempt as schedule permits.   Thank you,  Randi Godinez, PT, DPT

## 2019-04-26 NOTE — PROGRESS NOTES
[facility]    Progress Note    [facility] Patient Status:  Inpatient    10/14/1940 MRN [id number]   Location [facility] 4W/SW/SE Attending Sandra Harris MD   [facility] Day # 11 PCP Shonna Alves MD     Subjective:  No acute ev Cardiac: Regular rate and rhythm, S1, S2 normal, no murmur, rub or gallop. Lungs: Clear without wheezes, rales, rhonchi or dullness. Normal excursions and effort. Abdomen: Soft, incision CDI. J tube site CDI.         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

## 2019-04-26 NOTE — PROGRESS NOTES
[facility]    Cardiology Progress Note  [facility]    Lerma Smoke Patient Status:  Inpatient    10/14/1940 MRN [id number]   [facility] 2W/SW Attending Doug Cooley MD   [facility] Day # 11 PCP Mar is to try and give outpatient radiation therapy and possibly chemotherapy as early as next week        Nonischemic dilated cardiomyopathy. Ejection fraction around 30 to 35% with moderate mitral regurgitation.   Cardiac cath done recently revealed mildly e

## 2019-04-27 NOTE — PROGRESS NOTES
[facility]    Progress Note    Ray Dumont Patient Status:  Inpatient    10/14/1940 MRN [id number]   Location [facility] 4W/SW/SE Attending Ronold Castleman, MD   [facility] Day # 12 PCP Arnol Peter MD       Subjective:   Lobo Singletary TSH 2.09 09/09/2018     03/19/2019     (H) 11/08/2011    MG 2.4 04/26/2019    PHOS 2.5 04/26/2019    TROP <0.045 03/19/2019                   Assessment and Plan:       Distal esophageal adenocarcinoma, with dysphagia to clear liquids and sol pending  Greater than 35 minutes spent, >50% spent counseling re: treatment plan and work up.       Melly Kohli MD  **Certification      PHYSICIAN Certification of Need for Inpatient Hospitalization - Initial Certification    Patient will require inpati

## 2019-04-27 NOTE — PHYSICAL THERAPY NOTE
Chart reviewed, pt ok to be seen per TRUPTI Rushing. Pt refused d/t pain 9-10/10 with turning. Pt is open to attempting PT later today, will reattempt later as able. RN Mariann aware.

## 2019-04-27 NOTE — PHYSICAL THERAPY NOTE
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 Rec d/c to [facility] at this point d/t level of care required, dec functional mobility with pain.  Pt's  acknowledged that it would be difficult for him to assist pt with ADLs and functional mobility and endorsed d/c to SA steps with a railing?: A Lot     AM-PAC Score:  Raw Score: 16   Approx Degree of Impairment: 54.16%   Standardized Score (AM-PAC Scale): 40.78   CMS Modifier (G-Code): CK    FUNCTIONAL ABILITY STATUS  Gait Assessment   Gait Assistance: Not tested  Distance

## 2019-04-27 NOTE — PLAN OF CARE
Problem: Patient Centered Care  Goal: Patient preferences are identified and integrated in the patient's plan of care  Description  Interventions:  - What would you like us to know as we care for you?  I have esophageal ca   - Provide timely, complete, an based on assessment  - Modify environment to reduce risk of injury  - Provide assistive devices as appropriate  - Consider OT/PT consult to assist with strengthening/mobility  - Encourage toileting schedule  4/27/2019 1851 by Ledy Reina RN  Outcome: RN  Outcome: Progressing  4/27/2019 1629 by Karla Patricio RN  Outcome: Progressing     Problem: RESPIRATORY - ADULT  Goal: Achieves optimal ventilation and oxygenation  Description  INTERVENTIONS:  - Assess for changes in respiratory status  - Assess fo life threatening arrhythmias  - Monitor electrolytes and administer replacement therapy as ordered  4/27/2019 1851 by Alexx Coreas RN  Outcome: Progressing  4/27/2019 1629 by Alexx Coreas RN  Outcome: Progressing     Problem: DISCHARGE PLANNING  Go hyperglycemia and hypoglycemia  - Administer ordered medications to maintain glucose within target range  - Assess barriers to adequate nutritional intake and initiate nutrition consult as needed  - Instruct patient on self management of diabetes  4/27/201 Progressing  Goal: Oral mucous membranes remain intact  Description  INTERVENTIONS  - Assess oral mucosa and hygiene practices  - Implement preventative oral hygiene regimen  - Implement oral medicated treatments as ordered  4/27/2019 1851 by Charo King throat clearing or wet/gurgly vocal quality is noted  4/27/2019 1851 by Barry Shepherd, RN  Outcome: Progressing  4/27/2019 1629 by Barry Shepherd, RN  Outcome: Progressing     Pt A/O, can be forgetful at times.  at the bedside.  No calls from tele

## 2019-04-28 NOTE — PLAN OF CARE
Carlita Byrne able to rest comfortable overnight. VSS. PCA Dilaudid in place with minimal usage overnight. No calls from tele. Lovenox subq continued. Accucheck Q6, covered as needed. Balderas in place. Osmolite 1.2 Tube feeding infusing through Jtube @ 75 ml/hr. including physical limitations  - Instruct pt to call for assistance with activity based on assessment  - Modify environment to reduce risk of injury  - Provide assistive devices as appropriate  - Consider OT/PT consult to assist with strengthening/mobilit oxygenation and minimize respiratory effort  - Oxygen supplementation based on oxygen saturation or ABGs  - Provide Smoking Cessation handout, if applicable  - Encourage broncho-pulmonary hygiene including cough, deep breathe, Incentive Spirometry  - Asses for interpreters to assist at discharge as needed  - Consider post-discharge preferences of patient/family/discharge partner  - Complete POLST form as appropriate  - Assess patient's ability to be responsible for managing their own health  - Refer to Case intact  Description  INTERVENTIONS  - Assess and document risk factors for pressure ulcer development  - Assess and document skin integrity  - Monitor for areas of redness and/or skin breakdown  - Initiate interventions, skin care algorithm/standards of ca Progressing     Problem: Impaired Swallowing  Goal: Minimize aspiration risk  Description  Interventions:  - Patient should be alert and upright for all feedings (90 degrees preferred)  - Offer food and liquids at a slow rate  - No straws  - Encourage smal

## 2019-04-28 NOTE — PROGRESS NOTES
[facility]    Progress Note    Mauricio [place] Patient Status:  Inpatient    10/14/1940 MRN [id number]   Location [facility] 4W/SW/SE Attending Mable Crocker MD   [facility] Day # 15 PCP Pepe Vela MD       Subjective:   Murtaza Gilliam 04/22/2019    AST 19 04/22/2019    ALT 21 04/22/2019    PTT 66.0 (H) 04/26/2019    INR 1.14 04/27/2019    PT 19.3 (H) 11/19/2013    T4F 1.05 06/27/2018    TSH 2.09 09/09/2018     03/19/2019     (H) 11/08/2011    MG 2.2 04/28/2019    PHOS 2.5 suppository     Hypokalemia    - replace k      Dehydration   -resolved    Diabetes mellitus type 2.    -ss insulin   -add levemir  -will be unable to take orals on dc, so plan levemir and ssi     HTN  - po meds on hold  - metoprolol, hydralazine and lasix

## 2019-04-28 NOTE — OCCUPATIONAL THERAPY NOTE
OCCUPATIONAL THERAPY EVALUATION - INPATIENT     Room Number: 465/465-A  Evaluation Date: 4/28/2019  Type of Evaluation: Initial  Presenting Problem: Dehydration; JG tube insertion 4/25    Physician Order: IP Consult to Occupational Therapy  Reason for Ther simplification techniques;ADL training;Functional transfer training;UE strengthening/ROM; Endurance training;Patient/Family education;Patient/Family training;Equipment eval/education       OCCUPATIONAL THERAPY MEDICAL/SOCIAL HISTORY     Problem List  [place] (RW)       Hand Dominance: Right  Drives: Yes       Stairs in Home: 7 to enter   Use of Assistive Device(s): pt reports not using AD PTA; has RW available    Prior Level of [place]: Pt reports full independence PTA    SUBJECTIVE  \"I don't understand Standing: TBD  Dynamic Standing: TBD    FUNCTIONAL ADL ASSESSMENT  Grooming: min A  Feeding: setup  Bathing: mod A  Toileting: max A  Upper Extremity Dressing: min A  Lower Extremity Dressing: max A    Education Provided: Pt and  educated on OT role

## 2019-04-28 NOTE — PROGRESS NOTES
[place] [facility]    Progress Note    Dominique De La Paz Patient Status:  Inpatient    10/14/1940 MRN [id number]   Location [facility] 4W/SW/SE Attending Carmenza Bernard MD   [address] Day # 13 PCP Thersia Gaucher, MD     Subjective:  No acute ev General: Alert and oriented in no apparent distress. HEENT: No focal deficits. Neck: No JVD, carotids 2+ no bruits. Cardiac: Regular rate and rhythm, S1, S2 normal, no murmur, rub or gallop. Lungs: Clear without wheezes, rales, rhonchi or dullness.   No hydrALAzine HCl (APRESOLINE) injection 5 mg 5 mg Intravenous Q6H PRN   dextrose 10 % infusion  Intravenous Continuous PRN   dextrose 5 % and 0.45 % NaCl with KCl 20 mEq infusion  Intravenous Continuous   nystatin (MYCOSTATIN) 987791 UNIT/GM ointment  Topic Long term (current) use of anticoagulants     Encounter for therapeutic drug monitoring     Esophageal carcinoma (HCC)     Adenocarcinoma of gastroesophageal junction (HCC)     Dehydration     Constipation, unspecified constipation type     Abdominal pa

## 2019-04-28 NOTE — PLAN OF CARE
Problem: Patient Centered Care  Goal: Patient preferences are identified and integrated in the patient's plan of care  Description  Interventions:  - What would you like us to know as we care for you?  I have esophageal ca   - Provide timely, complete, an schedule  Outcome: Progressing     Problem: GASTROINTESTINAL - ADULT  Goal: Minimal or absence of nausea and vomiting  Description  INTERVENTIONS:  - Maintain adequate hydration with IV or PO as ordered and tolerated  - Nasogastric tube to low intermittent

## 2019-04-28 NOTE — PROGRESS NOTES
[facility]    Progress Note    Marcia Ellington Patient Status:  Inpatient    10/14/1940 MRN [id number]   Location [facility] 4W/SW/SE Attending Margarito Warren MD   [facility] Day # 15 PCP Stephanie Kimble MD       Assessment and Plan: (Gastrostomy/Enterostomy Jejunostomy 1 18 Fr. LUQ) -- -- 0    Total Output 550 1650 0       Net I/O     2020 382 130        Wt Readings from Last 4 Encounters:  04/27/19 : 233 lb 3.2 oz (105.8 kg)  04/11/19 : 208 lb (94.3 kg)  04/05/19 : 208 lb (94.3 kg)

## 2019-04-28 NOTE — OCCUPATIONAL THERAPY NOTE
OT orders received and chart reviewed. OT tatyana attempted 2x this AM however pt with nursing care then with PCT for sponge bathing. Asked OT to return later. Will re-attempt later today as schedule allows. Will continue to follow.

## 2019-04-29 NOTE — PALLIATIVE CARE NOTE
[facility]  Palliative Care Follow Up    Dory Barbosa Patient Status:  Inpatient    10/14/1940 MRN [id number]   Location [facility] 2W/SW Attending Lo Bravo MD   [facility] Day # 15 PCP Diana Mercado MD     Date of Consult: ml   Output 1200 ml   Net -517.3 ml       Physical Exam:  General: chronically ill, mostly comfortable, somewhat fatigued  Nutritional status: obese  HEENT: frequently spitting saliva into a basin and wetting her mouth with water  Chest appearance: Kyphosi Oral, 4 times per day  •  scopolamine (TRANSDERM-SCOP) patch, 1 patch, Transdermal, Q72H  •  Fluticasone Propionate (FLONASE) 50 MCG/ACT nasal spray 1 spray, 1 spray, Each Nare, Daily  •  HYDROcodone-acetaminophen 7.5-325 MG/15ML solution 15 mL, 15 mL, Ora Date    WBC 5.4 04/28/2019    HGB 9.2 (L) 04/28/2019    HCT 30.0 (L) 04/28/2019    .0 (L) 04/28/2019       Coags:  Lab Results   Component Value Date    PT 19.3 (H) 11/19/2013    INR 1.14 04/27/2019    PTT 66.0 (H) 04/26/2019       Chemistry:  Lab R appreciated)       Xerostomia   -Continue Mouth Kote (saliva substitute) PRN      Pain  -Continue Norco 7.5/325 mg solution per tube Q 4 hours PRN (as prescribed by Dr. Yoselin Mendoza)      Constipation  -Continue Miralax per tube daily        Goals of care, cou

## 2019-04-29 NOTE — OPERATIVE REPORT
Date of operation 4/25/2019    Preoperative diagnosis:  1. History of adenocarcinoma of the gastroesophageal junction, on neoadjuvant chemoradiation  2. History of complete obstruction secondary to #1 at the level of the gastroesophageal junction  3.   Hi The patient was brought to the operating room and laid supine on the operating table. General endotracheal anesthesia was induced with no complication. All pressure points were padded appropriately.   A Balderas catheter was inserted under sterile conditions Attention was then drawn to the right upper quadrant. The left lobe of the liver was identified. The liver was notably nodular and had a cirrhotic appearance.   Using a 15 blade, a 1 cm wedge biopsy of the liver was obtained by dividing the capsule and pa

## 2019-04-29 NOTE — PROGRESS NOTES
[facility]    Progress Note    Dominique De La Paz Patient Status:  Inpatient    10/14/1940 MRN [id number]   Location [facility] 4W/SW/SE Attending Carmenza Bernard MD   [facility] Day # 14 PCP Thersia Gaucher, MD     Subjective:  No acute ev Cardiac: Regular rate and rhythm, S1, S2 normal, no murmur, rub or gallop. Lungs: Clear without wheezes, rales, rhonchi or dullness. Normal excursions and effort.   Abdomen: Soft, normoactive bowel sounds, incision clean dry and intact, J-tube site clean dextrose 10 % infusion  Intravenous Continuous PRN   dextrose 5 % and 0.45 % NaCl with KCl 20 mEq infusion  Intravenous Continuous   nystatin (MYCOSTATIN) 631685 UNIT/GM ointment  Topical BID   Hydrocortisone Acetate (ANUSOL-HC) suppository 25 mg 25 mg Rec Goals of care, counseling/discussion     Advanced care planning/counseling discussion     Excessive oral secretions     Xerostomia    This is an 49-year-old female with multiple medical problems and extensive surgical history who was recently diagnosed

## 2019-04-29 NOTE — PROGRESS NOTES
[facility]    Progress Note    Ray Dumont Patient Status:  Inpatient    10/14/1940 MRN [id number]   Location [facility] 4W/SW/SE Attending Ronold Castleman, MD   [facility] Day # 14 PCP Arnol Peter MD        Subjective:     Res CO2 31.0 04/29/2019     (H) 04/29/2019    CA 8.1 (L) 04/29/2019    ALB 2.7 (L) 04/22/2019    ALKPHO 40 (L) 04/22/2019    BILT 0.5 04/22/2019    TP 6.5 04/22/2019    AST 19 04/22/2019    ALT 21 04/22/2019    PTT 66.0 (H) 04/26/2019    INR 1.14 04/27/

## 2019-04-29 NOTE — PROGRESS NOTES
[facility]  Hematology/Oncology  Progress Note    Roni Brothers Patient Status:  Inpatient    10/14/1940 MRN [id number]   Location [facility] 4W/SW/SE Attending Carlie Hollingsworth MD   [facility] Day # 14 PCP MD Cornell Rivers inability to pass solids or liquids and UTI associated with urinary frequency and burning     Plan:     1.) GE junction adenocarcinoma with obstruction     --Radiation therapy began on Tuesday of last week and 1st dose of weekly carbo/taxol began on Thursd Date    WBC 5.4 04/28/2019    HGB 9.2 (L) 04/28/2019    HCT 30.0 (L) 04/28/2019    .0 (L) 04/28/2019    CREATSERUM 0.55 04/29/2019    BUN 21 (H) 04/29/2019     04/29/2019    K 4.2 04/29/2019     04/29/2019    CO2 31.0 04/29/2019    GLU 1

## 2019-04-29 NOTE — PLAN OF CARE
Nasir Sargent able to rest comfortable overnight. VSS. PCA Dilaudid in place. No calls from tele. Lovenox subq continued. Accucheck Q6, covered as needed. Levemir initiated. Balderas in place. Osmolite 1.2 Tube feeding infusing through Jtube @ 75 ml/hr.  Will delmy physical limitations  - Instruct pt to call for assistance with activity based on assessment  - Modify environment to reduce risk of injury  - Provide assistive devices as appropriate  - Consider OT/PT consult to assist with strengthening/mobility  - Encou minimize respiratory effort  - Oxygen supplementation based on oxygen saturation or ABGs  - Provide Smoking Cessation handout, if applicable  - Encourage broncho-pulmonary hygiene including cough, deep breathe, Incentive Spirometry  - Assess the need for s to assist at discharge as needed  - Consider post-discharge preferences of patient/family/discharge partner  - Complete POLST form as appropriate  - Assess patient's ability to be responsible for managing their own health  - Refer to Case Management Depart and document risk factors for pressure ulcer development  - Assess and document skin integrity  - Monitor for areas of redness and/or skin breakdown  - Initiate interventions, skin care algorithm/standards of care as needed  Outcome: Progressing  Goal: Inc adequate comfort level or patient's stated pain goal  Description  INTERVENTIONS:  - Encourage pt to monitor pain and request assistance  - Assess pain using appropriate pain scale  - Administer analgesics based on type and severity of pain and evaluate re adequate hydration with IV or PO as ordered and tolerated  - Evaluate effectiveness of GI medications  - Encourage mobilization and activity  - Obtain nutritional consult as needed  - Establish a toileting routine/schedule  - Consider collaborating with ph Assess quality of pulses, skin color and temperature  - Assess for signs of decreased coronary artery perfusion - ex.  Angina  - Evaluate fluid balance, assess for edema, trend weights  Outcome: Progressing  Goal: Absence of cardiac arrhythmias or at baseli Problem: METABOLIC/FLUID AND ELECTROLYTES - ADULT  Goal: Glucose maintained within prescribed range  Description  INTERVENTIONS:  - Monitor Blood Glucose as ordered  - Assess for signs and symptoms of hyperglycemia and hypoglycemia  - Administer ordered treatments as ordered  Outcome: Progressing     Problem: Impaired Functional Mobility  Goal: Achieve highest/safest level of mobility/gait  Description  Interventions:  - Assess patient's functional ability and stability  - Promote increasing activity/tole

## 2019-04-29 NOTE — PROGRESS NOTES
[facility]    Progress Note    Mauricio Macias Patient Status:  Inpatient    10/14/1940 MRN [id number]   Location [facility] 4W/SW/SE Attending Mable Crocker MD   [address] Day # 13 PCP MD Mauricio Spangler  Is a HGB 9.2 (L) 04/28/2019    HCT 30.0 (L) 04/28/2019    .0 (L) 04/28/2019    CREATSERUM 0.68 04/28/2019    BUN 24 (H) 04/28/2019     04/28/2019    K 4.3 04/28/2019     04/28/2019    CO2 31.0 04/28/2019     (H) 04/28/2019    CA 7.9 (L

## 2019-04-29 NOTE — PHYSICAL THERAPY NOTE
PHYSICAL THERAPY TREATMENT NOTE - INPATIENT     Room Number: 465/465-A       Presenting Problem: dehydration with JG tube insertion on 4/25    Problem List  Principal Problem:    Dehydration  Active Problems:    Adenocarcinoma of gastroesophageal junction education;Gait training;Strengthening;Transfer training;Balance training    SUBJECTIVE  \"Don't pull me\"     OBJECTIVE  Precautions: None;Drain(s)(JG tube, pain pump)    WEIGHT BEARING RESTRICTION  Weight Bearing Restriction: None                PAIN ASSE Goals to be met by: 5/10/19  Patient Goal Patient's self-stated goal is: Return to PLOF and decrease pain   Goal #1 Patient is able to demonstrate supine - sit EOB @ level: independent      Goal #1   Current Status  SBA   Goal #2 Patient is able to demon

## 2019-04-29 NOTE — DIETARY NOTE
ADULT NUTRITION REASSESSMENT     Pt is at high nutrition risk. Pt meets malnutrition criteria.       CRITERIA FOR MALNUTRITION DIAGNOSIS:  Criteria for non-severe malnutrition diagnosis: acute illness/injury related to wt loss 5% in 1 month, energy intak calorie formula Osmolite 1.5 or Jevity 1.5 if unable to tolerate advancing rate Longer term can provide fdgs over a shorter period of time to allow freedom from pump (16hrs on, 8 hrs off), but unable to do bolus fdgs via j-tube.  (95ml rate for 16 hrs, 125m 04/22/19 1000 95.4 kg (210 lb 4.8 oz)   04/22/19 0555 98 kg (216 lb)   04/19/19 0309 93 kg (205 lb)     Wt Readings from Last 10 Encounters:  04/27/19 : 105.8 kg (233 lb 3.2 oz)  04/11/19 : 94.3 kg (208 lb)  04/05/19 : 94.3 kg (208 lb)  04/04/19 : 93.9 k 16    NUTRITION PRESCRIPTION:  Diet: NPO, TPN    Oral Supplements: none  Estimated Nutritional Needs:  Calories: 4589-8211 calories/day (18-20 calories per kg Current wt)  Protein: 78 grams protein/day (1.5 grams protein per kg Ideal body wt (IBW))  Fluid

## 2019-04-29 NOTE — CM/SW NOTE
Updated information sent to [facility] notified liaison Kai Kimball of possible discharge tomorrow 4/30.      [facility] ext [phone]

## 2019-04-29 NOTE — PHYSICAL THERAPY NOTE
Attempted physical therapy treatment. Per RN, patient is occupied with visit from physician.  Will re-attempt as schedule permits

## 2019-04-29 NOTE — PROGRESS NOTES
[facility]    Progress Note    Virginiajericho Raglandanupam Patient Status:  Inpatient    10/14/1940 MRN [id number]   Location [facility] 4W/SW/SE Attending Wilmer Mckeon MD   [facility] Day # 14 PCP Abner Montez MD       Subjective:   James Bills HCT 30.0 (L) 04/28/2019    .0 (L) 04/28/2019    CREATSERUM 0.55 04/29/2019    BUN 21 (H) 04/29/2019     04/29/2019    K 4.2 04/29/2019     04/29/2019    CO2 31.0 04/29/2019     (H) 04/29/2019    CA 8.1 (L) 04/29/2019    ALB 2.7 (L for cardiac clearance  - reviewed cxr- with overload--> plan daily iv lasix--> lasix oral solution daily  - s/p angiogram under anesthesia 4/23, ok for surgery      Constipation  - moderate stool on xray on admission  - plan suppository and TWE  - hydrocor

## 2019-04-30 NOTE — PROGRESS NOTES
[facility]    Cardiology Progress Note  [facility]    Roni Brothers Patient Status:  Inpatient    10/14/1940 MRN [id number]   Location [facility] 4W/SW/SE Attending Reena Hicks MD   Hosp Day # 15 PCP 04/30/2019    HCT 28.0 (L) 04/30/2019    .0 (L) 04/30/2019    CREATSERUM 0.60 04/30/2019    BUN 23 (H) 04/30/2019     04/30/2019    K 3.8 04/30/2019     04/30/2019    CO2 32.0 04/30/2019     (H) 04/30/2019    CA 8.3 (L) 04/30/2019

## 2019-04-30 NOTE — OCCUPATIONAL THERAPY NOTE
OCCUPATIONAL THERAPY TREATMENT NOTE - INPATIENT        Room Number: 465/465-A           Presenting Problem: Dehydration; JG tube insertion 4/25    Problem List  Principal Problem:    Dehydration  Active Problems:    Adenocarcinoma of gastroesophageal junct WILLIE    DISCHARGE RECOMMENDATIONS  OT Discharge Recommendations: Sub-acute rehabilitation  OT Device Recommendations: TBD     PLAN  OT Treatment Plan: Balance activities; Energy conservation/work simplification techniques;ADL training;Functional transfer tr complete toileting with min A and AD  Comment: min A for clothing management     Patient will tolerate standing for 3 minutes in prep for adls with CGA and AD   Comment: goal met     Patient will complete LB dress with min A and AD  Comment: NT          Go

## 2019-04-30 NOTE — PALLIATIVE CARE NOTE
Patient initially seen sitting in the recliner with no family at the bedside.  Goran Correa later entered the room. We discussed that the plan is for discharge to [facility] for a [facility] stay.  Lobo Singletary is eager to resume cancer treatment as soon a

## 2019-04-30 NOTE — PLAN OF CARE
Problem: Patient Centered Care  Goal: Patient preferences are identified and integrated in the patient's plan of care  Description  Interventions:  - What would you like us to know as we care for you?  I have esophageal ca   - Provide timely, complete, an schedule  Outcome: Progressing     Problem: GASTROINTESTINAL - ADULT  Goal: Minimal or absence of nausea and vomiting  Description  INTERVENTIONS:  - Maintain adequate hydration with IV or PO as ordered and tolerated  - Nasogastric tube to low intermittent perform as needed  - Assess and instruct to report SOB or any respiratory difficulty  - Respiratory Therapy support as indicated  - Manage/alleviate anxiety  - Monitor for signs/symptoms of CO2 retention  Outcome: Progressing     Problem: CARDIOVASCULAR - coordinating discharge planning if the patient needs post-hospital services based on physician/LIP order or complex needs related to functional status, cognitive ability or social support system  Outcome: Progressing     Problem: [address] wounds(s) or drain site(s) healing without S/S of infection  Description  INTERVENTIONS:  - Assess and document risk factors for pressure ulcer development  - Assess and document skin integrity  - Assess and document dressing/incision, wound bed, drain sit wet/gurgly vocal quality is noted  Outcome: Progressing     Arielle Carballoap Belcher Loud) is aware of her plan of care. Patient is alert and oriented x4. On 1L o2 for comfort. Patient does not tolerate any PO intake. Patient sucks on ice chips and spits into basin.  Per d

## 2019-04-30 NOTE — CM/SW NOTE
347pm:     Ambulance placed on will call for today to [facility].  If pt is able to discharge later this evening, RN to call [facility]/ transport time and liaison, Andria Lesches at [phone].     --------------------------------------------------------------------

## 2019-04-30 NOTE — DISCHARGE SUMMARY
[facility]    Discharge Summary    Daniel Tineo Patient Status:  Inpatient    10/14/1940 MRN [id number]   Location [facility] 4W/SW/SE Attending Robert Aguilar MD   [facility] Day # 13 PCP Blanka Issa MD     Date of Admission: 4 secretions     Xerostomia    Physical Exam: Gen: appears comfortable, alert and oriented x3  HEENT: bilateral TM intact with no excess cerumen build up  Pulm: Lungs clear, normal respiratory effort  CV: Heart with regular rate and rhythm, trace edema to bi the near future      Dysphagia  -npo  -with esophageal obstruction  -add intensol to help patient relax and be able to lay flat for radiation therapy- good result on 4/28  -add biotene to help with dry mouth and comfort  -added \"mouth kote\" artificial sa information:  [facility]  [phone]             Sofy Garcia MD.    Specialty:  Surgical Oncology  Why:  as directed   Contact information:  [facility]  [facility] [address] [place] [address]             Shen Fu Susp  Commonly known as:  LOPRESSOR  Replaces:  metoprolol Tartrate 25 MG Tabs      Take 0.625 mL (6.25 mg total) by mouth every 6 (six) hours.    Refills:  0     omeprazole 2mg/ml Susp  Commonly known as:  PRILOSEC      Take 20 mL (40 mg total) by mouth da total) rectally daily.    Quantity:  24 suppository  Refills:  1     lidocaine-prilocaine 2.5-2.5 % Crea  Commonly known as:  EMLA      Apply to site 1 hour prior to port a cath needle insertion   Quantity:  1 Tube  Refills:  2     ondansetron 8 MG Tbdp  Co who will arrange for later this week    Radiation schedule to be arranged per Dr. Rudy Allen or his partner.      PT/OT- please medicate with prn pain meds 30 mins prior    Titrate stool softeners to have a bm every day-every other day             Discharge Referjaime

## 2019-05-01 NOTE — PLAN OF CARE
Problem: Patient Centered Care  Goal: Patient preferences are identified and integrated in the patient's plan of care  Description  Interventions:  - What would you like us to know as we care for you?  I have esophageal ca   - Provide timely, complete, an schedule  Outcome: Progressing     Problem: GASTROINTESTINAL - ADULT  Goal: Minimal or absence of nausea and vomiting  Description  INTERVENTIONS:  - Maintain adequate hydration with IV or PO as ordered and tolerated  - Nasogastric tube to low intermittent perform as needed  - Assess and instruct to report SOB or any respiratory difficulty  - Respiratory Therapy support as indicated  - Manage/alleviate anxiety  - Monitor for signs/symptoms of CO2 retention  Outcome: Progressing     Problem: CARDIOVASCULAR - coordinating discharge planning if the patient needs post-hospital services based on physician/LIP order or complex needs related to functional status, cognitive ability or social support system  Outcome: Progressing     Problem: [address] wounds(s) or drain site(s) healing without S/S of infection  Description  INTERVENTIONS:  - Assess and document risk factors for pressure ulcer development  - Assess and document skin integrity  - Assess and document dressing/incision, wound bed, drain sit wet/gurgly vocal quality is noted  Outcome: Vahe Wynne is aware of her plan of care. Patient is alert and oriented x4. On 1L o2 for comfort. Denies any pain. Patient complains of fullness.  Patient believes this is related to the Mirlax and suppos

## 2019-05-01 NOTE — DISCHARGE SUMMARY
[facility]    Discharge Summary    Marcia Ellington Patient Status:  Inpatient    10/14/1940 MRN [id number]   Location [facility] 4W/SW/SE Attending Romulo Dominguez MD   [facility] Day # 12 PCP Stephanie Kimble MD     Date of Admission: secretions     Xerostomia    Physical Exam: Gen: appears comfortable, alert and oriented x3  HEENT: bilateral TM intact with no excess cerumen build up  Pulm: Lungs clear, normal respiratory effort  CV: Heart with regular rate and rhythm, trace edema to bi the near future      Dysphagia  -npo  -with esophageal obstruction  -add intensol to help patient relax and be able to lay flat for radiation therapy- good result on 4/28  -add biotene to help with dry mouth and comfort  -added \"mouth kote\" artificial sa %.      Follow up:    Follow-up Information     Mandy Amaro MD.    Specialty:  NEPHROLOGY  Contact information:  [address] [phone]             Sofy Garcia MD.    Specialty:  Surgical Oncology  Why:  as directed every 8 (eight) hours as needed. Quantity:  30 mL  Refills:  0     metoprolol tartrate 10mg/ml Susp  Commonly known as:  LOPRESSOR  Replaces:  metoprolol Tartrate 25 MG Tabs      Take 0.625 mL (6.25 mg total) by mouth every 6 (six) hours.    Refills:  0 Supp  Commonly known as:  ANUSOL-HC      Place 1 suppository (25 mg total) rectally daily.    Quantity:  24 suppository  Refills:  1     lidocaine-prilocaine 2.5-2.5 % Crea  Commonly known as:  EMLA      Apply to site 1 hour prior to port a cath needle inse to 100 uts bid in ~1 week    Chemo per Dr. Sim Tovar and his office who will arrange for later this week    Radiation schedule to be arranged per Dr. Tyler Chacon or his partner.      PT/OT- please medicate with prn pain meds 30 mins prior    Titrate stool softeners to

## 2019-05-01 NOTE — PLAN OF CARE
Plan of care reviewed with Mumtaz Hull and her . Patient still not able to have a BM after miralax and suppository given. She states she is feeling \"full and nauseated. \" M.D. Notified with patient update.  Patient ordered to receive an enema and to hol injury  Description  INTERVENTIONS:  - Assess pt frequently for physical needs  - Identify cognitive and physical deficits and behaviors that affect risk of falls.   - [place] fall precautions as indicated by assessment.  - Educate pt/family on patient sa Monitor vital signs, rhythm, and trends  - Monitor for bleeding, hypotension and signs of decreased cardiac output  - Evaluate effectiveness of vasoactive medications to optimize hemodynamic stability  - Monitor arterial and/or venous puncture sites for bl of electrolyte imbalances  - Administer electrolyte replacement as ordered  - Monitor response to electrolyte replacements, including rhythm and repeat lab results as appropriate  - Fluid restriction as ordered  - Instruct patient on fluid and nutrition re Promote sitting position while performing ADLs such as feeding, grooming, and bathing  - Educate and encourage patient/family in tolerated functional activity level and precautions during self-care    Outcome: Progressing     Problem: Impaired Swallowing

## 2019-05-01 NOTE — PROGRESS NOTES
[facility]    Progress Note    Na Funez Patient Status:  Inpatient    10/14/1940 MRN [id number]   Location [facility] 4W/SW/SE Attending Tanner Bearden MD   [address] Day # 15 PCP Shanna Bay MD       Subjective:   Camryn Belcher CO2 32.0 04/30/2019     (H) 04/30/2019    CA 8.3 (L) 04/30/2019    ALB 2.0 (L) 04/30/2019    ALKPHO 50 (L) 04/30/2019    BILT 0.5 04/30/2019    TP 5.9 (L) 04/30/2019    AST 23 04/30/2019    ALT 22 04/30/2019    PTT 66.0 (H) 04/26/2019    INR 1.14    Constipation  - moderate stool on xray on admission  - plan suppository and TWE  - hydrocortisone suppository for hemorrhoids   - now with \"diarrhea\" - which is likely not true diarrhea but related to constipation, cont supportive care with bowel re

## 2019-05-01 NOTE — DIETARY NOTE
Brief Nutrition Note:    Complete reassessment available 4/29--refer to for specifics. Chart reviewed--noted LGI issues. No BM. Pt known to have history of significant BM issues.   Talked with APN and discussed option of formula change to jevity 1.2 wi

## 2019-05-01 NOTE — CM/SW NOTE
ELVA/Nurys informed SW that pt is medically stable to discharge today. SW spoke w/ Kiara Acosta from [facility] and arranged ambulance transport (complete, 1LO2) to [facility] at [place] [address].      RN stated that she will update pt and pt's  regarding discharge time.  Willy

## 2019-05-02 NOTE — PROGRESS NOTES
Pt here for C1D8 Taxol/carbo with concurrent daily RT. Arrives Via wheelchair, accompanied by Spouse           Modifications in dose or schedule: Yes; pt was hospitalized. Pt c/o nausea r/t new tube feedings and mucus.  She is spitting in emesis basin due focused, Discussion and Reinforcement  Outcome:  Needs reinforcement  Comments:

## 2019-05-02 NOTE — PROGRESS NOTES
Oncology Nutrition Assessment    Ht Readings from Last 1 Encounters:  04/15/19 : 160 cm (5' 3\")      Wt Readings from Last 1 Encounters:  05/02/19 : 101 kg (222 lb 9.6 oz)    BMI Calculated: There is no height or weight on file to calculate BMI.   Weight significant GI symptoms and wt loss    Estimated Nutritional Needs:    1700 calories (18 luc/kg)  78 protein (1.5 g protein/kg)  1800ml fluid needs    Nutritional Goals:  maintain weight within 5%, halt weight loss, three meals per day and use of nutritio

## 2019-05-02 NOTE — PROGRESS NOTES
Daniel Tineo  : 10/14/1940  Age [de-identified]year old  female patient is admitted to [place] [address]  19 for rehab and strengthening     Chief complaint: Dehydration,Hypokalemia ,Nausea ,Abdominal pain,  Adenocarcinoma of gastroesophageal junction,Acute cysti reports it is from all her past IV's and blood draws. She does have a porte. Reports no changes in her bowels or bladder. J-tube tender, covered with dressing. Patient continues to feel nauseous all day long, receiving zofran 8 mg ODT q 4 prn.   Has a littl Smokeless tobacco: Never Used      Tobacco comment: Quit 1977    Alcohol use: No      Alcohol/week: 0.0 oz    Drug use: No      ALLERGIES:    Definity                PALPITATIONS, DIZZINESS    Comment:Patient felt better after 15-20 minutes, went Place 1 patch onto the skin every third day. Disp:  Rfl: 0   Enoxaparin Sodium 80 MG/0.8ML Subcutaneous Solution Inject 0.8 mL (80 mg total) into the skin every 12 (twelve) hours.  Disp:  Rfl: 0   Fluticasone Propionate 50 MCG/ACT Nasal Suspension 1 spray b cath needle insertion Disp: 1 Tube Rfl: 2   albuterol sulfate (2.5 MG/3ML) 0.083% Inhalation Nebu Soln Take 3 mL (2.5 mg total) by nebulization every 6 (six) hours as needed for Wheezing.  Disp: 25 vial Rfl: 3       VITALS:  /65   Pulse 65   Temp 98 ° distension  LYMPHATIC:bilateral lower leg edema , 1+  pitting   MUSCULOSKELETAL: no acute synovitis upper or lower extremity, generalized weakness   EXTREMITIES/VASCULAR:skin excessively dry to lower legs   NEUROLOGIC: follows commands  PSYCHIATRIC: alert to sq lovenox bid  -with bleeding from j-tube side, lovenox decreased to 80 uts bid, plan to increase to 100 uts bid in ~1 week     4.  Chronic atrial fibrillation and cardiomyopathy non-ischemic  EF 20-25%   - coumadin on hold  - sq lovenox bid  -  Cont me

## 2019-05-02 NOTE — TELEPHONE ENCOUNTER
Post op call to check in on pt; lm on vm to cb. Left detailed message to schedule f/u on Wednesday 5/8/19 1:30pm [facility]; main dept # left as call back.

## 2019-05-03 PROBLEM — K94.19 JEJUNOSTOMY TUBE SITE PAIN (HCC): Status: ACTIVE | Noted: 2019-01-01

## 2019-05-03 NOTE — PROGRESS NOTES
Call by nurse to patients room. Overnight patient was very confused. Patient received Chemo and radiation yesterday for Esophageal cancer. Nurses assessment of patient showed J-Tube leaking green drainage.  Tube feedings were stopped and dressing change :no suprapubic distension  LYMPHATIC:bilateral lower leg edema , 1+  pitting   MUSCULOSKELETAL: no acute synovitis upper or lower extremity, generalized weakness   EXTREMITIES/VASCULAR:skin excessively dry to lower legs   NEUROLOGIC: follows commands Disp:  Rfl: 0   Scopolamine 1.5mg TD patch 1mg/3days Place 1 patch onto the skin every third day. Disp:  Rfl: 0   Enoxaparin Sodium 80 MG/0.8ML Subcutaneous Solution Inject 0.8 mL (80 mg total) into the skin every 12 (twelve) hours.  Disp:  Rfl: 0   Flutica device Dx E11.9 Type 2 non insulin dependant diabetes Disp: 100 each Rfl: 5   glycerin, laxative, 1.2 G Rectal Suppos Place 1 suppository rectally daily as needed.  Disp:  Rfl:    OneTouch UltraSoft Lancets Does not apply Misc Test 2-3 times per day Disp:

## 2019-05-03 NOTE — H&P
[facility]    PATIENT'S NAME: Yousuf Art   ATTENDING PHYSICIAN: Lorene Henry MD   PATIENT ACCOUNT#:   [id number]    LOCATION:  [place] [address]  MEDICAL RECORD #:   [id number]       YOB: 1940  ADMISSION DATE:       05/03/20 coronary artery disease on recent angiogram.    PAST SURGICAL HISTORY:  Right internal jugular port access, jejunostomy tube with open liver biopsy recently. She had thoracic spine laminectomy and benign tumor resection.   History of perforated diverticuli J-tube site with dermatitis. No clear evidence of erythema around the affected area. No significant drainage at this point. Abdomen is soft, nontender. 2.   Diabetes mellitus type 2.  3.   Hypokalemia. 4.   Mild dehydration.   5.   Adenocarcinoma of ga

## 2019-05-03 NOTE — DIETARY NOTE
ADULT NUTRITION INITIAL ASSESSMENT    Pt is at high nutrition risk. Pt meets malnutrition criteria.       CRITERIA FOR MALNUTRITION DIAGNOSIS:  Criteria for non-severe malnutrition diagnosis: acute illness/injury related to wt loss 5% in 1 month, body fa wanting to take pt home now.        ADMITTING DIAGNOSIS:   [facility] tube site [facility] [K94.19]    Past Medical History   has a past medical history of Atrial fibrillation ([facility] Constipation, Dehydration, Diabetes mellitus ([facility] Esophageal cancer ([facility] History of constipation issues. FOOD/NUTRITION RELATED HISTORY:  Appetite: NPO    Intake Meeting Needs: TF at goal will meet nutritional needs. Food Allergies: No  Cultural/Ethnic/Episcopalian Preferences: Not Obtained    MEDICATIONS: reviewed.

## 2019-05-03 NOTE — TELEPHONE ENCOUNTER
Per APN, patient is refusing port access or PIV for IV antibiotics, so plan is to send pt to ED. Order provided from Dr Pedro Enriquez to for RN at [facility] to access port and advise patient to allow access to provide needed treatment.    APN will attempt to conv

## 2019-05-03 NOTE — CM/SW NOTE
4:04pm Update- [facility] denied the case due to staffing issues. Referral made to [facility] via allscripts. awaiting review.      [facility] Mckoy [phone]  [facility] [phone]    --  3:14pm- SW informed by Rhett [id number] who states

## 2019-05-03 NOTE — ED PROVIDER NOTES
Patient Seen in: [facility] 4w/sw/se    History   Patient presents with:  Cath Tube Problem (gastrointestinal, urinary, integumentary)    Stated Complaint: Jejunostomy tube site pain [facility])    HPI    [de-identified] female with history of esophageal ca SIGMOIDOSCOPY, FLEXIBLE; WITH ABLATION OF TUMOR(S), POLYP(S), OR OTHER LESIONS(S) NOT AMENABLE TO RE  9/13/15   • TONSILLECTOMY         Family history reviewed and is not pertinent to presenting problem.     Social History    Tobacco Use      Smoking status motion. Neurological: She is alert and oriented to person, place, and time. No focal deficit   Skin: Skin is warm and dry. No rash noted. Psychiatric: She has a normal mood and affect. Nursing note and vitals reviewed.             ED Course     Labs Medications   Normal Saline Flush 0.9 % injection 3 mL (has no administration in time range)   acetaminophen (TYLENOL) tab 650 mg (has no administration in time range)   ondansetron HCl (ZOFRAN) injection 4 mg (has no administration in time range)   dext off on antibiotics, admit for further monitoring and wound care. Discussed with and admitted to Dr. Natasha Suarez.     Impression:  After review and interpretation of the above emergency department workup, the patient was found to have Jejunostomy tube site mir

## 2019-05-03 NOTE — TELEPHONE ENCOUNTER
Called pt to confirm earlier message. Left detailed message on vm re: appt scheduled; my direct call back # given.

## 2019-05-03 NOTE — HOME CARE LIAISON
Notified Dyan DAVIS that [facility] is unable to accept patient at this time. Per Dyan's request- spoke to  and explained that patient will not be seen by Diony Odonnell on discharge.  Explained to  that patient will most likely be re

## 2019-05-03 NOTE — ED INITIAL ASSESSMENT (HPI)
Patient from [facility]. Arrived via EMS for infected j-tube. C/o pain that began 4 days ago along with foul odor, bleeding and drainage.

## 2019-05-03 NOTE — TELEPHONE ENCOUNTER
Chandrika calling regarding patients j-tube. Chandrika says it's green and smells. Chandrika wanted to start her on iv resetion but patient refused. mell

## 2019-05-03 NOTE — CONSULTS
[facility]    Patient Name:  Na Funez   YOB: 1940   Gender:  Female   Date:  05/03/19   Provider:  No name on file. Insurance:  MEDICARE PART A&B     PATIENT PROVIDERS  Referring Provider: No ref.  provider found   Add History of this present illness dates back to this morning when the patient was noted to have drainage around her jejunostomy tube site with surrounding skin changes. She denies any fevers however noted some chills.   She describes some lower abdominal dis /71   Pulse 64   Temp 98.3 °F (36.8 °C) (Oral)   Resp 23   Ht 1.626 m (5' 4\")   Wt 101.2 kg (223 lb)   SpO2 96%   BMI 38.28 kg/m²      Medications Reviewed:    Current Outpatient Medications:   •  Scopolamine 1.5mg TD patch 1mg/3days, Place 1 patch •  LORazepam 2 MG/ML Oral Conc, Take 0.5 mL (1 mg total) by mouth every 8 (eight) hours as needed.  (Patient taking differently: Place 1 mg under the tongue every 8 (eight) hours as needed.  ), Disp: 30 mL, Rfl: 0  •  Hydrocortisone Acetate 25 MG Rectal Sup Epinephrine             SHORTNESS OF BREATH, PALPITATIONS  Lincomycin              OTHER (SEE COMMENTS)    Comment:Heart almost stopped             Other reaction(s): LINCOMYCIN HCL  Robinul [Glycopyrro*    NAUSEA ONLY, OTHER (SEE COMMENTS)    Comment:Katherine • TONSILLECTOMY        Reviewed Social History:  Social History    Socioeconomic History      Marital status:       Spouse name: Not on file      Number of children: Not on file      Years of education: Not on file      Highest education level: Not Endocrine: Negative for polydipsia and polyuria. Genitourinary: Negative for dysuria and difficulty urinating. Musculoskeletal: Negative for myalgias. Skin: Negative for color change and pallor.    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

## 2019-05-04 NOTE — PLAN OF CARE
Patient denies pain. J-tube site continues with some drainage, stated \"foul smelling\" per patient, site cleaned and clean/dry dressing maintained. Tube feeding due to reach goal rate at 06:00. Labs this morning.     Problem: Patient Centered Care  Goal ordered and tolerated  - Nasogastric tube to low intermittent suction as ordered  - Evaluate effectiveness of ordered antiemetic medications  - Provide nonpharmacologic comfort measures as appropriate  - Advance diet as tolerated, if ordered  - Obtain nutr areas of redness and/or skin breakdown  - Initiate interventions, skin care algorithm/standards of care as needed  Outcome: Progressing  Goal: Incision(s), wounds(s) or drain site(s) healing without S/S of infection  Description  INTERVENTIONS:  - Assess a

## 2019-05-04 NOTE — CM/SW NOTE
Pt is to remain Observation, per [facility]. Referral had been made yesterday to [facility] for home tube feeds and supplies. SW placed call to [address] [facility] [phone] who state the pt's packet is under Medicare review.  This cannot b

## 2019-05-04 NOTE — PROGRESS NOTES
[facility]    Progress Note    Al Cerise Patient Status:  Observation    10/14/1940 MRN [id number]   Location [facility] 4W/SW/SE Attending Lang Yates MD   Hosp Day # 0 PCP Tommy Barnes MD       Subjective:   Corrinne Cable • Fluticasone Propionate  1 spray Each Nare Daily   • Furosemide  40 mg Per J Tube Daily   • hydrALAZINE HCl  10 mg Per J Tube Q8H [facility]   • metoprolol tartrate  6.25 mg Per J Tube Q6H   • omeprazole  40 mg Per J Tube Daily   • scopolamine  1 patch Con Freitas

## 2019-05-04 NOTE — PLAN OF CARE
Problem: Patient Centered Care  Goal: Patient preferences are identified and integrated in the patient's plan of care  Description  Interventions:  - What would you like us to know as we care for you?  Keep with ice chips  - Provide timely, complete, and Provide nonpharmacologic comfort measures as appropriate  - Advance diet as tolerated, if ordered  - Obtain nutritional consult as needed  - Evaluate fluid balance  Outcome: Progressing  Goal: Maintains or returns to baseline bowel function  Description  I fluid and nutrition restrictions as appropriate  Outcome: Progressing     Problem: SKIN/TISSUE INTEGRITY - ADULT  Goal: Skin integrity remains intact  Description  INTERVENTIONS  - Assess and document risk factors for pressure ulcer development  - Assess a bathroom. No distress noted. Endorsed to oncoming RN.

## 2019-05-04 NOTE — PROGRESS NOTES
No acute events overnight. Patient has been stable and afebrile. Examination of the J-tube site reveals marked improvement with minimal excoriation. Instructions and how to continue dressing the site were reiterated to the patient and her .

## 2019-05-04 NOTE — PLAN OF CARE
Problem: Patient Centered Care  Goal: Patient preferences are identified and integrated in the patient's plan of care  Description  Interventions:  - What would you like us to know as we care for you? I would like to go home as soon as possible, as I do n suction as ordered  - Evaluate effectiveness of ordered antiemetic medications  - Provide nonpharmacologic comfort measures as appropriate  - Advance diet as tolerated, if ordered  - Obtain nutritional consult as needed  - Evaluate fluid balance  Outcome: lab results as appropriate  - Fluid restriction as ordered  - Instruct patient on fluid and nutrition restrictions as appropriate  Outcome: Progressing     Problem: SKIN/TISSUE INTEGRITY - ADULT  Goal: Skin integrity remains intact  Description  INTERVENTI distention, however BM this morning was solid and abdomen soft to touch. Dressing to J-tube site changed x1 due to previous dressing falling off when ambulating.   Patient's  at the bedside throughout the day and appears upset over discharge aditya

## 2019-05-05 NOTE — PROGRESS NOTES
[facility]    Progress Note    Matthew Herron Patient Status:  Observation    10/14/1940 MRN [id number]   Location [facility] 4W/SW/SE Attending Max Yip MD   Hosp Day # 0 PCP Donaldo Wesley MD       Subjective:   Vianey Jacobs Subcutaneous 2 times per day   • Fluticasone Propionate  1 spray Each Nare Daily   • Furosemide  40 mg Per J Tube Daily   • hydrALAZINE HCl  10 mg Per J Tube Q8H [facility]   • metoprolol tartrate  6.25 mg Per J Tube Q6H   • omeprazole  40 mg Per J Tube Daily   •

## 2019-05-05 NOTE — PLAN OF CARE
Problem: Patient Centered Care  Goal: Patient preferences are identified and integrated in the patient's plan of care  Description  Interventions:  - What would you like us to know as we care for you?  - Provide timely, complete, and accurate information nonpharmacologic comfort measures as appropriate  - Advance diet as tolerated, if ordered  - Obtain nutritional consult as needed  - Evaluate fluid balance  Outcome: Progressing  Goal: Maintains or returns to baseline bowel function  Description  INTERVENT and nutrition restrictions as appropriate  Outcome: Progressing     Problem: SKIN/TISSUE INTEGRITY - ADULT  Goal: Skin integrity remains intact  Description  INTERVENTIONS  - Assess and document risk factors for pressure ulcer development  - Assess and doc

## 2019-05-05 NOTE — OCCUPATIONAL THERAPY NOTE
OCCUPATIONAL THERAPY EVALUATION - INPATIENT     Room Number: 456/456-A  Evaluation Date: 5/5/2019  Type of Evaluation: Initial  Presenting Problem: gastrophageal Ca    Physician Order: IP Consult to Occupational Therapy  Reason for Therapy: ADL/IADL Dysfun chair. She has a raised toilet seat at home and is currently able to ambulate household distances. DISCHARGE RECOMMENDATIONS  OT Discharge Recommendations: Home with home health PT/OT; Home  OT Device Recommendations: TBD- shower chair, GB    PLAN  OT biopsy of liver   • IR PORT A CATH PROCEDURE  04/08/2019    Power injectable chest port placed   • JEJUNOSTOMY/GASTROSTOMY TUBE INSERTION N/A 4/25/2019    Performed by Laury Lopez MD at [address] [place] [address]   • PACEMAKER/DEFIBRILLATOR  8/2015   • SIGMOIDOSCOPY, body clothing?: A Little  -   Bathing (including washing, rinsing, drying)?: A Little  -   Toileting, which includes using toilet, bedpan or urinal? : A Little  -   Putting on and taking off regular upper body clothing?: None  -   Taking care of personal g

## 2019-05-05 NOTE — SLP NOTE
SPEECH/LANGUAGE/COGNITIVE EVALUATION - INPATIENT    Admission Date: 5/3/2019  Evaluation Date: 05/05/19    Reason for Referral: Other (Comment)(Pt with J tube placement, and orders for swallow evaluation)    ASSESSMENT & PLAN   ASSESSMENT & IMPRESSION  Pt call back from MD.      Discharge Recommendations/Plan: Undetermined               GOALS  Goal #1 Pt will participate in oral care 3-4x daily. In Progress   Goal #2 SLP will provide further pt/family education as needed.      In Progress     Prior Living

## 2019-05-05 NOTE — PHYSICAL THERAPY NOTE
PHYSICAL THERAPY EVALUATION - INPATIENT     Room Number: 456/456-A  Evaluation Date: 5/5/2019  Type of Evaluation: Initial   Physician Order: PT Eval and Treat    Presenting Problem: jejunostomy tube site pain  Reason for Therapy: Mobility Dysfunction and current admission: Patient with hx of HTN, pacemaker, HLD, obesity, DM, constipation, AFib, and esophageal cancer     Problem List  Principal Problem:    Jejunostomy tube site pain [facility])      Past Medical History  Past Medical History:   Diagnosis Date   • SITUATION  Type of Home: House   Home Layout: One level  Stairs to Enter : 7  Railing: Yes          Lives With: Spouse  Drives: No  Patient Owned Equipment: Rolling walker  Patient Regularly Uses: None    Prior Level of [place]: Prior to admission, pa Modifier (G-Code): CJ    FUNCTIONAL ABILITY STATUS  Gait Assessment   Gait Assistance: (Stand by assist)  Distance (ft): 75  Assistive Device: Rolling walker  Pattern: R Steppage;L Steppage  Stoop/Curb Assistance: Not tested       Bed Mobility: NT    Trans

## 2019-05-05 NOTE — PLAN OF CARE
Problem: Patient Centered Care  Goal: Patient preferences are identified and integrated in the patient's plan of care  Description  Interventions:  - What would you like us to know as we care for you?   - Provide timely, complete, and accurate informatio nonpharmacologic comfort measures as appropriate  - Advance diet as tolerated, if ordered  - Obtain nutritional consult as needed  - Evaluate fluid balance  Outcome: Progressing  Goal: Maintains or returns to baseline bowel function  Description  INTERVENT and nutrition restrictions as appropriate  Outcome: Progressing     Problem: SKIN/TISSUE INTEGRITY - ADULT  Goal: Skin integrity remains intact  Description  INTERVENTIONS  - Assess and document risk factors for pressure ulcer development  - Assess and doc stable. Plan to be discharged tomorrow if medically stable.

## 2019-05-06 NOTE — PLAN OF CARE
Problem: Patient Centered Care  Goal: Patient preferences are identified and integrated in the patient's plan of care  Description  Interventions:  - What would you like us to know as we care for you? ***  - Provide timely, complete, and accurate informa nonpharmacologic comfort measures as appropriate  - Advance diet as tolerated, if ordered  - Obtain nutritional consult as needed  - Evaluate fluid balance  Outcome: Progressing  Goal: Maintains or returns to baseline bowel function  Description  INTERVENT and nutrition restrictions as appropriate  Outcome: Progressing     Problem: SKIN/TISSUE INTEGRITY - ADULT  Goal: Skin integrity remains intact  Description  INTERVENTIONS  - Assess and document risk factors for pressure ulcer development  - Assess and doc

## 2019-05-06 NOTE — DIETARY NOTE
Brief Nutrition Note:    Visit to f/u for home plans. Home Care rep to meet with pt today to teach fdgs for home.     Jevity 1.5 at goal rate of 60ml/hr over 20hrs (off 4 hrs per day to allow pt to go out of house for treatments) 1200ml volume (5 cans/cont

## 2019-05-06 NOTE — CM/SW NOTE
[facility] orders have been received. SUSAN contacted Cornel Gomez from [facility] and notified him of the pt's discharge. Cristian Kumar with [facility] met with the pt. And her  and the pt's  was instructed on the tube feedings.   The pt's  has contacted [facility] and ha

## 2019-05-06 NOTE — PLAN OF CARE
Problem: Patient Centered Care  Goal: Patient preferences are identified and integrated in the patient's plan of care  Description  Interventions:  - What would you like us to know as we care for you?   - Provide timely, complete, and accurate informatio Nora Martin, RN  Outcome: Progressing  5/6/2019 0314 by Nora Martin RN  Outcome: Progressing     Problem: GASTROINTESTINAL - ADULT  Goal: Minimal or absence of nausea and vomiting  Description  INTERVENTIONS:  - Maintain adequate hydrati range  Description  INTERVENTIONS:  - Monitor Blood Glucose as ordered  - Assess for signs and symptoms of hyperglycemia and hypoglycemia  - Administer ordered medications to maintain glucose within target range  - Assess barriers to adequate nutritional i as indicated  5/6/2019 0709 by Bharati Sanchez RN  Outcome: Progressing  5/6/2019 0314 by Bharati Sanchez RN  Outcome: Progressing  Goal: Oral mucous membranes remain intact  Description  INTERVENTIONS  - Assess oral mucosa and hygiene practice

## 2019-05-06 NOTE — WOUND PROGRESS NOTE
WOUND CARE NOTE      PLAN   Recommendations:  Dr. Annie Sheldon is following the pt. OT and Speech are following the pt.    Dietary consult for recommendations for nutrition to optimize wound healing  Turn schedules  Heels elevated using pillows, heel wedge in the upper ostomy pouch and fed the J tube thru the hole and secured the pouch in place. I used ostomy tape to secure the hole in the pouch around the J tube, it is on and intact.  I spoke with the pt's nurse, I will notify the MD and write out care intru

## 2019-05-06 NOTE — PHYSICAL THERAPY NOTE
Attempted physical therapy treatment. Patient declined stating she was going home today. TRUPTI Chase Client aware of session.

## 2019-05-06 NOTE — SLP NOTE
SPEECH DAILY NOTE - INPATIENT    ASSESSMENT & PLAN   ASSESSMENT  Pt seen for ongoing dysphagia education. Pt's case discussed at length with the patient's .  Pt's 's main concern is to return home with home health care and resume cancer treatm Pt able to complete oral rinse with no overt clinical signs of aspiration. Pt completes oral care per 's report. Goal met    Goal #2 SLP will provide further pt/family education as needed.     reports, all nutrition will be taken via J-tube a

## 2019-05-06 NOTE — PLAN OF CARE
Problem: Diabetes/Glucose Control  Goal: Glucose maintained within prescribed range  Description  INTERVENTIONS:  - Monitor Blood Glucose as ordered  - Assess for signs and symptoms of hyperglycemia and hypoglycemia  - Administer ordered medications to m collaborating with pharmacy to review patient's medication profile  Outcome: Adequate for Discharge  Goal: Maintains adequate nutritional intake (undernourished)  Description  INTERVENTIONS:  - Monitor percentage of each meal consumed  - Identify factors c healing without S/S of infection  Description  INTERVENTIONS:  - Assess and document risk factors for pressure ulcer development  - Assess and document skin integrity  - Assess and document dressing/incision, wound bed, drain sites and surrounding tissue

## 2019-05-06 NOTE — DISCHARGE SUMMARY
[facility]    Discharge Summary    Daniel Tineo Patient Status:  Observation    10/14/1940 MRN [id number]   Location [facility] 4W/SW/SE Attending Vernon Forrester MD   Hosp Day # 0 PCP Blanka Issa MD     Date of Admission: 5 rhonchi. Cardiovascular: S1, S2. Regular rate and rhythm. No murmurs, rubs or gallops. Abdomen: Soft, nontender, nondistended. Positive bowel sounds. No rebound or guarding. Neurologic: No focal neurological deficits.    Musculoskeletal: Moves all extr Tube route every 8 (eight) hours. Hold if SBP < 140<br>*DO NOT GIVE TABLET---USE PHARMACY MADE SUSPENSION*    HYDROcodone-acetaminophen 7.5-325 MG/15ML Oral Solution  Take 15 mL by mouth every 4 (four) hours as needed.     PEG 3350 Oral Powd Pack  17 g by P laxative, 1.2 G Rectal Suppos  Place 1 suppository rectally daily as needed. !! OneTouch UltraSoft Lancets Does not apply Misc  Test 2-3 times per day    !! - Potential duplicate medications found. Please discuss with provider.               Discharge Matthew Elliott LOVENOX      Inject 0.8 mL (80 mg total) into the skin every 12 (twelve) hours. Refills:  0     Fluticasone Propionate 50 MCG/ACT Susp  Commonly known as:  FLONASE      1 spray by Each Nare route daily.    Refills:  0     Furosemide 8 MG/ML Soln  Commonly Lancets Misc      For ACCU-Check softclix device Dx E11.9 Type 2 non insulin dependant diabetes   Quantity:  100 each  Refills:  5     ACCU-CHEK SOFTCLIX LANCETS Misc      TEST DAILY AS DIRECTED   Quantity:  100 each  Refills:  3     PEG 3350 Pack  Commo per day). 150ml water flush q4hrs (900ml/day)--very important with this formula to meet hydration needs.     Jahaira Torres RD, LDN   Clinical Dietitian   [phone]          Time spent:  > 30 minutes    Hilario Perkins  5/6/2019

## 2019-05-06 NOTE — DIABETES ED
[facility]OSP - [facility]    Diabetes Education  Note    Christina Martel Patient Status:  Observation   10/14/1940 MRN [id number]  Location [facility] 4W/SW/SE Attending Nohemy Jonas MD  Hosp Day # 0 PCP Rebecca Piedra MD    Pt being d/c to h

## 2019-05-06 NOTE — WOUND PROGRESS NOTE
Wound Care Services  Spoke with the pt's nurse and the pt. may be discharged this evening, Gave the pt. and supplies written instructions for J tube pouch changes and supply order numbers, gave them some supplies for home.  The pt. will have home health fol

## 2019-05-07 NOTE — TELEPHONE ENCOUNTER
Spoke to Gluster, patients .  He will call back (patient is currently at the [facility] getting treatment)

## 2019-05-07 NOTE — TELEPHONE ENCOUNTER
Fax received from [address] [facility] ([place] - [phone]), stating Albuterol Nebulizer solution is not covered by pts insurance and requesting Prior Auth. TE routed to [address] [facility] staff for assistance.

## 2019-05-07 NOTE — TELEPHONE ENCOUNTER
[facility] ([phone]) Spoke to  Rep. Information provided and submitted for review. Ref# [id number]. Call [phone] to check on authorization.

## 2019-05-07 NOTE — TELEPHONE ENCOUNTER
Spoke to Irasema Jonas, patient's . He needs to call 2 Pharmacies to find out which medications have been filled and which ones patient needs. He will call back later with this information. Patient is now at home not at [facility].

## 2019-05-07 NOTE — TELEPHONE ENCOUNTER
Pt discharged from [facility] on 5/6/19 . Per dc instructions please call to schedule follow up with Primary Care Physician.

## 2019-05-07 NOTE — TELEPHONE ENCOUNTER
Lm on vm to cb; need to schedule post op in clinic 5/15/19. Also to check status since discharge. Main dept # given as call back.

## 2019-05-07 NOTE — PROGRESS NOTES
[facility] Radiation Treatment Management Note 1-5    Patient:  Peggyann Mask  Age:  [de-identified]year old  Visit Diagnosis:    1.  Malignant neoplasm of lower third of esophagus [facility])      Primary Rad/Onc:  Dr. Yoly Partida    Site [facility] imaging have been reviewed:   Yes    Assessment/Plan:    Resume XRT    Very protracted course due to completing health issues    Continue radiotherapy per plan    Next visit:  1 week    Dr. Emely Churchill

## 2019-05-07 NOTE — TELEPHONE ENCOUNTER
requesting a call back to discuss medications he was unable to receive from pharmacy regarding pts feeding tube.   Please call [phone] or [phone](cell)

## 2019-05-07 NOTE — PROGRESS NOTES
Prior authorization received from [facility] [employer]. Expires 5/7/2020. Labeled and sent to [facility]. [address] [place] [address] pharmacy updated. Spoke to pharmacist, Joceline Graf. [phone]  [address] [place] [address]

## 2019-05-08 NOTE — TELEPHONE ENCOUNTER
Spoke to [facility] nurse and relayed Dr. Yumiko Ulloa advice that he will sign HH orders but patient needs to be seen in the office for follow up.  Sully Mejía or her associate will follow patient and will talk to Carin SRon [place] patient's  to schedule appointment with

## 2019-05-08 NOTE — TELEPHONE ENCOUNTER
Spoke to [employer], patient's . He indicated that he does not need this medication as he has plenty at home.

## 2019-05-08 NOTE — TELEPHONE ENCOUNTER
HOSPITALIST NURSE      TELEPHONE NOTE    VM received from pts  and faxes from [facility] pharmacy/[facility] for assistance w [facility].  [facility] stating pharmacy needed clarification on Qty of hydralazine solution (1 bottle indicated on prescri

## 2019-05-08 NOTE — TELEPHONE ENCOUNTER
[facility] calling for mutual pt was released from the hosp 5/6  and wants to spk w rn to find out if  [facility] would be the Dr to contact for any orders if needed. Pls call at:[phone],thanks.

## 2019-05-08 NOTE — PROGRESS NOTES
James Sirruss to lab today for labs. She arrived from radiation treatment via wheelchair. She had lab and Dr. Santosh Sarmiento appointment scheduled for later today, but she is not feeling well. She is nauseated but not vomiting. She is extremely fatigued.  Made arrangemen

## 2019-05-09 NOTE — PROGRESS NOTES
Oncology Nutrition Assessment    Ht Readings from Last 1 Encounters:  05/03/19 : 165.1 cm (5' 5\")      Wt Readings from Last 1 Encounters:  05/03/19 : 100.4 kg (221 lb 6.4 oz)    BMI Calculated: There is no height or weight on file to calculate BMI.   [facility] [place] standard weight) ([facility] [address]    • Unspecified essential hypertension        Diet History:  Liquids and very thin pureed (baby food) and ensure (only 1 per day so far)  Oral Liquid Supplement Use:  Ensure--discussed changing to ensure enlive (monitor BG levels) completely. CPM  5/9/19 Did not weigh pt today.  very aggravated today therefore difficult to get needed information from him. Overall, it seems that feedings are going well at home. Uncertain if getting needed amount in, but suspect at least 75%.

## 2019-05-09 NOTE — PROGRESS NOTES
Wound Care Services 11:45 am  Called by Dr Kortney Jesus to assess the pt's J tube site and the pouch, as she is in the infusion center now.  The pt. and spouse are talking with the dietician, she was brought to an infusion room and assisted to the recliner israel

## 2019-05-09 NOTE — PROGRESS NOTES
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  This is being planned for evaluation and likely biopsy by Dr. Terry De Leon in History:   Procedure Laterality Date   • APPENDECTOMY     • BACK SURGERY  8/15    benign cyst removed from thoracic area   • CATARACT Right 2011    right sided cataract surgery   • COLONOSCOPY  2007   • HYSTERECTOMY     • IR J-TUBE PROCEDURE  04/25/2019 activity:        Days per week: Not on file        Minutes per session: Not on file      Stress: Not on file    Relationships      Social connections:        Talks on phone: Not on file        Gets together: Not on file        Attends Mu-ism service: No [place]. Allergies:     Definity                PALPITATIONS, DIZZINESS    Comment:Patient felt better after 15-20 minutes, went             home. Demerol  [Meperidin*    SHORTNESS OF BREATH    Comment:Other reaction(s):  Other (See Comments) as needed for Anxiety. Disp: 14 tablet Rfl: 0   Metoprolol Tartrate Does not apply Powder 6.25 mg by Jejunal Tube route every 6 (six) hours. Disp:  Rfl:    Omeprazole Does not apply Powder 40 mg by Jejunal Tube route daily.  Disp:  Rfl:    Nutritional Suppl for cough, hemoptysis, chest pain, or dyspnea on exertion.   Gastrointestinal: Negative for odynophagia, reflux symptoms,vomiting, change in bowel habits, diarrhea, constipation,  +Dysphagia, and +nausea, +drainage from J-tube  Integument/breast: Negative f MCH 29.2 05/08/2019 11:17 AM    MCHC 32.1 05/08/2019 11:17 AM    RDW 15.0 05/08/2019 11:17 AM    NEPRELIM 2.24 05/08/2019 11:17 AM    .0 05/08/2019 11:17 AM       Lab Results   Component Value Date/Time     (H) 05/08/2019 11:17 AM    BUN 14 0 with chemotherapy    --Proceed with week 3 carbo/taxol      2.) Parapharyngeal mass    --pt needs direct laryngoscopy for this mass; concerning for another disease process; will refer to ENT  --I've spoken with Dr. Nica Campoverde who is planning to evaluate the pt

## 2019-05-09 NOTE — PROGRESS NOTES
Met with spouse in Infusion center to review current medications. Wolf brought furosemide liquid scrip and ondansetron liquid scrip from home.  Dali Dhillon has been having persistant nausea but Frankey Sofia has not been giving her any antiemetics because he was confused abo

## 2019-05-09 NOTE — PROGRESS NOTES
Pt here for C1D15. Arrives Via wheelchair, accompanied by Spouse           Modifications in dose or schedule: No, addition of ativan as a premedication for nausea control and potassium rider post chemotherapy.     Patient arrived early for treatment today given  maintain safe environment  monitor effect of pain management  monitor for signs/symptoms of infection  nausea/vomiting teaching  optimize nutrition status  provided nutrition support  provided oral care regime  patient/family supported  educate ana rosa

## 2019-05-10 NOTE — TELEPHONE ENCOUNTER
Who ordered and why???.  Looks as though patient is on Lovenox and not on Coumadin. Again the patient needs to be seen for follow-up.

## 2019-05-10 NOTE — TELEPHONE ENCOUNTER
Sent call to RN [facility] requesting to speak with RN re: critical value INR. Pls call. Thank you.

## 2019-05-10 NOTE — TELEPHONE ENCOUNTER
Patients spouse states Patient is not taking coumadin anymore is now on Lovenox injections. Patient is given 0.8 ML every 12 hours which is between 7-8 AM and 7-8 PM.     Spouse wanted to see if was working which is why sent in INR report.      Please a

## 2019-05-10 NOTE — TELEPHONE ENCOUNTER
Refills pended and routed to Dr. Caterina Trammell.  is aware that patient needs to be seen and he will schedule an appointment as soon as he can. He is overwhelmed with her care.  is ordering all manner of help for him.

## 2019-05-10 NOTE — TELEPHONE ENCOUNTER
Patient does home testing.  is aware that patient needs a follow up visit. PT/INR was addressed by [facility].

## 2019-05-13 NOTE — TELEPHONE ENCOUNTER
Humulin is not in med list. Current med list Regular Human, and Levemir pens. Spoke to Johana and informed her that patient has not seen [facility] since discharge. She was put on Regular as short acting insulin by ER.  Inez Corbin is going to contact patient/sp

## 2019-05-13 NOTE — TELEPHONE ENCOUNTER
Pharm Needs clarification on what Humulin insulin to provide to the pt., as the pt. does not want vials, and prefers to use pen needles. Pharm requesting to speak to RN.

## 2019-05-13 NOTE — TELEPHONE ENCOUNTER
RN calling to let  know that she saw the pt. On Fri., and pt seems very weak, from her chemo therapy and radiation. RN will work with the pt. With strengthening, ambulation, transfers and stair climbing.  RN states that the pts  seems very over whe

## 2019-05-14 NOTE — TELEPHONE ENCOUNTER
HOSPITALIST NURSE      TELEPHONE NOTE    Call received from [facility] pharmacist stating pt  was having difficulty following dc insulin instructions.  Per Pharmacist  refused to  long acting insulin as  states he was unaware

## 2019-05-14 NOTE — PROGRESS NOTES
[facility] Radiation Treatment Management Note 6-10    Patient:  Laura Rust  Age:  [de-identified]year old  Visit Diagnosis:    1.  Malignant neoplasm of lower third of esophagus [facility])      Primary Rad/Onc:  Dr. Emely Churchill    Site Deliver

## 2019-05-14 NOTE — TELEPHONE ENCOUNTER
Again I cannot prescribe anything until I see her. Needs to be seen. If symptoms are bad take her to the emergency room.

## 2019-05-14 NOTE — TELEPHONE ENCOUNTER
Dhara Gayle states they go to wound clinic weekly to get the dressing changed, but this dressing has not last a week because of so much secretions. Tube is leaking. Shaun Lord came last week to infusion to fix it.  Dhara Gayle is asking if she can come again to see if she

## 2019-05-14 NOTE — TELEPHONE ENCOUNTER
Ashleigh Pierre requesting [facility] retention, pain meds for abdomen pain & antacid. Pls call. Thank you.

## 2019-05-14 NOTE — TELEPHONE ENCOUNTER
Keely Rawls called to say that Rebecca Roll is leaking and usually the wound care nurse takes care of it when they come in on thursdays. They are wondering if they can get it taken care of when they come in for radiation today.  Please advise

## 2019-05-14 NOTE — TELEPHONE ENCOUNTER
Not active with [facility].  Call placed to [facility] home health who confirm they are the home health agency. They confirm they will reach out to spouse today to coordinate an earlier visit to address leaking j tube.

## 2019-05-14 NOTE — TELEPHONE ENCOUNTER
Per Alli Irvin, wound services are not covering or seeing this patient. Was seen last week by Prem Mooney in place of the surgeon that day.   Patient's j tube dressing and pouching being done by home health care RN from [facility] phoned Ray back and explained th

## 2019-05-14 NOTE — TELEPHONE ENCOUNTER
Spoke to [facility] nurse and discussed Dr. Jose Maria Watkins advice that patient needs to be seen in the office but if symptoms are bad to go to ER.

## 2019-05-14 NOTE — TELEPHONE ENCOUNTER
[facility] also states pt is retaining water and has been having problems with nausea and vomiting and abdominal pain and insomnia. Her blood sugars are also in 200's. Rodríguez Garcia would like to speak to RN. Please call.

## 2019-05-15 PROBLEM — L03.311 CELLULITIS, ABDOMINAL WALL: Status: ACTIVE | Noted: 2019-01-01

## 2019-05-15 PROBLEM — D70.1 CHEMOTHERAPY-INDUCED NEUTROPENIA: Status: ACTIVE | Noted: 2019-01-01

## 2019-05-15 PROBLEM — D70.1 CHEMOTHERAPY-INDUCED NEUTROPENIA (HCC): Status: ACTIVE | Noted: 2019-01-01

## 2019-05-15 PROBLEM — T45.1X5A CHEMOTHERAPY-INDUCED NEUTROPENIA: Status: ACTIVE | Noted: 2019-01-01

## 2019-05-15 PROBLEM — T45.1X5A CHEMOTHERAPY-INDUCED NEUTROPENIA (HCC): Status: ACTIVE | Noted: 2019-01-01

## 2019-05-15 PROBLEM — D70.9 NEUTROPENIA (HCC): Status: ACTIVE | Noted: 2019-01-01

## 2019-05-15 NOTE — ED INITIAL ASSESSMENT (HPI)
Leaking around j tube site onset today. +diarrhea. History of esophageal ca. Currently receiving chemo and radiation.

## 2019-05-15 NOTE — ED NOTES
Per  it appears there is more than usual amount coming out of j-tube/leaking around the site. Denies fevers.

## 2019-05-15 NOTE — ED NOTES
Pt to ED c/o excessive drainage noted to J-tube and abdominal cramping. Spouse reports that patient usually has a minimal amount of drainage but since yesterday the amount has increased and patient has been c/o abdominal pain.  Pt is currently undergoing ch

## 2019-05-15 NOTE — PROGRESS NOTES
[facility] Pharmacy Note: Antimicrobial Weight Dose Adjustment for: vancomycin (VANCOCIN)    Na Funez is a [de-identified]year old female who has been prescribed vancomycin (VANCOCIN) 1000 mg x1 in ER.   CrCl is estimated creatinine clearance is 69.2 mL/min (based on SC

## 2019-05-15 NOTE — PROGRESS NOTES
Jordan Ba is a [de-identified]year old female. Patient presents with:  Leg Swelling  Nausea  Vomiting    HPI:   Ms. Kanwal Fowler presents this afternoon, accompanied by her , for evaluation. First visit with me. Usual PCP is Dr. Keyona Lopez.   She is currently unde Does not apply Powder 10 mg by Jejunal Tube route every 8 (eight) hours. Hold for SBP < 140 Disp: 1 Bottle Rfl: 0   Enoxaparin Sodium 80 MG/0.8ML Subcutaneous Solution Inject 0.8 mL (80 mg total) into the skin every 12 (twelve) hours.  Disp: 1 Syringe Rfl: softclix device Dx E11.9 Type 2 non insulin dependant diabetes Disp: 100 each Rfl: 5   glycerin, laxative, 1.2 G Rectal Suppos Place 1 suppository rectally daily as needed.  Disp:  Rfl:    OneTouch UltraSoft Lancets Does not apply Misc Test 2-3 times per [employer] 5-2-19   • Severe protein-calorie malnutrition Selene Lopez: less than 60% of standard weight) ([facility]    • Unspecified essential hypertension      Past Surgical History:   Procedure Laterality Date   • APPENDECTOMY     • BACK SURGERY  8/15    benign cyst removed Zofran ODT 4 mg 3 times daily as needed for nausea. Prescription sent to pharmacy. J-tube management per Surgical Oncology. Her  is attempting to contact him. Recommend ER evaluation should symptoms worsen.   Ongoing care with Oncology as she is

## 2019-05-15 NOTE — PATIENT INSTRUCTIONS
Please take generic Zofran 4 mg 3 times daily as needed for nausea. Go to the ER if symptoms worsen.

## 2019-05-15 NOTE — TELEPHONE ENCOUNTER
Phone call from  stating his wife is having severe abdominal pain, significant increase in J-tube drain output (45oz q 4hrs),  and bilateral lower leg swelling. She was evaluated by her PCP who recommended patient be worked up in the ED.  Patient and

## 2019-05-15 NOTE — ED PROVIDER NOTES
Patient Seen in: [facility] AND [facility] Emergency Department    History   Patient presents with:  Abdomen/Flank Pain (GI/)    Stated Complaint: abd pain, \"j-tube draining\"    HPI    Patient is an 76-year-old female with a history of adenocarcinoma of the placement of feeding j-tube, open wedge biopsy of liver   • IR PORT A CATH PROCEDURE  04/08/2019    Power injectable chest port placed   • JEJUNOSTOMY/GASTROSTOMY TUBE INSERTION N/A 4/25/2019    Performed by Jazmyn Calhoun MD at [facility]   • PACEMAKER/ Effort normal and breath sounds normal. No respiratory distress.    Abdominal: Abdomen is obese and slightly distended the surgical scar is well-healed without signs of infection there is a J-tube in the left upper quadrant with a colostomy bag around it th (*)     BUN/CREA Ratio 27.1 (*)     Calcium, Total 7.8 (*)     Alkaline Phosphatase 40 (*)     Total Protein 5.7 (*)     Albumin 1.9 (*)     A/G Ratio 0.5 (*)     All other components within normal limits   PHOSPHORUS - Abnormal; Notable for the following Narrative: The GI Panel tests for Campylobacter species jejuni, coli, and   upsaliensis; all species, subspecies, and serovars of Salmonella;   and Vibrio species parahaemolyticus, vulnificus, and cholera.    It does NOT test for Aeromonas or Edwardsiel suspicious for abscess which measures approximately 14 x 22 x 51 mm.  2.  In the ventral supraumbilical region approximately 8 cm above the umbilicus is a gas and fluid collection without peripheral enhancement suggesting phlegmonous change [facility]    Disposition:  Admit  5/15/2019  7:22 pm    Follow-up:  No follow-up provider specified.   We recommend that you schedule follow up care with a primary care provider within the next three months to obtain basic health screening including reassessmen

## 2019-05-16 NOTE — CM/SW NOTE
5/17 814am: SW received a call from [place] with [facility]. [facility] has been spending more time that alotted assisting the pt. And her  with the pt's care.   Depending on the pt's status prior to discharge, [facility] may not be able to accept

## 2019-05-16 NOTE — TELEPHONE ENCOUNTER
Routed to [facility] ISAMAR  ABBEY ON-CALL as FYI. Discussed with Mary Jo Sprague RN as patient is currently inpatient and message is to be addressed by inpatient staff.     I spoke to inpatient RN Melony Gaucher for this patient on [facility] and she confirmed that inpatient gastro

## 2019-05-16 NOTE — PLAN OF CARE
Problem: Patient Centered Care  Goal: Patient preferences are identified and integrated in the patient's plan of care  Description  Interventions:  - Provide timely, complete, and accurate information to patient/family  - Incorporate patient and family k

## 2019-05-16 NOTE — TELEPHONE ENCOUNTER
Spouse, Delonte Villarreal called looking to discuss plan for [facility]. He is currently in pt's room 450. Call back is cell:  [phone]    He is aware Dr Evelyne Burdick is in [address] currently. He verbalized understanding and would appreciate a call back.

## 2019-05-16 NOTE — ED NOTES
Orders for admission, patient is aware of plan and ready to go upstairs.  Any questions, please call ED RN sebastian  at extension [phone]

## 2019-05-16 NOTE — WOUND PROGRESS NOTE
WOUND CARE NOTE      PLAN   Recommendations:  Dr. Roxanne Bolaños is following the pt.    Dietary consult for recommendations for nutrition to optimize wound healing  Turn schedules  Heels elevated using pillows, heel wedge or heel boots to offload heels  Use of of stomahesive paste, the sutures in the disc of the J tube remain intact, the tube does fluctuant in and out, most likely causing the leaking, I placed the disc against the skin.  I applied a 2 1/4 inch flange, I cut a hole in the top of pouch and fed the

## 2019-05-16 NOTE — DIETARY NOTE
ADULT NUTRITION INITIAL ASSESSMENT    Pt is at high nutrition risk. Pt meets malnutrition criteria.       CRITERIA FOR MALNUTRITION DIAGNOSIS:  Criteria for non-severe malnutrition diagnosis: acute illness/injury related to body fat mild depletion, muscle mellitus ([facility]    • Dilated cardiomyopathy (HCC)    • Esophageal cancer ([facility]     Adenocarcinoma of gastroesophageal junction    • Exposure to medical diagnostic radiation 04/2019    last radiation 5-2-19   • Hepatitis C antibody test positive    • [email or web address] [email or web address]   • potassium phosphate IVPB  20 mEq Intravenous Once   • Insulin Regular Human  1-5 Units Subcutaneous 4 times per day   • Fluticasone Propionate  1 spray Each Nare Daily   • Initial dose Heparin infusion  1,000 Units/hr Intravenous Once   •

## 2019-05-16 NOTE — CONSULTS
THE [facility] Surgical Oncology    Patient Name:  Sylvain Boyd   YOB: 1940   Gender:  Female   Date:  05/16/19   Provider:  No name on file. Insurance:  MEDICARE PART A&B     PATIENT PROVIDERS  Referring Provider: No ref.  provider found   Add This is a very pleasant 22-year-old female with a past history of atrial fibrillation on Coumadin, diabetes, hypertension, and possible liver cirrhosis who was recently diagnosed with Siewert 1 gastroesophageal junction adenocarcinoma.   Her oncologic histo 4/1/2019: PET/CT: Hypermetabolic activity within the distal esophagus. No evidence of paul or distant metastases. Of note, there is a hypermetabolic left parapharyngeal lymph node.   Pulmonary nodules originally appreciated on CT scan are not FDG avid  4 • Dilated cardiomyopathy (HCC)    • Esophageal cancer ([facility] [address]     Adenocarcinoma of gastroesophageal junction    • Exposure to medical diagnostic radiation 04/2019    last radiation 5-2-19   • Hepatitis C antibody test positive    • Hiatal hernia    • High b Tobacco comment: Quit 1977    Substance and Sexual Activity      Alcohol use: No        Alcohol/week: 0.0 oz      Drug use: No    Other Topics      Concerns:        Caffeine Concern: Yes          2cups daily, soda, chocolate        Right Handed:  Yes Constitutional: She is oriented to person, place, and time. She appears well-developed and well-nourished. HENT:   Head: Normocephalic. Eyes: Pupils are equal, round, and reactive to light. EOM are normal.   Neck: Normal range of motion.    Davey Fabry BILIARY:            Cholecystectomy has been performed. There is no intra- or extra-hepatic biliary ductal dilatation. SPLEEN:           Spleen is not enlarged. There are granulomatous calcifications in the spleen. No suspicious splenic lesion.   STOMACH: tissues extends into the left lower quadrant around the anastomosis common several diverticula are present in the region of the anastomosis. Moderate quantity of fecal material within the majority of the large bowel.   No organized measurable fluid   colle 1.  Percutaneous jejunostomy tube is present in the left upper quadrant. The tip extends retrograde and is located at the level of the ligament of Treitz in the distal duodenum/proximal jejunum.   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

## 2019-05-16 NOTE — PROGRESS NOTES
Patient seen during previous admissions. Viewed abdominal wall cellulitis with RN prior to dressing change. ANC < 500  Will add filgrastim.

## 2019-05-16 NOTE — PROGRESS NOTES
[facility]    Progress Note    Al Cerise Patient Status:  Inpatient    10/14/1940 MRN [id number]   Location [facility] 4W/SW/SE Attending Remigio Lamb Day # 1 PCP Tommy Barnes MD        Subjective: colostomy to jtube for drainage  - nystatin powder    Malfunctioning jtube  - hold tube feedings  - GI consult  - ? Esophageal stent placement   - ?  IR to move jtube  - dietician consult    Abd pain  Possibly d/t Acute diverticulitis  - continue abx as abo Only)(cpt=74177)    Result Date: 5/15/2019  CONCLUSION:  1. Percutaneous jejunostomy tube is present in the left upper quadrant. The tip extends retrograde and is located at the level of the ligament of Treitz in the distal duodenum/proximal jejunum.   Thea Arthur Dictated by (CST): Monse Jimenez MD on 5/15/2019 at 18:34     Approved by (CST): Monse Jimenez MD on 5/15/2019 at 18:48                       KENNETH Toth  5/16/2019

## 2019-05-16 NOTE — CONSULTS
Zayra Martinez 89  Report of GI Consultation    Estrellita Early Patient Status:  Inpatient    10/14/1940 MRN [id number]   Location [facility] 4W/SW/SE Attending Salo Do,*   Hosp Day # 1 PCP Neema Nye MD     Date of A obstructing lesion at diagnosis already causing her significant difficulty swallowing even liquids then. She has been worked up by Dr. Matheus Elaine of oncology and has started XRT/ chemotherapy as per Oncology consultation notes.   She remains completel lb (59 kg)  03/04/19 : 218 lb (98.9 kg)  10/02/18 : 215 lb (97.5 kg)  09/25/18 : 219 lb 9.6 oz (99.6 kg)  09/15/18 : 223 lb 9.6 oz (101.4 kg)  08/28/18 : 224 lb (101.6 kg)        Impression:       Recommendations:    1.  [address] [facility] skin. Hopefully decompressing the small bowel with the gravity drainage will help reduce the pressure driving this leakage. NPO except ice chips, medications.   · Continue current broad-spectrum antibiotics for superimposed abscess, cellulitis at the [facility] intraoperative liver biopsy shows Stage III hepatic fibrosis. This further increases Ms Diallo's known risks for anesthesia and operative intervention, chemotherapy; may limit her tolerance of intravenous TPN therapy as well.     6.  Complex history of likely force more focused discussions of palliative care possible Hospice. d/w Ankur Negrete and Adonis      Thank you for allowing me to participate in the care of your patient.     Over 80 minutes spent today reviewing today's and recent data; discussi EM MAIN OR   • PACEMAKER/DEFIBRILLATOR  8/2015   • SIGMOIDOSCOPY, FLEXIBLE; WITH ABLATION OF TUMOR(S), POLYP(S), OR OTHER LESIONS(S) NOT AMENABLE TO RE  9/13/15   • TONSILLECTOMY         Family History  Family History   Problem Relation Age of Onset   • H (MAXIPIME) 1 g in sodium chloride 0.9% 100 mL MBP/add-vantage 1 g Intravenous Q8H   dextrose 50 % injection 50 mL 50 mL Intravenous PRN   Glucose-Vitamin C (DEX-4) 4-6 GM-MG chewable tab 4 tablet 4 tablet Oral Q15 Min PRN   glucose (DEX4) oral liquid 15 g Omeprazole Does not apply Powder 40 mg by Jejunal Tube route daily. Nutritional Supplements (PROMOD) Oral Liquid 30 mL by Jejunal Tube route 2 (two) times daily before meals.    bisacodyl 10 MG Rectal Suppos Place 1 suppository (10 mg total) rectally da Comment:Other reaction(s): BENZOCAINE  Cetylpyridinium Chl*    UNKNOWN    Comment:Other reaction(s): CETYLPYRIDINIUM CHLORIDE  Ephedrine                         Review of Systems:     No fevers. Baseline waxing and waning abdominal pain.   Completely unabl No results for input(s): URINE, CULTI, BLDSMR in the last 168 hours.     Recent Labs   Lab 05/15/19  1630 05/16/19  0622   ALT 18 16   AST 15 18   ALKPHO 43* 40*   BILT 0.6 0.6   HGB 8.0* 7.3*   WBC 0.8* 1.0*       Imaging:  Ct Abdomen+pelvis(cont No biliary ductal dilatation. 8.  Scattered foci of gas within the ventral abdominal wall likely representing sequela of medical injection such is heparin or insulin. 9.  Stable lipoma within the right gluteal region.  10.  Stable 11 mm left adrenal adenoma

## 2019-05-16 NOTE — TELEPHONE ENCOUNTER
Sayda Joshua called after hours to cancel Dinah Span appts for today lab and infusion she was admitted last night. mell

## 2019-05-16 NOTE — CONSULTS
[facility]    Report of Consultation    Char Foster Patient Status:  Inpatient    10/14/1940 MRN [id number]   Location [facility] 4W/SW/SE Attending Paul Wei,*   Hosp Day # 1 PCP Javy Barrientos MD     Consultati was seen by Dr. He Lopez who agreed with the increase amount of drainage from the feeding tube site and CT evidence of retrograde migration of the J-tube towards the duodenum.   Recommendation so far and is to hold any further tube feeds and keep the jejuno JEJUNOSTOMY/GASTROSTOMY TUBE INSERTION N/A 4/25/2019    Performed by Fritz Yu MD at [facility] OR   • PACEMAKER/DEFIBRILLATOR  8/2015   • SIGMOIDOSCOPY, FLEXIBLE; WITH ABLATION OF TUMOR(S), POLYP(S), OR OTHER LESIONS(S) NOT AMENABLE TO RE  9/13/15   • Oral Q6H PRN   ondansetron HCl (ZOFRAN) injection 4 mg 4 mg Intravenous Q6H PRN   morphINE sulfate (PF) 2 MG/ML injection 1 mg 1 mg Intravenous Q2H PRN   Or      morphINE sulfate (PF) 2 MG/ML injection 2 mg 2 mg Intravenous Q2H PRN   Or      morphINE sulfa Oral Powd Pack 17 g by Per J Tube route daily. Scopolamine 1.5mg TD patch 1mg/3days Place 1 patch onto the skin every third day.    lidocaine-prilocaine 2.5-2.5 % External Cream Apply to site 1 hour prior to port a cath needle insertion   Nystatin (NYSTOP 96.8 kg (213 lb 6.4 oz), SpO2 94 %. General: Very weak. Alert and oriented x 3. HEENT: Dry membranes.   Unable to keep a lot of liquid in  Neck: No axillary cervical or supraclavicular adenopathy  Respiratory: Symmetric expansion, non-labored breathing mm.  2.  In the ventral supraumbilical region approximately 8 cm above the umbilicus is a gas and fluid collection without peripheral enhancement suggesting phlegmonous change measuring 92 x 133 x 143 mm.  3.  There are 2 colon resection sites with reanasto for possible stent discussion      Anemia due to chemotherapy  - Ferritin over 300  - transfuse soon, if falls below 7, will see tomorrow    History of right atrial appendage thrombus  - previously on lovenox BID, currently on heparin due to possible proce

## 2019-05-16 NOTE — H&P
[facility]    PATIENT'S NAME: Katerine Mariscal   ATTENDING PHYSICIAN: Mikayla Zacarias MD   PATIENT ACCOUNT#:   [id number]    LOCATION:  [place] [address]  MEDICAL RECORD #:   [id number]       YOB: 1940  ADMISSION DATE:       05/15/20 perforated diverticulitis status post partial colectomy, Jenny procedure, and later colostomy takedown and reanastomosis, history of cholecystectomy.       MEDICATIONS:  Please see medication reconciliation list.      ALLERGIES:  Local anesthetics and ma cyanosis. NEUROLOGIC:  Motor and sensory intact. Cranial nerves II through XII are intact. ASSESSMENT AND PLAN:    1. Neutropenia, most likely related to chemotherapy.     2.   Abdominal pain with increased drainage surrounding J-tube site, rule o

## 2019-05-16 NOTE — PROGRESS NOTES
[facility] Dosing Service    Initial Pharmacokinetic Consult for Vancomycin Dosing     Lee Mcclure is a [de-identified]year old female who is being treated for intra-abdominal infection.   Pharmacy has been asked to dose Vancomycin by Dr. Mario Amador    She is allerg

## 2019-05-16 NOTE — PLAN OF CARE
Pt having a rough day today. VS are stable, no fevers throughout shift, tele in place. Pt remains on neutropenic precautions, zarxio subQ given, IVF continued along with ABT.  Heparin drip started; d/c'd and lovenox will start tomorrow AM. no leakage from Patient/Family Short Term Goal  Description  Patient's Short Term Goal: to fix my j tube     Interventions:   - monitor VS  - monitor labs  - wound consult  - IV ABX     - See additional Care Plan goals for specific interventions   Outcome: Progressing

## 2019-05-16 NOTE — TELEPHONE ENCOUNTER
Pts  requesting to speak to  or Dr Panda Parr, to get a 2nd opinion about getting a STENT put in the pts esophagus.  states that the pt. Is admitted at [address] [place] [address].  states that the pts JTube was out of position causing leakage and pt.  Brielle Sims

## 2019-05-17 PROBLEM — R68.89 COPIOUS ORAL SECRETIONS: Status: ACTIVE | Noted: 2019-01-01

## 2019-05-17 NOTE — CONSULTS
[address] Patient Status:  Inpatient    10/14/1940 MRN [id number]   Location [facility] 4W/SW/SE Attending Alicia Melo,*   Hosp Day # 2 PCP Pepe Vela MD     Lucien collection without peripheral enhancement suggesting phlegmonous change measuring 92 x 133 x 143 mm. 3.  There are 2 colon resection sites with reanastomosis in the proximal and distal colons.   The reanastomosis site in the left lower quadrant is adjacent on 4/25/2019, PMH/PSH is significant as shown below and also for chronic atrial fibrillation on warfarin, sick sinus syndrome s/p PPM placement, diabetes mellitus type 2, hypertension, hyperlipidemia, obesity, osteoarthritis, ischemic colitis, thoracic spi her J-tube. Weight has been relatively stable; she weighed 215 pounds when I first saw her on 4/24/2019 and she weighs 213 pounds with this hospitalization.     Review of Systems/Symptom Managment: Reggie Law continues to have secretions that she is unable to told him that I would ask SW to assist.    Jennifer Roman also shared with me the [facility] paperwork that Bonifacio Nguyen had completed in 2003. Jennifer Roman believes that this paperwork gives him the \"right\" to make decisions on Gladis Silva' behalf.  We also reviewed her life-sustaining ruth anastamosis; pt had a colostomy for a year prior to reversal   • Personal history of antineoplastic chemotherapy 04/2019    last chemo 5-2-19   • Severe protein-calorie malnutrition Zuleyka Crooked: less than 60% of standard weight) (HCC)    • Unspecified essential Current Facility-Administered Medications:   •  Vancomycin HCl (FIRVANQ) 50 MG/ML oral solution 125 mg, 125 mg, Oral, Daily  •  dextrose 10 % infusion, , Intravenous, Continuous PRN  •  scopolamine (TRANSDERM-SCOP) patch, 1 patch, Transdermal, Q72H  • infusion, , Intravenous, Continuous    No current outpatient medications on file. Hematology:  Lab Results   Component Value Date    WBC 2.6 (L) 05/17/2019    HGB 7.5 (L) 05/17/2019    HCT 23.3 (L) 05/17/2019    .0 (L) 05/17/2019       Coags:   Marina Watson enlarged. There are granulomatous calcifications in the spleen. No suspicious splenic lesion. STOMACH: Large hiatal hernia. Majority the stomach is located above the diaphragm. Duodenum is unremarkable.  PANCREAS: No lesion, fluid collection, ductal dilat measurable fluid collection or lymphadenopathy or free air. ABDOMINAL WALL: Percutaneous jejunostomy tube is present in the left upper quadrant.   There is fat stranding along the tube tract, and along the left-lateral margin of the tube tract within the de abscess which measures approximately 14 x 22 x 51 mm.  2.  In the ventral supraumbilical region approximately 8 cm above the umbilicus is a gas and fluid collection without peripheral enhancement suggesting phlegmonous change measuring 92 x 133 x 143 mm.  3 conclusion 1, and therefore the 1st conclusion is been revised to reflect the proper position of the tube.   We discussed that there are 2 fluid collections in the ventral abdominal wall 1 appears more mature likely represents abscess which is in contact wi constipation/stercoral colitis. 4.  Post hysterectomy. No adnexal mass. 5.  Scattered low-density foci in the liver and kidneys most likely representing cysts however most of the foci are too small to definitively characterize. 6.  Large hiatal hernia.  7. limits set at this time  Have advanced directives been discussed with patient or healthcare power of : Yes        Healthcare Agent Appointed: Yes  Healthcare Agent's Name: Samreen Oliva ()  Healthcare Agent's Phone Number: [phone] Herbert Spurling, APN. Thank you for allowing the Palliative Care Services team to participate in the care of your patient. Our service will follow up with patient on Monday, if still hospitalized.  For any arising palliative care needs over the weekend, please

## 2019-05-17 NOTE — PLAN OF CARE
Problem: Patient Centered Care  Goal: Patient preferences are identified and integrated in the patient's plan of care  Description  Interventions:    - Provide timely, complete, and accurate information to patient/family  - Incorporate patient and family

## 2019-05-17 NOTE — CM/SW NOTE
CTL rounds:  Met with Richard Graf and her . Patient is alert, oriented. Spoke with patient and her  about post discharge care. Attempted to review preferred providers SNF list with patients .  Mr. Elysia Sweet states that his wife was at [facility]

## 2019-05-17 NOTE — DIETARY NOTE
ADULT NUTRITION UPDATE NOTE    Consult received to Initiate and Manage TPN    NUTRITION INTERVENTION:  - Meals and snacks: NPO  - Enteral Nutrition:  On hold  - Parenteral Nutrition:   RD ordered first bag for tonight  1800ml volume, 75 g protein, 900 non 3. 3* 3.8   CL 96* 99 106   CO2 33.0* 31.0 28.0   PHOS  --  2.2* 2.8   OSMOCALC 274* 282 292     NUTRITION PRESCRIPTION:  Diet: Parenteral Nutrition (PN) and NPO  Oral Supplements: NPO  ESTIMATED NUTRITION NEEDS:  Calories: 3418-1322 calories/day (18-20 luc

## 2019-05-17 NOTE — PROGRESS NOTES
Zayra Martinez 89  GI SERVICE PROGRESS NOTE    [facility] Patient Status:  Inpatient    10/14/1940 MRN [id number]   Location [facility] 4W/SW/SE Attending Carl Murillo,*   Hosp Day # 2 PCP MD Rosemary Lutz 3. 3* 3.8   CL 96* 99 106   CO2 33.0* 31.0 28.0   ALKPHO 43* 40*  --    AST 15 18  --    ALT 18 16  --    BILT 0.6 0.6  --    TP 6.5 5.7*  --        No results for input(s): HAY CASTANO in the last 168 hours.     Recent Labs   Lab 05/16/19  0622 05/17/19  8934 density foci are present within the kidneys which are too small to characterize but likely represent cysts. AORTA/VASCULAR:   There is calcific aortic atherosclerosis without aneurysm. RETROPERITONEUM: No mass or enlarged adenopathy.   BOWEL/MESENTERY:  Per measuring approximately 30 x 34 x 108 mm (AP x transverse x CC). This finding is without well-defined wall and may represent phlegmonous change or postprocedural change.   Scattered foci of gas are also present within the abdominal wall likely representing diverticula suggesting acute diverticulitis in the vicinity of the proximal sigmoid colon/anastomosis. There is fat stranding in this region which extends in the presacral soft tissues however there is no organized measurable fluid collection.   There is a (CST): Audrey Fortune MD on 5/16/2019 at 16:51             Result Date: 5/16/2019  CONCLUSION:  1. Percutaneous jejunostomy tube is present in the left upper quadrant.   The tip extends retrograde and is located at the level of the ligament of Treitz in the Stable 11 mm left adrenal adenoma.   Dictated by (CST): Robin Kaye MD on 5/15/2019 at 18:34     Approved by (CST): Robin Kaye MD on 5/15/2019 at 18:48          Xr Gastric/jejunum Tube Check With Contrast  (cpt=49465)    Result Date: 5/17/2019  CONCLUSIO the above and counseling this patient, speaking to other involved physicians.       Zana Beavers MD

## 2019-05-17 NOTE — PHYSICAL THERAPY NOTE
Attempted PT/OT co-eval at scheduled time, however pt going down for x-ray. Will check on later this morning.

## 2019-05-17 NOTE — PROGRESS NOTES
[facility]    CDI Prediction Tool Protocol (Vancomycin Initiated)    OVP (oral vancomycin prophylaxis) 125 mg PO Daily is being started in this patient based on a score of 13.       Score Breakdown:  High risk antibiotic use (5 points)  Malignanc

## 2019-05-17 NOTE — PLAN OF CARE
Pt is anxious and irritable today. Remains NPO, VS stable, isolation precautions maintained, continues to have copious secretions and spitting up. Scopolamine patch on order.  IVF and IV ABT continued, tele in place with no calls, j-tube site maintained wit Absence of urinary retention  Description  INTERVENTIONS:  - Assess patient’s ability to void and empty bladder  - Monitor intake/output and perform bladder scan as needed  - Follow urinary retention protocol/standard of care  - Consider collaborating with

## 2019-05-17 NOTE — PROGRESS NOTES
[facility] - [facility]    Progress Note    Irwin Little Patient Status:  Inpatient    10/14/1940 MRN [id number]   Location [facility] 4W/SW/SE Attending Remigio Lamb Day # 2 PCP Demarcus Garcia MD        Subjective: drainage  - nystatin powder  - GI and surgery following     Malfunctioning jtube  - hold tube feedings  - GI consult  - ?  Esophageal stent placement   - xray confirmed tube is positioned correctly, no further need by IR   - dietician consult  - TPN     Abd PT 19.3 (H) 11/19/2013    T4F 1.05 06/27/2018    TSH 2.09 09/09/2018     03/19/2019     (H) 11/08/2011    MG 2.0 05/17/2019    PHOS 2.8 05/17/2019    TROP <0.045 03/19/2019       Ct Abdomen+pelvis(contrast Only)(cpt=74177)    Addendum Date without aneurysm. RETROPERITONEUM: No mass or enlarged adenopathy. BOWEL/MESENTERY:  Percutaneous jejunostomy tube is present in the left upper quadrant.   There is prominent fat stranding in the subcutaneous fat along the tube tract, and along the left-la change. Scattered foci of gas are also present within the abdominal wall likely representing sequela of medical injection such is heparin or insulin.   There  is a lipoma deep to the right gluteus antonette muscle measuring approximately 33 x 79 mm, grossly presacral soft tissues however there is no organized measurable fluid collection. There is also a moderate quantity of fecal material throughout the majority the large bowel.   Therefore correlate for possible diverticulitis and constipation/stercoral coli quadrant. The tip extends retrograde and is located at the level of the ligament of Treitz in the distal duodenum/proximal jejunum.   The portion of the jejunostomy tube coursing through the subcutaneous fat in the abdominal wall is surrounded by fat stran Xr Gastric/jejunum Tube Check With Contrast  (WNE=[id number])    Result Date: 5/17/2019  CONCLUSION:  1. J-tube in the jejunum with antegrade flow of contrast.     Dictated by (CST): Roman Parada MD on 5/17/2019 at 10:58     Approved by (CST): So Caba

## 2019-05-17 NOTE — PHYSICAL THERAPY NOTE
Re-attempted a second time after pt returned from x-ray, however  immediately telling PT that Angelica Sosa is not appropriate for PT today. \"   explained that pt is tired from activity at x-ray, as well as feeling weak and nauseous.   Pt in agreement

## 2019-05-17 NOTE — PROGRESS NOTES
[facility]    Hematology/Oncology   Progress Note        Chief complaint  Patient presents with:  Abdomen/Flank Pain (GI/)  GEJ cancer, cellulitis, neutropenia        Subjective:  Seen by several consulting teams yesterday, looks lightl pressure 147/55, pulse 65, temperature 97.7 °F (36.5 °C), temperature source Oral, resp. rate 18, height 1.6 m (5' 3\"), weight 96.8 kg (213 lb 6.4 oz), SpO2 97 %.     ROS  All 14 points reviewed and negative except as listed above    Physical Exam:  Gen: a No suspicious splenic lesion. STOMACH: Large hiatal hernia. Majority the stomach is located above the diaphragm. Duodenum is unremarkable. PANCREAS: No lesion, fluid collection, ductal dilatation, or atrophy.   ADRENALS: Stable 11 mm diameter nodule in th WALL: Percutaneous jejunostomy tube is present in the left upper quadrant.   There is fat stranding along the tube tract, and along the left-lateral margin of the tube tract within the deep subcutaneous fat is a 14 x 22 x 51 mm (AP x transverse x CC) periph ventral supraumbilical region approximately 8 cm above the umbilicus is a gas and fluid collection without peripheral enhancement suggesting phlegmonous change measuring 92 x 133 x 143 mm.  3.  There are 2 colon resection sites with reanastomosis in the pro the proper position of the tube. We discussed that there are 2 fluid collections in the ventral abdominal wall 1 appears more mature likely represents abscess which is in contact with the jejunostomy tube tract.   The other collection is along the midline Scattered low-density foci in the liver and kidneys most likely representing cysts however most of the foci are too small to definitively characterize. 6.  Large hiatal hernia. 7.  Post cholecystectomy. No biliary ductal dilatation.  8.  Scattered foci of mL 3 mL Intravenous PRN   acetaminophen (TYLENOL) tab 650 mg 650 mg Oral Q6H PRN   ondansetron HCl (ZOFRAN) injection 4 mg 4 mg Intravenous Q6H PRN   morphINE sulfate (PF) 2 MG/ML injection 1 mg 1 mg Intravenous Q2H PRN   Or      morphINE sulfate (PF) 2 MG Use as directed 1 spray in the mouth or throat as needed. PEG 3350 Oral Powd Pack 17 g by Per J Tube route daily. Scopolamine 1.5mg TD patch 1mg/3days Place 1 patch onto the skin every third day.    lidocaine-prilocaine 2.5-2.5 % External Cream Apply

## 2019-05-17 NOTE — OCCUPATIONAL THERAPY NOTE
Orders received chart review complete, attempted Occupational Therapy evaluation x2 today, pt in xray initially and once returned to room pt and spouse declining evaluation 2/2 fatigue, requesting therapy to return tomorrow.      Will follow up tomorrow 5/1

## 2019-05-17 NOTE — WOUND PROGRESS NOTE
Wound Care Services  Follow up on the pt's J tube pouch, it is on and intact, small amt of exudate in the pouch and tube. No leaking of the appliance, the left lower abdomen excoriation has improved from yesterday.  Protective Isolation maintained, spoke wi

## 2019-05-18 NOTE — PLAN OF CARE
Patient refused additional PIV line. TPN paused while abx and albumin infusing. Lasix administered as ordered after albumin. Balderas catheter intact and draining. Ostomy appliance over jtube with drainage bag intact overnight.  Patient is incontinent of stool

## 2019-05-18 NOTE — PLAN OF CARE
Problem: Patient Centered Care  Goal: Patient preferences are identified and integrated in the patient's plan of care  Description  Interventions:  - What would you like us to know as we care for you?  This just keeps happening to me  - Provide timely, co Carlos Villarreal RN  Outcome: Not Progressing  5/18/2019 0234 by Carlos Villarreal RN  Outcome: Not Progressing     Problem: GASTROINTESTINAL - ADULT  Goal: Minimal or absence of nausea and vomiting  Description  INTERVENTIONS:  - Maintain adequat discharge  5/18/2019 0647 by Carlos Villarreal RN  Outcome: Not Progressing  5/18/2019 0623 by Carlos Villarreal RN  Outcome: Not Progressing

## 2019-05-18 NOTE — OCCUPATIONAL THERAPY NOTE
Chart reviewed and re-attempted OT evaluation today. Patient presents to therapy more alert in bed however with c/o diarrhea today. She adamantly refused any out of bed activity while she is having diarrhea. Encouraged AROM of BUE/BLE.  She states she may b

## 2019-05-18 NOTE — PROGRESS NOTES
Z[address]ra [address] 89     Gastroenterology Progress Note    Darryl Shaw Patient Status:  Inpatient    10/14/1940 MRN [id number]   Location [facility] 4W/SW/SE Attending Mary Weir,*   Hosp Day # 3 PCP Yeimy Dejesus, hrs:   BP Temp Temp src Pulse Resp SpO2 Weight   05/18/19 0443 141/51 97.3 °F (36.3 °C) Oral 63 16 95 % 213 lb 1.6 oz (96.7 kg)   05/17/19 2032 139/46 98.1 °F (36.7 °C) Oral 67 18 94 % —   05/17/19 1557 150/53 97.6 °F (36.4 °C) Oral 66 18 94 % —     Body m exposure control for dose reduction was used. Adjustment of the mA and/or kV was done based on the patient's size. Use of iterative reconstruction technique for dose reduction was used. FINDINGS:  LIVER: Liver has normal size and contour.   Scattered subc identified. Mild fat stranding in the presacral tissues. There has been prior proximal and distal colon resections with reanastomoses in the right upper and left lower quadrants.   No evidence for acute obstruction or inflammation at the proximal surgical bases show cardiomegaly, and a cardiac pacing device is present. Scattered linear bands of atelectasis/scar in the lung bases. OTHER: Negative. CONCLUSION:  1. Percutaneous jejunostomy tube is present in the left upper quadrant.   The tip extends antegr Stable 11 mm left adrenal adenoma.   Dictated by (CST): David Mccrary MD on 5/15/2019 at 18:34     Approved by (CST): David Mccrary MD on 5/15/2019 at 18:48    ADDENDUM:  Exam was discussed with Dr. Aris Nunez, Gastroenterology, on 5/16/19 at [facility]. trey.  Alessandro Peralta colons.   The reanastomosis site in the left lower quadrant is adjacent to diverticula, and there is inflammation around the anastomosis as well as adjacent diverticula suggesting acute diverticulitis in the vicinity of the proximal sigmoid colon/anastomosi

## 2019-05-18 NOTE — PLAN OF CARE
Caryle Collard has had complaints of abdominal pain throughout the day, declines pain medication, states it makes her too drowsy. VSS. Balderas in place. Last dose of albumin given. IV antibiotics given. PICC RN inserted an IV for IV antibiotics.  Potassium covered per ABX     - See additional Care Plan goals for specific interventions   Outcome: Progressing     Problem: GASTROINTESTINAL - ADULT  Goal: Minimal or absence of nausea and vomiting  Description  INTERVENTIONS:  - Maintain adequate hydration with IV or PO as o

## 2019-05-18 NOTE — PROGRESS NOTES
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    S/p Jtube placement on 4/25/19  - NPO  - pain and na <0.045 03/19/2019       Xr Gastric/jejunum Tube Check With Contrast  (cpt=49465)    Result Date: 5/17/2019  CONCLUSION:  1. J-tube in the jejunum with antegrade flow of contrast.     Dictated by (CST): Iva Beck MD on 5/17/2019 at 10:58     Approved b

## 2019-05-18 NOTE — PROGRESS NOTES
[facility]    [facility] Note    Patel Palacios Patient Status:  Inpatient    10/14/1940 MRN [id number]   Location [facility] 4W/SW/SE Attending Remigio Lamb Patience Day # 2 PCP Jamil Zuniga MD     Subjective:  Cont  05/17/2019    CA 8.2 05/17/2019    MG 2.0 05/17/2019    PHOS 2.8 05/17/2019       Physical Exam:  Blood pressure 150/53, pulse 66, temperature 97.6 °F (36.4 °C), temperature source Oral, resp.  rate 18, height 1.6 m (5' 3\"), weight 96.8 kg (213

## 2019-05-19 NOTE — PHYSICAL THERAPY NOTE
PHYSICAL THERAPY EVALUATION - INPATIENT     Room Number: 450/450-A  Evaluation Date: 5/19/2019  Type of Evaluation: Initial   Physician Order: PT Eval and Treat    Presenting Problem: admitted for cellulitis, intractable nausea, abdominal pain, diarrhea, 5x/week(3-5x/wk)       PHYSICAL THERAPY MEDICAL/SOCIAL HISTORY     History related to current admission: malignant neoplasm low 3rd of esophagus    Problem List  Principal Problem:    Cellulitis, abdominal wall  Active Problems:    Goals of care, counselin • JEJUNOSTOMY/GASTROSTOMY TUBE INSERTION N/A 4/25/2019    Performed by Paola Patel MD at [facility] OR   • PACEMAKER/DEFIBRILLATOR  8/2015   • SIGMOIDOSCOPY, FLEXIBLE; WITH ABLATION OF TUMOR(S), POLYP(S), OR OTHER LESIONS(S) NOT AMENABLE TO RE  9/13/15 STATUS  Gait Assessment   Gait Assistance: Not tested           Stoop/Curb Assistance: Not tested       Bed Mobility:  SBA with HOB elevated    Transfers:  CGA with RW    Exercise/Education Provided:  Bed mobility  Body mechanics  Transfer training    Priya

## 2019-05-19 NOTE — OCCUPATIONAL THERAPY NOTE
OCCUPATIONAL THERAPY EVALUATION - INPATIENT     Room Number: 450/450-A  Evaluation Date: 5/19/2019  Type of Evaluation: Initial  Presenting Problem: (nausea, abdominal pain, j-tube drainage)    Physician Order: IP Consult to Occupational Therapy  Reason fo assist. OT to follow while she is here at [facility]. RN aware.       DISCHARGE RECOMMENDATIONS  OT Discharge Recommendations: 24 hour care/supervision;Home with home health PT/OT;Sub-acute rehabilitation  OT Device Recommendations: TBD    PLAN  OT T Laterality Date   • APPENDECTOMY     • BACK SURGERY  8/15    benign cyst removed from thoracic area   • CATARACT Right 2011    right sided cataract surgery   • COLONOSCOPY  2007   • HYSTERECTOMY     • IR J-TUBE PROCEDURE  04/25/2019    kyle Handley Putting on and taking off regular upper body clothing?: A Little  -   Taking care of personal grooming such as brushing teeth?: A Little  -   Eating meals?: A Little    AM-PAC Score:  Score: 17  Approx Degree of Impairment: 50.11%  Standardized Score (AM-P

## 2019-05-19 NOTE — PROGRESS NOTES
120 [facility] dosing service    Follow-up Pharmacokinetic Consult for Vancomycin Dosing     Germaine Mask is a [de-identified]year old female who is being treated for neutropenic fever.    Patient is on day 4 of Vancomycin and add is currently receiving 1 gm IV Q 24 ho

## 2019-05-19 NOTE — PLAN OF CARE
No complaints of pain throughout the day. Worked with PT, able to ambulate to the chair with minimal assist. IV to left forearm in place. Accu checks Q6, covered appropriately with sliding scale insulin. VSS. Balderas in place. On tele, no calls. [email or web address].  Plan Minimal or absence of nausea and vomiting  Description  INTERVENTIONS:  - Maintain adequate hydration with IV or PO as ordered and tolerated  - Nasogastric tube to low intermittent suction as ordered  - Evaluate effectiveness of ordered antiemetic medicati

## 2019-05-19 NOTE — PROGRESS NOTES
[place] [facility] - [facility]    Progress Note    Roni Brothers Patient Status:  Inpatient    10/14/1940 MRN [id number]   Location [facility] 4W/SW/SE Attending Remigio Lamb Day # 4 PCP Braulio Boston MD        Subjective: regular insulin     Trush  - nystatin swish and spit     Neutropenia, most likely related to chemotherapy.    - heme/onc following  - filgrastim per oncology      Severe Malnutrition protein luc with alb inc 2.3 only alb infusions  - npo/ jtube feedings he

## 2019-05-19 NOTE — PLAN OF CARE
Problem: Patient Centered Care  Goal: Patient preferences are identified and integrated in the patient's plan of care  Description  Interventions:  - What would you like us to know as we care for you?  This just keeps happening to me  - Provide timely, co appropriate  - Advance diet as tolerated, if ordered  - Obtain nutritional consult as needed  - Evaluate fluid balance  Outcome: Progressing  Goal: Maintains or returns to baseline bowel function  Description  INTERVENTIONS:  - Assess bowel function  - Berry Felix

## 2019-05-20 NOTE — PHYSICAL THERAPY NOTE
PHYSICAL THERAPY TREATMENT NOTE - INPATIENT     Room Number: 450/450-A       Presenting Problem: admitted for cellulitis, intractable nausea, abdominal pain, diarrhea, increased drainage to J tube    Problem List  Principal Problem:    Cellulitis, abdomina Little   -   Sitting down on and standing up from a chair with arms (e.g., wheelchair, bedside commode, etc.): A Little   -   Moving from lying on back to sitting on the side of the bed?: A Lot   How much help from another person does the patient currently

## 2019-05-20 NOTE — WOUND PROGRESS NOTE
Wound Care Services  Follow up on the pt's J tube site and pouching system, it is intact, no leaking, small amt of tan liquid exudate in the pouch, the pt's left lateral lower abdomen excoriation has improved.  No complaints at this time, she is drinking wa

## 2019-05-20 NOTE — PROGRESS NOTES
[place] [facility] - [facility]    Progress Note    Marcia Ellington Patient Status:  Inpatient    10/14/1940 MRN [id number]   Location [facility] 4W/SW/SE Attending Remigio Lamb Day # 5 PCP Stephanie Kimble MD     Subjective:  No c CA 8.4 05/20/2019    MG 1.9 05/20/2019    PHOS 2.6 05/20/2019       Physical Exam:  Blood pressure 158/71, pulse 63, temperature 97.5 °F (36.4 °C), temperature source Oral, resp. rate 16, height 1.6 m (5' 3\"), weight 97.8 kg (215 lb 9.8 oz), SpO2 98 %.

## 2019-05-20 NOTE — PROGRESS NOTES
[place] [facility] - [facility]    Progress Note    Peggyann Mask Patient Status:  Inpatient    10/14/1940 MRN [id number]   Location [facility] 4W/SW/SE Attending Remigio Lamb Day # 5 PCP Manisha Boyd MD        Subjective: cdiff negative   - Levsin sublingual prn      Obstructing Distal esophageal adenocarcinoma, currently on chemotherapy/radiation.    S/p Jtube placement on 4/25/19  - NPO  - pain and nausea control   - surgical oncology following  - oncology following  - se PT 19.3 (H) 11/19/2013    T4F 1.05 06/27/2018    TSH 2.09 09/09/2018     03/19/2019     (H) 11/08/2011    MG 1.9 05/20/2019    PHOS 2.6 05/20/2019    TROP <0.045 03/19/2019                        Chiquis Devlin, KENNETH  5/20/2019

## 2019-05-20 NOTE — PLAN OF CARE
Problem: Patient Centered Care  Goal: Patient preferences are identified and integrated in the patient's plan of care  Description  Interventions:  - What would you like us to know as we care for you?  This just keeps happening to me  - Provide timely, co appropriate  - Advance diet as tolerated, if ordered  - Obtain nutritional consult as needed  - Evaluate fluid balance  Outcome: Progressing  Goal: Maintains or returns to baseline bowel function  Description  INTERVENTIONS:  - Assess bowel function  - Yue Rodriguez

## 2019-05-20 NOTE — PROGRESS NOTES
[facility]  GI SERVICE PROGRESS NOTE    Patel Palacios Patient Status:  Inpatient    10/14/1940 MRN [id number]   Location [facility] 4W/SW/SE Attending Loistine Dancer,*   Hosp Day # 5 PCP MD Jc Feliciano 179* 170* 206*   BUN 14 13   < > 9 13 17   CREATSERUM 0.56 0.48*   < > 0.64 0.56 0.55   GFRAA 102 107   < > 97 102 102   GFRNAA 88 93   < > 85 88 89   CA 8.4* 7.8*   < > 8.5 8.8 8.4*   ALB 2.2* 1.9*  --  2.3*  --   --    * 135*   < > 140 141 140   K site for the enteral feeding tube, collecting in the colostomy bag. · Continue cefepime and vancomycin antibiotics.   · Administered Lasix/albumin infusions 5/17–5/18 for fluid overload  · Chemotherapy on hold; has been administered Neupogen; WBC 8300 toda reviewing today's and recent data; discussing the above and counseling this patient, speaking to other involved physicians.       Camron Mendoza MD

## 2019-05-20 NOTE — PROGRESS NOTES
[facility]    Hematology/Oncology   Progress Note        Chief complaint  Patient presents with:  Abdomen/Flank Pain (GI/)  GEJ cancer, cellulitis, neutropenia        Subjective:  Still weak, tolerating TPN, diarrhea has improved, wanti adenopathy  CV: Regular, distant. Lungs: Symmetric expansion, nonlabored breathing  Abd: active bowel sounds. Cellulitic changes are no longer present. mildline laprotomy scar. J-tube site to gravity. + ostomy bag.    Ext: 1+ edema both feet up to the kne Min PRN   glucose (DEX4) oral liquid 15 g 15 g Oral Q15 Min PRN   Normal Saline Flush 0.9 % injection 3 mL 3 mL Intravenous PRN   acetaminophen (TYLENOL) tab 650 mg 650 mg Oral Q6H PRN   ondansetron HCl (ZOFRAN) injection 4 mg 4 mg Intravenous Q6H PRN   mo suppository (10 mg total) rectally daily as needed. artificial saliva substitute Mouth/Throat Solution Use as directed 1 spray in the mouth or throat as needed. PEG 3350 Oral Powd Pack 17 g by Per J Tube route daily.    Scopolamine 1.5mg TD patch 1mg/

## 2019-05-20 NOTE — PALLIATIVE CARE NOTE
[facility]  Palliative Care Follow Up    Dory Barbosa Patient Status:  Inpatient    10/14/1940 MRN [id number]   Location [facility] 4W/SW/SE Attending Wally Hauser,*   Hosp Day # 5 PCP Diana Mercado MD     Date of C appreciative of the time I spent with her today and I encouraged her to discuss these wishes with her  and MDs. I stressed the importance of not making these decisions at times of crisis.      Review of Systems:  Pertinent items are noted in subject KOTE) solution SOLN 5 spray, 5 spray, Mouth/Throat, PRN  •  bisacodyl (DULCOLAX) rectal suppository 10 mg, 10 mg, Rectal, Daily PRN  •  Fluticasone Propionate (FLONASE) 50 MCG/ACT nasal spray 1 spray, 1 spray, Each Nare, Daily  •  Enoxaparin Sodium (LOVENO 11/08/2011    MG 1.9 05/20/2019    PHOS 2.6 05/20/2019    TROP <0.045 03/19/2019       Imaging:        Objective:  Vital Signs:  Blood pressure (!) 161/68, pulse 64, temperature 97.1 °F (36.2 °C), temperature source Oral, resp.  rate 19, height 5' 3\" (1.6 ([facility] [address]    Anemia    DM type II    Afib/CHF EF 20%    Severe malnutrition  -currently NPO on TPN, per dietician recs    Debility/weakness  -PT/OT    Goals of care, counseling/discussion  -see subjective above for details  -Pt is full code; encouraged setting

## 2019-05-20 NOTE — CM/SW NOTE
Marbin from [facility] stated that his director of nursing will be reviewing referral and follow up if they are able to accept. Per  request, SUSAN gave caregiver resource list. Order has been received for community palliative care.  SUSAN will send pa

## 2019-05-20 NOTE — PLAN OF CARE
TPN infusing through port. Tube feeds are put on hold per Dr. Eric Mccain. Patient with PIV for ABX. Balderas catheter intact. Jtube with drainage bag intact. Incontinent of stool, prompt incontinence care provided. Morphine PRN for pain management.  PT/OT got pt u wound consult  - IV ABX     - See additional Care Plan goals for specific interventions   Outcome: Progressing     Problem: GENITOURINARY - ADULT  Goal: Absence of urinary retention  Description  INTERVENTIONS:  - Assess patient’s ability to void and empty

## 2019-05-20 NOTE — CM/SW NOTE
151pm: [facility] is unable to accept the pt. At this time. SNF placement will need to be readdressed with the pt. And her .   -------------------------  900am: SUSAN received a call from Chaitanya, liaison with [facility].   Updated information has been sent to

## 2019-05-20 NOTE — DIETARY NOTE
ADULT NUTRITION UPDATE NOTE      Re ordered TPN for tonight as follows: 2L, 80 g pro, 1350 non protein calories (750 dextrose/600 fat) to provide 1670 total calories with 80 g protein to meet 96% min calorie and 100% protein needs.      MD's approved resumi

## 2019-05-21 NOTE — PROGRESS NOTES
[facility]    Hematology/Oncology   Progress Note        Chief complaint  Patient presents with:  Abdomen/Flank Pain (GI/)  GEJ cancer, cellulitis, neutropenia    Subjective:  On TPN,  at bedside. Feeling ok. sittin up on chair. longer present. mildline laprotomy scar. J-tube site to gravity. + ostomy bag. Ext: 1+ edema both feet up to the knees improved  Neuro: strength grossly intact. Moving all four extremities.    Psych: appropriate mood, affect    Imaging: personally reviewe Sodium (LOVENOX) 80 MG/0.8ML injection 80 mg 80 mg Subcutaneous 2 times per day   LORazepam (ATIVAN) injection 0.25 mg 0.25 mg Intravenous Nightly PRN   Cefepime HCl (MAXIPIME) 1 g in sodium chloride 0.9% 100 mL MBP/add-vantage 1 g Intravenous Q8H   dextro Subcutaneous Solution Pen-injector Inject 12 Units into the skin nightly. Metoprolol Tartrate Does not apply Powder 6.25 mg by Jejunal Tube route every 6 (six) hours. Omeprazole Does not apply Powder 40 mg by Jejunal Tube route daily.    Nutritional Sup Tevin Arzola MD

## 2019-05-21 NOTE — CM/SW NOTE
5/29 1219pm: SW received a call from Ester Trujillo, liaison with [facility] with an update. [address] will need the TPN order/recipe before 2p and the pt. Will need to arrive no later than 3p.   If the TPN recipe is known before it can be sent to [facility] that.  SW explored different options with the pt. And her . The pt's  is planning to hire a caregiver to be with the pt. While she is in rehab. He has 2 agencies lined up. The pt.  Was most recently at [facility] and will not con

## 2019-05-21 NOTE — PLAN OF CARE
Problem: Diabetes/Glucose Control  Goal: Glucose maintained within prescribed range  Description  INTERVENTIONS:  - Monitor Blood Glucose as ordered  - Assess for signs and symptoms of hyperglycemia and hypoglycemia  - Administer ordered medications to m nonpharmacologic comfort measures as appropriate  - Advance diet as tolerated, if ordered  - Obtain nutritional consult as needed  - Evaluate fluid balance  Outcome: Not Progressing     Problem: GENITOURINARY - ADULT  Goal: Absence of urinary retention  Rivas Cordelia

## 2019-05-21 NOTE — DIETARY NOTE
ADULT NUTRITION REASSESSMENT     Pt is at high nutrition risk. Pt meets malnutrition criteria.       CRITERIA FOR MALNUTRITION DIAGNOSIS:  Criteria for non-severe malnutrition diagnosis: acute illness/injury related to body fat mild depletion, muscle mass PERTINENT PAST MEDICAL HISTORY:   Past Medical History:   Diagnosis Date   • Atrial fibrillation ([facility]     on warfarin   • Chronic atrial fibrillation (H[facility])    • Chronic viral hepatitis C ([facility]    • Constipation    • Dehydration    • Diabetes ([facility] lb)  04/04/19 : 93.9 kg (207 lb)  04/02/19 : 93.9 kg (207 lb)    GASTROINTESTINAL: dysphagia and diarrhea    FOOD/NUTRITION RELATED HISTORY:  Intake: NPO >2 weeks. EN at home PTA  Intake Meeting Needs: Yes, TPN meeting needs.     Food Allergies: No  Cultur EVALUATE/NUTRITION GOALS:  - Food and Nutrient Administration:      Monitor: enteral nutrition initiation, TPN tolerance, adequacy of TPN and for TPN adjustment  - Anthropometric Measurement:      Monitor: wt and wt change   - Nutrition Goals:      allow w

## 2019-05-21 NOTE — PROGRESS NOTES
[facility]  GI SERVICE PROGRESS NOTE    Daniel Tineo Patient Status:  Inpatient    10/14/1940 MRN [id number]   Location [facility] 4W/SW/SE Attending Bryan Motta,*   Hosp Day # 6 PCP MD Lyric Olmedo Lab 05/15/19  1630 05/16/19  0622  05/18/19  0853 05/19/19  0505 05/20/19  0502 05/21/19  0539   GLU 59* 130*   < > 179* 170* 206* 191*   BUN 14 13   < > 9 13 17 17   CREATSERUM 0.56 0.48*   < > 0.64 0.56 0.55 0.57   GFRAA 102 107   < > 97 102 102 101 drainage. Significant bilious output from the enteral feeding tube is ongoing. As of today's exam, there is still significant bilious, purulent drainage coming out of the skin fistula site for the enteral feeding tube, collecting in the colostomy bag.   ·

## 2019-05-21 NOTE — PROGRESS NOTES
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    S/p Jtube TP 6.0 (L) 05/18/2019    AST 17 05/18/2019    ALT 19 05/18/2019    PTT 25.8 05/16/2019    INR 1.00 (A) 05/10/2019    PT 19.3 (H) 11/19/2013    T4F 1.05 06/27/2018    TSH 2.09 09/09/2018     03/19/2019     (H) 11/08/2011    MG 2.2 05/21/201

## 2019-05-21 NOTE — OCCUPATIONAL THERAPY NOTE
OCCUPATIONAL THERAPY TREATMENT NOTE - INPATIENT        Room Number: 450/450-A           Presenting Problem: (nausea, abdominal pain, j-tube drainage)    Problem List  Principal Problem:    Cellulitis, abdominal wall  Active Problems:    Goals of care, coun training; Endurance training;Patient/Family education;Patient/Family training;Equipment eval/education; Compensatory technique education    SUBJECTIVE  \"I take a long time, you came during my morning routine\"      OBJECTIVE  Precautions: (colostomy, TPN) SBA            Goals  on:    Frequency: 3-5x a week    Wanna Dancer OTR/L  [facility]

## 2019-05-21 NOTE — PALLIATIVE CARE NOTE
Louis Graham is sitting up in the recliner with her eyes closed. James Gold says that he feels that Louis Graham needs to rest and perhaps take a nap. He also says that he feels that the doctors are doing a good job with Gato's care.      James Gold is okay with me returning tomorrow

## 2019-05-21 NOTE — PROGRESS NOTES
120 [facility] dosing service    Follow-up Pharmacokinetic Consult for Vancomycin Dosing     Estrellita Early is a [de-identified]year old female who is being treated for abdominal cellulitis .    Patient is on day 6 of Vancomycin and add is currently receiving 1.5 gm IV Q

## 2019-05-22 NOTE — PLAN OF CARE
Problem: Patient Centered Care  Goal: Patient preferences are identified and integrated in the patient's plan of care  Description  Interventions:  - What would you like us to know as we care for you?  This just keeps happening to me  - Provide timely, co administration  - Ensure safe mobilization of patient  - Hold pressure on venipuncture sites to achieve adequate hemostasis  - Assess for signs and symptoms of internal bleeding  - Monitor lab trends  Outcome: Progressing     Problem: GASTROINTESTINAL - AD seroquel see inteval data.

## 2019-05-22 NOTE — PLAN OF CARE
Pt remains stable throughout shift. Continues to have the excessive saliva spit up. Remains NPO, TPN infusing through cx port, ABT continued, Jtube site intact- continues to leak into ostomy bag. J-tube connected to gravity drainage bag.  Worked with PT/OT Avoid intramuscular injections, enemas and rectal medication administration  - Ensure safe mobilization of patient  - Hold pressure on venipuncture sites to achieve adequate hemostasis  - Assess for signs and symptoms of internal bleeding  - Monitor lab tr

## 2019-05-22 NOTE — PALLIATIVE CARE NOTE
Patient seen in bed with  Vance Corcoran at the bedside. Zeynep Herr is not feeling well today. She is nauseated and has been vomiting. She appears tachypneic and dyspneic. She also appears very fatigued.     During her previous hospital stay, I had provided educat

## 2019-05-22 NOTE — PROGRESS NOTES
[place] [facility]    Hematology/Oncology   Progress Note        Chief complaint  Patient presents with:  Abdomen/Flank Pain (GI/)  GEJ cancer, cellulitis, neutropenia    Subjective:  On TPN,  at bedside. Feeling much worse.  Sat on chair Lungs: Symmetric expansion, nonlabored breathing  Abd: active bowel sounds. Cellulitic changes are no longer present. mildline laprotomy scar. J-tube site to gravity.  + ostomy bag. + tenderness with touch  Ext: 1+ edema both feet up to the knees improved Q8H   dextrose 50 % injection 50 mL 50 mL Intravenous PRN   Glucose-Vitamin C (DEX-4) 4-6 GM-MG chewable tab 4 tablet 4 tablet Oral Q15 Min PRN   glucose (DEX4) oral liquid 15 g 15 g Oral Q15 Min PRN   Normal Saline Flush 0.9 % injection 3 mL 3 mL Intraven Supplements (PROMOD) Oral Liquid 30 mL by Jejunal Tube route 2 (two) times daily before meals. bisacodyl 10 MG Rectal Suppos Place 1 suppository (10 mg total) rectally daily as needed.    artificial saliva substitute Mouth/Throat Solution Use as directed

## 2019-05-22 NOTE — PROGRESS NOTES
[place] [facility] - [facility]    Progress Note    Daniel Tineo Patient Status:  Inpatient    10/14/1940 MRN [id number]   Location [facility] 4W/SW/SE Attending Remigio Lamb Day # 7 PCP Blanka Issa MD        Subjective: diarrhea   Possibly d/t Acute diverticulitis  - continue abx as above  - GI consulted  - GI panel and cdiff negative   - Levsin sublingual prn      Obstructing Distal esophageal adenocarcinoma, currently on chemotherapy/radiation.    S/p Jtube placement on 6.0 (L) 05/18/2019    AST 17 05/18/2019    ALT 19 05/18/2019    PTT 25.8 05/16/2019    INR 1.00 (A) 05/10/2019    PT 19.3 (H) 11/19/2013    T4F 1.05 06/27/2018    TSH 2.09 09/09/2018     03/19/2019     (H) 11/08/2011    MG 2.2 05/21/2019    P

## 2019-05-22 NOTE — PLAN OF CARE
J tube draining to gravity, also leaking around site. Accucheck Q6. Right chest port with TPN at 83. 3mL/hr. Telemetry monitor in place. Zofran given for nausea. NPO, pt spitting frequently throughout shift.  Bed in lowest position, call light in reach, fall nausea and vomiting  Description  INTERVENTIONS:  - Maintain adequate hydration with IV or PO as ordered and tolerated  - Nasogastric tube to low intermittent suction as ordered  - Evaluate effectiveness of ordered antiemetic medications  - Provide nonphar

## 2019-05-22 NOTE — PROGRESS NOTES
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    S/p Jtube placement on 8.2 (L) 05/22/2019    ALB 2.3 (L) 05/18/2019    ALKPHO 51 (L) 05/18/2019    BILT 0.5 05/18/2019    TP 6.0 (L) 05/18/2019    AST 17 05/18/2019    ALT 19 05/18/2019    PTT 25.8 05/16/2019    INR 1.00 (A) 05/10/2019    PT 19.3 (H) 11/19/2013    T4F 1.05 06/27

## 2019-05-23 NOTE — PROGRESS NOTES
[place] [facility]    Hematology/Oncology   Progress Note        Chief complaint  Patient presents with:  Abdomen/Flank Pain (GI/)  GEJ cancer, cellulitis, neutropenia    Subjective:  On TPN,  at bedside.  Patient feels somewhat improved Karina Vidal, to make a decision about end of life care as clinically indicated    Objective:  Blood pressure (!) 164/66, pulse 65, temperature 97.1 °F (36.2 °C), temperature source Oral, resp.  rate 16, height 1.6 m (5' 3\"), weight 99.6 kg (219 lb 9 oz), SpO2 97 % Date: 5/23/2019  CONCLUSION:   Pulmonary vascular congestion with worsening left greater than right pleural effusions and associated basilar airspace disease.   Dictated by (CST): Shauna Barbour MD on 5/23/2019 at 10:38     Approved by (CST): Coleman Hawley dextrose 50 % injection 50 mL 50 mL Intravenous PRN   Glucose-Vitamin C (DEX-4) 4-6 GM-MG chewable tab 4 tablet 4 tablet Oral Q15 Min PRN   glucose (DEX4) oral liquid 15 g 15 g Oral Q15 Min PRN   Normal Saline Flush 0.9 % injection 3 mL 3 mL Intravenous times daily before meals. bisacodyl 10 MG Rectal Suppos Place 1 suppository (10 mg total) rectally daily as needed. artificial saliva substitute Mouth/Throat Solution Use as directed 1 spray in the mouth or throat as needed.      PEG 3350 Oral Powd Pack

## 2019-05-23 NOTE — WOUND PROGRESS NOTE
Wound and Ostomy Care  Routine J tube  pouch appliance change, the pt. is sitting up in bed, she is drinking water and spitting it out, her spouse Tara Orosco is at the bedside.  The J tube pouch is intact, no leaking, 50 cc of tan yellow foul smelling exudate was

## 2019-05-23 NOTE — PLAN OF CARE
Problem: Patient Centered Care  Goal: Patient preferences are identified and integrated in the patient's plan of care  Description  Interventions:  - What would you like us to know as we care for you?  This just keeps happening to me  - Provide timely, co appropriate  - Advance diet as tolerated, if ordered  - Obtain nutritional consult as needed  - Evaluate fluid balance  Outcome: Progressing  Goal: Maintains or returns to baseline bowel function  Description  INTERVENTIONS:  - Assess bowel function  - Vin Boudreaux collection bag by gravity. Accucheck done. No glycemic reactions noted. Safety measures in place. Continue to monitor.

## 2019-05-23 NOTE — PLAN OF CARE
Pt had CT abd/pelvis showing improving abscesses. On cefepime and vanco for cellulitis. Jtube draining to gravity, used for medication only. NPO, pt frequently spitting saliva into basin. Port getting TPN per orders. Indwelling vick. Incontinent of bowel. ABX     - See additional Care Plan goals for specific interventions   Outcome: Progressing     Problem: GASTROINTESTINAL - ADULT  Goal: Minimal or absence of nausea and vomiting  Description  INTERVENTIONS:  - Maintain adequate hydration with IV or PO as o

## 2019-05-23 NOTE — PROGRESS NOTES
[place] [facility] - [facility]    Progress Note    Roniyimi Brothers Patient Status:  Inpatient    10/14/1940 MRN [id number]   Location [facility] 4W/SW/SE Attending Remigio Lamb Day # 8 PCP Braulio [place] MD        Subjective: IR   - dietician consult  - TPN      Abd pain/ diarrhea   Possibly d/t Acute diverticulitis  - continue abx as above  - GI consulted  - GI panel and cdiff negative   - Levsin sublingual prn      Obstructing Distal esophageal adenocarcinoma, currently on ch 05/18/2019    TP 6.0 (L) 05/18/2019    AST 17 05/18/2019    ALT 19 05/18/2019    PTT 25.8 05/16/2019    INR 1.00 (A) 05/10/2019    PT 19.3 (H) 11/19/2013    T4F 1.05 06/27/2018    TSH 2.09 09/09/2018     03/19/2019     (H) 11/08/2011    MG 2. length. CT a/p improved. Discussed with Dr Gloria Daniels and will discuss with Dr Ciara Quezada later today. >35 min spent with patient. > 50% time was spent counseling patient, discussing plan of care, discussing labs and imaging findings. Spoke with consultant.  All

## 2019-05-23 NOTE — PROGRESS NOTES
[facility] dosing service    Follow-up Pharmacokinetic Consult for Vancomycin Dosing     Dominique De La Paz is a [de-identified]year old female who is being treated for cellulitis. Patient is on day 9 of Vancomycin and add is currently receiving 2 gm IV Q 24 hours.   G

## 2019-05-23 NOTE — PROGRESS NOTES
[facility]  GI SERVICE PROGRESS NOTE    Dominique De La Paz Patient Status:  Inpatient    10/14/1940 MRN [id number]   Location [facility] 4W/SW/SE Attending Meir Baker,*   Hosp Day # 8 PCP Thersia Gaucher, MD Joel London 19.3 (H) 11/19/2013    PT 22.3 (H) 06/28/2011    PT 15.7 (H) 02/08/2011    INR 1.00 (A) 05/10/2019    INR 1.14 04/27/2019    INR 1.15 04/25/2019       Recent Labs   Lab 05/18/19  0853  05/21/19  0539 05/22/19  0547 05/23/19  0530   *   < > 191* 202* colostomy bag today at 9:30 AM.  Not yet ready to change from gravity drainage to a trial of tube feedings. · Consideration for repeat CT scan today if able to tolerate. · Dr Franchesca Blank Oncology notes appreciated.   Donavon Thomson may be a candidate for ??outpt pa

## 2019-05-24 NOTE — PROGRESS NOTES
[facility]  GI SERVICE PROGRESS NOTE    Armida Carter Patient Status:  Inpatient    10/14/1940 MRN [id number]   Location [facility] 4W/SW/SE Attending Charlene Bedoya,*   Hosp Day # 9 PCP MD Gabino Lanza Lab 05/18/19  0853  05/22/19  0547 05/23/19  0530 05/24/19  0620   *   < > 202* 197* 157*   BUN 9   < > 17 15 16   CREATSERUM 0.64   < > 0.50* 0.48* 0.55   GFRAA 97   < > 106 107 102   GFRNAA 85   < > 92 93 89   CA 8.5   < > 8.2* 8.4* 8.2*   ALB 2 5/23/2019  CONCLUSION:   Pulmonary vascular congestion with worsening left greater than right pleural effusions and associated basilar airspace disease.   Dictated by (CST): Shauna Barbour MD on 5/23/2019 at 10:38     Approved by (CST): Shauna Barbour, requesting stent placement as soon as next week if her overall condition continues to improve; remains extremely weak.   · MiraLAX as tolerated for the severe constipation, threatened rectal fecal impaction        Over 45 minutes spent today reviewing today

## 2019-05-24 NOTE — PLAN OF CARE
Problem: Patient Centered Care  Goal: Patient preferences are identified and integrated in the patient's plan of care  Description  Interventions:  - What would you like us to know as we care for you?  This just keeps happening to me  - Provide timely, co appropriate  - Advance diet as tolerated, if ordered  - Obtain nutritional consult as needed  - Evaluate fluid balance  Outcome: Progressing  Goal: Maintains or returns to baseline bowel function  Description  INTERVENTIONS:  - Assess bowel function  - Nara Mccloud

## 2019-05-24 NOTE — PROGRESS NOTES
[facility]    Hematology/Oncology   Progress Note        Chief complaint  Patient presents with:  Abdomen/Flank Pain (GI/)  GEJ cancer, cellulitis, neutropenia    Subjective:  Patient is with no acute events overnight.  She's remained af electronic DNR order entered in the computer  --she would want, Kyara Gravely, to make a decision about end of life care as clinically indicated.      --patient expresses strong disinterest in discussing this topic again and feels frustrated that the topic keeps comi pleural effusions.     Dictated by (CST): Jenni Moran MD on 5/23/2019 at 11:49     Approved by (CST): Jenni Moran MD on 5/23/2019 at 12:26          Xr Chest Ap Portable  (cpt=71045)    Result Date: 5/23/2019  CONCLUSION:   Pulmonary vascular congestion wi mg 80 mg Subcutaneous 2 times per day   LORazepam (ATIVAN) injection 0.25 mg 0.25 mg Intravenous Nightly PRN   Cefepime HCl (MAXIPIME) 1 g in sodium chloride 0.9% 100 mL MBP/add-vantage 1 g Intravenous Q8H   dextrose 50 % injection 50 mL 50 mL Intravenous Powder 6.25 mg by Jejunal Tube route every 6 (six) hours. Omeprazole Does not apply Powder 40 mg by Jejunal Tube route daily. Nutritional Supplements (PROMOD) Oral Liquid 30 mL by Jejunal Tube route 2 (two) times daily before meals.    bisacodyl 10 MG R available as needed this weekend. Lyndsey Suarez MD  [facility]  [address] [id number]  [address] [place] [address], [facility]

## 2019-05-24 NOTE — PROGRESS NOTES
[place] [facility] - [facility]    Progress Note    Royce Landry Patient Status:  Inpatient    10/14/1940 MRN [id number]   Location [facility] 4W/SW/SE Attending Remigio Lamb Day # 9 PCP Sasha Huber MD        Subjective: correctly, no further need by [facility]  - dietician consult  - TPN      Abd pain/ diarrhea   Possibly d/t Acute diverticulitis  - continue abx as above  - GI consulted  - GI panel and cdiff negative   - Levsin sublingual prn      Obstructing Distal esophageal a ALB 2.3 (L) 05/18/2019    ALKPHO 51 (L) 05/18/2019    BILT 0.5 05/18/2019    TP 6.0 (L) 05/18/2019    AST 17 05/18/2019    ALT 19 05/18/2019    PTT 25.8 05/16/2019    INR 1.00 (A) 05/10/2019    PT 19.3 (H) 11/19/2013    T4F 1.05 06/27/2018    TSH 2.09 0  code status. She said she would not want the tube down her throat and wound want CPR, but wound not sign a POLST form.  She and her  had difficulty understanding that we need a doctors order to adhere to her wishes and we cannot use her livin

## 2019-05-24 NOTE — PLAN OF CARE
Not acute changes throughout shift. VVS, TPN infusing through port, ABT continued, vick intact and draining freely. J- tube to gravity drainage bag maintained with no leakage at site. Pt up to the chair today with 2 assist and walker.  Plan for discharging GENITOURINARY - ADULT  Goal: Absence of urinary retention  Description  INTERVENTIONS:  - Assess patient’s ability to void and empty bladder  - Monitor intake/output and perform bladder scan as needed  - Follow urinary retention protocol/standard of care

## 2019-05-24 NOTE — OCCUPATIONAL THERAPY NOTE
Attempted to see pt for OT session x2, however 1st attempt MD talking extensively with pt and spouse, 2nd attempt NP talking extensively with them. Will cont to follow.

## 2019-05-24 NOTE — PROGRESS NOTES
[place] [facility] - [facility]    Progress Note    Xuanevan Hinton Patient Status:  Inpatient    10/14/1940 MRN [id number]   Location [facility] 4W/SW/SE Attending Remigio Lamb Day # 8 PCP Magdi Schwartz MD     Subjective:  No c CO2 28.0 05/23/2019     05/23/2019    CA 8.4 05/23/2019       Physical Exam:  Blood pressure (!) 164/66, pulse 65, temperature 97.1 °F (36.2 °C), temperature source Oral, resp.  rate 16, height 1.6 m (5' 3\"), weight 99.6 kg (219 lb 9 oz), SpO2 97 % There are no intra-abdominal fluid collection/abscesses. My findings were communicated to the patient and her . I discussed this case with Dr. Christiano Lopez who is quite  familiar with the patient and her family.   His recommendation is to hold off on res

## 2019-05-24 NOTE — WOUND PROGRESS NOTE
Wound Care Services  Follow up on the pt. and the J tube pouch, she is up in the chair, her spouse Kiki Maxwell is at the bedside, per Kiki Mcdowell switched out the tube for a smaller size last night.  The J tube and pouch are intact, no leaking, the appliance wa

## 2019-05-25 NOTE — PROGRESS NOTES
[facility]  GI SERVICE PROGRESS NOTE    Irwin Jonas Patient Status:  Inpatient    10/14/1940 MRN [id number]   Location [facility] 4W/SW/SE Attending Jameel Biggs,*   Hosp Day # 10 PCP Demarcus Garcia MD       Subject --  7.5 7.1   .0  --  151.0 149.0*       Lab Results   Component Value Date    PT 19.3 (H) 11/19/2013    PT 22.3 (H) 06/28/2011    PT 15.7 (H) 02/08/2011    INR 1.00 (A) 05/10/2019    INR 1.14 04/27/2019    INR 1.15 04/25/2019       Recent Labs   La retching and inability to take liquids is becoming her chief complaint, we may need to consider requesting stent placement as soon as next week if her overall condition continues to improve; remains extremely weak.   · MiraLAX as tolerated for the severe co

## 2019-05-25 NOTE — PLAN OF CARE
Problem: Patient Centered Care  Goal: Patient preferences are identified and integrated in the patient's plan of care  Description  Interventions:  - What would you like us to know as we care for you?  This just keeps happening to me  - Provide timely, co appropriate  - Advance diet as tolerated, if ordered  - Obtain nutritional consult as needed  - Evaluate fluid balance  Outcome: Progressing  Goal: Maintains or returns to baseline bowel function  Description  INTERVENTIONS:  - Assess bowel function  - Bettie Villagomez

## 2019-05-25 NOTE — PROGRESS NOTES
[facility]    Progress Note    Shruthi Feli Patient Status:  Inpatient    10/14/1940 MRN [id number]   Location [facility] 4W/SW/SE Attending Ute Onofre MD   Hosp Day # 10 PCP Sofie Vargas MD       Subjective:   Leslee Laguna Current Scheduled Inpatient Meds:     • magnesium sulfate  2 g Intravenous Once   • PEG 3350  17 g Oral Daily   • hydrALAZINE HCl  10 mg Per J Tube Q8H   • insulin detemir  5 Units Subcutaneous Nightly   • vancomycin  2,000 mg Intravenous Q24H   • Imaging/EKG:           Assessment and Plan:       Abdominal Cellulitis d/t increased drainage surrounding J-tube site  Likely phlegmon, possible evolving abscess along the midline laparotomy wound on CT scan  - hold tube feedings plans to start trial bid     Weakness walked well with pt     HTN  - hydralazine through jtube added     Pleural effusions  - lasix       Quality:  · Diet: NPO  · PT/OT: pending  · DVT Prophylaxis: Lovenox  · CODE status: Full   · Dispo: per clinical course      Greater than 3

## 2019-05-25 NOTE — PLAN OF CARE
Problem: Diabetes/Glucose Control  Goal: Glucose maintained within prescribed range  Description  INTERVENTIONS:  - Monitor Blood Glucose as ordered  - Assess for signs and symptoms of hyperglycemia and hypoglycemia  - Administer ordered medications to m GENITOURINARY - ADULT  Goal: Absence of urinary retention  Description  INTERVENTIONS:  - Assess patient?s ability to void and empty bladder  - Monitor intake/output and perform bladder scan as needed  - Follow urinary retention protocol/standard of care

## 2019-05-25 NOTE — OCCUPATIONAL THERAPY NOTE
Pt not seen for OT at this time secondary to pt and spouse declining session, stating that pt was up in chair all day, and had ambulated in horn, and that she had just returned to bed, exhausted.  Will attempt to see pt next date as pt is able to participat

## 2019-05-26 NOTE — PROGRESS NOTES
[place] [facility]    Progress Note    Akbar Baptiste Patient Status:  Inpatient    10/14/1940 MRN [id number]   Location [facility] 4W/SW/SE Attending Liliya Morrison, [address] [facility] Day # 11 PCP Ezio Hammer MD       Subjective:   Dez Fowler Miconazole Nitrate 2 % powder  Topical [email or web address]   Insulin Regular Human (NOVOLIN R) 100 UNIT/ML injection 1-5 Units 1-5 Units Subcutaneous 4 times per day   artificial saliva substitute (MOUTH KOTE) solution SOLN 5 spray 5 spray Mouth/Throat PRN   b 05/26/2019    PHOS 2.8 05/25/2019    TROP <0.045 03/19/2019                 Results:     CBC:    Lab Results   Component Value Date    WBC 7.1 05/24/2019    WBC 7.5 05/23/2019    WBC 8.3 05/20/2019     Lab Results   Component Value Date    HGB 8.2 (L) 05/2 - monitor      Diabetes mellitus type 2, with current hypoglycemia.    A1C 6.8  - ss regular insulin  - levemir added      Trush  - nystatin swish and spit     Neutropenia, most likely related to chemotherapy.    - heme/onc following  - filgrastim per on

## 2019-05-26 NOTE — PLAN OF CARE
Problem: Patient Centered Care  Goal: Patient preferences are identified and integrated in the patient's plan of care  Description  Interventions:  - What would you like us to know as we care for you?  This just keeps happening to me  - Provide timely, co appropriate  - Advance diet as tolerated, if ordered  - Obtain nutritional consult as needed  - Evaluate fluid balance  Outcome: Progressing  Goal: Maintains or returns to baseline bowel function  Description  INTERVENTIONS:  - Assess bowel function  - Bonita Gamboa to monitor.

## 2019-05-26 NOTE — DIETARY NOTE
NUTRITION NOTE UPDATE:    MD order to begin Enteral TF via J-tube of Jevity 1.5 @ 10 ml/hr and hold rate. Noted plans to monitor TF tolerance, if diarrhea develops-to hold TF. TPN to continue.  Above TF will provide: 330 Kcal, 14 gm Pro and 167 ml free wate

## 2019-05-27 NOTE — PROGRESS NOTES
[place] [facility] - [facility]    Progress Note    Paulette Lundy Patient Status:  Inpatient    10/14/1940 MRN [id number]   Location [facility] 4W/SW/SE Attending Deb Hart, [address] [facility] Day # 12 PCP Unique Bowling MD       Subjective:   Darryl Cole Insulin Regular Human (NOVOLIN R) 100 UNIT/ML injection 1-5 Units 1-5 Units Subcutaneous 4 times per day   artificial saliva substitute (MOUTH KOTE) solution SOLN 5 spray 5 spray Mouth/Throat PRN   bisacodyl (DULCOLAX) rectal suppository 10 mg 10 mg Rect 03/19/2019                 Results:     CBC:    Lab Results   Component Value Date    WBC 5.2 05/27/2019    WBC 7.1 05/24/2019    WBC 7.5 05/23/2019     Lab Results   Component Value Date    HGB 8.5 (L) 05/27/2019    HGB 8.2 (L) 05/24/2019    HGB 8.2 (L) 0 hypoglycemia.    A1C 6.8  - ss regular insulin  - levemir added      Trush  - nystatin swish and spit     Neutropenia, most likely related to chemotherapy.    - heme/onc following  - filgrastim per oncology- completed      Severe Malnutrition protein luc w

## 2019-05-27 NOTE — PLAN OF CARE
PT feeling better this morning, pt stated that she had a good night sleep. VSS, TPN infusing through port, port dressing changed. Tube feedings changed to Osmolite due to diarrhea, dressing changed as well due to leakage at the site.  Balderas maintained, Pt r specific interventions   Outcome: Progressing     Problem: GENITOURINARY - ADULT  Goal: Absence of urinary retention  Description  INTERVENTIONS:  - Assess patient?s ability to void and empty bladder  - Monitor intake/output and perform bladder scan as nee

## 2019-05-27 NOTE — PLAN OF CARE
Problem: Patient Centered Care  Goal: Patient preferences are identified and integrated in the patient's plan of care  Description  Interventions:  - What would you like us to know as we care for you?  This just keeps happening to me  - Provide timely, co appropriate  - Advance diet as tolerated, if ordered  - Obtain nutritional consult as needed  - Evaluate fluid balance  Outcome: Progressing  Goal: Maintains or returns to baseline bowel function  Description  INTERVENTIONS:  - Assess bowel function  - Megan Sanchez

## 2019-05-27 NOTE — PLAN OF CARE
PT feeling better this morning, pt stated that she had a good night sleep. VSS, TPN infusing through port, port dressing changed. Tube feedings changed to Osmolite 1 luc due to diarrhea, J-tube dressing changed as well due to leakage at the site.  [facility] additional Care Plan goals for specific interventions   Outcome: Progressing     Problem: GENITOURINARY - ADULT  Goal: Absence of urinary retention  Description  INTERVENTIONS:  - Assess patient’s ability to void and empty bladder  - Monitor intake/output

## 2019-05-27 NOTE — OCCUPATIONAL THERAPY NOTE
OCCUPATIONAL THERAPY TREATMENT NOTE - INPATIENT        Room Number: 450/450-A           Presenting Problem: (nausea, abdominal pain, j-tube drainage)    Problem List  Principal Problem:    Cellulitis, abdominal wall  Active Problems:    Goals of care, coun eval/education; Compensatory technique education    SUBJECTIVE  I can't breathe like this (with bed flat)    OBJECTIVE  Precautions: (colostomy, TPN)    WEIGHT BEARING RESTRICTION  Weight Bearing Restriction: None                PAIN ASSESSMENT  Rating: Sanchez Valencia

## 2019-05-27 NOTE — PROGRESS NOTES
[facility]  GI SERVICE PROGRESS NOTE    Patel Palacios Patient Status:  Inpatient    10/14/1940 MRN [id number]   Location [facility] 4W/SW/SE Attending Nina Vargas,*   Hosp Day # 12 PCP Jamil Zuniga MD       Subject Lab 05/25/19  0510 05/26/19  0444 05/27/19  0548   * 92 152*   BUN 17 16 14   CREATSERUM 0.46* 0.40* 0.45*   GFRAA 109 114 109   GFRNAA 94 99 95   CA 7.8* 7.7* 7.5*    140 136   K 3.7 3.8 4.1    106 105   CO2 29.0 29.0 26.0       No re

## 2019-05-28 NOTE — DIETARY NOTE
5/29/19-Addendum--TF goal rate of 80ml/hr of Osmolite 1.0. Currently pt at 20ml/hr which provides 440ml volume, 440 calories and 20 g protein.  Each increase by 10ml will provide 220ml volume, 220 calories and 10 g protein, therefore with each increase this morning. Pt hesitant per RN. Pt reports it makes her diarrhea worse. NUTRITION INTERVENTION:  - RD Malnutrition Care Plan: TPN and TF meeting nutritional needs.      - Parenteral Nutrition: for new bag starting tonight, decreased to 1600 ml volume ANTHROPOMETRICS:  HT: 160 cm (5' 3\")  WT: 112.7 kg (248 lb 8 oz)  Inaccurate, 23# higher than wt yesterday, to obtain standing scale wt when pt returns to bed. For now use last wt of 215#--estimated dry wt. BMI: Body mass index is 44.02 kg/m². --l Units Subcutaneous 4 times per day   • Fluticasone Propionate  1 spray Each Nare Daily   • Enoxaparin Sodium  80 mg Subcutaneous 2 times per day   • cefepime  1 g Intravenous Q8H     LABS: reviewed  , 143, 116--noted levemir increase to 10u last pm. euglycemia, improved GI status and TPN and TF to meet 100% of nutritional needs.       DIETITIAN FOLLOW UP: RD to follow up within 5 days     [address], [address] [facility]   Clinical Dietitian [id number]

## 2019-05-28 NOTE — OCCUPATIONAL THERAPY NOTE
Attempted to see patient for follow up occupational therapy session, pt sitting up in chair with family at bedside stating she had already gone for a walk with RN. Requests exercises later in afternoon. Will follow up as schedule permits.      Sita Barbosa

## 2019-05-28 NOTE — PROGRESS NOTES
Zayra Martinez 89  GI SERVICE PROGRESS NOTE    [facility] Patient Status:  Inpatient    10/14/1940 MRN [id number]   Location [facility] 4W/SW/SE Attending Bernarda Person,*   Hosp Day # 15 PCP Adrian Faust MD       Subject 149.0* 170.0 171.0       Lab Results   Component Value Date    PT 19.3 (H) 11/19/2013    PT 22.3 (H) 06/28/2011    PT 15.7 (H) 02/08/2011    INR 1.00 (A) 05/10/2019    INR 1.14 04/27/2019    INR 1.15 04/25/2019       Recent Labs   Lab 05/26/19  0444 05/27/

## 2019-05-28 NOTE — PROGRESS NOTES
[facility]    Progress Note    Daniel Tineo Patient Status:  Inpatient    10/14/1940 MRN [id number]   Location [facility] 4W/SW/SE Attending Eliana Hodgson, [address] [facility] Day # 15 PCP Blanka Issa MD       Subjective:   Dinah Monae Nitrate 2 % powder  Topical [email or web address]   Insulin Regular Human (NOVOLIN R) 100 UNIT/ML injection 1-5 Units 1-5 Units Subcutaneous 4 times per day   artificial saliva substitute (MOUTH KOTE) solution SOLN 5 spray 5 spray Mouth/Throat PRN   bisacodyl (DUL 2.8 05/27/2019    TROP <0.045 03/19/2019                 Results:     CBC:    Lab Results   Component Value Date    WBC 4.5 05/28/2019    WBC 5.2 05/27/2019    WBC 7.1 05/24/2019     Lab Results   Component Value Date    HGB 8.4 (L) 05/28/2019    HGB 8.5 ( negative   - monitor      Diabetes mellitus type 2, with current hypoglycemia.    A1C 6.8  - ss regular insulin  - levemir added      Trush  - nystatin swish and spit     Neutropenia, most likely related to chemotherapy.    - heme/onc following  - filgrast

## 2019-05-28 NOTE — PLAN OF CARE
Problem: Patient Centered Care  Goal: Patient preferences are identified and integrated in the patient's plan of care  Description  Interventions:  - What would you like us to know as we care for you?  This just keeps happening to me  - Provide timely, co appropriate  - Advance diet as tolerated, if ordered  - Obtain nutritional consult as needed  - Evaluate fluid balance  Outcome: Progressing  Goal: Maintains or returns to baseline bowel function  Description  INTERVENTIONS:  - Assess bowel function  - Bonita Gamboa Patient has loose/liquid BM x1 this afternoon (incontinent). Per social work, patient to be discharged to [facility] SNF when diet/tube feed plan is finalized. Patient required one dose of PRN Morphine for pain.  Patient ambulated with assistance

## 2019-05-29 NOTE — WOUND PROGRESS NOTE
Wound and Ostomy Care Services  Follow up on the pt's J tube site and pouch, the pt. is sitting up in bed, she states that she slept well last night, her spouse is at the bedside.  No c/o pain, per the pt. the pouch was leaking last night and the nurse castellano

## 2019-05-29 NOTE — PLAN OF CARE
Problem: Patient Centered Care  Goal: Patient preferences are identified and integrated in the patient's plan of care  Description  Interventions:  - What would you like us to know as we care for you?  This just keeps happening to me  - Provide timely, co appropriate  - Advance diet as tolerated, if ordered  - Obtain nutritional consult as needed  - Evaluate fluid balance  Outcome: Progressing  Goal: Maintains or returns to baseline bowel function  Description  INTERVENTIONS:  - Assess bowel function  - Harmony Carney

## 2019-05-29 NOTE — PROGRESS NOTES
[facility] dosing service    Follow-up Pharmacokinetic Consult for Vancomycin Dosing     Akbar Baptiste is a [de-identified]year old female who is being treated for cellulitis. Patient is on day 15 of Vancomycin and add is currently receiving 2 gm IV Q 24 hours.

## 2019-05-29 NOTE — PROGRESS NOTES
[facility]    Hematology/Oncology   Progress Note        Chief complaint  Patient presents with:  Abdomen/Flank Pain (GI/)  GEJ cancer, cellulitis, neutropenia    Subjective:  Patient has concerns for bilateral LE edema symptoms causing order entered in the computer  --she would want, Ravi Stephenson, to make a decision about end of life care as clinically indicated. --patient expresses strong disinterest in discussing this topic again and feels frustrated that the topic keeps coming up    10.  LE (TRANSDERM-SCOP) patch 1 patch Transdermal Q72H   dextrose 10 % infusion  Intravenous Continuous PRN   hyoscyamine sulfate (LEVSIN) disintegrating tab 0.125 mg 0.125 mg Sublingual Q4H PRN   nystatin (MYCOSTATIN) suspension 500,000 Units 5 mL Oral QID   Bandar insulin into the skin 3-4 times daily Dx E11.9 Insulin using Type 2 diabetes   Insulin Regular Human 100 UNIT/ML Injection Solution Inject 0.01-0.07 mL (1-7 Units total) into the skin every 6 (six) hours.    potassium chloride 40 meq/30 ml (10%) Oral Soluti diabetes   glycerin, laxative, 1.2 G Rectal Suppos Place 1 suppository rectally daily as needed.    OneTouch UltraSoft Lancets Does not apply Misc Test 2-3 times per day       Labs: assessed and reviewed  Lab Results   Component Value Date    WBC 4.5 05/28/

## 2019-05-29 NOTE — PROGRESS NOTES
[place] [facility] - [facility]    Progress Note    Peggyann Mask Patient Status:  Inpatient    10/14/1940 MRN [id number]   Location [facility] 4W/SW/SE Attending Remigio Lamb Day # 14 PCP Manisha Boyd MD        Subjective: placement at this time   - xray confirmed tube is positioned correctly, no further need by [facility]  - dietician consult  - TPN   - Jtube feedings now restarted at slow rate- tolerating 20ml/hr, d/w Dr. Minerva Garcia, possibly up to 30ml/hr today.      Abd pain/ diarrh Component Value Date    WBC 3.6 (L) 05/29/2019    HGB 8.3 (L) 05/29/2019    HCT 25.8 (L) 05/29/2019    .0 05/29/2019    CREATSERUM 0.52 (L) 05/29/2019    BUN 13 05/29/2019     05/29/2019    K 3.8 05/29/2019     05/29/2019    CO2 27.0 0

## 2019-05-29 NOTE — PLAN OF CARE
Problem: Patient Centered Care  Goal: Patient preferences are identified and integrated in the patient's plan of care  Description  Interventions:  - What would you like us to know as we care for you?  This just keeps happening to me  - Provide timely, co appropriate  - Advance diet as tolerated, if ordered  - Obtain nutritional consult as needed  - Evaluate fluid balance  Outcome: Progressing  Goal: Maintains or returns to baseline bowel function  Description  INTERVENTIONS:  - Assess bowel function  - Memory Abhay

## 2019-05-30 NOTE — PLAN OF CARE
Problem: Patient Centered Care  Goal: Patient preferences are identified and integrated in the patient's plan of care  Description  Interventions:  - What would you like us to know as we care for you?  This just keeps happening to me  - Provide timely, co appropriate  - Advance diet as tolerated, if ordered  - Obtain nutritional consult as needed  - Evaluate fluid balance  Outcome: Progressing  Goal: Maintains or returns to baseline bowel function  Description  INTERVENTIONS:  - Assess bowel function  - Lourdes Oliver

## 2019-05-30 NOTE — PLAN OF CARE
Problem: Patient Centered Care  Goal: Patient preferences are identified and integrated in the patient's plan of care  Description  Interventions:  - What would you like us to know as we care for you?  This just keeps happening to me  - Provide timely, co appropriate  - Advance diet as tolerated, if ordered  - Obtain nutritional consult as needed  - Evaluate fluid balance  Outcome: Progressing  Goal: Maintains or returns to baseline bowel function  Description  INTERVENTIONS:  - Assess bowel function  - Stevie Moya

## 2019-05-30 NOTE — PROGRESS NOTES
[place] [facility] - [facility]    Progress Note    Patel Palacios Patient Status:  Inpatient    10/14/1940 MRN [id number]   Location [facility] 4W/SW/SE Attending Remigio Lamb Day # 15 PCP Jamil Zuniga MD        Subjective: feeds as above   - GI consult  - ?  Esophageal stent placement if jtube cannot be used-GI would not recommend stent placement at this time   - xray confirmed tube is positioned correctly, no further need by [facility]  - dietician consult  - TPN   - Jtube feedings counseling and/or coordination of care related to plan of care, treatment plan, coordinating with nursing and consultants.         Results:     Lab Results   Component Value Date    WBC 3.6 (L) 05/30/2019    HGB 7.9 (L) 05/30/2019    HCT 24.5 (L) 05/30/2019

## 2019-05-30 NOTE — PROGRESS NOTES
Zayra Martinez 98  GI SERVICE PROGRESS NOTE    Mauricio Macias Patient Status:  Inpatient    10/14/1940 MRN [id number]   Location [facility] 4W/SW/SE Attending Alicia Melo,*   Hosp Day # 15 PCP Pepe Vela MD       Subject 05/27/19  0548 05/28/19  0420 05/29/19  0438   * 120* 174*   BUN 14 14 13   CREATSERUM 0.45* 0.45* 0.52*   GFRAA 109 109 104   GFRNAA 95 95 91   CA 7.5* 7.9* 7.9*   ALB  --  1.8*  --     138 136   K 4.1 4.0 3.8    107 103   CO2 26.0 27. 0

## 2019-05-30 NOTE — PROGRESS NOTES
Zayra Martinez 98  GI SERVICE PROGRESS NOTE    Daniel Tineo Patient Status:  Inpatient    10/14/1940 MRN [id number]   Location [facility] 4W/SW/SE Attending Bryan Motta,*   Hosp Day # 15 PCP Blanka Issa MD       Subject 0.45* 0.52* 0.46*   GFRAA 109 104 109   GFRNAA 95 91 94   CA 7.9* 7.9* 8.1*   ALB 1.8*  --   --     136 137   K 4.0 3.8 4.3    103 105   CO2 27.0 27.0 26.0   ALKPHO 67  --   --    AST 18  --   --    ALT 16  --   --    BILT 0.5  --   --    TP 6.

## 2019-05-31 NOTE — PLAN OF CARE
Problem: Patient Centered Care  Goal: Patient preferences are identified and integrated in the patient's plan of care  Description  Interventions:  - What would you like us to know as we care for you?  This just keeps happening to me  - Provide timely, co appropriate  - Advance diet as tolerated, if ordered  - Obtain nutritional consult as needed  - Evaluate fluid balance  Outcome: Progressing  Goal: Maintains or returns to baseline bowel function  Description  INTERVENTIONS:  - Assess bowel function  - Bonita Gamboa continue to monitor.

## 2019-05-31 NOTE — PROGRESS NOTES
[place] [facility] - [facility]    Progress Note    Lincoln Renay Patient Status:  Inpatient    10/14/1940 MRN [id number]   Location [facility] 4W/SW/SE Attending Remigio Lamb Day # 16 PCP Miley Gleason MD        Subjective: total of abx      Malfunctioning jtube  - tube feeds as above   - GI consult  - ?  Esophageal stent placement if jtube cannot be used-GI would not recommend stent placement at this time   - xray confirmed tube is positioned correctly, no further need by [facility] than 50% of the time was spent in counseling and/or coordination of care related to plan of care, treatment plan, coordinating with nursing and consultants.         Results:     Lab Results   Component Value Date    WBC 3.5 (L) 05/31/2019    HGB 7.8 (L) 05/

## 2019-05-31 NOTE — PROGRESS NOTES
[facility]    Hematology/Oncology   Progress Note        Chief complaint  Patient presents with:  Abdomen/Flank Pain (GI/)  GEJ cancer, cellulitis, neutropenia    Subjective:  Patient mentions improvement in her bilateral LE edema sympt expressed no interest in pursuit of CPR/chest compressions or intubation; however, the pt does NOT want to [place] the [place]ST [place] or have an electronic DNR order entered in the computer  --she would want, Nora Pichardo, to make a decision about end of life care Medications:  adult 3 in 1 TPN  Intravenous Continuous TPN   cefdinir (OMNICEF) 250 MG/5ML suspension 300 mg 300 mg Per J Tube BID   insulin detemir (LEVEMIR) 100 UNIT/ML flextouch 10 Units 10 Units Subcutaneous Nightly   dextrose 10 % infusion  Intravenou Q2H PRN   Or      morphINE sulfate (PF) 2 MG/ML injection 2 mg 2 mg Intravenous Q2H PRN   Or      morphINE sulfate (PF) 4 MG/ML injection 4 mg 4 mg Intravenous Q2H PRN       Medications Prior to Admission:  Enoxaparin Sodium 80 MG/0.8ML Subcutaneous Soluti lidocaine-prilocaine 2.5-2.5 % External Cream Apply to site 1 hour prior to port a cath needle insertion   Nystatin (NYSTOP) 110259 UNIT/GM External Powder APPLY TO AFFECTED AREAS TWICE DAILY   Glucose Blood (ACCU-CHEK SABRINA PLUS) In Vitro Strip TEST BLO

## 2019-05-31 NOTE — CM/SW NOTE
JOSSELYN note:  Spoke with Jenny STEVENSON regarding discharge order for today to [facility].. TPN orders sent to [facility] per SARAH Salcedo. Pt to go to SNF per ambulance. PCS form on chart.     11:15 am - Called Demetra Goodpasture [facility] liason and left message r/t disch

## 2019-05-31 NOTE — OCCUPATIONAL THERAPY NOTE
OCCUPATIONAL THERAPY TREATMENT NOTE - INPATIENT        Room Number: 450/450-A           Presenting Problem: (nausea, abdominal pain, j-tube drainage)    Problem List  Principal Problem:    Cellulitis, abdominal wall  Active Problems:    Goals of care, coun Sub-acute rehabilitation  OT Device Recommendations: TBD     PLAN  OT Treatment Plan: Balance activities; Energy conservation/work simplification techniques;ADL training;Functional transfer training; Endurance training;Patient/Family education;Patient/Family Patients self stated goal is: not stated     Patient will complete toilet transfer with supervision  Comment: ongoing, SBA STS from EOB     Patient will complete LE dressing with supervision  Comment:  A required to thread BLE into brief, needs encourageme

## 2019-05-31 NOTE — PLAN OF CARE
Problem: Patient Centered Care  Goal: Patient preferences are identified and integrated in the patient's plan of care  Description  Interventions:  - What would you like us to know as we care for you?  This just keeps happening to me  - Provide timely, co Provide nonpharmacologic comfort measures as appropriate  - Advance diet as tolerated, if ordered  - Obtain nutritional consult as needed  - Evaluate fluid balance  Outcome: Adequate for Discharge  Goal: Maintains or returns to baseline bowel function  Mohsen Repositioned in bed. Refused to get to chair. TPN/tube feedings given as ordered. Intake monitored. NPO  Tele removed and sent to tele. Plan modified: Plan discharge tomorrow d/t no bed at rehab till tomorrow.  [facility]

## 2019-06-01 NOTE — PLAN OF CARE
Problem: Patient Centered Care  Goal: Patient preferences are identified and integrated in the patient's plan of care  Description  Interventions:  - What would you like us to know as we care for you?  This just keeps happening to me  - Provide timely, co Provide nonpharmacologic comfort measures as appropriate  - Advance diet as tolerated, if ordered  - Obtain nutritional consult as needed  - Evaluate fluid balance  Outcome: Adequate for Discharge  Goal: Maintains or returns to baseline bowel function  Mohsen Repositioned in bed when allowed by patient. Refused to get to chair. TPN/tube feedings given as ordered. Intake monitored. Balderas output monitored. Balderas remains in place. NPO  Report given to [facility]. All questions answered.   IV removed.

## 2019-06-01 NOTE — DISCHARGE SUMMARY
Dc summary#[id number]  > 30 min spent on [address] Discharge Diagnoses: esopageal ca, dehydration    Lace+ Score: 85  59-90 High Risk  29-58 Medium Risk  0-28   Low Risk. TCM Follow-Up Recommendation:  LACE > 58:  High Risk of readmission after discharg

## 2019-06-01 NOTE — PLAN OF CARE
Problem: Patient Centered Care  Goal: Patient preferences are identified and integrated in the patient's plan of care  Description  Interventions:  - What would you like us to know as we care for you?  This just keeps happening to me  - Provide timely, co appropriate  - Advance diet as tolerated, if ordered  - Obtain nutritional consult as needed  - Evaluate fluid balance  Outcome: Progressing  Goal: Maintains or returns to baseline bowel function  Description  INTERVENTIONS:  - Assess bowel function  - Pravin Ball

## 2019-06-01 NOTE — CM/SW NOTE
MD informed SUSAN that pt is medically stable to discharge today. SW spoke w/ Mercedes from [facility] and confirmed they have a private room available and will be ready for pt at 1pm. Updated clinicals and TPN notes sent to [facility].      SUSAN spoke taya/ Tamiko from [facility]

## 2019-06-01 NOTE — PLAN OF CARE
Problem: Patient Centered Care  Goal: Patient preferences are identified and integrated in the patient's plan of care  Description  Interventions:  - What would you like us to know as we care for you?  This just keeps happening to me  - Provide timely, co RN  Outcome: Progressing     Problem: GASTROINTESTINAL - ADULT  Goal: Minimal or absence of nausea and vomiting  Description  INTERVENTIONS:  - Maintain adequate hydration with IV or PO as ordered and tolerated  - Nasogastric tube to low intermittent sucti bleeding injury  Description  (Example usage: patient with low platelets)  INTERVENTIONS:  - Avoid intramuscular injections, enemas and rectal medication administration  - Ensure safe mobilization of patient  - Hold pressure on venipuncture sites to [employer]

## 2019-06-03 NOTE — TELEPHONE ENCOUNTER
Mary Kate calling from [facility]. She stts pt's  called her stating that pt has to stop her feeding prior to tomorrows appt. Mary Kate requesting a call back to clarify #[phone].

## 2019-06-04 NOTE — DISCHARGE SUMMARY
[facility]    PATIENT'S NAME: Tiarra Valadez   ATTENDING PHYSICIAN: Lucinda Lamb MD   PATIENT ACCOUNT#:   [id number]    LOCATION:  [address] [place] [address] RECORD #:   [id number]       YOB: 1940  ADMISSION DATE:       05/15 is 98, pulse 64, respiratory rate 18, blood pressure 148/68, 96%. LUNGS:  Clear. HEART:  Normal S1, S2. No S3.   ABDOMEN:  Soft. Tender in the epigastric area. Her J-tube site appears better. EXTREMITIES:  Mild edema.    NEUROLOGIC:  She is anxiou legs.  Podiatry to see the patient in rehab if able. Adjust feeds and wean hyperalimentation per Dietary restrictions. J tube care:  Please keep the area neat and dry. Discharge with Balderas catheter to measure fluids.   All medications through J-tube must

## 2019-06-06 NOTE — TELEPHONE ENCOUNTER
I spoke with central scheduling and the next available appointment is Tuesday, June 11 at 2:30 with a 1:15 p.m. Arrival time. They do not have any appointments tomorrow or Monday.  Central scheduling cannot schedule a nursing home patient on a weekend per t

## 2019-06-06 NOTE — PROGRESS NOTES
Oncology Nutrition Assessment    Ht Readings from Last 1 Encounters:  05/16/19 : 160 cm (5' 3\")      Wt Readings from Last 1 Encounters:  05/30/19 : 102.2 kg (225 lb 6.4 oz)    BMI Calculated: There is no height or weight on file to calculate BMI.   Weigh standard weight) ([facility] [address]    • Unspecified essential hypertension        GI Problems:  dysphagia-NPO    Nutrition Risk Status:  high nutritional risk  Risk Status Based On:  significant GI symptoms and wt loss    Estimated Nutritional Needs:    [id number] brianna Pt is having BM's, uncertain of amount. Uriel Gaytan says several times per day, urgent. Adonis Berger says only small amount. As long as she is having BM's that is good. Advised to alert MD if constipation.   Hopefully by next week things will settle down and  will

## 2019-06-06 NOTE — TELEPHONE ENCOUNTER
Per Dr. Tam Paz, okay to change priority to STAT. CT should be ordered without contrast per [facility] conversation with Dr. Isabela kelsey. To clinical to schedule.

## 2019-06-06 NOTE — TELEPHONE ENCOUNTER
Nursing, you to coordinate with the [facility] nursing staff to discuss setting up the CT scan, if we could set up the CT scan, she goes over for radiation treatments at approximately 11 AM Monday through Friday, I would like to set up a CT scan

## 2019-06-06 NOTE — TELEPHONE ENCOUNTER
I spoke with Phill Claudio at [address] [place] [address] radiation and she says patient does have radiation scheduled tomorrow and the radiation appointments are not viewable in Epic because they are on a separate system.  She has moved the radiation appointment so that patient can keep

## 2019-06-06 NOTE — TELEPHONE ENCOUNTER
I spoke with central scheduling at [facility]. They are able to schedule patient for a CT on Friday June 7 at 11:30 a.m.  She has no restrictions because the test is without contrast. She should arrive at diagnostic main at 11:15 to get checked in with the CT depa

## 2019-06-11 NOTE — TELEPHONE ENCOUNTER
Spoke with Anthony Garzon at [facility] stating pt is scheduled for a drainage tube insertion to abdominal wall on 6/13 per IR. Informed Anthony Garzon that RT will not be given on that day and will resume the following day.  Updated RTT, Karishma and Dr Stephanie Mantilla

## 2019-06-11 NOTE — PROGRESS NOTES
ndasgupta    [facility] Radiation Treatment Management Note 16-20    Patient:  Xuan Hinton  Age:  [de-identified]year old  Visit Diagnosis:    1.  Malignant neoplasm of lower third of esophagus [facility])      Primary Rad/Onc:  Dr. Michael Grimes    Si

## 2019-06-13 NOTE — PROGRESS NOTES
Spoke with [facility]) and transportation to return back to [facility] for 6/13 1400    6/13 1257  Report given to Jose Martin Keita [facility] from [facility]     6/13 1343  Pt is able to sit up in wheelchair, up with asst   Procedural site remains dry and intact, no b

## 2019-06-13 NOTE — INTERVAL H&P NOTE
The above referenced H&P was reviewed by Unique Stephenson MD on 6/13/2019, the patient was examined and no significant changes have occurred in the patient's condition since the H&P was performed.   Risks, benefits, alternative treatments and consequences of no

## 2019-06-18 NOTE — PROGRESS NOTES
[facility] Radiation Treatment Management Note 21-25    Patient:  Vishnu Aguilar  Age:  [de-identified]year old  Visit Diagnosis:    1.  Malignant neoplasm of lower third of esophagus [facility])      Primary Rad/Onc:  Dr. Darleen Troncoso

## 2019-06-19 PROBLEM — Z51.81 ENCOUNTER FOR THERAPEUTIC DRUG MONITORING: Status: RESOLVED | Noted: 2018-11-05 | Resolved: 2019-01-01

## 2019-06-19 PROBLEM — Z79.01 LONG TERM (CURRENT) USE OF ANTICOAGULANTS: Status: RESOLVED | Noted: 2018-11-05 | Resolved: 2019-01-01

## 2019-06-19 NOTE — TELEPHONE ENCOUNTER
Pt's spouse states keep getting calls about Pt using coumadin and states Pt no longer is taking coumadin and Pt is at [facility] under Dr Naty harp, please advise     Please see communication 6/7

## 2019-06-20 NOTE — PROGRESS NOTES
[facility] Radiation Treatment Management Note 21-25    Patient:  Roni Brothers  Age:  [de-identified]year old  Visit Diagnosis:    1.  Malignant neoplasm of lower third of esophagus [facility])      Primary Rad/Onc:  Dr. Joe Troncoso

## 2019-06-20 NOTE — PROGRESS NOTES
Oncology Nutrition Assessment    Ht Readings from Last 1 Encounters:  06/20/19 : 157.5 cm (5' 2\")      Wt Readings from Last 1 Encounters:  06/20/19 : 91.6 kg (202 lb)    BMI Calculated: There is no height or weight on file to calculate BMI.   Weight Hist Ivan Zuniga: less than 60% of standard weight) ([facility] [address]    • Unspecified essential hypertension        GI Problems:  dysphagia-NPO    Nutrition Risk Status:  high nutritional risk  Risk Status Based On:  significant GI symptoms and wt loss    Estimated Nutritional fiber containing formula. Pt is having BM's, uncertain of amount. Fabián Pabon says several times per day, urgent. Irasema Jonas says only small amount. As long as she is having BM's that is good. Advised to alert MD if constipation.   Hopefully by next week things will se

## 2019-06-25 NOTE — PROGRESS NOTES
[facility] Radiation Treatment Management Note 26-30    Patient:  Xuan Hinton  Age:  [de-identified]year old  Visit Diagnosis:    1.  Malignant neoplasm of lower third of esophagus [facility])      Primary Rad/Onc:  Dr. Michael Troncoso

## 2019-06-27 NOTE — PROGRESS NOTES
Oncology Nutrition Assessment    Ht Readings from Last 1 Encounters:  06/20/19 : 157.5 cm (5' 2\")      Wt Readings from Last 1 Encounters:  06/27/19 : 89.4 kg (197 lb)    BMI Calculated: Body mass index is 36.03 kg/m². Weight History:   Wt Readings from weight) ([facility] [address]    • Unspecified essential hypertension        GI Problems:  dysphagia-NPO    Nutrition Risk Status:  high nutritional risk  Risk Status Based On:  significant GI symptoms and wt loss    Estimated Nutritional Needs:    7513-0676 calories with having BM's, uncertain of amount. Lazaro Butler says several times per day, urgent. Akil Adela says only small amount. As long as she is having BM's that is good. Advised to alert MD if constipation.   Hopefully by next week things will settle down and  will be mor would meet nutritional needs. Pt has Jevity 1.5 at home and suspect pt would tolerate change to that formula. Would require rate of 60ml/hr x20 hrs. Call out to [facility] to discuss plans at discharge from that facility.   Pt looks to be feeling better and bett

## 2019-07-01 NOTE — TELEPHONE ENCOUNTER
Discharge summary:  Note transcribed by Dr. Aury Villar    Date seen: 7/1/2019    Subjective: Patient seen and examined for discharge planning, gastroesophageal adenocarcinoma, abdominal wall cellulitis, intertrigo, CHF.  Patient doing well, caregiver and h obvious deformity   exam Balderas catheter removed    Labs/imaging: See chart    DVT prophylaxis: with novel anticoagulant    Ambulatory status: Per hospital discharge recommendations, specialist involvement/recommendations and physical therapy evaluation TPN  Chronic atrial fibrillation and cardiomyopathy non-ischemic EF 20-25%: Continue current home medications, offer in-house cardiology consult  HTN: Continue current medications  Pleural effusions: Continue current monitoring, in-house pulmonology consul hours  Fluticasone spray 2 sprays daily as needed  Hydralazine 10 mg by J-tube every 8 hours  Omeprazole 40 mg daily  MiraLAX 17 g daily  Scopolamine patch 1 mg q. 3 days transdermally  Zofran 4 mg every 8 hours as needed per J-tube  Lotrisone cream twice

## 2019-07-02 NOTE — TELEPHONE ENCOUNTER
Requested Prescriptions     Refused Prescriptions Disp Refills   • OMEPRAZOLE 40 MG Oral Capsule Delayed Release [Pharmacy Med Name: OMEPRAZOLE 40MG CAPSULES] 90 capsule 1     Sig: GIVE 40 MG VIA J-TUBE EVERY DAY     Refused By: Edyth Opal

## 2019-07-02 NOTE — PROGRESS NOTES
Initial Post Discharge Follow Up   Discharge Date from SNF: 7/1/19  Contact Date: 7/2/2019    Consent Verification:  Assessment Completed With: Patient  Spouse: Reny Monahan received per patient?  verbal Both the patient and her  Ashleigh Pierre wer tablet Rfl: 1   insulin detemir 100 UNIT/ML Subcutaneous Solution Pen-injector Inject 10 Units into the skin nightly.  Disp: 1 vial Rfl: 1   Insulin Regular Human 100 UNIT/ML Injection Solution Inject q 6 hrs per sliding scale:  150-200= 1u , 201-250= 2u, for SBP < 140 Disp: 1 Bottle Rfl: 0   Enoxaparin Sodium 80 MG/0.8ML Subcutaneous Solution Inject 0.8 mL (80 mg total) into the skin every 12 (twelve) hours.  Disp: 1 Syringe Rfl: 5   Fluticasone Propionate 50 MCG/ACT Nasal Suspension 1 spray by Each Nare ro times per day Disp:  Rfl:      • When you were leaving the hospital were any medication changes discussed with you? yes  • If you were prescribed a new medication:   o Was the new medication’s purpose explained? yes  o Was the new medication’s side effects NCM: NCM reviewed discharge instructions and when to seek medical attention with the patient and her  Sadie Calixto. She states that she is doing fine since coming home.  She does have nausea and diarrhea from the tube feedings, which is not new since comi

## 2019-07-02 NOTE — TELEPHONE ENCOUNTER
PA request for Ondansetron 4mg tab and Transderm scop patches UF#[id number] ph#[phone] in purple folder.

## 2019-07-02 NOTE — TELEPHONE ENCOUNTER
Spoke to pt for TCM today. Pt does not have HFU appt scheduled at this time. TCM/HFU appt recommended by 7/8/2019 as pt is a high risk for readmission. Please advise.     BOOK BY DATE (last date for TCM):7-     Please f/u with pt and try to get he

## 2019-07-02 NOTE — TELEPHONE ENCOUNTER
MKK has never been the prescriber for pt's Lovenox. Contacted pharmacy. They state pt was previously in a nursing home so the last prescriber they have on file is the in-house physician for the nursing home.  However, she is now at home so they are deferrin

## 2019-07-02 NOTE — TELEPHONE ENCOUNTER
Pharm Rep calling to inform that Prior auth needed for Enoxaparin Injection. Please call [address] phone # [phone] - Pts ID # [id number].

## 2019-07-03 NOTE — TELEPHONE ENCOUNTER
To Dr. Latricia Mustafa---    Pended Rx's never prescribed by you before. Please advise. Per Records these meds were given inpatient only.

## 2019-07-03 NOTE — PROGRESS NOTES
[facility]    PATIENT'S NAME: Colt Puente   RADIATION ONCOLOGIST: Heather Avila.  Timo Foss MD   PATIENT ACCOUNT #: [de-identified] LOCATION: [address] [place] [address] RECORD #: [id number] YOB: 1940   DATE: 06/27/2019       RADIATION ONCOLOG 5040 cGy in 180 cGy daily fractions. This was done utilizing IMRT and image-guided radiotherapy utilizing 6 MV photons.   Treatments began on 04/10/2019 and completed on 06/27/2019 for a total of 79 elapsed days in which she received all 28 prescribed radi to swallow though there was some degree of improvement after the chemotherapy stopped. A number of other medical problems ensued including problems with her Port-A-Cath as well as bilateral lower extremity edema and chronic J-tube management issues.   She

## 2019-07-03 NOTE — TELEPHONE ENCOUNTER
From: Darryl Shaw  To: Marilee Dumont MD  Sent: 7/3/2019 3:55 PM CDT  Subject: Prescription Question    Do you Really expect me to inject her with some thing that is doing her harm until 7/26/19 ?

## 2019-07-03 NOTE — TELEPHONE ENCOUNTER
Contacted Wolf by phone. Explained that I had asked in my last [employer] message to please call our office so we could assist him in scheduling an appt for Lety Rosario because we can certainly get her in sooner. Scheduled appt for 7/5/19.

## 2019-07-05 NOTE — PROGRESS NOTES
[facility]  Nephrology Daily Progress Note    Perez Heart  [id number]  [de-identified]year old  Patient presents with:  Hospital F/U      HPI:   Perez Heart is a [de-identified]year old female.   23-year-old female with a history of adult onset diabetes mellitus, hypert dysuria and hematuria  Hema/Lymph:  Negative for easy bleeding and easy bruising  Integumentary:  Negative for pruritus and rash  Musculoskeletal:  Negative for bone/joint symptoms  Neurological:  Negative for gait disturbance  Psychiatric:  Negative for i --   --    LYMPH 5  --   --    MON 2  --   --    EOS 2  --   --    BASO 1  --   --    NEPERCENT  --    < > 64.3   LYPERCENT  --    < > 16.3   MOPERCENT  --    < > 16.0   EOPERCENT  --    < > 1.4   BAPERCENT  --    < > 0.6   NE  --    < > 2.25   LYMABS  -- 2.5-0.025 MG/5ML Oral Liquid, 5 mL by Per J Tube route 2 (two) times daily as needed. , Disp: , Rfl: 0  •  HYDROcodone-acetaminophen 7.5-325 MG/15ML Oral Solution, 10 mL by Per J Tube route every 4 (four) hours as needed for Pain., Disp: 118 mL, Rfl: 0  • insulin diabetic, Disp: 1 Device, Rfl: 0  •  Lancets Does not apply Misc, For ACCU-Check softclix device Dx E11.9 Type 2 non insulin dependant diabetes, Disp: 100 each, Rfl: 5  •  glycerin, laxative, 1.2 G Rectal Suppos, Place 1 suppository rectally daily Other reaction(s): LINCOMYCIN HCL  Robinul [Glycopyrro*    NAUSEA ONLY, OTHER (SEE COMMENTS)    Comment:Severe malaise and nausea when Robinul             administered.   Anesthetics, Amide      UNKNOWN    Comment:Novacaine  Benzocaine                  Co was reviewed. Is supposed to see radiation oncology in September 2019. Also recommend they follow-up with Dr. Ciara Quezada and Dr. Dax Campa. Return in 1 month. Will arrange follow-up laboratory studies after urine cultures in.          Orders Placed This Encoun

## 2019-07-05 NOTE — TELEPHONE ENCOUNTER
Addy/PT at [facility] called to let [facility] know that pt will be seen 1 wk x 1 and 2 wks x 3 for PT. No need to call unless further questions or orders.

## 2019-07-05 NOTE — TELEPHONE ENCOUNTER
LM with Radha as requested regarding ok to continue PT as written below and [facility] agreeable to signing orders.

## 2019-07-05 NOTE — TELEPHONE ENCOUNTER
Radha/CHANDRIKA also called to verify that [facility] will sign home health orders for pt. Please call w/ verbal orders.   OK to leave message on VM/confidential.

## 2019-07-07 NOTE — TELEPHONE ENCOUNTER
Urine CS results noted . No fever, chills or flank pain.      Will start on cefpodoxime 100 mg BID for 7 days - not allergic as per discussion with patient

## 2019-07-11 NOTE — TELEPHONE ENCOUNTER
OT will be working with pt on ADL transfers , fall prevention , and energy conservation , upper extremity exercise , 2 x for 2 weeks , then 1 x for 1 week  - any ques pls call

## 2019-07-11 NOTE — TELEPHONE ENCOUNTER
Anneliese Bingham from [facility] calling to gianluca bain rn regarding order received from home care, pt does not have an ostomy. Pls call at:[phone],thanks.

## 2019-07-15 NOTE — TELEPHONE ENCOUNTER
Mary from [facility] called. She states pt is no longer using ostomy supplies to cover her J-tube. She isn't having any drainage from the J-tube anymore. Carmen Cee is requesting an order to use split tape and gauze over J-tube site instead of ostomy supplies.  [phone]

## 2019-07-15 NOTE — ED INITIAL ASSESSMENT (HPI)
Patient reports pain and hardening to her right thigh/right leg. It is not turning red per .      Home health nurse encouraged ER visit    Hx of DVT years back  Currently being treated for esophageal CA

## 2019-07-15 NOTE — TELEPHONE ENCOUNTER
Contacted Mary. Notified her that she may switch order from using ostomy bags over J-tube site to using split tape and gauze. Remigio Nunn states when she saw pt today, she advised her to go to ER. She has noticed that pt has a lot of bruising.  She is gett

## 2019-07-17 NOTE — ED PROVIDER NOTES
Patient Seen in: [facility] AND [facility] Emergency Department    History   Patient presents with:  Swelling Edema (cardiovascular, metabolic)    Stated Complaint: dvt right leg    HPI    [de-identified]year old female with h/o a fib on lovenox, HCV, CAD, DM, HTN, esophage Explor lap, placement of feeding j-tube, open wedge biopsy of liver   • IR PORT A CATH PROCEDURE  04/08/2019    Power injectable chest port placed   • JEJUNOSTOMY/GASTROSTOMY TUBE INSERTION N/A 4/25/2019    Performed by Miky Urbina MD at [address] [place] [address] Musculoskeletal: Normal range of motion. She exhibits no deformity. Right upper leg: She exhibits no tenderness, no swelling and no deformity. Legs:  Neurological: She is alert and oriented to person, place, and time.  Coordination normal.   S

## 2019-07-17 NOTE — TELEPHONE ENCOUNTER
Radha/CHANDRIKA states pts  is requesting nutritionist to come to home. [facility] has one available. Please call w/ verbal orders/OK to leave VM.

## 2019-07-19 NOTE — TELEPHONE ENCOUNTER
Spoke to Saúl, Registered Dietician with [facility]. Patients  asked for a consultation because the tube feeding that patient is currently getting (Jevity 1.5) is making her blood sugar run higher, in the 200s. Dietician would like to switch her to DiabetaSource which is comparable to Glucerna. She sent patient samples to try but [employer] needs Dr. Cordero Layer ok to switch and depending on which one insurance covers she will either take Glucerna or DiabetaSource. Ok to leave message on confidential vm.

## 2019-07-19 NOTE — TELEPHONE ENCOUNTER
Lucrecia RalphIlya requesting to speak with RN re: increased blood sugars and change tube feedings. Pls call. Thank you.

## 2019-07-22 NOTE — PROGRESS NOTES
Verified HIPAA and s/w - Delonte Cherelle. appt made for 8/5 at 4:30 pm. Will call back if they would like anything sooner with RH. Wolf preferred f/u with LB.

## 2019-07-22 NOTE — TELEPHONE ENCOUNTER
Johanne Shelton from [facility] is calling in regards to pts PT and OT only  . States pt  wants to discharge services. Requesting a physicians verbal order .

## 2019-07-22 NOTE — TELEPHONE ENCOUNTER
From: Akbar Baptiste  To: Ashleigh Zuniga MD  Sent: 7/22/2019 9:39 AM CDT  Subject: Visit Follow-up Question    I would like to make an apt for you to review my current medications and evaluate my condition since I have contracted cancer.   Thank you  Radha Kumar

## 2019-07-22 NOTE — TELEPHONE ENCOUNTER
Maybe wait for a few days for  to rethink his decision. There are a lot of people from [facility] trying to help him and his wife. If he wants to change services he might not get coverage.  Medicare is very specific about coverage and there are several serv

## 2019-07-22 NOTE — TELEPHONE ENCOUNTER
Left Dr. Naomi Gage verbal ok to change tube feedings. Requested a call back to confirm that Marisa Peraza got this message or if he has any questions or concerns.

## 2019-07-22 NOTE — TELEPHONE ENCOUNTER
Spoke to Tony Johnson, PT,[facility]---She states that now patient's  want to discharge patient completely from [facility].

## 2019-07-23 NOTE — TELEPHONE ENCOUNTER
Spoke to Rachel Crandall [facility] and advised that Dr. Ana Gonzalez agrees with his recommendations for tube feedings.

## 2019-07-23 NOTE — TELEPHONE ENCOUNTER
Pts /[place] calling to advise that pt had oncology visit today, pt asking if pt should continue on the blood thinner shot Enoxaearin until appt 8/20, or if pt should come off the shot.  Indicates ok to reply by [employer] or call

## 2019-07-23 NOTE — TELEPHONE ENCOUNTER
Hilda Hoover states pt's trail with DiabetiSource AC to lower blood sugars noticed a severe lack of energy - not tolerated well. Recommends pt to go back to tube feeding - consider 1 serving of Prosource addition to previous tube feeding to meet protein goals.

## 2019-07-23 NOTE — TELEPHONE ENCOUNTER
Bjorn Chavraria called again about orders to D/C all services provided by [facility]. She states pt's  is unhappy with the services they are providing.  Unfortunately, he is not coping or accepting patient's condition and regardless of what any of [facility] staff does to

## 2019-07-23 NOTE — TELEPHONE ENCOUNTER
LM per patient and Ray's request:  Dr Heriberto Romo contacted and spoke to Dr Yudy Arreaga. Dr Lopez [facility] office will call them with recommendations for diuretic changes. Tonight: STOP xarelto. Go back to lovenox 80 mg twice daily starting tonight.     Please call back wit

## 2019-07-23 NOTE — PROGRESS NOTES
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  This is being planned for evaluation and likely biopsy by Dr. Andrew Roque in antineoplastic chemotherapy 04/2019    last chemo 5-2-19   • Severe protein-calorie malnutrition Vernelle Rummer: less than 60% of standard weight) ([facility] [address]    • Unspecified essential hypertension        Past Surgical History:  Past Surgical History:   Procedure Later Smokeless tobacco: Never Used      Tobacco comment: Quit 1977    Substance and Sexual Activity      Alcohol use: No        Alcohol/week: 0.0 standard drinks      Drug use: No      Sexual activity: Not on file    Lifestyle      Physical activity: representative in an office. She and Irasema Jonas are retired and live in [place]. 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 She enjoys crossword puzzles. Fabián Pabon is originally from [place].        Jazzy Andrade Units into the skin nightly.  Disp: 1 vial Rfl: 1   Insulin Regular Human 100 UNIT/ML Injection Solution Inject q 6 hrs per sliding scale:  150-200= 1u , 201-250= 2u,  251-300= 3u,  301-350= 4u,  351-400= 5u,  401-450= 6u, > 451= 6u and call MD Disp: 1 vial Nutritional Supplements (PROMOD) Oral Liquid 30 mL by Jejunal Tube route 2 (two) times daily before meals. Disp:  Rfl:    bisacodyl 10 MG Rectal Suppos Place 1 suppository (10 mg total) rectally daily as needed.  Disp: 10 suppository Rfl: 3   artificial s J-tube  Integument/breast: Negative for rash, skin lesions, and pruritus. Hematologic/lymphatic: Negative for easy bruising, bleeding, and lymphadenopathy. Musculoskeletal: Negative for myalgias, arthralgias, muscle weakness.  +LE swelling  Neurological: 02:26 PM       Lab Results   Component Value Date/Time     (H) 07/23/2019 02:26 PM    BUN 36 (H) 07/23/2019 02:26 PM    CREATSERUM 1.13 (H) 07/23/2019 02:26 PM    GFRNAA 46 (L) 07/23/2019 02:26 PM    CA 9.1 07/23/2019 02:26 PM    ALB 2.9 (L) 07/23/2 regimen during the 5 week radiation phase it typically used; should not require any growth factor support during this short course    --her baseline CEA/CA 19-9 tumor markers prior to starting chemotherapy are both elevated and will be useful to monitoring at home that could be given either sublingual or through J-tube  --some component of anxiety related nausea; refilled a clonazepam prn order for her that's been helping    4.) J-tube    --Dr. Henrik Peraza has been helping greatly after placing tube and had th

## 2019-07-24 NOTE — TELEPHONE ENCOUNTER
I received a Palliative Care referral order from Dr. Shelbi Arenas. I called and spoke with Jose Angel De La Paz' , Jamel Johnston today.  Per Ashkan Khan can not handle long appointments, she gets worn out easily and has a very regimented schedule in regards to her tu

## 2019-07-24 NOTE — TELEPHONE ENCOUNTER
Patient faxed over copy of medications. Medications are correct. Copies of blood pressure and medications placed on your desk. Please advise.

## 2019-07-26 NOTE — TELEPHONE ENCOUNTER
Reviewed prep for PET with Jermain in PET department and instructed Ray on following:  Stop continuous tube feeing at 4 am on 8/2/19 day of PET.   Dose of long acting insulin on 8/1/19 should be cut in half.; so instead of 10 units the evening before give 5

## 2019-07-27 NOTE — ED PROVIDER NOTES
Patient Seen in: [facility] AND [facility] Emergency Department    History   Patient presents with:  Cath Tube Problem (gastrointestinal, urinary, integumentary)    Stated Complaint: gtube fell out    HPI    53-year-old female patient presents with a complaint o biopsy of liver   • IR PORT A CATH PROCEDURE  04/08/2019    Power injectable chest port placed   • JEJUNOSTOMY/GASTROSTOMY TUBE INSERTION N/A 4/25/2019    Performed by Xenia Pink MD at [facility]   • PACEMAKER/DEFIBRILLATOR  8/2015   • SIGMOIDOSCOPY, stitch with a 3-0 silk placed to secure in addition to the balloon. Nisha Leah without complication. X-ray: Tube in place without leakage    MDM   Patient case discussed with Dr. Debby Wynne on-call for Dr. Dina Verdin, surgical oncologist to place the G-tube.   D

## 2019-07-31 NOTE — TELEPHONE ENCOUNTER
Reviewed with RH, blood pressures and heart rates look good, will have available for Dr. Yoan Caldera appt Monday.

## 2019-07-31 NOTE — TELEPHONE ENCOUNTER
[address] [employer] returned call. She will be in a meeting from 9-11am so please call before or after that time.

## 2019-08-01 NOTE — TELEPHONE ENCOUNTER
Called Ray to check on pt. He states the J-tube is working fine. Pt is c/o rectal pain, back, bilat flank. States he had to fire [facility]  as they were not helping, were not sending RN on regular basis.   He also states pt will not let him provide a

## 2019-08-01 NOTE — TELEPHONE ENCOUNTER
Spoke with Caryle Collard and Keely Rawls today.   They are requesting assistance with finding another home health agency to assist with care of feeding tube, etc.    Both were busy with phone calls and appointments and request that we meet up tomorrow when they are here get

## 2019-08-01 NOTE — TELEPHONE ENCOUNTER
From: Boo Niño  To: Todd Blair MD  Sent: 7/31/2019 8:11 PM CDT  Subject: Other    I am sending a photo of the G tube under the disk. There is a protrusion that concerns me, and is tender to the touch.   Harmony Simpson

## 2019-08-02 PROBLEM — K57.92 ACUTE DIVERTICULITIS: Status: ACTIVE | Noted: 2019-01-01

## 2019-08-02 PROBLEM — K56.41 FECAL IMPACTION IN RECTUM (HCC): Status: ACTIVE | Noted: 2019-01-01

## 2019-08-02 NOTE — PROGRESS NOTES
[facility] Surgical Oncology    Patient Name:  Roni Brothers   YOB: 1940   Gender:  Female   Date:  05/16/19   Provider:  No name on file. Insurance:  MEDICARE PART A&B     PATIENT PROVIDERS  Referring Provider: No ref.  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   Her oncologic histo 4/1/2019: PET/CT: Hypermetabolic activity within the distal esophagus. No evidence of paul or distant metastases. Of note, there is a hypermetabolic left parapharyngeal lymph node.   Pulmonary nodules originally appreciated on CT scan are not FDG avid  4 • Constipation    • Coronary atherosclerosis    • Dehydration    • Diabetes ([facility] [address]    • Diabetes mellitus ([facility]    • Dilated cardiomyopathy (HCC)    • Esophageal cancer (HCC)     Adenocarcinoma of gastroesophageal junction    • Exposure to medical diagnosti Types: Cigarettes        Quit date: 1977        Years since quittin.6      Smokeless tobacco: Never Used      Tobacco comment: Quit     Substance and Sexual Activity      Alcohol use: No        Alcohol/week: 0.0 standard drinks      Drug Psychiatric/Behavioral: Negative for confusion and agitation. Physical Examination:  Physical Exam    Constitutional: She is oriented to person, place, and time. She appears well-developed and well-nourished. HENT:   Head: Normocephalic.    Eyes: Pu Please hold tube feeds for now, I will review the images and if the tube is in the right place, we will resume tube feeds  Replete potassium: Recheck this afternoon.       Destin Benites MD  [facility] General Surgical Oncology  Pending sale to [facility]  Pager 4

## 2019-08-02 NOTE — ED INITIAL ASSESSMENT (HPI)
Patient arrives with c/o generalized abdominal pain for \" a few days. \" Patient believes she is \"blocked up\". Has a gtube due to esophageal CA    Patient reports she had her gtube replaced about 5 days ago here and has problems ever since.  She has sever

## 2019-08-02 NOTE — PLAN OF CARE
Pt admitted from ER per transport cart. She reported severe abdominal pain. Abdomen noted to be distended. Urinary bladder firm. Pt unable to void. Bladder scan revealed 999ml residual.  Notified Dr. Belle Patient.  Order received to place vick catheter.   Fole

## 2019-08-02 NOTE — WOUND PROGRESS NOTE
Wound Care Services  Consulted by Dr. Mtz Parents to see the pt's J tube site, the pt. and spouse are known to me. The pt. appears thinner to me. Spoke with the pt's nurse, Dr Dione James was here earlier and the J tube was reinserted.  It is intact and taped in plac

## 2019-08-02 NOTE — H&P
ONI/ Wang Mae [address] Patient Status:  Emergency    10/14/1940 MRN [id number]   Location [address] Attending Smitha Christopher MD   Hosp Day # 0 PCP Pravin Ribera MD     Date:   Personal history of antineoplastic chemotherapy 04/2019    last chemo 5-2-19   • Severe protein-calorie malnutrition Criss Jase: less than 60% of standard weight) ([facility] [address]    • Unspecified essential hypertension      Past Surgical History:   Procedure Laterality Comment:Novacaine  Benzocaine                  Comment:Other reaction(s): BENZOCAINE  Cetylpyridinium Chl*    UNKNOWN    Comment:Other reaction(s): CETYLPYRIDINIUM CHLORIDE  Ephedrine                 Macrolides And Keto*    UNKNOWN    Home Medications:  Pr per sliding scale:  150-200= 1u , 201-250= 2u,  251-300= 3u,  301-350= 4u,  351-400= 5u,  401-450= 6u, > 451= 6u and call MD   Insulin Syringes, Disposable, U-100 0.3 ML Does not apply Misc   No No   Sig: Inject insulin into the skin 3-4 times daily Dx E11 (eight) hours. Hold for SBP < 140   insulin detemir 100 UNIT/ML Subcutaneous Solution Pen-injector   No No   Sig: Inject 10 Units into the skin nightly.    lidocaine-prilocaine 2.5-2.5 % External Cream   No No   Sig: Apply to site 1 hour prior to port a cat groin.  Musculoskeletal: Normal range of motion. normal strength. Feet:  Normal pulses. Neurologic:  Alert, oriented, no focal deficits, cranial nerves II-XII are grossly intact. Cognition and Speech:  Oriented, speech clear and coherent.   Psychiatric: calcifications; relatively atrophic kidneys; relatively large, air-filled diverticulum arising from the mid sigmoid colon (im 70); small nodular densities in the subcutaneous fat of the anterior abdominal wall, likely related to injections     Assessment a

## 2019-08-02 NOTE — CM/SW NOTE
08/02/19 1300   CM/SW Referral Data   Referral Source Nurse;   Patient Info   Patient's Mental Status   (sleeping)   Patient's [address]   Patient lives with Spouse   Patient Status Prior to Admission   Independent with ADLs and

## 2019-08-02 NOTE — TELEPHONE ENCOUNTER
From: Boo Niño  To: Todd Blair MD  Sent: 8/2/2019 7:24 AM CDT  Subject: Other    Melvin Hare is on 4th floor. Tube came out and they are trying to find some one to put it back. Can you help?   Harmony Simpson

## 2019-08-02 NOTE — DIETARY NOTE
ADULT NUTRITION INITIAL ASSESSMENT    Pt is at high nutrition risk. Pt meets malnutrition criteria.       CRITERIA FOR MALNUTRITION DIAGNOSIS:  Criteria for severe malnutrition diagnosis: chronic illness related to wt loss greater than 10% in 6 months, e Coordination of nutrition care: collaboration with other providers    - Discharge and transfer of nutrition care to new setting or provider: monitor plans    ADMITTING DIAGNOSIS:   Fecal impaction in rectum ([facility] [address] [K56.41]  Acute diverticulitis [K57.92] lb)  04/02/19 : 93.9 kg (207 lb)  03/27/19 : 95.7 kg (211 lb)  03/26/19 : 95.3 kg (210 lb)  Many of above wts reflect significant edema, which has now greatly improved. GASTROINTESTINAL: dysphagia and constipation.  Pt rinses mouth with water and spits (IBW))  Fluid needs: 1800ml/day plus GI losses.       MONITOR AND EVALUATE/NUTRITION GOALS:    - Food and Nutrient Administration:      Monitor: resumption of enteral nutrition and or need for TPN    - Anthropometric Measurement:      Monitor: wt and wt viri

## 2019-08-02 NOTE — PROGRESS NOTES
[facility]    CDI Prediction Tool Protocol (Vancomycin Initiated)    OVP (oral vancomycin prophylaxis) 125 mg PO Daily is being started in this patient based on a score of 14.       Score Breakdown:  High risk antibiotic use (5 points)  Malignanc

## 2019-08-02 NOTE — ED PROVIDER NOTES
Patient Seen in: [facility] AND [facility] Emergency Department    History   Patient presents with:  Abdomen/Flank Pain (GI/)    Stated Complaint:     HPI    [de-identified]year old female with past medical history of atrial fibrillation on Xarelto, diabetes, dilated card • Severe protein-calorie malnutrition Majefee Lopez: less than 60% of standard weight) ([facility] [address]    • Unspecified essential hypertension        Past Surgical History:   Procedure Laterality Date   • APPENDECTOMY     • BACK SURGERY  8/15    benign cyst removed from th Head: Normocephalic and atraumatic. Eyes: Pupils are equal, round, and reactive to light. Conjunctivae and EOM are normal.   Neck: Normal range of motion and full passive range of motion without pain. Neck supple. No neck rigidity.  Normal range of motion CBC W/ DIFFERENTIAL[[id number]]          Abnormal            Final result                 Please view results for these tests on the individual orders.    RAINBOW DRAW BLUE   RAINBOW DRAW LAVENDER   RAINBOW DRAW LIGHT GREEN   RAINBOW DRAW GOLD       MDM Radiology exams  Viewed and reviewed by myself and findings discussed with patient including need for follow up    Critical Care:  I spent a total of 30 minutes of critical care time in obtaining history, performing a physical exam, bedside monitoring of in Acute diverticulitis K57.92 8/2/2019 Unknown

## 2019-08-03 NOTE — PROGRESS NOTES
No acute events overnight. Patient has been stable and afebrile.     /53 (BP Location: Left arm)   Pulse 66   Temp 97.5 °F (36.4 °C) (Axillary)   Resp 18   Ht 1.626 m (5' 4\")   Wt 86.2 kg (190 lb 1.6 oz)   SpO2 99%   BMI 32.63 kg/m²      Physical Ex

## 2019-08-03 NOTE — PLAN OF CARE
Pt alert and oriented. On room air. On Xarelto. NPO and TF on hold. IVF infusing. J-tube site with dressing CDI. Pt c/o nausea - PRN Zofran given. Pain managed with PRN morphine. Balderas in place. Accucheck Q6. Pt turns and repositions self.   Asked pt to acc Incorporate patient and family knowledge, values, beliefs, and cultural backgrounds into the planning and delivery of care  - Encourage patient/family to participate in care and decision-making at the level they choose  - Honor patient and family perspecti

## 2019-08-03 NOTE — PLAN OF CARE
VSS, remains NPO, accuchecks Q6 WNL,  J-tube in place- being used for meds only. J-tube dressing  Changed,  Balderas in place, VF , ABT continued, potassium covered per protocol, pt fecally impacted- enemas Q8hr.  Mineral oil given PRN,  Morphine given for mir

## 2019-08-03 NOTE — CONSULTS
[facility]   Gastroenterology Consultation Note    Roni Deneen  Patient Status:    Inpatient  Date of Admission:         8/1/2019, Hospital day #1  Attending:   Devonte Singh DO  PCP:     Braulio Boston MD    Reason for Consult positive    • Hiatal hernia    • High blood pressure    • Hypoglycemia    • Hypotension    • Obesity, unspecified    • Other and unspecified hyperlipidemia    • Pacemaker     due to atrial fibrillation   • Peritonitis ([facility] [address] 1976    with bowel perfoation wi OF BREATH, PALPITATIONS  Lincomycin              OTHER (SEE COMMENTS)    Comment:Heart almost stopped             Other reaction(s): LINCOMYCIN HCL  Robinul [Glycopyrro*    NAUSEA ONLY, OTHER (SEE COMMENTS)    Comment:Severe malaise and nausea when Robinul injection 2 mg, 2 mg, Intravenous, Q2H PRN **OR** morphINE sulfate (PF) 4 MG/ML injection 4 mg, 4 mg, Intravenous, Q2H PRN  •  Piperacillin Sod-Tazobactam So (ZOSYN) 3.375 g in dextrose 5 % 100 mL ADD-vantage, 3.375 g, Intravenous, Q8H  •  omeprazole (PRIL Abdomen+pelvis(contrast Only)(cpt=74177)    Result Date: 8/2/2019  CONCLUSION:  1. Acute short-segment sigmoid diverticulitis without CT evidence of pericolonic abscess formation.   2. Significant distention of the rectal wall with mild rectal wall thickeni constipation. CT suggestive of acute diverticulitis. #GE junction adenocarcinoma - sees Dr. Romi Green and Dr. Sim Tovar as outptient. 3cm mass invading into the gastric cardia, bU1C4O5, stage II disease. Getting neoadjuvant chemoRT.  Also question of parapha

## 2019-08-04 NOTE — PROGRESS NOTES
Plan was discussed with nurse.   No acute events overnight, minimal output with enemas/suppositories    /55 (BP Location: Right arm)   Pulse 65   Temp 97.6 °F (36.4 °C) (Oral)   Resp 18   Ht 1.626 m (5' 4\")   Wt 86.2 kg (190 lb 1.6 oz)   SpO2 97%   B

## 2019-08-04 NOTE — SLP NOTE
SLP attempted to see pt for BSE. RN reported pt not appropriate for PO trials. SLP to reattempt on 8/5/19, as pt more appropriate for PO trials.     Thank you,  Madelyn Lai M.S., [facility] [phone]

## 2019-08-04 NOTE — PROGRESS NOTES
[place] [facility]    Progress Note    Royce Landry Patient Status:  Inpatient    10/14/1940 MRN [id number]   Location [facility] 4W/SW/SE Attending Noelle Gandhi, [address] [facility] Day # 2 PCP Sasha Huber MD       Subjective:   Chapo Padron Min PRN   glucose (DEX4) oral liquid 15 g 15 g Oral Q15 Min PRN   Insulin Regular Human (NOVOLIN R) 100 UNIT/ML injection 1-5 Units 1-5 Units Subcutaneous 4 times per day   ondansetron HCl (ZOFRAN) injection 4 mg 4 mg Intravenous Q6H PRN   morphINE sulfate 0. 87 0.85   GFRAA 50*  --  73 75   GFRNAA 43*  --  63 65   CA 8.8  --  8.5 8.2*   *  --  138 138   K 2.6* 3.1* 3.5  3.5 3.6   CL 91*  --  99 102   CO2 38.0*  --  32.0 29.0             Assessment and Plan:           Acute diverticulitis  Patient start

## 2019-08-04 NOTE — PLAN OF CARE
Problem: Diabetes/Glucose Control  Goal: Glucose maintained within prescribed range  Description  INTERVENTIONS:  - Monitor Blood Glucose as ordered  - Assess for signs and symptoms of hyperglycemia and hypoglycemia  - Administer ordered medications to m hydration with IV or PO as ordered and tolerated  - Nasogastric tube to low intermittent suction as ordered  - Evaluate effectiveness of ordered antiemetic medications  - Provide nonpharmacologic comfort measures as appropriate  - Advance diet as tolerated

## 2019-08-05 NOTE — PROGRESS NOTES
[place] [facility]    Progress Note    Lerma Smoke Patient Status:  Inpatient    10/14/1940 MRN [id number]   Location [facility] 4W/SW/SE Attending Sandra Mars, [address] [facility] Day # 3 PCP Russ Carlson MD       Subjective:   Alexander Hahn (DEX4) oral liquid 15 g 15 g Oral Q15 Min PRN   Insulin Regular Human (NOVOLIN R) 100 UNIT/ML injection 1-5 Units 1-5 Units Subcutaneous 4 times per day   ondansetron HCl (ZOFRAN) injection 4 mg 4 mg Intravenous Q6H PRN   morphINE sulfate (PF) 2 MG/ML inje  138 137   K 3.5  3.5 3.6 3.8   CL 99 102 105   CO2 32.0 29.0 27.0             Assessment and Plan:           Acute diverticulitis  Patient started on Zosyn 3.375 g IV piggyback every 8 hours, will use morphine as needed for pain.   Continue IV flui

## 2019-08-05 NOTE — ANESTHESIA POSTPROCEDURE EVALUATION
Patient: Mauricio Macias    Procedure Summary     Date:  08/05/19 Room / Location:  [address] [place] [address] ENDOSCOPY 05 / [address] [place] [address] ENDOSCOPY    Anesthesia Start:  5709 Anesthesia Stop:      Procedure:  FLEXIBLE SIGMOIDOSCOPY (N/A ) Diagnosis:       Fecal impaction ([facility] [address]      (feca

## 2019-08-05 NOTE — PROGRESS NOTES
[facility]  GI SERVICE PROGRESS NOTE    Roni Waddell Patient Status:  Inpatient    10/14/1940 MRN [id number]   Location [facility] 4W/SW/SE Attending Devonte Singh, [address] [facility] Day # 3 PCP Braulio Boston MD       Subjective: CL 99 102 105   CO2 32.0 29.0 27.0       No results for input(s): OMAIRA, LIP in the last 168 hours. Recent Labs   Lab 08/01/19 2124 08/02/19  0523   MG 2.5 2.3   PHOS  --  3.4       No results for input(s): URINE, CULTI, BLDSMR in the last 168 hours. TPN feeding. Ms. Jc Simpson returns to the ED 8/1/2019 complaining of generalized abdominal pain, constipation or obstruction.   Apparently her jejunostomy tube had recently become displaced and replaced; arrived with complaints about leaking pain and proble morning. Over 50 minutes spent today reviewing today's and recent data; discussing the above and counseling this patient, speaking to other involved physicians.       Sherri Ryan MD

## 2019-08-05 NOTE — ANESTHESIA PREPROCEDURE EVALUATION
Anesthesia PreOp Note    HPI:     Royce Landry is a [de-identified]year old female who presents for preoperative consultation requested by: Demetrio Soler MD    Date of Surgery: 8/1/2019 - 8/5/2019    Procedure(s):   FLEXIBLE SIGMOIDOSCOPY  Indication: fec Dilated cardiomyopathy ([facility] [address]         Date Noted: 07/02/2018      Hepatitis C antibody test positive         Date Noted: 01/16/2018      Hepatomegaly         Date Noted: 01/16/2018      Slow transit constipation         Date Noted: 01/16/2018      [employer] right sided cataract surgery   • COLONOSCOPY  2007   • HYSTERECTOMY     • IR J-TUBE PROCEDURE  04/25/2019    Explor lap, placement of feeding j-tube, open wedge biopsy of liver   • IR PORT A CATH PROCEDURE  04/08/2019    Power injectable chest port plac needed. Disp: 60 tablet Rfl: 0    Cefpodoxime Proxetil 100 MG Oral Tab Take 1 tablet (100 mg total) by mouth 2 (two) times daily. Disp: 14 tablet Rfl: 0 Taking   HYDRALAZINE HCL 10 MG Oral Tab TAKE 1 TABLET VIA J-TUBE EVERY 8 HOURS.  HOLD FOR BLOOD PRESSURE lidocaine-prilocaine 2.5-2.5 % External Cream Apply to site 1 hour prior to port a cath needle insertion Disp: 1 Tube Rfl: 2 Taking   Glucose Blood (ACCU-CHEK SABRINA PLUS) In Vitro Strip TEST BLOOD SUGAR DAILY AS DIRECTED Dx E11.9 Type 2 non insulin using insulin detemir (LEVEMIR) 100 UNIT/ML flextouch 10 Units 10 Units Subcutaneous Daily Nawaf Uriarte MD 10 Units at 08/04/19 1016    HYDROcodone-acetaminophen 7.5-325 MG/15ML solution 10 mL 10 mL Per J Tube Q4H PRN Nawaf Uriarte MD     dextrose 50 % reaction(s): LINCOMYCIN HCL  Robinul [Glycopyrro*    NAUSEA ONLY, OTHER (SEE COMMENTS)    Comment:Severe malaise and nausea when Robinul             administered. Anesthetics, Amide      UNKNOWN    Comment:Novacaine  Benzocaine                  Comment: Ot Relationship status: Not on file      Intimate partner violence:        Fear of current or ex partner: Not on file        Emotionally abused: Not on file        Physically abused: Not on file        Forced sexual activity: Not on file    Other Topics 08/05/2019    CO2 27.0 08/05/2019    BUN 10 08/05/2019    CREATSERUM 0.80 08/05/2019     (H) 08/05/2019    PGLU 139 (H) 08/05/2019    CA 8.5 08/05/2019     Lab Results   Component Value Date    INR 1.34 (H) 08/03/2019       Vital Signs:   Body mass i

## 2019-08-05 NOTE — PLAN OF CARE
Problem: Diabetes/Glucose Control  Goal: Glucose maintained within prescribed range  Description  INTERVENTIONS:  - Monitor Blood Glucose as ordered  - Assess for signs and symptoms of hyperglycemia and hypoglycemia  - Administer ordered medications to m ADULT  Goal: Minimal or absence of nausea and vomiting  Description  INTERVENTIONS:  - Maintain adequate hydration with IV or PO as ordered and tolerated  - Nasogastric tube to low intermittent suction as ordered  - Evaluate effectiveness of ordered antiem

## 2019-08-05 NOTE — OPERATIVE REPORT
SIGMOIDOSCOPY PROCEDURE REPORT     DATE OF PROCEDURE:  8/5/2019     PCP: Eryn Villanueva MD     PREOPERATIVE DIAGNOSIS: Rectal fecal impaction refractory to enemas, laxatives     POSTOPERATIVE DIAGNOSIS:  See impression. SURGEON:  Cheng Wells Photographs taken. Total of about 2 L of the PEG bowel prep instilled into the transverse descending sigmoid colon today. RECOMMENDATIONS:  · Observation today as Ms. Devante Garza empties out. This should clear the fecal impaction.   · Continue Zosyn antibio

## 2019-08-05 NOTE — PLAN OF CARE
Radha Kumar has had minimal complaints of pain throughout the day. VSS. Jtube being flushed Q2 with 200ml of water. Pt had flex sig today, continues to have BMs after flex sig.  at bedside. Plan for swallow eval tomorrow.    Problem: Diabetes/Glucose Contro choices  8/5/2019 1717 by Jesu Mooney RN  Outcome: Progressing  8/5/2019 1717 by Jesu Mooney, RN  Outcome: Progressing     Problem: GASTROINTESTINAL - ADULT  Goal: Minimal or absence of nausea and vomiting  Description  INTERVENTIONS:  - Maintain adequa

## 2019-08-05 NOTE — SLP NOTE
SLP attempt for BSSE. Pt NPO for SIGMOIDOSCOPY. SLP to f/u as pt appropriate for oral intake. Thank you.     Mitchel Dixon M.S. [id number] [facility]  Speech Language Pathologist   Phone Number [phone]

## 2019-08-06 NOTE — SLP NOTE
ADULT SWALLOWING EVALUATION    ASSESSMENT    ASSESSMENT/OVERALL IMPRESSION:  LIMITED evaluation completed as patient continued to expectorate secretions throughout session and verbalized her complaints dysphagia with any PO intake secondary to known esopha Undetermined    HISTORY   MEDICAL HISTORY  Reason for Referral: R/O aspiration    Problem List  Principal Problem:    Acute diverticulitis  Active Problems:    Fecal impaction in rectum [facility])    Past Medical History  Past Medical History:   Diagnosis Date Motion: Within Functional Limits    Voice Quality: Hoarse  Respiratory Status: Unlabored  Consistencies Trialed: Thin liquids  Method of Presentation: Self presentation;Cup  Patient Positioning: Upright    Oral Phase of Swallow:  Within Functional Limits

## 2019-08-06 NOTE — PROGRESS NOTES
[facility]    Progress Note    Na Funez Patient Status:  Inpatient    10/14/1940 MRN [id number]   Location [facility] 4W/SW/SE Attending Yulia Oliveira, [address] [facility] [address] Day # 4 PCP Shanna Bay MD       Subjective:   Camryn Belcher PEG 3350 (MIRALAX) powder packet 17 g 17 g Per J Tube Daily   insulin detemir (LEVEMIR) 100 UNIT/ML flextouch 10 Units 10 Units Subcutaneous Daily   HYDROcodone-acetaminophen 7.5-325 MG/15ML solution 10 mL 10 mL Per J Tube Q4H PRN   dextrose 50 % injecti Date    HGB 10.0 (L) 08/06/2019    HGB 9.7 (L) 08/05/2019    HGB 9.3 (L) 08/04/2019      Lab Results   Component Value Date    .0 08/06/2019    .0 08/05/2019    .0 08/04/2019       Recent Labs   Lab 08/04/19  0531 08/05/19  0625 08/06/ discussing plan of care, discussing labs and imaging findings. Spoke with consultant. All questions answered.            Certification      PHYSICIAN Certification of Need for Inpatient Hospitalization - Initial Certification    Patient will require inpatie

## 2019-08-06 NOTE — PROGRESS NOTES
[facility]  GI SERVICE PROGRESS NOTE    Armida Thanhdevante Patient Status:  Inpatient    10/14/1940 MRN [id number]   Location [facility] 4W/SW/SE Attending Morena Warren, [address] [facility] Day # 4 PCP Anurag Chavez MD       Subjective: 04/27/2019       Recent Labs   Lab 08/04/19  0531 08/05/19  0625 08/06/19  0440   * 113* 170*   BUN 17 10 8   CREATSERUM 0.85 0.80 0.76   GFRAA 75 81 86   GFRNAA 65 70 74   CA 8.2* 8.5 8.5    137 136   K 3.6 3.8 3.6    105 104   CO2 29.0 her significant difficulty swallowing even liquids then. She suffered complications from a feeding jejunostomy tube placed 4/25/2019 later necessitating a prolonged admission in May 2019 for intravenous antibiotics, intravenous TPN feeding.       Ms. Akila Pacheco  can bring some in.   · Continues on Xarelto anticoagulation  · Enteral Vancomycin daily prophylaxis dosing        Over 50 minutes spent today reviewing today's and recent data; discussing the above and counseling this patient, speaking to other invo

## 2019-08-06 NOTE — TELEPHONE ENCOUNTER
That is my understanding, Digna Disla. She is still in-pt so I can check with SW on the floor. Thank you!

## 2019-08-06 NOTE — PLAN OF CARE
IVF and zosyn infusing. Dressing around j-tube changed. Water flush per order. Accuchecks q6hrs. Incontinent of bowel. Declines medication for pain. Continues to swish and spit.  Plan for swallow eval in AM.      Problem: Diabetes/Glucose Control  Goal: Glu ordered  - Evaluate effectiveness of ordered antiemetic medications  - Provide nonpharmacologic comfort measures as appropriate  - Advance diet as tolerated, if ordered  - Obtain nutritional consult as needed  - Evaluate fluid balance  Outcome: Progressing

## 2019-08-06 NOTE — PLAN OF CARE
VSS, accucheck Q6- sliding scale given, j-tube in place, Q2hr flushes continued, IVF and ABT continued, potassium covered per protocol, swallow eval done today; esophogram later in the day today- diarrhea continues s/p dis-impaction yesterday with Dr. Jose Angel Dey ADULT  Goal: Minimal or absence of nausea and vomiting  Description  INTERVENTIONS:  - Maintain adequate hydration with IV or PO as ordered and tolerated  - Nasogastric tube to low intermittent suction as ordered  - Evaluate effectiveness of ordered antiem

## 2019-08-06 NOTE — PROGRESS NOTES
Plan was discussed with nurse. Pt resting. She underwent lower endoscopy and disimpaction.     /56 (BP Location: Right arm)   Pulse 61   Temp 97.8 °F (36.6 °C) (Oral)   Resp 17   Ht 1.626 m (5' 4\")   Wt 86.2 kg (190 lb 1.6 oz)   SpO2 98%   BMI 32.63

## 2019-08-06 NOTE — DIETARY NOTE
Update note  8/7/2019  1218    TF Osmolite 1.2 @ 20 ml/hr started this AM. Tolerating TF. On Miralax 17 g daily. Food and Nutrition Dept can provide Benefiber supplement if ordered. (2 tsp Benefiber= 4 g Fiber).      Recommend:   If starting Benefiber, ortiz jevity 1.2), but poor tolerance per him, but suspect may have been related to diverticulitis brewing. 8/6/2019 Update:   - s/p disimpaction. 8/5 s/p sigmoidoscopy with disimpaction. Small BM yesterday.  Received call from GI yesterday re: plan to start care to new setting or provider: monitor plans    ADMITTING DIAGNOSIS: Fecal impaction in rectum ([facility] [address] [K56.41]Acute diverticulitis [K57.92]    PAST MEDICAL HISTORY:  has a past medical history of Anesthesia complication, Atrial fibrillation ([facility] [address] Chronic 95.3 kg (210 lb)  Many of above wts reflect significant edema, which has now greatly improved. GASTROINTESTINAL: swallow evaluation noted. Allowed pt with Thin liquids. Admitted with fecal impaction now pt c/o diarrhea--likely post enemas.   Pt rinses m skin breakdown, promote healing and improved GI status    DIETITIAN FOLLOW UP: RD to follow up within 5 days  Wayne Hunter RD, [address] [facility], [address] [place] [address] J Dietitian  [phone]  8/6/2019

## 2019-08-07 NOTE — PROGRESS NOTES
[place] [facility] - [facility]    Progress Note    Daniel Tineo Patient Status:  Inpatient    10/14/1940 MRN [id number]   Location [facility] 4W/SW/SE Attending Remigio Lamb Day # 5 PCP Blanka Issa MD        Subjective: impaction relieved. - voiding trial soon      Metabolic alkalosis  Likely secondary to repeated expectoration  - improving.  Cont IV fluids      Chronic atrial fibrillation  Rate controlled, continue Xarelto.     Skin breakdown around J-tube  Wound care to

## 2019-08-07 NOTE — PLAN OF CARE
Problem: Diabetes/Glucose Control  Goal: Glucose maintained within prescribed range  Description  INTERVENTIONS:  - Monitor Blood Glucose as ordered  - Assess for signs and symptoms of hyperglycemia and hypoglycemia  - Administer ordered medications to m Evaluate fluid balance  Outcome: Progressing  Goal: Maintains or returns to baseline bowel function  Description  INTERVENTIONS:  - Assess bowel function  - Maintain adequate hydration with IV or PO as ordered and tolerated  - Evaluate effectiveness of GI risk of injury  - Provide assistive devices as appropriate  - Consider OT/PT consult to assist with strengthening/mobility  - Encourage toileting schedule  Outcome: Progressing     Problem: DISCHARGE PLANNING  Goal: Discharge to home or other facility with

## 2019-08-07 NOTE — PLAN OF CARE
Patient's rectal pain resolved after disimpaction, but patient continues to have incontinent loose watery stools. Rodarte Remy is working properly. Tube feeding started this morning.  Explained to patient that her  will need to bring Benefiber from the phar GASTROINTESTINAL - ADULT  Goal: Minimal or absence of nausea and vomiting  Description  INTERVENTIONS:  - Maintain adequate hydration with IV or PO as ordered and tolerated  - Nasogastric tube to low intermittent suction as ordered  - Evaluate effectivenes

## 2019-08-07 NOTE — PROGRESS NOTES
08/07/19 1110   Clinical Encounter Type   Visited With Patient and family together  ()   Routine Visit Introduction   Continue Visiting Yes   Patient Spiritual Encounters   Spiritual Assessment Completed Yes   Spiritual Needs Pt is Caodaism, has

## 2019-08-08 NOTE — PROGRESS NOTES
[facility]  GI SERVICE PROGRESS NOTE    Matthew Herron Patient Status:  Inpatient    10/14/1940 MRN [id number]   Location [facility] 4W/SW/SE Attending Chris Hall, [address] [facility] Day # 6 PCP Donaldo Wesley MD       Subjective: BUN 8 5* 6*   CREATSERUM 0.76 0.73 0.78   GFRAA 86 90 83   GFRNAA 74 78 72   CA 8.5 8.4* 8.4*    138 138   K 3.6 3.8 4.2    108 108   CO2 24.0 23.0 24.0       No results for input(s): HAY CASTANO in the last 168 hours.     Recent Labs   Lab 08/02 complications from a feeding jejunostomy tube placed 4/25/2019 later necessitating a prolonged admission in May 2019 for intravenous antibiotics, intravenous TPN feeding.       Ms. Ruma Gale returns to the ED 8/1/2019 complaining of generalized abdominal pain, c increasing LE edema; consider discharging on Furosemide 20mg/day  · Continues on Xarelto anticoagulation  · Enteral Vancomycin daily prophylaxis dosing  · There has been interest among treating physicians in performing a contrast XR study esophagus  to inv

## 2019-08-08 NOTE — WOUND PROGRESS NOTE
Wound Care Services  Follow up on the pt., she is up in the chair, her spouse James Gold is at the bedside.  The J tube is intact with 3 intact sutures, per the pt. she had 4 sutures but she thinks she pulled one suture out by lying on her side sleeping,noted smal

## 2019-08-08 NOTE — PROGRESS NOTES
Feels better this morning.   Could not tolerate esophagram.    /48 (BP Location: Left arm)   Pulse 68   Temp 98.1 °F (36.7 °C) (Oral)   Resp 18   Ht 1.626 m (5' 4\")   Wt 86.2 kg (190 lb 1.6 oz)   SpO2 95%   BMI 32.63 kg/m²      Recent Labs   Lab 08/0

## 2019-08-08 NOTE — PROGRESS NOTES
[facility]    Progress Note    Xuanmayelin Hinton Patient Status:  Inpatient    10/14/1940 MRN [id number]   Location [facility] 4W/SW/SE Attending Remigio Lamb Day # 6 PCP Magdi Schwartz MD        Subjective: relieved. - voiding trial soon      Metabolic alkalosis  Likely secondary to repeated expectoration  - improving. Cont IV fluids      Chronic atrial fibrillation  Rate controlled, continue Xarelto.     Skin breakdown around J-tube  Wound care to evaluate.

## 2019-08-08 NOTE — PLAN OF CARE
J-tube feeding 30ml/hr 100 flush q2hr, plan to increase rate to 30ml/hr in the morning,  Restarted lasix and aldactone, iv fluids discontinued,  Benefiber started, family refused miralax, loose stool x2  Up to chair and ambulated in hallway.    Wound care c or PO as ordered and tolerated  - Nasogastric tube to low intermittent suction as ordered  - Evaluate effectiveness of ordered antiemetic medications  - Provide nonpharmacologic comfort measures as appropriate  - Advance diet as tolerated, if ordered  - Ob physical deficits and behaviors that affect risk of falls.   - [place] fall precautions as indicated by assessment.  - Educate pt/family on patient safety including physical limitations  - Instruct pt to call for assistance with activity based on assessme

## 2019-08-08 NOTE — CM/SW NOTE
8/9 426pm: The pt. Has been accepted by both [address]. Staffing will need to be verified with the agencies prior to discharge.   -----------------  8/8 102pm: Updated Jtube feedings orders have been sent to [place] via [employer].   [facility] is unable

## 2019-08-09 NOTE — PROGRESS NOTES
[facility]    Progress Note    Roniyimi Brothers Patient Status:  Inpatient    10/14/1940 MRN [id number]   Location [facility] 4W/SW/SE Attending Remigio Lamb Day # 7 PCP Braulio Boston MD        Subjective: improving.  Cont IV fluids      Chronic atrial fibrillation  Rate controlled, continue Xarelto.     Skin breakdown around J-tube  Wound care to evaluate.     Adenocarcinoma of the GE junction  Continue supportive care  - passed speech eval. Plan for esophag

## 2019-08-09 NOTE — PROGRESS NOTES
No acute events, VSS.     /60 (BP Location: Left arm)   Pulse 65   Temp 97.9 °F (36.6 °C) (Oral)   Resp 18   Ht 1.626 m (5' 4\")   Wt 86.2 kg (190 lb 1.6 oz)   SpO2 96%   BMI 32.63 kg/m²      Recent Labs   Lab 08/07/19  0505 08/08/19  0530 08/09/19  0

## 2019-08-10 NOTE — CM/SW NOTE
8/10: SW received call from Dr. Pedro Miguel inquiring if all services have been set up for patient to discharge to home. SW followed up with [facility] ([phone]) they are able to accept patient for RN/AIde when patient discharges to home.  They wi transferd to account rep handling this patient. Matter being marked urgent. SW/CM to remain available for support and/or discharge planning.      [address], [place] [id number]

## 2019-08-10 NOTE — PROGRESS NOTES
[facility]    Progress Note    Marcia Ellington Patient Status:  Inpatient    10/14/1940 MRN [id number]   Location [facility] 4W/SW/SE Attending Remigio Lamb # 8 PCP Stephanie Kimble MD        Subjective: Balderas in place will remain until fecal impaction relieved. - voiding trial tomorrow done     Metabolic alkalosis  Likely secondary to repeated expectoration  - improving. Cont IV fluids      Chronic atrial fibrillation  Rate controlled, continue Xarelto. MD  8/10/2019

## 2019-08-10 NOTE — PROGRESS NOTES
[facility]     Gastroenterology Progress Note    Marcia Chandana Patient Status:  Inpatient    10/14/1940 MRN [id number]   Location [facility] 4W/SW/SE Attending Mitzy Longoria,*   Hosp Day # 8 PCP Stephanie Person, is no tenderness to palpation. There are no masses appreciated. Liver and spleen are not palpable. Skin- No jaundice. Warm and dry. Ext: No cyanosis, clubbing or edema is evident.    Neuro- Alert and interactive, and gross movements of extremities norm

## 2019-08-10 NOTE — PLAN OF CARE
J-tube feeding  osmolite 1.2 at 60ml/hr at goal rate, bm x2, voiding, up to chair and ambulating in hallway, plan to discharge home Sunday or Monday with home health.    Problem: Diabetes/Glucose Control  Goal: Glucose maintained within prescribed range  [facility] antiemetic medications  - Provide nonpharmacologic comfort measures as appropriate  - Advance diet as tolerated, if ordered  - Obtain nutritional consult as needed  - Evaluate fluid balance  Outcome: Progressing  Goal: Maintains or returns to baseline stu Educate pt/family on patient safety including physical limitations  - Instruct pt to call for assistance with activity based on assessment  - Modify environment to reduce risk of injury  - Provide assistive devices as appropriate  - Consider OT/PT consult

## 2019-08-11 NOTE — PLAN OF CARE
Tolerating tube feeding at goal rate 60ml/hr, voiding, bm x1, up to chair and ambulating in hallway. Possible discharge home tomorrow.  is aware of plan.   Problem: Diabetes/Glucose Control  Goal: Glucose maintained within prescribed range  Descripti medications  - Provide nonpharmacologic comfort measures as appropriate  - Advance diet as tolerated, if ordered  - Obtain nutritional consult as needed  - Evaluate fluid balance  Outcome: Progressing  Goal: Maintains or returns to baseline bowel function on patient safety including physical limitations  - Instruct pt to call for assistance with activity based on assessment  - Modify environment to reduce risk of injury  - Provide assistive devices as appropriate  - Consider OT/PT consult to assist with str

## 2019-08-11 NOTE — PROGRESS NOTES
[facility]    Progress Note    Boo Renay Patient Status:  Inpatient    10/14/1940 MRN [id number]   Location [facility] 4W/SW/SE Attending Remigio Lamb Patience Day # 9 PCP Miley Gleason MD        Subjective: until fecal impaction relieved. - voiding trial tomorrow done     Metabolic alkalosis  Likely secondary to repeated expectoration  - improving.  Cont IV fluids      Chronic atrial fibrillation  Rate controlled, continue Xarelto.     Skin breakdown around J

## 2019-08-11 NOTE — PLAN OF CARE
No acute events overnight. Tube feedings at goal rate. No complaints of pain. No complaints of nausea/vomiting. IV abx given. Up with 1 assist and walker to bathroom, voiding freely. No BM overnight. [facility]      Problem: low intermittent suction as ordered  - Evaluate effectiveness of ordered antiemetic medications  - Provide nonpharmacologic comfort measures as appropriate  - Advance diet as tolerated, if ordered  - Obtain nutritional consult as needed  - Evaluate fluid b falls.  - [place] fall precautions as indicated by assessment.  - Educate pt/family on patient safety including physical limitations  - Instruct pt to call for assistance with activity based on assessment  - Modify environment to reduce risk of injury  -

## 2019-08-12 NOTE — DISCHARGE SUMMARY
Dc summary#[id number]  > 30 min spent on [address] Discharge Diagnoses: cellulitis of abd wall    Lace+ Score: 85  59-90 High Risk  29-58 Medium Risk  0-28   Low Risk. TCM Follow-Up Recommendation:  LACE > 58:  High Risk of readmission after discharge

## 2019-08-12 NOTE — PLAN OF CARE
No acute events overnight. Tube feedings at goal rate. No complaints of pain. No complaints of nausea/vomiting. IV abx given. Up with 1 assist and walker to bathroom, voiding freely. Small BM overnight. Plan for possible d/c home today with [facility].      Proble to low intermittent suction as ordered  - Evaluate effectiveness of ordered antiemetic medications  - Provide nonpharmacologic comfort measures as appropriate  - Advance diet as tolerated, if ordered  - Obtain nutritional consult as needed  - Evaluate flui of falls.   - [place] fall precautions as indicated by assessment.  - Educate pt/family on patient safety including physical limitations  - Instruct pt to call for assistance with activity based on assessment  - Modify environment to reduce risk of injury

## 2019-08-12 NOTE — CM/SW NOTE
8/12 308pm: The pt's feedings have been approved. [facility] will contact the pt's  to arrange for delivery. SUSAN contacted [place] with [facility] and notified them of the discharge.   Both [facility] and [facility] are aware of discharge today.   ---------

## 2019-08-12 NOTE — PLAN OF CARE
Vss, accuchecks Q6; WNL, remains NPO, Tube feedings continued via j-tube, dressing intact, ABT continued, potassium covered per protocol, denies any pain,  chest port maintained, pt up with stand by assist, plan to discharge home with [facility] timely, complete, and accurate information to patient/family  - Incorporate patient and family knowledge, values, beliefs, and cultural backgrounds into the planning and delivery of care  - Encourage patient/family to participate in care and decision-amaya non-pharmacological measures as appropriate and evaluate response  - Consider cultural and social influences on pain and pain management  - Manage/alleviate anxiety  - Utilize distraction and/or relaxation techniques  - Monitor for opioid side effects  - N Identify discharge learning needs (meds, wound care, etc)  - Arrange for interpreters to assist at discharge as needed  - Consider post-discharge preferences of patient/family/discharge partner  - Complete POLST form as appropriate  - Assess patient's abil

## 2019-08-12 NOTE — TELEPHONE ENCOUNTER
Gurpreet Lang called wondering if Dr Karen Barros would be seeing pt prior to discharge today. Room 448.     Stoney's call back:  [phone]

## 2019-08-13 NOTE — TELEPHONE ENCOUNTER
Spoke to pt for TCM today. Pt does not have HFU appt scheduled at this time. TCM/HFU appt recommended by 8/19/2019 as pt is a high risk for readmission. Niranjan Heath     BOOK BY DATE (last date for TCM):8/26/2019     Please f/u with pt and assist them in scheduling

## 2019-08-13 NOTE — PROGRESS NOTES
Initial Post Discharge Follow Up   Discharge Date: 8/12/19  Contact Date: 8/13/2019    Consent Verification:  Assessment Completed With: Patient and her  were both on the phone call with patient's verbal permission. HIPAA Verified?   Yes    Disch MG Oral Tab 1 tablet (20 mg total) by Per J Tube route daily with food. Disp: 30 tablet Rfl: 0   Wheat Dextrin (BENEFIBER) Oral Powder 1 Scoop by Jejunal Tube route 2 (two) times daily.  Disp: 1 Can Rfl: 0   Lidocaine HCl 2 % External Gel Apply 2 mL topical Nare route daily. Disp: 1 Bottle Rfl: 0   Furosemide 8 MG/ML Oral Solution 5 mL (40 mg total) by Per J Tube route daily. Disp: 1 Bottle Rfl: 0   Omeprazole Does not apply Powder 40 mg by Jejunal Tube route daily.  Disp:  Rfl:    Nutritional Supplements (PRO didn't so I had to drop off the prescriptions. • May I go over your medications with you to make sure we are not missing anything? yes  • Are there any reasons that keep you from taking your medication as prescribed?  No  Are you having any concerns with co Specialist Appt with patient     Yes        Interventions by NCM: NCM reviewed discharge instructions and when to seek medical attention with the patient and her . NCM reviewed s/s of infection which she denied having at the J tube site.   Frankey Sofia orders sent to PCP.

## 2019-08-14 NOTE — TELEPHONE ENCOUNTER
Did not realize there there was an email encounter about this too. See patient email from today for further documentation.

## 2019-08-14 NOTE — TELEPHONE ENCOUNTER
Per below patient needs [facility] follow up appt by 8/26/19.  called patient. Due to patient schedule appt booked 8/28/19. Sooner appts offered. Dr. Carmen [place] to keep appt as scheduled?

## 2019-08-14 NOTE — TELEPHONE ENCOUNTER
SHARIF lauren [facility]  called to let Dr. Fabian Crawford know that pt is under their care so if Dr. Fabian Crawford has any orders for them, please call.

## 2019-08-14 NOTE — TELEPHONE ENCOUNTER
GI RNs please advise. I verbally discussed with Dr. Sarah Mcghee. Per Dr. Sarah Mcghee, he was not consulted while patient was in the hospital at which time this abx was prescribed. He does not want to submit PA under his name or advise on alternatives.  He would [employer]

## 2019-08-14 NOTE — TELEPHONE ENCOUNTER
Dr. Khalida Blanco, the pharmacy contacted our office. The vancomycin is not covered because it requires a prior authorization with insurance. Would you like me to tell Jose Angel De La Paz to contact Dr. Elisa Garcia office for the prior authorization?      I would be happy

## 2019-08-14 NOTE — TELEPHONE ENCOUNTER
Dr. Jose Gaines please see below. Can we attempt a PA for vancomycin under your name? Prescribed by Dr. Tayo aSntiago. I can't submit a PA under his name because I don't know his NPI, contact info ect.

## 2019-08-14 NOTE — TELEPHONE ENCOUNTER
From: Dominique De La Paz  To: Rosemary Espinosa MD  Sent: 8/14/2019 9:30 AM CDT  Subject: Prescription Question    Dr. Serina Limon  The insurance co is not approving vanccomycin HCI 50 MG/ML Oral Recon Soln that Dr. Marc Garcia \"spelling\" prescribed and Grady is

## 2019-08-14 NOTE — TELEPHONE ENCOUNTER
There is no D./C summary and I really can't tell why she is on Vanco.  They need to contact Dr. Elizabeth Christie. Or Dr. Maricarmen Davis may know why.

## 2019-08-14 NOTE — TELEPHONE ENCOUNTER
Per RN- Spoke to pt  to schedule hosp fu- he stated they already have appt on 8/28 and expressed frustration about how difficult it is to get pt to appts- she also has several other appts scheduled in next couple weeks- pl call pt if she needs to be

## 2019-08-14 NOTE — TELEPHONE ENCOUNTER
Dr Corin Beavers,    Did you prescribe Vancomycin for prophylaxis of c diff?     Insurance is asking for a PA for the vancomycin    You saw this pt while in the hospital    Please advise

## 2019-08-14 NOTE — TELEPHONE ENCOUNTER
Prior auth request received from [employer] ([address] ) on prescription for Vancomycin solution Dahlia Combs). Routing to triage/nephro teams for assistance with initiating prior auth.      Plan [phone], Patient EP#[id number]

## 2019-08-15 NOTE — DISCHARGE SUMMARY
[facility]    PATIENT'S NAME: Barry Kimregor   ATTENDING PHYSICIAN: Lucinda Lamb MD   PATIENT ACCOUNT#:   [id number]    LOCATION:  [address] [place] [address] [address] RECORD #:   [id number]       YOB: 1940  ADMISSION DATE:       08/01 created an approximately 3-day delay in discharge while insurance approval and them choosing another provider occurred.   She is going to have an outpatient PET scan and determine further therapy from there with chemotherapy, radiation, etc. to see what cou stool softeners. 3.   Acute urinary retention from constipation, able to urinate. 4.   Metabolic alkalosis, stable. 5.   Chronic atrial fibrillation. Continue Xarelto. 6.   Skin breakdown around J-tube present on admission. Continue wound care.   7. oil per J-tube as needed. 22.   Vancomycin 125 mg daily. 23.   Wheat dextrin twice a day. DISCHARGE INSTRUCTIONS:  Followup is her PET scan as soon as possible, Dr. Anabella Mayer in 2 weeks, Dr. Ashish Lemon in 2 weeks.   Follow up with Dr. Juli Carrion as soon as pos

## 2019-08-15 NOTE — PROGRESS NOTES
Agree with Dr Yazmin Morales note. Ms. Angelica Zamarripa received an extended course of vancomycin prophylaxis during the hospitalization. No documented C. difficile infection on recent testing here. This was prophylaxis.   Probably not worth of prior authorization fight at

## 2019-08-15 NOTE — TELEPHONE ENCOUNTER
Patient  called stating that his wife J tube has a few cracks and it is leaking when pushing her medications or fluid. I asked the  to send me a picture so I can see what may need to be replaced.  Ray sent the picture to me and it appears that

## 2019-08-15 NOTE — PROGRESS NOTES
This is a patient of Dr. Deepa Carter, however I did see the patient in the hospital. Review of the notes seems she was supposed to be on vancomycin daily for cdiff prophylaxis while she is on antibiotics for possible diverticulitis/proctitis.  If she was unabl

## 2019-08-16 NOTE — TELEPHONE ENCOUNTER
From: Katia Arndt  To: Chito Bowman MD  Sent: 8/14/2019 2:58 PM CDT  Subject: Prescription Question    Dr. Tayo Santiago wrote a prescription for vancomycin and the insurance company wants clinical documentation on this.  If this is the only Rx

## 2019-08-16 NOTE — TELEPHONE ENCOUNTER
From: Lee Mcclure  To: Nisa Carpio MD  Sent: 8/15/2019 6:52 PM CDT  Subject: Prescription Question    just read your answer, thank you.   Dottie Walker

## 2019-08-16 NOTE — TELEPHONE ENCOUNTER
From: Char Foster  To: Theola Krabbe, MD  Sent: 8/15/2019 6:16 PM CDT  Subject: Prescription Question    Can Dr. Eduar Cho or someone else answer my question about the antibiotic prescribed vancomycin or some other substitute being filled.  I sen
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
## 2019-08-19 PROBLEM — S80.02XA CONTUSION OF LEFT KNEE, INITIAL ENCOUNTER: Status: ACTIVE | Noted: 2019-01-01
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
## 2019-08-19 PROBLEM — R52 INTRACTABLE PAIN: Status: ACTIVE | Noted: 2019-01-01

## 2019-08-19 PROBLEM — S42.202A CLOSED FRACTURE OF PROXIMAL END OF LEFT HUMERUS, UNSPECIFIED FRACTURE MORPHOLOGY, INITIAL ENCOUNTER: Status: ACTIVE | Noted: 2019-01-01

## 2019-08-19 NOTE — ED PROVIDER NOTES
Patient Seen in: [facility] Emergency Department    History   Patient presents with:  Fall (musculoskeletal, neurologic)    Stated Complaint: fall    HPI    57-year-old female patient presents her complaining of pain to her left arm and left knee Performed by Vin Turpin MD at [address] [place] [address] ENDOSCOPY   • HYSTERECTOMY     • IR J-TUBE PROCEDURE  04/25/2019    Explor lap, placement of feeding j-tube, open wedge biopsy of liver   • IR PORT A CATH PROCEDURE  04/08/2019    Power injectable chest port speech, nonfocal examination. Patient able to extend against resistance of the left wrist.  Sensation intact. Sensation intact.   Psych: Appropriate, not agitated      ED Course     Labs Reviewed   CBC W/ DIFFERENTIAL - Abnormal; Notable for the following Reviewed: 8/6/2019          ICD-10-CM Noted POA    Fall W19. Yokasta Stevenson 8/19/2019 Unknown

## 2019-08-19 NOTE — PROGRESS NOTES
Pt here with  for replacement of Animated Speech today. Procedure consent form signed, labeled and sent to Scan.

## 2019-08-19 NOTE — ED INITIAL ASSESSMENT (HPI)
Pt fell at home. Landed on left side. C/o left shoulder and upper arm pain. Denies hitting head. 25mcg fentanyl given en route.

## 2019-08-20 PROBLEM — M80.012A: Status: ACTIVE | Noted: 2019-01-01

## 2019-08-20 NOTE — PLAN OF CARE
Problem: GASTROINTESTINAL - ADULT  Goal: Minimal or absence of nausea and vomiting  Description  INTERVENTIONS:  - Maintain adequate hydration with IV or PO as ordered and tolerated  - Evaluate effectiveness of ordered antiemetic medications  - Provide n 20meq rider     Problem: PAIN - ADULT  Goal: Verbalizes/displays adequate comfort level or patient's stated pain goal  Description  INTERVENTIONS:  - Encourage pt to monitor pain and request assistance  - Assess pain using appropriate pain scale  - Adminis

## 2019-08-20 NOTE — H&P
[facility]    PATIENT'S NAME: Faraz Elkins   ATTENDING PHYSICIAN: Marycarmen Call MD   PATIENT ACCOUNT#:   [de-identified]    LOCATION:  [address] [place] [address] RECORD #:   [id number]       YOB: 1940  ADMISSION DATE:       08/19 her sitting on the floor against the wall complaining of pain in her left arm and left knee. She does not think she hit her head, but the fall was unwitnessed and she does have some confusion about the fall itself.   She was in severe pain and was brought Port-A-Cath placement in April of this year; jejunostomy, gastrostomy tube insertion in April of this year; pacemaker defibrillator; tonsillectomy.       ALLERGIES:  Definity causes palpitations and dizziness; Demerol caused shortness of breath and jumpines guarding. No rebound. EXTREMITIES:  Left knee is quite swollen and ecchymotic, tender to touch with limited movement secondary to pain. Left upper extremity is out of the sling. The patient declines to have the sling replaced.   She is moving all of her discharge. 6.   History of constipation. Restart MiraLAX once the decision is made about treatment of her humerus fracture. Once tube feeds can be restarted, we will restart the MiraLAX given her history of severe constipation. 7.   Hypokalemia.   Repla

## 2019-08-20 NOTE — PLAN OF CARE
Problem: ALTERED NUTRIENT INTAKE - ADULT  Goal: Nutrient intake appropriate for improving, restoring or maintaining nutritional needs  Description  INTERVENTIONS:  - Assess nutritional status and recommend course of action  - Monitor  labs, and treatment

## 2019-08-20 NOTE — CM/SW NOTE
[facility]ANTA received for home health. SW confirmed the pt is already current with home health services through [facility] and [facility] for tube feeds. Updated packet sent by [email or web address] via [employer].     [facility] [phone]  [facility] [phone]/L

## 2019-08-20 NOTE — RESPIRATORY THERAPY NOTE
Patient has a previous diagnosis of sleep apnea but does not use CPAP therapy at home and does not wish to use CPAP therapy while inpatient. RN aware.

## 2019-08-20 NOTE — PLAN OF CARE
Rec'd patient from ER per cart--transferred patient to bed with 4 person assist.  Patient refusing to remove extra sheets beneath her from ER cart.   Instructed patient we need to remove sheets in order to obtain an accurate weight--patient and  dalton

## 2019-08-20 NOTE — PROGRESS NOTES
ADMISSION NOTE    [de-identified]year old female with a fib on xarelto, DM HTN  presents with humerus fracture after mechanical fall. Available medical records partially reviewed. Dictation to follow.     Yumiko Nguyễn M.D.  8/19/2019

## 2019-08-20 NOTE — CONSULTS
[facility] HOSP - [facility]    Report of Consultation    Perez Heart Patient Status:  Observation    10/14/1940 MRN [id number]   Location [facility]5W Attending Rosio Rouse MD   Hosp Day # 0 PCP Adrian Faust MD     Date of Admissio • Personal history of antineoplastic chemotherapy 04/2019    last chemo 5-2-19   • Severe protein-calorie malnutrition Merrily Demark: less than 60% of standard weight) [facility]    • Sleep apnea    • Unspecified essential hypertension      Past Surgical History: home.  Demerol  [Meperidin*    SHORTNESS OF BREATH    Comment:Other reaction(s):  Other (See Comments)             Jumpy  Epinephrine             SHORTNESS OF BREATH, PALPITATIONS  Lincomycin              OTHER (SEE COMMENTS)    Comment:Heart almost stopped total) by Per J Tube route daily. (Patient taking differently: 125 mg by Per J Tube route daily.  )   insulin detemir 100 UNIT/ML Subcutaneous Solution Pen-injector Inject 10 Units into the skin nightly.    Insulin Regular Human 100 UNIT/ML Injection Soluti glycerin, laxative, 1.2 G Rectal Suppos Place 1 suppository rectally daily as needed.      OneTouch UltraSoft Lancets Does not apply Misc Test 2-3 times per day       Review of Systems:   Review of Systems: no c/o chest pain, sob, fever, chills, or s/s or <0.045 03/19/2019     Xr Knee (1 Or 2 Views), Left (cpt=73560)    Result Date: 8/19/2019  CONCLUSION:   Osseous demineralization. No acute fracture. Tricompartmental osteoarthritis. No significant effusion. Vascular calcifications.   Dictated by (CST):

## 2019-08-20 NOTE — PROGRESS NOTES
08/20/19 2384   Clinical Encounter Type   Visited With Patient and family together  (son Guanaco Malone, daughter-in-law Deanna Walker and Erlin Coon)   Routine Visit Introduction  (consult)   Continue Visiting Yes   Patient's Supportive Strategies/Resources family, domingo

## 2019-08-20 NOTE — ED NOTES
Orders for admission, patient is aware of plan and ready to go upstairs. Any questions, please call ED RN Inna Pino  at extension [phone].

## 2019-08-20 NOTE — TELEPHONE ENCOUNTER
From: Christina Martel  To: Pedrito Palma MD  Sent: 8/20/2019 9:58 AM CDT  Subject: Other    I want to cancel the Kayleigharda Boeck has on the 28th of Aug. Jay Howard fell yesterday and broke her left arm. She is in room [address] [facility].

## 2019-08-20 NOTE — PLAN OF CARE
Problem: Patient Centered Care  Goal: Patient preferences are identified and integrated in the patient's plan of care  Description  Interventions:  - What would you like us to know as we care for you?  \"I fell today at home and fell on my left arm\"  - P mobilization and activity  - Obtain nutritional consult as needed  - Establish a toileting routine/schedule  - Consider collaborating with pharmacy to review patient's medication profile  Outcome: Progressing     Problem: GENITOURINARY - ADULT  Goal: Absen goal  Description  INTERVENTIONS:  - Encourage pt to monitor pain and request assistance  - Assess pain using appropriate pain scale  - Administer analgesics based on type and severity of pain and evaluate response  - Implement non-pharmacological measures

## 2019-08-20 NOTE — PROGRESS NOTES
[facility]    Progress Note    Royce Landry Patient Status:  Observation    10/14/1940 MRN [id number]   Location [facility] Attending Rahul Howard MD   Hosp Day # 0 PCP Sasha Huber MD       Subjective:   Royce Landry There was no focal deficit. Cranial nerves intact.          Current Scheduled Inpatient Meds:     • hydrALAzine HCl  25 mg Per J Tube 2 times per day   • scopolamine  1 patch Transdermal Q72H   • Fluticasone Propionate  1 spray Each Nare Daily   • Insulin R by (CST): Yoli Armstrong MD on 8/19/2019 at 19:35              Assessment and Plan:     Comminuted displaced L humerus Fracture  - Inpatient admit due to co morbidities/heart hx.   - Orthopedic surgery following.  D/w Dr. Sydney Gamez - plans for ORIF Friday

## 2019-08-20 NOTE — PLAN OF CARE
CT of head and CT of C-spine ordered--patient and spouse refused stating she is in too much pain. Patient and spouse both feel these tests are not needed. Dr. Stafford Cons aware.

## 2019-08-20 NOTE — DIETARY NOTE
ADULT NUTRITION INITIAL ASSESSMENT    Pt is at high nutrition risk. Pt meets severe malnutrition criteria. RECOMMENDATIONS TO MD:  See Nutrition Intervention for TF orders.      CRITERIA FOR MALNUTRITION DIAGNOSIS: Criteria for severe malnutrition lorenza as safe. · Will hold of Benefiber for now as pt is on Pain meds, will re-eval for resumption. · Water flushes of 100 ml q 2 hr (1200 ml). · (TF at goal provides 1584 kcal, 73 g protein and 1066 ml free water. Total free water of 2266 ml/day.  Douglas Cordova oz)  07/05/19 : 87.4 kg (192 lb 9.6 oz)    Patient Weight(s) for the past 336 hrs:   Weight   08/19/19 1748 83.9 kg (185 lb)     GASTROINTESTINAL Status: J -tube functioning well.    (+) BM     FOOD/NUTRITION RELATED HISTORY:   Current Appetite: NPO  Intake

## 2019-08-20 NOTE — PLAN OF CARE
Bladder scan done-result: 700ml, 550ml,  400ml. Balderas catheter 16FR inserted without difficulty under sterile technique; urine return was yellow, clear, 500ml urine obtained.  Patient tolerated well

## 2019-08-20 NOTE — CONSULTS
[facility] HOSP - [facility]    Cardiology Consultation  [facility]    Ray [place] Patient Status:  Observation    10/14/1940 MRN [id number]   Location [facility]5W Attending Jose Martin Castaneda MD   Hosp Day # 0 PCP Patience Bassett • Hypotension    • Obesity, unspecified    • Other and unspecified hyperlipidemia    • Pacemaker     due to atrial fibrillation   • Peritonitis ([facility]    with bowel perfoation with anastamosis; pt had a colostomy for a year prior to reversal   • Per live in [place], [place]. 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 She enjoys crossword puzzles. Rommel Tejada is originally from [place].              Current Medications:    Current Facility-Administered M mouth every 8 (eight) hours as needed for Nausea.  )   Furosemide 8 MG/ML Oral Solution 5 mL (40 mg total) by Per J Tube route daily. PEG 3350 Oral Powd Pack 17 g by Per J Tube route daily.    Lidocaine HCl 2 % External Gel Apply 2 mL topically as needed non insulin using adult onset diabetes   ACCU-CHEK SOFTCLIX LANCETS Does not apply Misc TEST DAILY AS DIRECTED   Blood Glucose Monitoring Suppl (ACCU-CHEK SABRINA) Does not apply Device Test blood sugar dailyDx E11.9 Type 2 non insulin diabetic   Lancets Pacheco pulse 66, temperature 97.2 °F (36.2 °C), temperature source Oral, resp. rate 18, height 5' 10\" (1.778 m), weight 185 lb (83.9 kg), SpO2 100 %. Intake/Output:   Last 3 shifts: [id number]@   This shift: No intake/output data recorded.      Vent Settings: (peq=[id number])    Result Date: 8/19/2019  CONCLUSION:   Osseous demineralization. No acute fracture. Tricompartmental osteoarthritis. No significant effusion. Vascular calcifications.   Dictated by (CST): Mandie Millard MD on 8/19/2019 at 19:30     Appr

## 2019-08-21 NOTE — PLAN OF CARE
Patient transferred from the 5th floor overnight. Pain to LUE, medicated with PRN morphine. Patient does not want to wear to sling stating \"it hurts too much to move\". Patient not allowing staff to turn to complete skin assessment.   Jtube in place, tube medications  - Provide nonpharmacologic comfort measures as appropriate  - Advance diet as tolerated, if ordered  - Obtain nutritional consult as needed  - Evaluate fluid balance   Outcome: Progressing  Goal: Maintains or returns to baseline bowel function proper alignment of affected body part  Description  INTERVENTIONS:  - Support and protect limb and body alignment per provider's orders  - Instruct and reinforce with patient and family use of appropriate assistive device and precautions (e.g. spinal or h

## 2019-08-21 NOTE — PROGRESS NOTES
[facility]    Progress Note    Marcia Ellington Patient Status:  Observation    10/14/1940 MRN [id number]   Location [address] [facility] Attending Kathy Reynolds MD   Hosp Day # 1 PCP Stephanie Kimble MD       Subjective:   Marcia Ellington nerves intact.          Current Scheduled Inpatient Meds:     • potassium chloride  40 mEq Intravenous Once    Followed by   • potassium chloride  20 mEq Intravenous Once   • sodium chloride   Intravenous Once   • [START ON 8/23/2019] ceFAZolin  2 g Sixto De 8/19/2019 at 19:32          Xr Humerus (min 2 Views), Left (cpt=73060)    Result Date: 8/19/2019  CONCLUSION:   Comminuted, mildly displaced proximal humerus fracture involving the surgical neck and greater tuberosity.   Widening of the acromiohumeral inter L humerus Fracture  - Inpatient admit due to co morbidities/heart hx.   - Orthopedic surgery following. D/w Dr. Lauryn Jimenez - plans for ORIF Friday. - Cardiology to see for clearance. - EKG reviewed. - moderate risk for surgery   - hold xarelto.  Heparin

## 2019-08-21 NOTE — PROGRESS NOTES
08/20/19 1946   Clinical Encounter Type   Visited With Patient and family together  ( and friend present)   Routine Visit Follow-up  (visited Pt earlier today -- she requested I return in evening to say prayers)   Continue Visiting Yes   Terrence

## 2019-08-21 NOTE — PROGRESS NOTES
Pt. Refusing Miralax and Heparin. Educated on purpose, use, and MD who ordered it. Pt. continuously educated. Pt. Refused. MD notified. Pt. States \"I don't know why I everything is changing since I came to the hospital.\" Pt.  Educate on reason for

## 2019-08-21 NOTE — PROGRESS NOTES
[facility]    Progress Note    Paulette Lundy Patient Status:  Inpatient    10/14/1940 MRN [id number]   Location [facility] 4W/SW/SE Attending Darya Bernal MD   Hosp Day # 1 PCP Unique Bowling MD        Subjective:     Sujey Leonard Knee (1 Or 2 Views), Left (cpt=73560)    Result Date: 8/19/2019  CONCLUSION:   Osseous demineralization. No acute fracture. Tricompartmental osteoarthritis. No significant effusion. Vascular calcifications.   Dictated by (CST): Emmett Bradshaw MD on 8

## 2019-08-21 NOTE — PLAN OF CARE
Problem: Patient Centered Care  Goal: Patient preferences are identified and integrated in the patient's plan of care  Description  Interventions:  - What would you like us to know as we care for you?  \"I fell today at home and fell on my left arm\"  - P mobilization and activity  - Obtain nutritional consult as needed  - Establish a toileting routine/schedule  - Consider collaborating with pharmacy to review patient's medication profile  Outcome: Progressing     Problem: GENITOURINARY - ADULT  Goal: Absen goal  Description  INTERVENTIONS:  - Encourage pt to monitor pain and request assistance  - Assess pain using appropriate pain scale  - Administer analgesics based on type and severity of pain and evaluate response  - Implement non-pharmacological measures clarified with Md, okayed to take instead. 1 unit of blood given as ordered. Plan of care discussed pt. And family verbalized understanding.

## 2019-08-22 NOTE — PROGRESS NOTES
[facility] HOSP - [facility]    Progress Note    Laura Rust Patient Status:  Observation    10/14/1940 MRN [id number]   Location [facility] Attending Kal Pennington MD   Hosp Day # 2 PCP Elvin Bustillos MD       Subjective:   Laura Rust ceFAZolin  2 g Intravenous 30 Min Pre-Op   • Furosemide  40 mg Per J Tube Daily   • insulin detemir  10 Units Subcutaneous Nightly   • omeprazole  40 mg Per J Tube Daily   • PEG 3350  17 g Per J Tube Daily   • spironolactone  25 mg Oral Daily   • hydrALAzi Furosemide  40 mg Per J Tube Daily   • insulin detemir  10 Units Subcutaneous Nightly   • omeprazole  40 mg Per J Tube Daily   • PEG 3350  17 g Per J Tube Daily   • spironolactone  25 mg Oral Daily   • hydrALAzine HCl  25 mg Per J Tube 2 times per day   • Walker aware   Dysphagia from above s/p J tube placementa nd re exchange done yesterday   DM II  Severe protein carlorie malnutrition  HTN  Weakness. Quality:  · Diet: NPO.  Tube feeds  · PT/OT: pending   · DVT Prophylaxis: Heparin   · CODE status:

## 2019-08-22 NOTE — PLAN OF CARE
Patient complaining of severe pain to left arm uncontrolled with morphine. Patient constantly nauseated, refusing antiemetic medication. Hot packs given for muscle soreness. Refusing reposition, severe pain with reposition attempts. Sling to left arm.  Tube ordered medications to maintain glucose within target range  - Assess barriers to adequate nutritional intake and initiate nutrition consult as needed  - Instruct patient on self management of diabetes  Outcome: Progressing  Goal: Electrolytes maintained w interventions   Outcome: Not Progressing  Goal: Patient/Family Short Term Goal  Description  Patient's Short Term Goal: alleviate pain    Interventions:   - offer pain meds as ordered  - See additional Care Plan goals for specific interventions   Outcome:

## 2019-08-22 NOTE — PROGRESS NOTES
Pt. Refused to sign consent yesterday 8/21 d/t pt. Upset Gina Pretty will not have cast after surgery\". Surgeon aware. 8/22 Dr. Dawn Ellington discussed with patient surgery, answered questions, pt. aggreable to surgery.  However, Pt. refused to sign consent for s

## 2019-08-22 NOTE — PROGRESS NOTES
[facility]    Progress Note    Jordan Ba Patient Status:  Inpatient    10/14/1940 MRN [id number]   Location [facility] 4W/SW/SE Attending Marlin Ramirez MD   Hosp Day # 2 PCP Brissa Lynch MD        Subjective:   Subject 03/19/2019       Ct Shoulder Left (cpt=73200)    Result Date: 8/20/2019  CONCLUSION:  1. Comminuted left proximal humerus fracture with 1.2 cm of medial displacement of the shaft relative to the proximal components at the surgical neck.  2. Posttraumatic sh

## 2019-08-22 NOTE — PROGRESS NOTES
[facility]    Cardiology Progress Note  [facility]    Estrellita Early Patient Status:  Inpatient    10/14/1940 MRN [id number]   Location [facility] 4W/SW/SE Attending Pauline Carrillo MD   Hosp Day # 2 PCP Angel Langley on prophylactic doses of heparin. Will discuss resumption of anticoagulants with surgery after surgery. Patient's major concern is pain. I did speak with the orthopedic physician assistant and hospitalist in this regards.          Results:     Lab Resu

## 2019-08-23 NOTE — ANESTHESIA PROCEDURE NOTES
Airway  Date/Time: 8/23/2019 12:49 PM  Urgency: elective    Airway not difficult    General Information and Staff    Patient location during procedure: OR  Anesthesiologist: Negar Little DO  Performed: anesthesiologist     Indications and Patient Co

## 2019-08-23 NOTE — PLAN OF CARE
Vss, npo after midnight, tube feedings stopped at midnight for surgery, consent signed, pain controlled with IV dilaudid, accuchecks Q6, scheduled insulin given, care giver at bedside. All needs met at this time.  Refuses to get repositioned due to left arm fluid balance   Outcome: Progressing  Goal: Maintains or returns to baseline bowel function  Description  INTERVENTIONS:  - Assess bowel function  - Maintain adequate hydration with IV or PO as ordered and tolerated  - Evaluate effectiveness of GI medicati with patient and family use of appropriate assistive device and precautions (e.g. spinal or hip dislocation precautions)  Outcome: Progressing     Problem: SAFETY ADULT - FALL  Goal: Free from fall injury  Description  INTERVENTIONS:  - Assess pt frequentl

## 2019-08-23 NOTE — PROGRESS NOTES
[facility] HOSP - [facility]    Progress Note    Laura Rust Patient Status:  Observation    10/14/1940 MRN [id number]   Location [facility] Attending Kal Pennington MD   Hosp Day # 3 PCP Elvin Bustillos MD       Subjective:   Laura Rust Intravenous Once   • ceFAZolin  2 g Intravenous 30 Min Pre-Op   • Furosemide  40 mg Per J Tube Daily   • insulin detemir  10 Units Subcutaneous Nightly   • omeprazole  40 mg Per J Tube Daily   • PEG 3350  17 g Per J Tube Daily   • spironolactone  25 mg Ora chloride   Intravenous Once   • ceFAZolin  2 g Intravenous 30 Min Pre-Op   • Furosemide  40 mg Per J Tube Daily   • insulin detemir  10 Units Subcutaneous Nightly   • omeprazole  40 mg Per J Tube Daily   • PEG 3350  17 g Per J Tube Daily   • spironolactone problems  H/o adenocarcinoma GE junction - plans for outpt PET scan next week  Dr. Jose Singletary aware   Dysphagia from above s/p J tube placementa nd re exchange done yesterday   DM II  Severe protein carlorie malnutrition  HTN  Weakness.          Quality:  · Die

## 2019-08-23 NOTE — BRIEF OP NOTE
Pre-Operative Diagnosis: left proximal humerus fracture     Post-Operative Diagnosis:1) left proximal humerus fracture, 2) osteoporosis with left shoulder fracture, 3) fall at home     Procedure Performed: 1) open reduction internal fixation left proximal

## 2019-08-23 NOTE — PROGRESS NOTES
[facility]    Cardiology Progress Note  [facility]    Shanon Dsouza Patient Status:  Inpatient    10/14/1940 MRN [id number]   Location [facility] 4W/SW/SE Attending Aleisha Chacko MD   Hosp Day # 3 PCP Stephanie Fatima approximately 30%. Coronaries were essentially normal.    Esophageal cancer. Meds being given by feeding tube. Cardiac wise will have surgery today.   If she needs blood may need to get extra diuretics which she should be able to handle without pro

## 2019-08-23 NOTE — ANESTHESIA PREPROCEDURE EVALUATION
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 ClonazePAM 0.25 mg Oral Tab 0.25 mg by Gastric Tube route every 4 (four) hours as needed (Anxiety). Disp:  Rfl:  Past Week at Unknown time   Multiple Vitamins-Iron (QC DAILY MULTIVITAMINS/IRON) Oral Tab by Tube route daily.  Disp:  Rfl:  8/19/2019 at 0900 Insulin Syringes, Disposable, U-100 0.3 ML Does not apply Misc Inject insulin into the skin 3-4 times daily Dx E11.9 Insulin using Type 2 diabetes Disp: 100 each Rfl: 0 Taking   Fluticasone Propionate 50 MCG/ACT Nasal Suspension 1 spray by Each Nare route [MAR Hold] HYDROmorphone HCl (DILAUDID) 1 MG/ML injection 1 mg 1 mg Intravenous Q2H PRN Hever Mcclure MD 1 mg at 08/22/19 1738   [MAR Hold] Insulin Regular Human (NOVOLIN R) 100 UNIT/ML injection 1-11 Units 1-11 Units Subcutaneous 4 times per day Kendal [MAR Hold] Fluticasone Propionate (FLONASE) 50 MCG/ACT nasal spray 1 spray 1 spray Each Nare Daily Melissa Gallagher MD    dextrose 10 % infusion  Intravenous Continuous PRN Nabeel Fountain MD    [MAR Hold] Heparin Sodium (Porcine) 5000 UNIT/ML inject Ephedrine                 Macrolides And Keto*    UNKNOWN    Family History   Problem Relation Age of Onset   • Heart Disease Father    • Stroke Father         CVA   • Hypertension Mother    • Thyroid Disorder Son    • Cancer Neg    • Clotting Disorder Neg 2cups daily, soda, chocolate        Occupational Exposure: Not Asked        Hobby Hazards: Not Asked        Sleep Concern: Not Asked        Stress Concern: Not Asked        Weight Concern: Not Asked        Special Diet: Not Asked        Back Care: height is 1.778 m (5' 10\") and weight is 92.2 kg (203 lb 4 oz). Her oral temperature is 98.3 °F (36.8 °C). Her blood pressure is 136/51 and her pulse is 65.  Her respiration is 20 and oxygen saturation is 99%.    08/22/19  1739 08/22/19  1947 08/23/19  04 I have informed Christina Martel and/or legal guardian or family member of the nature of the anesthetic plan, benefits, risks including possible dental damage if relevant, major complications, and any alternative forms of anesthetic management.    All of the

## 2019-08-23 NOTE — PROGRESS NOTES
Surgical consent obtained from patient. Blood and anesthesia consents in chart with patient stickers. NPO after midnight, tube feedings on hold starting at 0000, D5.45 at 83 started. CHG bath given on left arm.

## 2019-08-23 NOTE — ANESTHESIA POSTPROCEDURE EVALUATION
Patient: Xuan Hinton    Procedure Summary     Date:  08/23/19 Room / Location:  [facility] MAIN [facility] MAIN OR    Anesthesia Start:  7637 Anesthesia Stop:  1440    Procedure:  HUMERUS OPEN REDUCTION INTERNAL FIXATION/ PINNING (Left ) Diagnosis:  (left pro

## 2019-08-24 NOTE — PLAN OF CARE
Pt A&Ox4. Pt on bedrest. Caregiver at the bedside all night. Pt strict NPO. Osmolite 1.2 tube feeds @ 60 with 100ml flush Q2 via J-tube. Left shoulder dressing CDI and sling in place, and elevated. Q4 NV checks of LUE WDL. IS encouraged. SCDs in place.  HO antiemetic medications  - Provide nonpharmacologic comfort measures as appropriate  - Advance diet as tolerated, if ordered  - Obtain nutritional consult as needed  - Evaluate fluid balance   Outcome: Progressing  Goal: Maintains or returns to baseline bow ADULT  Goal: Maintain proper alignment of affected body part  Description  INTERVENTIONS:  - Support and protect limb and body alignment per provider's orders  - Instruct and reinforce with patient and family use of appropriate assistive device and precaut

## 2019-08-24 NOTE — OCCUPATIONAL THERAPY NOTE
Attempted to see pt for therapy with RN approval. Pt medicated for pain. She is currently refusing therapy \"absolutely not! its been less than 24 hours since surgery. \" Explained importance for early mobility however pt continues to refuse.  Will return to

## 2019-08-24 NOTE — PROGRESS NOTES
[facility]    Progress Note    Mauricio Macias Patient Status:  Inpatient    10/14/1940 MRN [id number]   Location [facility] 4W/SW/SE Attending Shania Farley MD   [facility] Day # 4 PCP Pepe Vela MD        Subjective:   Subject 08/10/2019    ALKPHO 50 (L) 08/10/2019    BILT 0.3 08/10/2019    TP 6.8 08/10/2019    AST 19 08/10/2019    ALT 17 08/10/2019    PTT 25.8 05/16/2019    INR 1.34 (H) 08/03/2019    PT 19.3 (H) 11/19/2013    T4F 1.05 06/27/2018    TSH 2.09 09/09/2018    LIP 23

## 2019-08-24 NOTE — OPERATIVE REPORT
[facility]    PATIENT'S NAME: German Mcclain   ATTENDING PHYSICIAN: Daquan Sparks MD   OPERATING PHYSICIAN: Chioma Bedolla MD   PATIENT ACCOUNT#:   [id number]    [facility] [address] [place] RECORD #:   [id number]       DATE OF BIRTH: [birth year]. No interscalene block was used due to her other comorbidities and her anticoagulation. She was positioned in the beach chair in proper position.   The left shoulder and arm were then prepped and draped in sterile fashion with Betadine paint fol 14:44:21  t: 08/24/2019 04:46:01  [facility] [id number]  [facility]/    cc: Vergie Lundborg, MD

## 2019-08-24 NOTE — PLAN OF CARE
Pt is still complaining of upper abdominal discomfort, chronic. Also having shoulder pain. Dilaudid IVP is working well for pt. Took norco this morning, pt stated \"it did nothing. \"  NPO, continuous tube feed infusing with ordered water flushes.  Caregiver function  Description  INTERVENTIONS:  - Assess bowel function  - Maintain adequate hydration with IV or PO as ordered and tolerated  - Evaluate effectiveness of GI medications  - Encourage mobilization and activity  - Obtain nutritional consult as needed and evaluate response  - Implement non-pharmacological measures as appropriate and evaluate response  - Consider cultural and social influences on pain and pain management  - Manage/alleviate anxiety  - Utilize distraction and/or relaxation techniques  - M

## 2019-08-24 NOTE — PROGRESS NOTES
[facility] - [facility]    Progress Note    Matthew Herron Patient Status:  Observation    10/14/1940 MRN [id number]   Location [facility] Attending Yg Obrien MD   Hosp Day # 4 PCP Donaldo Wesley MD       Subjective:   Matthew Herron Intravenous Once   • multivitamin with iron  1 tablet Per NG Tube Daily   • rivaroxaban  10 mg Per J Tube Daily with food   • Insulin Regular Human  1-11 Units Subcutaneous 4 times per day   • Furosemide  40 mg Per J Tube Daily   • insulin detemir  10 Unit on 8/23/2019 at 14:38     Approved by (CST): Kati Echevarria MD on 8/23/2019 at 14:39                 • HYDROmorphone HCl  1 mg Intravenous 6x daily   • sodium chloride   Intravenous Once   • multivitamin with iron  1 tablet Per NG Tube Daily   • rivaroxaba today      H/o A. Fib chronic   - s/p pacemaker.   - hold xarelto until ok by ortho and cards   - tele      Other medical problems  H/o adenocarcinoma GE junction - plans for outpt PET scan next week  Dr. Audi Marquez aware   Dysphagia from above s/p J tube place

## 2019-08-25 NOTE — PLAN OF CARE
Radha Kumar is having a better day. Pain is controlled with scheduled dilaudid. Agreeable to working with PT/OT this afternoon. Currently receiving one unit of prbc, tolerating well. Will check hgb post transfusion. Swishes water/saliva in mouth to spit.  Continuo ADULT  Goal: Minimal or absence of nausea and vomiting  Description  INTERVENTIONS:  - Maintain adequate hydration with IV or PO as ordered and tolerated  - Evaluate effectiveness of ordered antiemetic medications  - Provide nonpharmacologic comfort measur replacement as ordered  - Monitor response to electrolyte replacements, including rhythm and repeat lab results as appropriate     Outcome: Progressing     Problem: MUSCULOSKELETAL - ADULT  Goal: Maintain proper alignment of affected body part  Description

## 2019-08-25 NOTE — PROGRESS NOTES
[facility]    Progress Note    Na Funez Patient Status:  Inpatient    10/14/1940 MRN [id number]   Location [facility] 4W/SW/SE Attending Izabela Mike MD   Hosp Day # 5 PCP Shanna Bay MD        Subjective:     Lynette Rodriguez 03/19/2019     (H) 11/08/2011    MG 2.1 08/22/2019    PHOS 2.2 (L) 08/21/2019    TROP <0.045 03/19/2019       Xr Fluoroscopy C-arm Time <1 Hour  (cpt=76000)    Result Date: 8/23/2019  CONCLUSION:  1.  Internal fixation proximal left humeral neck frac

## 2019-08-25 NOTE — PLAN OF CARE
Problem: Patient Centered Care  Goal: Patient preferences are identified and integrated in the patient's plan of care  Description  Interventions:  - What would you like us to know as we care for you?  \"I fell today at home and fell on my left arm\"  - P mobilization and activity  - Obtain nutritional consult as needed  - Establish a toileting routine/schedule  - Consider collaborating with pharmacy to review patient's medication profile  Outcome: Progressing     Problem: GENITOURINARY - ADULT  Goal: Absen anxiety  - Utilize distraction and/or relaxation techniques  - Monitor for opioid side effects  - Notify MD/LIP if interventions unsuccessful or patient reports new pain  - Anticipate increased pain with activity and pre-medicate as appropriate  Outcome: P

## 2019-08-25 NOTE — PHYSICAL THERAPY NOTE
PT and OT consulted with nurse prior to seeing pt. Attempted to have patient participate in an initial evaluation. Pt declined.   Both PT and OT spent 20 minutes in discussion with pt describing importance of getting up and out of bed, how treatment would

## 2019-08-25 NOTE — PROGRESS NOTES
[facility] - [facility]    Progress Note    Arminda Piper Patient Status:  Observation    10/14/1940 MRN [id number]   Location [facility] Attending Sammi Degroot MD   Hosp Day # 5 PCP Shonna Alves MD       Subjective:   Arminda Piper • rivaroxaban  10 mg Per J Tube Daily with food   • Insulin Regular Human  1-11 Units Subcutaneous 4 times per day   • Furosemide  40 mg Per J Tube Daily   • insulin detemir  10 Units Subcutaneous Nightly   • omeprazole  40 mg Per J Tube Daily   • PEG 33 Subcutaneous 4 times per day   • Furosemide  40 mg Per J Tube Daily   • insulin detemir  10 Units Subcutaneous Nightly   • omeprazole  40 mg Per J Tube Daily   • PEG 3350  17 g Per J Tube Daily   • spironolactone  25 mg Oral Daily   • hydrALAzine HCl  25 m placementa nd re exchange done yesterday   DM II  Severe protein carlorie malnutrition  HTN  Weakness. Quality:  · Diet: NPO. Tube feeds  · PT/OT: pending   · DVT Prophylaxis: Heparin   · CODE status: Full. · Dispo: per clinical course. Inpt.

## 2019-08-25 NOTE — PROGRESS NOTES
[facility]    Cardiology Progress Note    Ami Mao Patient Status:  Inpatient    10/14/1940 MRN [id number]   Location [facility] 4W/SW/SE Attending Mojgan Carpenter MD   Hosp Day # 5 PCP Abran Felder MD     Primary Cardiol lasix daily  - ECHo reviewed from April EF 20-25%  Moderate MVR.   - [facility] 4/2019 non obstructive cad, RHC RA 13, LVEDP 13     Chronic Afib  - rate/rhythm stable, paced - [facility] PPM  - Xarelto 10mg ortho dosing - increase to 20mg when ok with ortho    HTN, con

## 2019-08-25 NOTE — OCCUPATIONAL THERAPY NOTE
Attempted to see pt for OT evaluation. TRUPTI Jenkins) approved session. Pt finished receiving blood transfusion and was medicated for pain in prep for therapy session. RN informed the patient that therapy would be coming to see her.   OT explained role of th

## 2019-08-25 NOTE — OCCUPATIONAL THERAPY NOTE
Attempted to see pt for OT eval. Per RN Lynnea Cowden) pt receiving blood transfusion.  Will re-attempt

## 2019-08-26 NOTE — OCCUPATIONAL THERAPY NOTE
OCCUPATIONAL THERAPY EVALUATION - INPATIENT      Room Number: 468/468-A  Evaluation Date: 8/26/2019  Type of Evaluation: Initial  Presenting Problem: (L proximal humerus fracture, fall)    Physician Order: IP Consult to Occupational Therapy  Reason for The training;Patient/Family education;Patient/Family training;Equipment eval/education       OCCUPATIONAL THERAPY MEDICAL/SOCIAL HISTORY     Problem List   Principal Problem:    Fall, initial encounter  Active Problems:    Fall    Closed fracture of proximal e Roni Thomson MD at [facility] Left 8/23/2019    Performed by Neisha Price MD at [facility] OR   • HYSTERECTOMY     • IR J-TUBE PROCEDURE  04/25/2019    Explor lap, placement of feeding j-tube urinal? : Total  -   Putting on and taking off regular upper body clothing?: A Lot  -   Taking care of personal grooming such as brushing teeth?: A Little  -   Eating meals?: A Little    AM-PAC Score:  Score: 13  Approx Degree of Impairment: 63.03%  Wilhemenia Dues

## 2019-08-26 NOTE — PROGRESS NOTES
Jeanine Aleah returns today to replace a fractured jejunostomy tube. The defective tube balloon was deflated and the tube was pulled. A new 15 Italian jejunostomy tube was advanced through the pre-existing site. The balloon was inflated to 5 cc with water.   10

## 2019-08-26 NOTE — PLAN OF CARE
Agreed to working with PT/OT today. Receiving norco per j-tube now every 4 hours and less dilaudid ivp prn. She is not thrilled about this plan but will try it. Continuous tube feed infusing. Saline locked.  More agreeable to turning/repositioning but very tolerated, if ordered  - Obtain nutritional consult as needed  - Evaluate fluid balance   Outcome: Progressing  Goal: Maintains or returns to baseline bowel function  Description  INTERVENTIONS:  - Assess bowel function  - Maintain adequate hydration with limb and body alignment per provider's orders  - Instruct and reinforce with patient and family use of appropriate assistive device and precautions (e.g. spinal or hip dislocation precautions)  Outcome: Progressing     Problem: PAIN - ADULT  Goal: Nadira Trejo

## 2019-08-26 NOTE — PHYSICAL THERAPY NOTE
PHYSICAL THERAPY EVALUATION - INPATIENT     Room Number: 468/468-A  Evaluation Date: 8/26/2019  Type of Evaluation: Initial   Physician Order: PT Eval and Treat(may do gentle ROM Of L arm)    Presenting Problem: Fall resulting in L prox. humerus fracture lower than baseline levels and presents with good potential for sub-acute rehab to optimize functional outcomes. Patient will benefit from continued IP PT services to address these deficits in preparation for discharge.     DISCHARGE RECOMMENDATIONS  PT chemotherapy 04/2019    last chemo 5-2-19   • Severe protein-calorie malnutrition Ryan Heritage: less than 60% of standard weight) ([facility] [address]    • Sleep apnea    • Unspecified essential hypertension        Past Surgical History  Past Surgical History:   Procedure Late and L shoulder  Management Techniques: Activity promotion; Body mechanics;Repositioning    COGNITION  · Overall Cognitive Status:  WFL - within functional limits  · Attention Span:  attends with cues to redirect, difficulty dividing attention and distracted mechanics  Energy conservation  Functional activity tolerated  Lower therapeutic exercise:   Ankle pumps  Hip AB/AD  Knee extension  LAQ  Transfer training    Patient End of Session: In bed;Needs met;Call light within reach;RN aware of session/findings(care

## 2019-08-26 NOTE — CM/SW NOTE
MD order received regarding rehab placement. SUSAN contacted the pt's  per the pt's request.  SUSAN spoke with Gema Griffith via telephone ([phone]). Gema Griffith confirmed the pt.  Is current with [facility] for tube feedings and [facility] for RN and aide serv

## 2019-08-26 NOTE — DIETARY NOTE
ADULT NUTRITION REASSESSMENT     Pt is at high nutrition risk. Pt meets severe malnutrition criteria. RECOMMENDATIONS TO MD:  See Nutrition Intervention for TF orders.      CRITERIA FOR MALNUTRITION DIAGNOSIS: Criteria for severe malnutrition diagnosi Care Plan: long term Nutrition Support (TF)   - Enteral Nutrition:   · TF Osmolite 1.2 @ 60 ml/hr  goal rate via J -tube based on ave 22 hour infusion time. May increase to maximum goal rate of 65 ml/hr as safe.          · Water flushes of 100 m 150#         123% IBW       Usual Body Wt: 225#         82% UBW  WEIGHT HISTORY:   Wt Readings from Last 6 Encounters:  08/23/19 : 92.2 kg (203 lb 4 oz)  08/02/19 : 86.2 kg (190 lb 1.6 oz)  07/27/19 : 84.4 kg (186 lb 1.1 oz)  07/23/19 : 84.4 kg (186 lb)  0 NPO  Estimated Nutrition needs:   Calories: 1675 calories/day (MSJ  REE x 1.2 or 20  calories per kg Actual body wt (ABW))  Protein: 82 g protein/day (1.5 g protein/kg  Ideal body wt (IBW))  Fluids: ~ 4550-7399  ml /day (30 ml/kg Adj BW ) used for fluid fo

## 2019-08-26 NOTE — PROGRESS NOTES
[facility]    Progress Note    Ami Mao Patient Status:  Inpatient    10/14/1940 MRN [id number]   Location [facility] 4W/SW/SE Attending Mojgan Carpenter MD   [facility] Day # 6 PCP Abran Felder MD        Subjective:     Gennaro Goodwin 2.0 (L) 08/10/2019    ALKPHO 50 (L) 08/10/2019    BILT 0.3 08/10/2019    TP 6.8 08/10/2019    AST 19 08/10/2019    ALT 17 08/10/2019    PTT 25.8 05/16/2019    INR 1.34 (H) 08/03/2019    PT 19.3 (H) 11/19/2013    T4F 1.05 06/27/2018    TSH 2.09 09/09/2018

## 2019-08-26 NOTE — PLAN OF CARE
Problem: Patient Centered Care  Goal: Patient preferences are identified and integrated in the patient's plan of care  Description  Interventions:  - What would you like us to know as we care for you?  \"I fell today at home and fell on my left arm\"  - P mobilization and activity  - Obtain nutritional consult as needed  - Establish a toileting routine/schedule  - Consider collaborating with pharmacy to review patient's medication profile  Outcome: Progressing     Problem: GENITOURINARY - ADULT  Goal: Absen risk of falls.   - [place] fall precautions as indicated by assessment.  - Educate pt/family on patient safety including physical limitations  - Instruct pt to call for assistance with activity based on assessment  - Modify environment to reduce risk of i

## 2019-08-26 NOTE — PROGRESS NOTES
[facility]    Progress Note    Lee Mcclure Patient Status:  Observation    10/14/1940 MRN [id number]   Location [facility] Attending Amaury Francois MD   Hosp Day # 6 PCP Eryn Villanueva MD       Subjective:   Lee Mcclure Intravenous 4 times per day   • multivitamin with iron  1 tablet Per NG Tube Daily   • rivaroxaban  10 mg Per J Tube Daily with food   • Insulin Regular Human  1-11 Units Subcutaneous 4 times per day   • Furosemide  40 mg Per J Tube Daily   • insulin detem per day   • multivitamin with iron  1 tablet Per NG Tube Daily   • rivaroxaban  10 mg Per J Tube Daily with food   • Insulin Regular Human  1-11 Units Subcutaneous 4 times per day   • Furosemide  40 mg Per J Tube Daily   • insulin detemir  10 Units Subcuta outpt PET scan next week  Dr. Harmony Roman aware   Dysphagia from above s/p J tube placement and re exchange done yesterday   DM II  Severe protein carlorie malnutrition  HTN  Weakness. Quality:  · Diet: NPO.  Tube feeds  · PT/OT: pending   · DVT Prophyla

## 2019-08-27 PROBLEM — I50.23 ACUTE ON CHRONIC SYSTOLIC CONGESTIVE HEART FAILURE (HCC): Status: ACTIVE | Noted: 2019-01-01

## 2019-08-27 NOTE — PLAN OF CARE
Patient with complaints of pain to left shoulder, medicated with scheduled norco. L arm in sling. Patient very resistant to repositioning and only allows minimal repositioning. Tube feeds running at goal rate. NPO maintained.   Patient wearing 2-3L for comf nutritional consult as needed  - Evaluate fluid balance   Outcome: Progressing  Goal: Maintains or returns to baseline bowel function  Description  INTERVENTIONS:  - Assess bowel function  - Maintain adequate hydration with IV or PO as ordered and tolerate provider's orders  - Instruct and reinforce with patient and family use of appropriate assistive device and precautions (e.g. spinal or hip dislocation precautions)  Outcome: Progressing     Problem: PAIN - ADULT  Goal: Verbalizes/displays adequate comfort

## 2019-08-27 NOTE — PLAN OF CARE
Patient feeling ok today, up to chair with PT and with lift. Pain now controlled with PO percocet and with lidocaine patch. SCDs refused and heel prtector boots in place. Lasix switched to IV form. Discharge planning to rehab in progress.     Problem: [place] Progressing  Goal: Maintains or returns to baseline bowel function  Description  INTERVENTIONS:  - Assess bowel function  - Maintain adequate hydration with IV or PO as ordered and tolerated  - Evaluate effectiveness of GI medications  - Encourage mobiliza use of appropriate assistive device and precautions (e.g. spinal or hip dislocation precautions)  Outcome: Progressing     Problem: PAIN - ADULT  Goal: Verbalizes/displays adequate comfort level or patient's stated pain goal  Description  INTERVENTIONS:  -

## 2019-08-27 NOTE — PROGRESS NOTES
[facility]    Progress Note    Jose Luis Yip Patient Status:  Observation    10/14/1940 MRN [id number]   Location [facility] Attending Juli Carbajal MD   Hosp Day # 7 PCP Tommy Barnes MD       Subjective:   Jose Luis Yip HYDROcodone-acetaminophen  10 mL Per J Tube Q4H   • rivaroxaban  20 mg Per J Tube Daily with food   • multivitamin with iron  1 tablet Per NG Tube Daily   • Insulin Regular Human  1-11 Units Subcutaneous 4 times per day   • insulin detemir  10 Units Subcut multivitamin with iron  1 tablet Per NG Tube Daily   • Insulin Regular Human  1-11 Units Subcutaneous 4 times per day   • insulin detemir  10 Units Subcutaneous Nightly   • omeprazole  40 mg Per J Tube Daily   • PEG 3350  17 g Per J Tube Daily   • spironol done yesterday   DM II  Severe protein carlorie malnutrition  HTN  Weakness. Quality:  · Diet: NPO. Tube feeds  · PT/OT: pending   · DVT Prophylaxis: Heparin   · CODE status: Full. · Dispo: per clinical course. Inpt.        Greater than 35 minutes

## 2019-08-27 NOTE — PHYSICAL THERAPY NOTE
PHYSICAL THERAPY TREATMENT NOTE - INPATIENT     Room Number: 468/468-A       Presenting Problem: Fall resulting in L prox. humerus fracture    Problem List  Principal Problem:    Fall, initial encounter  Active Problems:    Atrial fibrillation ([address]    Dil from home and was ambulating without use of DME- plan for Sub-acute rehab to optimize functional outcomes. DISCHARGE RECOMMENDATIONS  PT Discharge Recommendations: Sub-acute rehabilitation     PLAN  PT Treatment Plan: Bed mobility; Body mechanics; Lupe  Approx Degree of Impairment: 76.75%   Standardized Score (AM-PAC Scale): 32.29   CMS Modifier (G-Code): CL    FUNCTIONAL ABILITY STATUS  Gait Assessment   Gait Assistance: Not tested           Stoop/Curb Assistance: Not tested       THERAPEUTIC EXERCISES

## 2019-08-27 NOTE — PROGRESS NOTES
[facility]    Cardiology Progress Note  [facility]    Vishnu Aguilar Patient Status:  Inpatient    10/14/1940 MRN [id number]   Location [facility] 4W/SW/SE Attending Ritika Telles MD   Hosp Day # 7 PCP Tuan Dixon give Lasix 40 mg IV every 12 hours for the next couple days or so. Chronic atrial fibrillation with paced rhythm, stable. On Xarelto, 10 mg/day. Increase to 20 mg a day when okay per surgery.       Results:     Lab Results   Component Value Date    WBC

## 2019-08-27 NOTE — CM/SW NOTE
Care Coordination/BPCI    Met with  per patients request to explain the BPCI/Medicare program. Fabian Reed agreed with phone f/u for 3 months from [address] [place] [address] after discharge from [facility]. Patient was enrolled under . BPCI/Medicare Letter provided.

## 2019-08-27 NOTE — PROGRESS NOTES
[facility]    [facility] Note    Ray Dumont Patient Status:  Inpatient    10/14/1940 MRN [id number]   Location [facility] 4W/SW/SE Attending Hever Mcclure MD   [address] Day # 7 PCP Arnol Peter MD        Subjective:   Subject 2.0 (L) 08/10/2019    ALKPHO 50 (L) 08/10/2019    BILT 0.3 08/10/2019    TP 6.8 08/10/2019    AST 19 08/10/2019    ALT 17 08/10/2019    PTT 25.8 05/16/2019    INR 1.34 (H) 08/03/2019    PT 19.3 (H) 11/19/2013    T4F 1.05 06/27/2018    TSH 2.09 09/09/2018

## 2019-08-28 NOTE — PLAN OF CARE
Pt A&Ox4. Caregiver at bedside. LUE in sling with mepilex. Swelling present in LUE,3+ pulse. Swelling on BLE, on IV lasix. J-tube with Osmolite 1.2 @60ml/hr with 100ml flush Q2. J-tube has hx leaking. Site cleaned and split guaze changed.  SCDs and heel barrie or PO as ordered and tolerated  - Evaluate effectiveness of ordered antiemetic medications  - Provide nonpharmacologic comfort measures as appropriate  - Advance diet as tolerated, if ordered  - Obtain nutritional consult as needed  - Evaluate fluid balanc appropriate     Outcome: Progressing     Problem: MUSCULOSKELETAL - ADULT  Goal: Maintain proper alignment of affected body part  Description  INTERVENTIONS:  - Support and protect limb and body alignment per provider's orders  - Instruct and reinforce wit

## 2019-08-28 NOTE — DISCHARGE SUMMARY
[facility] [place] [facility]    Discharge Summary    Sylvain Ano Patient Status:  Inpatient    10/14/1940 MRN [id number]   Location [facility] 4W/SW/SE Attending Liberty Vivas MD   [facility] Day # 8 PCP Vinny Jason MD     Date of Admission: humerus, unspecified fracture morphology, initial encounter     Intractable pain     Contusion of left knee, initial encounter     Age-related osteoporosis with current pathol fracture of left shoulder     Acute on chronic systolic congestive heart failure spoke with Dr. Sue Pabon, who set up an appointment for the patient on the day of admission. At that time, she was seen in the office and the J-tube was replaced. She was sent back home.   Her  got her into the kitchen and had expected that she woul daily   - change norco to perceoet 5/325 mg q 4 yours prn  - start  lidoderm patch      Fall  - PT/OT eval after surgery  - will need rehab      Nonischemic dilated cardiomyopathy/ Acute on chronic systolic CHF  - Patient is a little overloaded  - cardiolo HCl 25 MG Tabs  Commonly known as:  APRESOLINE  What changed:  when to take this      1 tablet (25 mg total) by Per J Tube route every 6 (six) hours.    Quantity:  120 tablet  Refills:  0     ondansetron 4 MG Tbdp  Commonly known as:  ZOFRAN-ODT  What teran 0     Insulin Syringes (Disposable) U-100 0.3 ML Misc      Inject insulin into the skin 3-4 times daily Dx E11.9 Insulin using Type 2 diabetes   Quantity:  100 each  Refills:  0     Lidocaine HCl 2 % Gel  Commonly known as:  XYLOCAINE      Apply 2 mL topic known as:  FIRVANQ      2.5 mL (125 mg total) by Per J Tube route daily.    Quantity:  105 mL  Refills:  0           Where to Get Your Medications      These medications were sent to [facility] [address] [facility] , [address]

## 2019-08-28 NOTE — PROGRESS NOTES
[facility]    Progress Note    Arminda Piper Patient Status:  Inpatient    10/14/1940 MRN [id number]   Location [facility] 4W/SW/SE Attending Sade Hill MD   [facility] Day # 8 PCP Shonna Alves MD        Subjective:     Cons 08/28/2019    ALB 2.0 (L) 08/10/2019    ALKPHO 50 (L) 08/10/2019    BILT 0.3 08/10/2019    TP 6.8 08/10/2019    AST 19 08/10/2019    ALT 17 08/10/2019    PTT 25.8 05/16/2019    INR 1.34 (H) 08/03/2019    PT 19.3 (H) 11/19/2013    T4F 1.05 06/27/2018    TSH

## 2019-08-28 NOTE — CM/SW NOTE
The pt. Has been accepted at [facility] today 8/28 at 2p. The pt. Will be discharge via ambulance. The pt. And her  are aware and agreeable to the above. PCS form is on the chart for medical records and ambulance.     Report [phone]

## 2019-08-28 NOTE — PROGRESS NOTES
[facility]  22  Cardiology Progress Note  [facility]    Shruthi Edmond Patient Status:  Inpatient    10/14/1940 MRN [id number]   Location [facility] 4W/SW/SE Attending Bria Beckman MD   Hosp Day # 8 PCP 08/28/2019    HCT 26.9 (L) 08/28/2019    .0 08/28/2019    CREATSERUM 0.63 08/28/2019    BUN 21 (H) 08/28/2019     (L) 08/28/2019    K 3.9 08/28/2019    CL 96 (L) 08/28/2019    CO2 32.0 08/28/2019     (H) 08/28/2019    CA 8.2 (L) 08/28/2

## 2019-08-29 NOTE — TELEPHONE ENCOUNTER
Pt had sx 8/23 and Padmini Scott called to schedule pt 10 day post op appt. ANGELA found no available appt. pls advise thank you

## 2019-08-30 NOTE — TELEPHONE ENCOUNTER
HOSPITALIST NURSE      TELEPHONE NOTE    Pts  Casimir Bloch called stating he received a call from [address] [place] [address] to  prescription for furosemide, Casimir Bloch requesting to clarify if prescription needs to be picked up as pt is mallory

## 2019-09-03 NOTE — TELEPHONE ENCOUNTER
Called salvador, rang several times and then went to busy signal.  Called pt and s/w spouse and he states pt already has appt scheduled on 9/4 @ 3pm at [facility] office w/ RH.

## 2019-09-05 NOTE — TELEPHONE ENCOUNTER
Refill request received for Nystop 100,000 U/mg top powder, 15gm. Pt's last visit was 5/14/18 as a new pt. No further appts have been made. Rx denied. Pt needs an appt.

## 2019-09-11 PROBLEM — E87.2 LACTIC ACIDOSIS: Status: ACTIVE | Noted: 2019-01-01

## 2019-09-11 PROBLEM — R19.7 DIARRHEA, UNSPECIFIED TYPE: Status: ACTIVE | Noted: 2019-01-01

## 2019-09-11 PROBLEM — E87.1 HYPONATREMIA: Status: ACTIVE | Noted: 2019-01-01

## 2019-09-11 PROBLEM — E87.20 LACTIC ACIDOSIS: Status: ACTIVE | Noted: 2019-01-01

## 2019-09-11 PROBLEM — R19.7 DIARRHEA: Status: ACTIVE | Noted: 2019-01-01

## 2019-09-11 NOTE — ED NOTES
Mrs. Kerri Farrar arrived with a diaper full of brown/yellow watery stool and actively spitting-up small amount of clear fluid. States she has been ill with vomiting and diarrhea x2 days.  She has been a resident at [facility] for approx 3 weeks s/p L humerus fr

## 2019-09-11 NOTE — ED INITIAL ASSESSMENT (HPI)
Per EMS - family notes gradual decline in mental status over past 8 hours. Pt reports n/v/d x2 days. Stool collected and tested for c-diff, results pending.

## 2019-09-11 NOTE — H&P
[facility]    PATIENT'S NAME: Anoop [facility]   ATTENDING PHYSICIAN: Jorge A Hector MD   PATIENT ACCOUNT#:   [id number]    LOCATION:  [place] [address]  MEDICAL RECORD #:   [id number]       YOB: 1940  ADMISSION DATE:       09/11/20 sick sinus syndrome, status post permanent pacemaker; hyperlipidemia; obesity; generalized osteoarthritis; history of an episode of ischemic colitis. She had nonischemic cardiomyopathy, ejection fraction 20% to 25%.     PAST SURGICAL HISTORY:  Right intern Clear to auscultation bilaterally. Normal respiratory effort. No intercostal retractions. HEART:  Regular rate and rhythm. S1, S2 auscultated. No murmur. ABDOMEN:  Soft, nondistended.   Discomfort to palpation, but no tenderness or rebound tenderness

## 2019-09-11 NOTE — PLAN OF CARE
REHABBING LEFT HUMERUS FRACTURE AT [facility] WITH ADDITIONAL CAREGIVER , ISOLATION FOR  CDIFF POSITIVE WITH DIARRHEA (INCONTINENT)  AND HYPONATREMIA (MALIGNANT NEOPLASM ESOPHAGUS- IS NPO WITH ORDER TO CONTINUE  G-TUBE FEED OSMOLITE 1.2 AT [address] schedule  Outcome: Progressing     Problem: DISCHARGE PLANNING  Goal: Discharge to home or other facility with appropriate resources  Description  INTERVENTIONS:  - Identify barriers to discharge w/pt and caregiver  - Include patient/family/discharge partn

## 2019-09-11 NOTE — ED PROVIDER NOTES
Patient Seen in: [facility] Emergency Department    History   Patient presents with:  Nausea/Vomiting/Diarrhea (gastrointestinal)  Altered Mental Status (neurologic)    Stated Complaint: decline in mental status over past 8 hours, diarrhea    HPI Unspecified essential hypertension               Past Surgical History:   Procedure Laterality Date   • APPENDECTOMY     • BACK SURGERY  8/15    benign cyst removed from thoracic area   • CARDIAC PACEMAKER PLACEMENT     • CATARACT Right 2011    right sided 83.9 kg   SpO2 98%   BMI 31.26 kg/m²          Physical Exam   Constitutional: She is oriented to person, place, and time. She appears well-developed and well-nourished. HENT:   Head: Normocephalic and atraumatic.    Mouth/Throat: Oropharynx is clear and m 3.0 (*)     All other components within normal limits   CBC W/ DIFFERENTIAL - Abnormal; Notable for the following components:    RBC 3.63 (*)     HGB 11.4 (*)     RDW-SD 54.0 (*)     RDW 15.1 (*)     Lymphocyte Absolute 0.39 (*)     Monocyte Absolute 1.06 IV contrast, motion artifact, positioning with left arm over upper     abdomen limits the evaluation of solid organs for subtle pathology.      Accounting for these limitations, the following observations are made:           LIVER: No liver mass is identifi present.                        =====    CONCLUSION:          Diffuse colonic wall thickening, suspect infectious or inflammatory     pancolitis. Ischemic colitis felt less likely given the distribution     although not excluded.   Presacral edema and paolo problems on file. to contribute to the complexity of this ED evaluation.     Oxygen Saturation: 98% on room air, Normal    Consults: Orders Placed This Encounter      ED Consult to Gastroenterology      ED Consult to Oncology      ED Consult to Oncology Discharge Medication List              Present on Admission  Date Reviewed: 8/25/2019          ICD-10-CM Noted POA    Diarrhea R19.7 9/11/2019 Unknown

## 2019-09-12 NOTE — PROGRESS NOTES
[facility]    Progress Note    Matthew Herron Patient Status:  Inpatient    10/14/1940 MRN [id number]   Location [facility] 4W/SW/SE Attending Eliecer Corrales MD   Hosp Day # 1 PCP Donaldo Wesley MD       Subjective:   Docia Hammans 09/11/2019    TP 7.6 09/11/2019    AST 40 (H) 09/11/2019    ALT 23 09/11/2019    PTT 25.8 05/16/2019    INR 1.34 (H) 08/03/2019    PT 19.3 (H) 11/19/2013    T4F 1.05 06/27/2018    TSH 2.09 09/09/2018    LIP 43 (L) 09/11/2019     (H) 11/08/2011    MG not been able to f/u with oncology due to recurrent hospitalizations  -d/w Dr. Calista Rinne, re: leakage around j-tube.   Wound/Ostomy to see  -Dr. Pedro Enriquez on consult    DM  -ssi    Dispo-pending        Greater than 35 minutes spent, >50% spent counseling re: tr

## 2019-09-12 NOTE — CM/SW NOTE
9/16 1204pm: MD order received regarding hospice. [facility] will follow up with the pt's  and discuss hospice options.    ----------------------------  9/12 944am: The pt.  Was admitted from

## 2019-09-12 NOTE — CONSULTS
Oncology Consultation Note    Patient Name: Paulette Lundy   YOB: 1940   Medical Record Number: [id number]   CSN: [id number]   Consulting Physician: Sima Tinajero MD  Referring Physician(s): No ref.  provider found  Date of Visit: 9/12/2019 have left parapharyngeal mass which is hypermetabolic believed to be secondary to a neoplastic process, which would likely be a secondary disease process unrelated to GE junction carcinoma.  This is being planned for evaluation and likely biopsy by Dr. Macario Dawkins [facility])     on warfarin   • Chronic atrial fibrillation (HCC)    • Chronic viral hepatitis C ([facility]    • Constipation    • Coronary atherosclerosis    • Dehydration    • Diabetes ([facility]    • Diabetes mellitus ([facility]    • Dilated cardiomyopathy ([facility]    • Esopha RE  9/13/15   • TONSILLECTOMY         Family Medical History:  Family History   Problem Relation Age of Onset   • Heart Disease Father    • Stroke Father         CVA   • Hypertension Mother    • Thyroid Disorder Son    • Cancer Neg    • Clotting Disorder N Topics      Concerns:         Service: Not Asked        Blood Transfusions: Not Asked        Caffeine Concern: Yes          2cups daily, soda, chocolate        Occupational Exposure: Not Asked        Hobby Hazards: Not Asked        Sleep Concern: N Comment:Novacaine  Benzocaine                  Comment:Other reaction(s): BENZOCAINE  Cetylpyridinium Chl*    UNKNOWN    Comment:Other reaction(s): CETYLPYRIDINIUM CHLORIDE  Ephedrine                 Macrolides And Keto*    UNKNOWN    Current Medications: hepatosplenomegaly. No palpable mass. +J-tube present with light brown liquid  Extremities: No edema or calf tenderness. +B/L LE swelling  Neurological: Grossly intact.      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 Diff positive    Plan:    1.) GE junction adenocarcinoma -- possibility of limited dis transition to hospice care in the future    --rec pt for repeat tumor markers with CEA/ and a repeat PET/CT scan to reassess a treatment response to dual modality chemo/RT.  Pt was not deemed a surgical candidate for esophagectomy due to advanced age helping greatly after placing tube and had the wound care nurse see pt in the cancer center today to help with leaking    5.) LE swelling/electrolyte imbalance    --now with hyponatremia, MARCO which may have been related to diuretics  --Patient is getting f

## 2019-09-12 NOTE — WOUND PROGRESS NOTE
WOUND CARE NOTE      PLAN   Recommendations:  Dr. Jerardo Devries  currently on consult  Dietary consult for recommendations for nutrition to optimize wound healing- Following  Turn schedules  Heels elevated using pillows, heel wedge or heel boots to offload he drainage, I cut a hole thru the top of the pouch and gently pulled the J tube thru and I secured it with a Tegaderm to cover the hole in the pouch, it is on and intact, spoke with the pt's nurse. The pt. and spouse are thankful.  Her call light is within re

## 2019-09-12 NOTE — RESPIRATORY THERAPY NOTE
Patient assessed for cpap/bipap use. Patient and care giver state that patient doesn't need or want cpap during this admission.

## 2019-09-12 NOTE — PROGRESS NOTES
Pt asleep, will not awake  Chart reviewed,  Should be able to tolerated iv fluids as needed for hypotension  Will followup in am.

## 2019-09-12 NOTE — DIETARY NOTE
ADULT NUTRITION INITIAL ASSESSMENT    Pt is at high nutrition risk. Pt meets severe malnutrition criteria.       CRITERIA FOR MALNUTRITION DIAGNOSIS:  Criteria for severe malnutrition diagnosis: chronic illness related to wt loss greater than 10% in 6 lziet around tube site and diarrhea.   - Medical Food Supplements-NPO  - Vitamin and mineral supplements: in EN formula  - Feeding assistance: NPO  - Nutrition education: none needed  At this time  - Coordination of nutrition care: collaboration with other provid kg (197 lb)  06/20/19 : 91.6 kg (202 lb)  06/12/19 : 103 kg (227 lb 1 oz)    GASTROINTESTINAL: nausea, diarrhea and J-tube leaking, GE junction cancer    FOOD/NUTRITION RELATED HISTORY:  Appetite: NPO  Intake: NPO  Intake Meeting Needs: No  Tube feeding on nutrition and for enteral nutrition adjustment  - Anthropometric Measurement:      Monitor: wt and wt change   - Nutrition Goals:      maintain wt within 5%, intake to meet needs, labs WNL and euglycemia    DIETITIAN FOLLOW UP: RD to follow up within 5 day

## 2019-09-13 NOTE — PROGRESS NOTES
Goals of care conversation:  Discussed with Mr. Jc Simpson and also Mrs. Jc Simpson and their caregiver  We talked about multiple comorbidities and overall decline  We discussed that she has the underlying adeno carcinoma of her GE junction along with the possible pha

## 2019-09-13 NOTE — PLAN OF CARE
Patient is A/O x 3. Patient has had a low grade fever but most recent 97.2 oral. Pain controlled with percocet. Bag over g-tube emptied. C. Diff precautions. All oral medications through g-tube. Family prefer to use own bottle. Balderas drained and intact.  Ic schedule  Outcome: Progressing     Problem: DISCHARGE PLANNING  Goal: Discharge to home or other facility with appropriate resources  Description  INTERVENTIONS:  - Identify barriers to discharge w/pt and caregiver  - Include patient/family/discharge partn

## 2019-09-13 NOTE — PROGRESS NOTES
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 Diff positive     Plan:     1.) GE junction adenocarcinoma -- possibility of limited disease involvement; 3cm ma care in the future     --rec pt for repeat tumor markers with CEA/ and a repeat PET/CT scan to reassess a treatment response to dual modality chemo/RT.  Pt was not deemed a surgical candidate for esophagectomy due to advanced age and frail performance adenocarcinoma lesion first and then this lesion rather than treating both at the same time which may come with significant toxicity     --intolerable of systemic chemotherapy, this could be considered for RT by Dr. Moses Ladd in radiation oncology.  However monitored, rec pRBC transfusion for hgb value < 7 g/dL        Thank you for allowing us to take part in the care of this patient. Will continue to follow along with you.  Dr. Shahnaz Hayes is available prn for questions/concerns this weekend     Nba Munoz MD

## 2019-09-13 NOTE — CONSULTS
[facility]  Report of GI Consultation    Armida Carter Patient Status:  Inpatient    10/14/1940 MRN [id number]   Location [facility] 4W/SW/SE Attending Jose Martin Bauman MD   Hosp Day # 1 PCP Anurag Chavez MD     Date of Admissi She suffered complications from a feeding jejunostomy tube placed 4/25/2019 later necessitating a prolonged admission in May 2019 for intravenous antibiotics, intravenous TPN feeding.     Admitting medications include Xarelto.     Ms. Jay Lines recently return description of esophageal blockage, regurgitating/vomiting up debris from her esophagus. Difficult to focus on the colitis question. She continues to periodically take swallows of water and then spend 1 or 2 minutes retching and gagging it back up. found to have C. difficile colitis on 9/11/2019. Recommendations:  · Tube feedings as tolerated. May need to speak to Dr. Dina Verdin about leaking J-tube.   · Gentle IV fluid support; acute kidney injury on this morning's labs  · Cardiology service follow PACEMAKER PLACEMENT     • CATARACT Right 2011    right sided cataract surgery   • COLONOSCOPY  2007   • FLEXIBLE SIGMOIDOSCOPY N/A 8/5/2019    Performed by Eve Vazquez MD at [address] [place] [address] mg 7.5 mg Oral Nightly PRN   dextrose 50 % injection 50 mL 50 mL Intravenous PRN   Glucose-Vitamin C (DEX-4) chewable tab 4 tablet 4 tablet Oral Q15 Min PRN   glucose (DEX4) oral liquid 15 g 15 g Oral Q15 Min PRN   vancomycin HCl (FIRVANQ) 50 MG/ML oral so diphenhydrAMINE-zinc acetate 2-0.1 % External Cream Apply 1 Application topically 3 (three) times daily as needed for Itching. oxyCODONE-acetaminophen 5-325 MG Oral Tab Take 1 tablet by mouth every 4 (four) hours as needed.  (Patient taking differently: patch onto the skin every third day. lidocaine-prilocaine 2.5-2.5 % External Cream Apply to site 1 hour prior to port a cath needle insertion   acetaminophen 160 MG/5ML Oral Liquid 650 mg by Per J Tube route every 6 (six) hours as needed for Fever.    lid UNKNOWN          Review of Systems:     Confusion, delirium on arrival.  No seizure activity or syncopal events. No fevers. No blood in the stool. No change in her retching, dysphagia complaint.   12 point review of systems is as above otherwise negative stranding without drainable collections accounting for noncontrast technique. Jejunostomy tube left abdomen appears appropriately positioned. Large hiatal hernia. The gastroesophageal junction/known malignancy not identified.   Trace left pleural effusio

## 2019-09-13 NOTE — PLAN OF CARE
Patient with caregiver at bedside throughout the night. Bag appliance over jtube site changed d/t leaking. Multiple Bms had, vanco PO continued. Enteric precautions maintained. Percocet administered Q4 PRN. Balderas intact.  Medications administered per jtube Encourage toileting schedule  Outcome: Progressing     Problem: DISCHARGE PLANNING  Goal: Discharge to home or other facility with appropriate resources  Description  INTERVENTIONS:  - Identify barriers to discharge w/pt and caregiver  - Include patient/fa

## 2019-09-13 NOTE — PROGRESS NOTES
[facility]    Patient Name:  Perez Heart   YOB: 1940   Gender:  Female   Date: 09/13/19   Provider:  No name on file. Insurance:  MEDICARE PART A&B     PATIENT PROVIDERS  Referring Provider: No ref.  provider found   Addr Diarrhea     Diarrhea, unspecified type     Hyponatremia     Lactic acidosis       History of Present Illness:  I was asked to evaluate Ms. Irwin Jonas by Dr. Sim Tovar to render an opinion regarding management of a potential infection of a feeding jejun 3/21/2019: CT of the chest abdomen pelvis: Wall thickening with at least 2 intraluminal nodules present in the mid to distal esophagus [see below] there is a large hiatal hernia. Findings are compatible with the history of esophageal cancer.   4/1/2019: PE Comment:Other reaction(s): CETYLPYRIDINIUM CHLORIDE  Ephedrine                 Macrolides And Keto*    UNKNOWN     History:  Reviewed:  Past Medical History:   Diagnosis Date   • Anesthesia complication     drug interactions, see allergy list   • Atrial stretcher • JEJUNOSTOMY/GASTROSTOMY TUBE INSERTION N/A 4/25/2019    Performed by Migdalia Rojas MD at [address] [place] [address] [place]   • PACEMAKER/DEFIBRILLATOR  08/2015    [employer] pacemaker   • SIGMOIDOSCOPY, FLEXIBLE; WITH ABLATION OF TUMOR(S), POLYP(S), OR OTHER LESIONS(S) NOT NITIN Respiratory: Negative for cough, chest tightness and shortness of breath. Cardiovascular: Negative for chest pain and leg swelling. Gastrointestinal: Positive for heartburn, nausea, vomiting and constipation.  Negative for abdominal pain, diarrhea, blo * 132* 135*   K 4.1 3.7 3.3*   CL 91* 95* 99   CO2 30.0 30.0 30.0     PROCEDURE:               CT ABDOMEN + PELVIS WITHOUT CONTRAST (ZFA=06979)     COMPARISON:              [facility], CT ABDOMEN + PELVIS (CONTRAST ONLY) (CPT=74177), Anastomotic suture line present in the left lower quadrant corresponding to the descending/sigmoid colon. There is sigmoid diverticulosis. Mild presacral edema and perirectal inflammatory fat stranding. No extraluminal gas.   No organized or   drainabl I could not assess the integrity of the skin around the jejunostomy tube seeing as there is an ostomy appliance in place presently. Unfortunately, bilious leakage from the jejunostomy tube site [around the tube] has persisted despite our best efforts.   I

## 2019-09-13 NOTE — PROGRESS NOTES
[facility] HOSP - [facility]    Cardiology Progress Note  [facility]    Christina Martel Patient Status:  Inpatient    10/14/1940 MRN [id number]   Location [facility] 4W/SW/SE Attending Iza Garcia MD   Hosp Day # 2 PCP Olga Tompkins 09/13/2019     (L) 09/13/2019    K 3.3 (L) 09/13/2019    CL 99 09/13/2019    CO2 30.0 09/13/2019    GLU 77 09/13/2019    CA 8.4 (L) 09/13/2019    ALB 2.3 (L) 09/11/2019    ALKPHO 106 09/11/2019    BILT 0.5 09/11/2019    TP 7.6 09/11/2019    AST 40 (H issues. I told her we are going to continue give her full treatment as needed but I would strongly recommend she not have CPR done or intubated should she have a catastrophic event occur.   Her  certainly feels this way but she needs to comes to [facility]

## 2019-09-13 NOTE — PROGRESS NOTES
[facility]  GI SERVICE PROGRESS NOTE    Shruthi Never Patient Status:  Inpatient    10/14/1940 MRN [id number]   Location [facility] 4W/SW/SE Attending Bria Beckman MD   Hosp Day # 2 PCP Sofie Vargas MD       Subjective: 253.0 166.0 173.0       Lab Results   Component Value Date    PT 19.3 (H) 11/19/2013    PT 22.3 (H) 06/28/2011    PT 15.7 (H) 02/08/2011    INR 1.34 (H) 08/03/2019    INR 1.00 (A) 05/10/2019    INR 1.14 04/27/2019       Recent Labs   Lab 09/11/19  0932 09/ syndrome 9/12am.  Gentle IV fluid support; acute kidney injury on admitting labs  · Cardiology service following for the dilated cardiopathy, systolic heart failure, hypotension  · C difficile colitis: Vancomycin 250mg per J tube q6hrs; full 14 day course

## 2019-09-13 NOTE — PROGRESS NOTES
[facility]    Progress Note    Roni Brothers Patient Status:  Inpatient    10/14/1940 MRN [id number]   Location [facility] 4W/SW/SE Attending Reena Hicks MD   Hosp Day # 2 PCP Braulio Boston MD       Subjective:   Dorie Pena 09/09/2018    LIP 43 (L) 09/11/2019     (H) 11/08/2011    MG 2.2 09/13/2019    PHOS 2.2 (L) 08/21/2019    TROP <0.045 03/19/2019                   Assessment and Plan:       C.  Dif  Diarrhea  -with colitis on CT  -recent abx  -started vanco qid  -ho

## 2019-09-13 NOTE — PLAN OF CARE
Problem: PAIN - ADULT  Goal: Verbalizes/displays adequate comfort level or patient's stated pain goal  Description  INTERVENTIONS:  - Encourage pt to monitor pain and request assistance  - Assess pain using appropriate pain scale  - Administer analgesics appropriate  - Identify discharge learning needs (meds, wound care, etc)  - Arrange for interpreters to assist at discharge as needed  - Consider post-discharge preferences of patient/family/discharge partner  - Complete POLST form as appropriate  - Assess notified and 250 mL bolus was given, as well as occult stool collected. MD roman/patrick'ed BP meds and anticoagulant. She was seen by cardiology and GI. Patient received 2 doses of PRN zofran for nausea.   No vomiting noted, however she constantly swishes and spit

## 2019-09-14 NOTE — PLAN OF CARE
Patient still leaking at [facility], skin cleansed and ostomy bag changed. Pain being managed with PRN percocet. Patient continues to [employer] and spit\" water. Incontinent of stool, prompt incontinence care provided. Isolation precautions maintained.  [place] Modify environment to reduce risk of injury  - Provide assistive devices as appropriate  - Consider OT/PT consult to assist with strengthening/mobility  - Encourage toileting schedule  Outcome: Progressing     Problem: DISCHARGE PLANNING  Goal: Discharge t

## 2019-09-14 NOTE — PROGRESS NOTES
[facility] HOSP - [facility]    Cardiology Progress Note  [facility]    Char Foster Patient Status:  Inpatient    10/14/1940 MRN [id number]   Location [facility] 4W/SW/SE Attending Fannie Mobley MD   Hosp Day # 3 PCP Carola Lima 09/11/2019    ALKPHO 106 09/11/2019    BILT 0.5 09/11/2019    TP 7.6 09/11/2019    AST 40 (H) 09/11/2019    ALT 23 09/11/2019    PTT 25.8 05/16/2019    INR 1.34 (H) 08/03/2019    PT 19.3 (H) 11/19/2013    T4F 1.05 06/27/2018    TSH 2.09 09/09/2018    LIP 4 to continue give her full treatment as needed but I would strongly recommend she not have CPR done or intubated should she have a catastrophic event occur. Her  certainly feels this way but she needs to comes to  with this on her own time.

## 2019-09-14 NOTE — PROGRESS NOTES
[place] [facility] - [facility]    Progress Note    Prateek Harris Patient Status:  Inpatient    10/14/1940 MRN [id number]   Location [facility] 4W/SW/SE Attending Everardo Souza MD   [facility] Day # 3 PCP Kalani Arndt MD       Subjective:   Melecio Lacks 08/03/2019    PT 19.3 (H) 11/19/2013    T4F 1.05 06/27/2018    TSH 2.09 09/09/2018    LIP 43 (L) 09/11/2019     (H) 11/08/2011    MG 2.1 09/14/2019    PHOS 2.2 (L) 08/21/2019    TROP <0.045 03/19/2019                   Assessment and Plan:       CORDELIA

## 2019-09-14 NOTE — PROGRESS NOTES
[facility]     Gastroenterology Progress Note    Darryl Colin Patient Status:  Inpatient    10/14/1940 MRN [id number]   Location [facility] 4W/SW/SE Attending Arminda Betancourt MD   Hosp Day # 3 PCP Yeimy Dejesus MD with positive stool testing. Increased WBC and fever. Will add IV flagyl today in addition to vancomycin 250mg via J tube (which may be leaking some of the medicine). Monitor for sepsis.  +fever overnight  #ARF - improving    #Non-ischemic dilated CM - per

## 2019-09-15 NOTE — PLAN OF CARE
No acute events overnight. Reports generalized discomfort - refusing PRN pain meds AND repositioning. Reports nausea - refusing PRN antiemetics ordered.  Encouraged repositioning with staff assistance but adamantly refusing despite educating patient on skin based on type and severity of pain and evaluate response  - Implement non-pharmacological measures as appropriate and evaluate response  - Consider cultural and social influences on pain and pain management  - Manage/alleviate anxiety  - Utilize distractio factors for pressure ulcer development  - Assess and document skin integrity  - Assess and document dressing/incision, wound bed, drain sites and surrounding tissue  - Implement wound care per orders  - Initiate isolation precautions as appropriate  - Init

## 2019-09-15 NOTE — PROGRESS NOTES
[facility]OSP - [facility]    Cardiology Progress Note    Dory Barbosa Patient Status:  Inpatient    10/14/1940 MRN [id number]   Location [facility] 4W/SW/SE Attending Lo Bravo MD   Hosp Day # 4 PCP Diana Mercado MD     Primary Cardio antibiotics, J-tube feedings held  -Surgical oncology with wound to follow up     Atrial fibrillation (HCC)  - Rate/rhythm stable in Afib  - Possible right atrial thrombus.     - With anemia and black J-tube drainage would stay off anticoagulants this weeke

## 2019-09-15 NOTE — PROGRESS NOTES
[place] [facility] - [facility]    Progress Note    Shruthi Feli Patient Status:  Inpatient    10/14/1940 MRN [id number]   Location [facility] 4W/SW/SE Attending Bria Beckman MD   [facility] Day # 4 PCP Sofie Vargas MD       Subjective:   Desirae Garcia 09/11/2019    ALT 23 09/11/2019    PTT 25.8 05/16/2019    INR 1.34 (H) 08/03/2019    PT 19.3 (H) 11/19/2013    T4F 1.05 06/27/2018    TSH 2.09 09/09/2018    LIP 43 (L) 09/11/2019     (H) 11/08/2011    MG 2.1 09/15/2019    PHOS 2.2 (L) 08/21/2019

## 2019-09-15 NOTE — PROGRESS NOTES
[facility]     Gastroenterology Progress Note    Royce Shayut Patient Status:  Inpatient    10/14/1940 MRN [id number]   Location [facility] 4W/SW/SE Attending Radha Marsh MD   Hosp Day # 4 PCP Sasha Huber MD noted on CT, with positive stool testing. Increased WBC and fever. On 9/13. Added IV flagyl in addition to vancomycin 250mg via J tube (which may be leaking some of the medicine). Responded well to IV flagyl. WBC improved. No fever. Diarrhea improving.

## 2019-09-15 NOTE — PLAN OF CARE
Patient continued on bedrest, remains extremely weak. L arm immobile d/t recent humerus fx. J tube leaking, dressing changed PRN. Accu checks q 6. Continued NPO with ice chips, swishes and spits water. Remote tele in place. Balderas in place.   and care nutritional intake and initiate nutrition consult as needed  - Instruct patient on self management of diabetes  Outcome: Progressing     Problem: RISK FOR INFECTION - ADULT  Goal: Absence of fever/infection during anticipated neutropenic period  Descriptio GASTROINTESTINAL - ADULT  Goal: Minimal or absence of nausea and vomiting  Description  INTERVENTIONS:  - Maintain adequate hydration with IV or PO as ordered and tolerated  - Nasogastric tube to low intermittent suction as ordered  - Evaluate effectivenes standing, transferring and ambulating w/ or w/o assistive devices  - Assist with transfers and ambulation using safe patient handling equipment as needed  - Ensure adequate protection for wounds/incisions during mobilization  - Obtain PT/OT consults as nee

## 2019-09-15 NOTE — PLAN OF CARE
Patient remains unable to move out of bed, remains drowsy. Agreeable to turning to one side to off load weight. Continued on contact/enteric isolation. Continued NPO, able to swish and spit water. Accu checks q 6 hours.  J tube site leaking, dressing change consult to assist with strengthening/mobility  - Encourage toileting schedule  Outcome: Progressing     Problem: GASTROINTESTINAL - ADULT  Goal: Minimal or absence of nausea and vomiting  Description  INTERVENTIONS:  - Maintain adequate hydration with IV o system  Outcome: Not Progressing     Problem: Patient Centered Care  Goal: Patient preferences are identified and integrated in the patient's plan of care  Description  Interventions:  - What would you like us to know as we care for you?   - Provide timely MUSCULOSKELETAL - ADULT  Goal: Return mobility to safest level of function  Description  INTERVENTIONS:  - Assess patient stability and activity tolerance for standing, transferring and ambulating w/ or w/o assistive devices  - Assist with transfers and am

## 2019-09-16 NOTE — PLAN OF CARE
No acute events overnight. Reports generalized discomfort - refusing PRN pain meds AND repositioning. Reports nausea - compazine administered per MAR.  Encouraged repositioning with staff assistance but adamantly refusing despite educating patient on skin b physician/LIP order or complex needs related to functional status, cognitive ability or social support system  Outcome: Progressing     Problem: PAIN - ADULT  Goal: Verbalizes/displays adequate comfort level or patient's stated pain goal  Description  INTE Assess and document risk factors for pressure ulcer development  - Assess and document skin integrity  - Monitor for areas of redness and/or skin breakdown  - Initiate interventions, skin care algorithm/standards of care as needed  Outcome: Not Progressing

## 2019-09-16 NOTE — WOUND PROGRESS NOTE
Wound Care Services  Called by Dr Brook Butler and asked to meet him at the pt's bedside as he will suture the J tube disc to the skin.  The pt. is sitting up in bed, Contact Isolation was maintained,the J tube site dressing was soiled with yellow green exudate

## 2019-09-16 NOTE — PROGRESS NOTES
Goals of care note  I met with  and Mr. Elysia Sweet and her caregiver this morning. We talked about her multiple comorbidities.   We talked about her heart and heart failure the underlying gastroesophageal carcinoma, complications she has had with her J-tube

## 2019-09-16 NOTE — PROGRESS NOTES
[facility]  GI SERVICE PROGRESS NOTE    Armida Carter Patient Status:  Inpatient    10/14/1940 MRN [id number]   Location [facility] 4W/SW/SE Attending Jose Martin Bauman MD   Hosp Day # 5 PCP Anurag Chavez MD       Subjective: 09/16/19  0645   RBC 3.03* 3.51* 3.41*   HGB 9.6* 10.9* 10.7*   HCT 29.2* 33.1* 32.4*   MCV 96.4 94.3 95.0   MCH 31.7 31.1 31.4   MCHC 32.9 32.9 33.0   RDW 15.1* 15.2* 15.2*   NEPRELIM 12.84* 11.45* 12.34*   WBC 15.3* 14.3* 16.5*   .0* 110.0* 117.0* defect.  Cardiac evaluation has also apparently shown right atrial appendage thrombus.  Recently felt to be \"optimized\" but moderate risk for anesthesia and surgery by the cardiology service April 2019.     Well-known to our group for multiple recent adm

## 2019-09-16 NOTE — PROGRESS NOTES
[facility] HOSP - [facility]    Cardiology Progress Note    Al Cerise Patient Status:  Inpatient    10/14/1940 MRN [id number]   Location [facility] 4W/SW/SE Attending Leopold Mcalpine, MD   Hosp Day # 5 PCP Tommy Barnes MD     Primary Cardio ?Jtube infection  -C. difficile colitis on antibiotics, J-tube feedings held  -Surgical oncology with wound to follow up     Atrial fibrillation ([address]  - Rate/rhythm stable in Afib  - Possible right atrial thrombus.     - With anemia and black J-tube draina Cardiology  09/15/19

## 2019-09-16 NOTE — HOSPICE RN NOTE
[facility] to meet on 9/17/19 at 11am This was  Arranged with the . POC was dicussed with Wilian Cedeño.

## 2019-09-16 NOTE — PROGRESS NOTES
[facility]    Progress Note    Ray Dumont Patient Status:  Inpatient    10/14/1940 MRN [id number]   Location [facility] 4W/SW/SE Attending Ronold Castleman, MD   [address] Day # 5 PCP Arnol Peter MD       Subjective:   Chelsy President 09/11/2019    TP 7.6 09/11/2019    AST 40 (H) 09/11/2019    ALT 23 09/11/2019    PTT 25.8 05/16/2019    INR 1.34 (H) 08/03/2019    PT 19.3 (H) 11/19/2013    T4F 1.05 06/27/2018    TSH 2.09 09/09/2018    LIP 43 (L) 09/11/2019     (H) 11/08/2011    MG

## 2019-09-17 NOTE — DIETARY NOTE
Brief Nutrition Note:    Nutritional reassessment deferred at this time due to Hospice discussion in place. J-tube has not been used for feedings during this admission. Monitor for change in aggressiveness of plans and need for nutritional reassessment.

## 2019-09-17 NOTE — PLAN OF CARE
Caregiver at bedside overnight. Balderas intact. Percocet PRN for pain. Meds through J tube, dressing changed this Am. 2L O2 in place for comfort. Repositioned as needed. Plan for hospice meeting today.  Will continue to monitor    Problem: PAIN - ADULT  Goal: resources  Description  INTERVENTIONS:  - Identify barriers to discharge w/pt and caregiver  - Include patient/family/discharge partner in discharge planning  - Arrange for needed discharge resources and transportation as appropriate  - Identify discharge function  Description  INTERVENTIONS:  - Assess bowel function  - Maintain adequate hydration with IV or PO as ordered and tolerated  - Evaluate effectiveness of GI medications  - Encourage mobilization and activity  - Obtain nutritional consult as needed Assist with transfers and ambulation using safe patient handling equipment as needed  - Ensure adequate protection for wounds/incisions during mobilization  - Obtain PT/OT consults as needed  - Advance activity as appropriate  - Communicate ordered activit

## 2019-09-17 NOTE — PLAN OF CARE
Problem: PAIN - ADULT  Goal: Verbalizes/displays adequate comfort level or patient's stated pain goal  Description  INTERVENTIONS:  - Encourage pt to monitor pain and request assistance  - Assess pain using appropriate pain scale  - Administer analgesics appropriate  - Identify discharge learning needs (meds, wound care, etc)  - Arrange for interpreters to assist at discharge as needed  - Consider post-discharge preferences of patient/family/discharge partner  - Complete POLST form as appropriate  - Assess Obtain nutritional consult as needed  - Establish a toileting routine/schedule  - Consider collaborating with pharmacy to review patient's medication profile  Outcome: Progressing     Problem: METABOLIC/FLUID AND ELECTROLYTES - ADULT  Goal: Glucose maintai appropriate  - Communicate ordered activity level and limitations with patient/family  Outcome: Progressing     Pt vitals stable. Pain controlled with medication. Pt still rinsing and spitting, but no episodes of vomiting or acute nausea this shift.  Zofran

## 2019-09-17 NOTE — PROGRESS NOTES
[facility]  GI SERVICE PROGRESS NOTE    Marcia Ellington Patient Status:  Inpatient    10/14/1940 MRN [id number]   Location [facility] 4W/SW/SE Attending Margarito Warren MD   Hosp Day # 6 PCP Stephanie Kimble MD       Subjective: HCT 29.2* 33.1* 32.4*   MCV 96.4 94.3 95.0   MCH 31.7 31.1 31.4   MCHC 32.9 32.9 33.0   RDW 15.1* 15.2* 15.2*   NEPRELIM 12.84* 11.45* 12.34*   WBC 15.3* 14.3* 16.5*   .0* 110.0* 117.0*       Lab Results   Component Value Date    PT 19.3 (H) 11/19 atrial appendage thrombus.  Recently felt to be \"optimized\" but moderate risk for anesthesia and surgery by the cardiology service April 2019.     Well-known to our group for multiple recent admissions this year.     Returns to the ED with complaints of

## 2019-09-17 NOTE — PROGRESS NOTES
[facility]    Cardiology Progress Note  [facility]    Boo Niño Patient Status:  Inpatient    10/14/1940 MRN [id number]   Location [facility] 4W/SW/SE Attending Benja Menendez MD   Hosp Day # 6 PCP Julieta Luis • KCl in Dextrose-NaCl 83 mL/hr at 09/16/19 7213       Results:     Lab Results   Component Value Date    WBC 16.5 (H) 09/16/2019    HGB 10.7 (L) 09/16/2019    HCT 32.4 (L) 09/16/2019    .0 (L) 09/16/2019    CREATSERUM 0.77 09/17/2019    BUN 28 (H

## 2019-09-17 NOTE — HOSPICE RN NOTE
Met with pt/ to discuss hospice services and goals of care. Educated family about services offered. Expressed concerns about using [facility] again because of a bad experience with home health care.  Encouraged that hospice is a different b

## 2019-09-17 NOTE — PLAN OF CARE
Patient resting comfortably on JOSE ENRIQUE mattress. Complains of pain to left upper extremity. Mepilex added to sacrum for skin breakdown prevention. Percocet prn for pain control. Patient only drinks bottled water to swish and spit. Refusing SCDs.  Balderas in place injury  - Provide assistive devices as appropriate  - Consider OT/PT consult to assist with strengthening/mobility  - Encourage toileting schedule  Outcome: Progressing     Problem: Patient Centered Care  Goal: Patient preferences are identified and integr Assess and document skin integrity  - Monitor for areas of redness and/or skin breakdown  - Initiate interventions, skin care algorithm/standards of care as needed  Outcome: Progressing     Problem: DISCHARGE PLANNING  Goal: Discharge to home or other faci orders  - Initiate isolation precautions as appropriate  - Initiate Pressure Ulcer prevention bundle as indicated  Outcome: Not Progressing     Problem: MUSCULOSKELETAL - ADULT  Goal: Return mobility to safest level of function  Description  INTERVENTIONS:

## 2019-09-17 NOTE — PROGRESS NOTES
[place] [facility] - [facility]    Progress Note    Estrellita Early Patient Status:  Inpatient    10/14/1940 MRN [id number]   Location [facility] 4W/SW/SE Attending Abelino Ureña MD   [facility] Day # 6 PCP Neema Nye MD       Subjective:   Mara Uribe 09/11/2019    ALT 23 09/11/2019    PTT 25.8 05/16/2019    INR 1.34 (H) 08/03/2019    PT 19.3 (H) 11/19/2013    T4F 1.05 06/27/2018    TSH 2.09 09/09/2018    LIP 43 (L) 09/11/2019     (H) 11/08/2011    MG 2.0 09/16/2019    PHOS 2.2 (L) 08/21/2019

## 2019-09-18 NOTE — PROGRESS NOTES
[facility]    Progress Note    Char Foster Patient Status:  Inpatient    10/14/1940 MRN [id number]   Location [facility] 4W/SW/SE Attending Fannie Mobley MD   [address] Day # 7 PCP Javy Barrientos MD       Subjective:   Pato Valenzuela 2.09 09/09/2018    LIP 43 (L) 09/11/2019     (H) 11/08/2011    MG 2.0 09/16/2019    PHOS 2.2 (L) 08/21/2019    TROP <0.045 03/19/2019                   Assessment and Plan:       C.  Dif  Diarrhea  -with colitis on CT  -recent abx  -started vanco qid

## 2019-09-18 NOTE — HOSPICE RN NOTE
Met with /pt/care taker at bedside to rediscuss hospice services and goals of care. Pt allowed  to speak/make decision because she was not feeling well. Agreeable to hospice care.   stated that he wants to have the care in place for hos

## 2019-09-18 NOTE — PLAN OF CARE
A&Ox4, forgetful at times. Pt on JOSE ENRIQUE mattress. Pt declining Q2 turns. Left arm and BLE edema. Lidocaine patch to left arm and prn percocet with relief. 2L O2 for comfort. Remote tele in place. Caregiver at bedside. Isolation maintained. Q6 accucheck.  Pt NP assistive devices as appropriate  - Consider OT/PT consult to assist with strengthening/mobility  - Encourage toileting schedule  Outcome: Progressing     Problem: DISCHARGE PLANNING  Goal: Discharge to home or other facility with appropriate resources  Yazmin Burnette effectiveness of ordered antiemetic medications  - Provide nonpharmacologic comfort measures as appropriate  - Advance diet as tolerated, if ordered  - Obtain nutritional consult as needed  - Evaluate fluid balance  Outcome: Progressing  Goal: Maintains or bundle as indicated  Outcome: Progressing     Problem: MUSCULOSKELETAL - ADULT  Goal: Return mobility to safest level of function  Description  INTERVENTIONS:  - Assess patient stability and activity tolerance for standing, transferring and ambulating w/ o

## 2019-09-18 NOTE — PLAN OF CARE
Problem: PAIN - ADULT  Goal: Verbalizes/displays adequate comfort level or patient's stated pain goal  Description  INTERVENTIONS:  - Encourage pt to monitor pain and request assistance  - Assess pain using appropriate pain scale  - Administer analgesics appropriate  - Identify discharge learning needs (meds, wound care, etc)  - Arrange for interpreters to assist at discharge as needed  - Consider post-discharge preferences of patient/family/discharge partner  - Complete POLST form as appropriate  - Assess mobilization and activity  - Obtain nutritional consult as needed  - Establish a toileting routine/schedule  - Consider collaborating with pharmacy to review patient's medication profile  Outcome: Progressing     Problem: METABOLIC/FLUID AND ELECTROLYTES - Advance activity as appropriate  - Communicate ordered activity level and limitations with patient/family  Outcome: Progressing     Pt A/O x4. VSS. Accu-checks q6. Zofran PRN for nausea. IV abx continued. 2L O2. Edema to BLE & LUE.  Pt &  met w/ [facility]

## 2019-09-19 NOTE — TELEPHONE ENCOUNTER
Kyaw Getting called back to say that Dez Fowler is coming home on 9/21/19 with Hospice and she also have a broken arm and she's weak, didn't know how she would be for her PET Scan and appointment, please advise.

## 2019-09-19 NOTE — CDS QUERY
Juan Hill  Dear Doctor: Ti William    A Registered Dietician evaluated this patient and found them to meet Severe Malnutrition Criteria using the [place] Guidelines based off the following clinical indicators:  *  Unpl

## 2019-09-19 NOTE — PLAN OF CARE
A&Ox4 , forgetful at times. NPO. j-tube dressing change this shift as ordered. Pt would like  to change j-tube dressing. 2L O2 via NC for comfort. Reglan for nausea with relief. Q6 accucheck. Remote tele in place. Balderas in place.  Lidocaine patch for devices as appropriate  - Consider OT/PT consult to assist with strengthening/mobility  - Encourage toileting schedule  Outcome: Progressing     Problem: DISCHARGE PLANNING  Goal: Discharge to home or other facility with appropriate resources  Description ordered antiemetic medications  - Provide nonpharmacologic comfort measures as appropriate  - Advance diet as tolerated, if ordered  - Obtain nutritional consult as needed  - Evaluate fluid balance  Outcome: Progressing  Goal: Maintains or returns to basel indicated  Outcome: Progressing     Problem: MUSCULOSKELETAL - ADULT  Goal: Return mobility to safest level of function  Description  INTERVENTIONS:  - Assess patient stability and activity tolerance for standing, transferring and ambulating w/ or w/o assi

## 2019-09-19 NOTE — PLAN OF CARE
Patient continues to c/o nausea, managed with compazine prn. Denies pain. Having frequent loose Bm. Continued NPO. L arm precautions in place. J tube dressing CDI. Mepilex on bilateral heels.  and caregiver at bedside.        Problem: PAIN - ADULT  G appropriate resources  Description  INTERVENTIONS:  - Identify barriers to discharge w/pt and caregiver  - Include patient/family/discharge partner in discharge planning  - Arrange for needed discharge resources and transportation as appropriate  - Identif INTEGRITY - ADULT  Goal: Incision(s), wounds(s) or drain site(s) healing without S/S of infection  Description  INTERVENTIONS:  - Assess and document risk factors for pressure ulcer development  - Assess and document skin integrity  - Assess and document d activity tolerance for standing, transferring and ambulating w/ or w/o assistive devices  - Assist with transfers and ambulation using safe patient handling equipment as needed  - Ensure adequate protection for wounds/incisions during mobilization  - Nicole De Leon

## 2019-09-19 NOTE — TELEPHONE ENCOUNTER
Given patient agreed to hospice, it is not medically necessary to pursue PET scan. Ok to follow with Dr. Jose Singletary for supportive care as outpatient if family chooses. At this time, Aftab Vanessa states that it is not necessary.     Will update Dr. Jose Singletary upon his retu

## 2019-09-19 NOTE — PROGRESS NOTES
[facility]    Progress Note    Arminda Piper Patient Status:  Inpatient    10/14/1940 MRN [id number]   Location [facility] 4W/SW/SE Attending Sade Hill MD   [facility] Day # 8 PCP Shonna Alves MD       Subjective:   Dia Henao MG 2.0 09/16/2019    PHOS 2.2 (L) 08/21/2019    TROP <0.045 03/19/2019                   Assessment and Plan:       C.  Dif  Diarrhea  -with colitis on CT  -recent abx  -started vanco qid, added iv flagyl  -increased leukocytosis  -hold tube feeding  -re

## 2019-09-20 NOTE — PLAN OF CARE
Patient c/o period of nausea, managed with zofran. Generalized pain but denies needing any medications. J tube dressing CDI. Mepilex on bilateral heels. Continued NPO. L arm precautions in place.  and caregiver at bedside.        Problem: PAIN - ADUL appropriate resources  Description  INTERVENTIONS:  - Identify barriers to discharge w/pt and caregiver  - Include patient/family/discharge partner in discharge planning  - Arrange for needed discharge resources and transportation as appropriate  - Identif METABOLIC/FLUID AND ELECTROLYTES - ADULT  Goal: Glucose maintained within prescribed range  Description  INTERVENTIONS:  - Monitor Blood Glucose as ordered  - Assess for signs and symptoms of hyperglycemia and hypoglycemia  - Administer ordered medications activity tolerance for standing, transferring and ambulating w/ or w/o assistive devices  - Assist with transfers and ambulation using safe patient handling equipment as needed  - Ensure adequate protection for wounds/incisions during mobilization  - Antonette Hernandez

## 2019-09-20 NOTE — PROGRESS NOTES
[facility]    Progress Note    Daniel Tineo Patient Status:  Inpatient    10/14/1940 MRN [id number]   Location [facility] 4W/SW/SE Attending Arzella Saint, MD   [facility] Day # 9 PCP Blanka Issa MD        Subjective:     Resp 09/11/2019    ALT 23 09/11/2019    PTT 25.8 05/16/2019    INR 1.34 (H) 08/03/2019    PT 19.3 (H) 11/19/2013    T4F 1.05 06/27/2018    TSH 2.09 09/09/2018    LIP 43 (L) 09/11/2019     (H) 11/08/2011    MG 2.0 09/16/2019    PHOS 2.2 (L) 08/21/2019

## 2019-09-20 NOTE — PLAN OF CARE
Problem: PAIN - ADULT  Goal: Verbalizes/displays adequate comfort level or patient's stated pain goal  Description  INTERVENTIONS:  - Encourage pt to monitor pain and request assistance  - Assess pain using appropriate pain scale  - Administer analgesics appropriate  - Identify discharge learning needs (meds, wound care, etc)  - Arrange for interpreters to assist at discharge as needed  - Consider post-discharge preferences of patient/family/discharge partner  - Complete POLST form as appropriate  - Assess mobilization and activity  - Obtain nutritional consult as needed  - Establish a toileting routine/schedule  - Consider collaborating with pharmacy to review patient's medication profile  Outcome: Progressing     Problem: METABOLIC/FLUID AND ELECTROLYTES - Advance activity as appropriate  - Communicate ordered activity level and limitations with patient/family  Outcome: Progressing   Patient resting comfortably. VSS. Afebrile. Repositioned as tolerated for comfort. Balderas maintained.  Lidocaine patch applied o

## 2019-09-20 NOTE — TELEPHONE ENCOUNTER
Document faxed to [facility] per MD instructions to finalize paperwork. Documentation not in EMA system for wheelchair information.

## 2019-09-20 NOTE — HOSPICE RN NOTE
Transport set for discharge home 1600 pickup at [facility] on 9/21/19. [facility] RN will meet at patients home for admission.    Spouse is in agreement with POC

## 2019-09-20 NOTE — PROGRESS NOTES
[facility]    Progress Note    Kim Rodrigues Patient Status:  Inpatient    10/14/1940 MRN [id number]   Location [facility] 4W/SW/SE Attending Mikie Caba MD   [facility] Day # 9 PCP Nicole Krishnamurthy MD       Subjective:   Daryl North 2.09 09/09/2018    LIP 43 (L) 09/11/2019     (H) 11/08/2011    MG 2.0 09/16/2019    PHOS 2.2 (L) 08/21/2019    TROP <0.045 03/19/2019                   Assessment and Plan:       C.  Dif  Diarrhea  -with colitis on CT  -recent abx  -started vanco qid

## 2019-09-20 NOTE — TELEPHONE ENCOUNTER
Yazmin Lowry from [facility] is calling to have a wheel chair note explaining why pt. Can't use a wheelchair or walker faxed over . #  [phone]  Fax.  # [phone]  Routed to [facility]

## 2019-09-21 NOTE — PROGRESS NOTES
Patient alert and oriented. Vitals stable. Denies pain. Zofran for nausea. Plan for home tomorrow with hospice. No distress noted.

## 2019-09-21 NOTE — PLAN OF CARE
Problem: PAIN - ADULT  Goal: Verbalizes/displays adequate comfort level or patient's stated pain goal  Description  INTERVENTIONS:  - Encourage pt to monitor pain and request assistance  - Assess pain using appropriate pain scale  - Administer analgesics appropriate  - Identify discharge learning needs (meds, wound care, etc)  - Arrange for interpreters to assist at discharge as needed  - Consider post-discharge preferences of patient/family/discharge partner  - Complete POLST form as appropriate  - Assess mobilization and activity  - Obtain nutritional consult as needed  - Establish a toileting routine/schedule  - Consider collaborating with pharmacy to review patient's medication profile  Outcome: Progressing     Problem: METABOLIC/FLUID AND ELECTROLYTES - Advance activity as appropriate  - Communicate ordered activity level and limitations with patient/family  Outcome: Progressing   No acute changes. VSS. Afebrile. Bedrest. Balderas maintained. PRN compazine given for nausea.  Complained pain on BLE and more sw

## 2019-09-21 NOTE — DISCHARGE SUMMARY
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    Quantity:  15 tablet  Refills:  0        CONTINUE taking these medications      Instructions Prescription details   ACCU-CH TRANSDERM-SCOP      Place 1 patch onto the skin every third day.    Quantity:  10 patch  Refills:  0        STOP taking these medications    ACCU-CHEK SABRINA PLUS Strp        Amoxicillin-Pot Clavulanate 875-125 MG Tabs  Commonly known as:  AUGMENTIN        B

## 2019-09-21 NOTE — HOSPICE RN NOTE
Met with pt at bedside to discuss discharge plan for pt. Ambulance was confirmed to  pt at 1600. Supplies given to pt at bedside. Paperwork on chart for pt to go home with.  POC updated with Tiny Altamirano, raghav and Kanwal LYLES

## 2019-09-25 NOTE — TELEPHONE ENCOUNTER
From: Peggyann Mask  To: Jose Hernandez MD  Sent: 9/25/2019 8:50 AM CDT  Subject: Non-Urgent Alcus Magdlaeno Melgoza if I can give Ramses Boatman normally given to infints?   Brian Kirkpatrick

## 2019-09-25 NOTE — TELEPHONE ENCOUNTER
Pt discharged from [facility] on 9/21/19 with [address], instruction to f/u w PCP in 1 week . Please call to schedule follow up with Primary Care Physician.

## 2019-09-27 NOTE — TELEPHONE ENCOUNTER
From: Jordan Ba  To: Bradley Hurtado MD  Sent: 9/26/2019 7:44 AM CDT  Subject: Test Results Question    Can Dr. Parada Delay me with a test kit order to test stool for C diff at this time?  Or can I collect a sample in a small cup to take t

## 2019-09-27 NOTE — TELEPHONE ENCOUNTER
From: Dory Barbosa  To: Jonna Santos MD  Sent: 9/26/2019 6:11 PM CDT  Subject: Other    FUROSEMIDE 20MG TAB WAS 2 TABS ONCE A DAY AND THE HOSP  INCREASED IT TO 2 20MG TABS TWICE A DAY.   I JUST CALLED [facility] TO RENEW AND IT IS LISTED AT THE OLD

## 2019-09-27 NOTE — TELEPHONE ENCOUNTER
Refill request has failed the Ambulatory Medication Refill Standing Order:    Requested Prescriptions     Refused Prescriptions Disp Refills   • FUROSEMIDE 20 MG Oral Tab [Pharmacy Med Name: FUROSEMIDE 20MG TABLETS] 60 tablet 0     Sig: TAKE 2 TABLETS VIA

## 2019-09-27 NOTE — TELEPHONE ENCOUNTER
Dr. Nabeel Briceno, please see pts [employer] message below. Updated rx pended for you to review and sign off if appropriate. Thank you.

## 2019-09-30 NOTE — PROGRESS NOTES
GI RNs–  Email composed tonight to try and address 's multiple questions.       Family continues to demonstrate very poor understanding and insight into the severity of Aury Forrester' illness and limited treatment options from here as she approaches end of Osmolite feedings. Normally this is not part of Hospice care. I can order that from ? ? Home health, not sure exactly how that would work. I will try to call Aftab Vanessa Monday or Tuesday.   It has been a very busy week and I have been backed up on an unusually

## 2019-10-01 NOTE — TELEPHONE ENCOUNTER
Paco Rubio,    I'm very sorry you have to watch your wife suffering so much. Aury Forrester is very cyndie to have someone like you who cares so deeply during this difficult time.     Malu Raza

## 2019-10-02 NOTE — TELEPHONE ENCOUNTER
Spoke to [employer], patient's spouse. He said med was ordered after Theone Heal was discharged from the hospital. It is helping keep the edema in her legs under control and she is very comfortable.  She gets 25 mg 1 time daily in tablet form that Wolf crushes and gives i

## 2019-10-02 NOTE — TELEPHONE ENCOUNTER
Spoke to streamit, patient's spouse. He does not feel that patient needs to come out of the house to see Dr. Jing Dumont.

## 2019-10-02 NOTE — TELEPHONE ENCOUNTER
From: Roni Brothers  To: Jessie Preciado MD  Sent: 10/2/2019 10:24 AM CDT  Subject: Other    If i refer a question to a Rx that I requested on 9/23/19 instead of an answer I received a denied due to a duplicate.  Grady is waiting for an answer from yo

## 2019-10-03 NOTE — TELEPHONE ENCOUNTER
Junito Arreaga calling to inform CB that [facility] is requesting orders for liquid Vancomyacin at once a day. [facility] at [phone]. For additional questions please call Junito Arreaga - aware CB is not in office today.   Than

## 2019-10-04 NOTE — TELEPHONE ENCOUNTER
I called hospice RN Greta Parsons and discussed Ms Diallo's case. She reports  and son have decided to defer tube feedings. She had two extended visits with Barbra Hutton and family this week and talked to them at length.  showed her my recent email.   Fabián Pabon

## 2019-10-04 NOTE — TELEPHONE ENCOUNTER
Dr Grupo Casper I spoke to the [address].  She just needs a verbal order for the Vanco.    If you want to write the order in [address] [place] [address] Rd, I can call the nurse and give her the order

## 2019-10-04 NOTE — PROGRESS NOTES
[facility] Radiation Treatment Management Note 11-15    Patient:  Lerma Smoke  Age:  [de-identified]year old  Visit Diagnosis:    1.  Malignant neoplasm of lower third of esophagus [facility])      Primary Rad/Onc:  Dr. Hilda Wolf    [facility]
Ambulatory

## 2019-10-07 NOTE — TELEPHONE ENCOUNTER
Dayami the [facility] nurse called me today with update on the vancomycin prescription. Apparently the Hospice program is refusing to cover any more vancomycin.  Pilar Peraza is furious. Giselle Mejia is stuck in the middle.     Pilar Peraza was asking about trying the tablets a

## 2019-10-07 NOTE — TELEPHONE ENCOUNTER
Radha Buckner, could you call Grady and check on the expense, co-pay of today's vancomycin prescription?

## 2019-10-07 NOTE — TELEPHONE ENCOUNTER
Is there a pre-auth or peer to peer to get this covered for a more reasonable co-pay? Ms. Devante Garza was hospitalized for the C. difficile and  will probably take her out of Hospice and have her hospitalized again if they think it is back.   Would sending

## 2019-10-08 NOTE — TELEPHONE ENCOUNTER
17 minutes spent in a bizarre bureaucratic phone call giving essential information such as the Andi Sin' ZIP Code and the current time in this specific time zone.     Now they have 72 hours to review the urgent request.    They are requesting that we fax addit

## 2019-10-08 NOTE — TELEPHONE ENCOUNTER
Jonna/Vicky [facility] calling to confirm if office received face to face form faxed on 10/2/19 for signature.   Fax: [phone]

## 2019-10-08 NOTE — TELEPHONE ENCOUNTER
I spoke to [facility] at the [address] [place] [address]. Phone #:[phone] ID#: [id number]    We tried a tier exception for the once daily Vancomycin.      This medication is a tier 5 and is not eligible for a tier exception    Appeals/Grievences : [phone]

## 2019-10-08 NOTE — TELEPHONE ENCOUNTER
Dr Bradley Urban,    Please call [phone] and you can do the appeal over the phone.      They would not let me do the appeal over the phone when I told them the pt was in hospice and couldn't come in to sign the Authorized [employer] Form    Th

## 2019-10-08 NOTE — TELEPHONE ENCOUNTER
Spoke to [place] and informed that papers are on Dr. Carreon Members shelf and will be signed when he returns on Friday 10/11/19.

## 2019-10-08 NOTE — TELEPHONE ENCOUNTER
I called the [facility] expedited grievance and appeals line number below.

## 2019-10-11 NOTE — TELEPHONE ENCOUNTER
I received a letter from [facility] stating they agree with their initial coverage determination and are denying the VANCOMYCIN. They denied the request because: This drug is covered on a cost-sharing (specialty) tier listed in your current Drug Guide.  Part

## 2019-10-15 NOTE — TELEPHONE ENCOUNTER
Need pts recent office note to be faxed to [facility] - Fax # [phone]. Pt. Has been admitted to [facility] as of 8/14/19.

## 2019-10-17 NOTE — TELEPHONE ENCOUNTER
I spoke to Heather Schultz  ([phone])    Call Ref # [id number]    Appeal was rejected again.     Next step Braden    Denied because specialty medication is in a high cost tier

## 2019-10-17 NOTE — TELEPHONE ENCOUNTER
Noted.  Nothing more we can do. This patient is being denied reasonable coverage of the vancomycin for C. difficile prophylaxis treatment.   If there is concern for worsening diarrhea and the family is not ready to watch her die then she needs to be sent b

## 2019-10-21 NOTE — PLAN OF CARE
Lab orders placed and patient was made aware. Shiela Cobb to be NPO @ midnight. Patient refusing to give up water for tonight. Patient informed that she needs to be NPO for procedure tmrw and that she will be under anesthesia.  Patient states, Danielito Aeljandre knows nothing about having anesthesia and would rather no

## 2019-10-21 NOTE — TELEPHONE ENCOUNTER
ondansetron 4 MG Oral Tablet Dispersible, Take 1 tablet (4 mg total) by mouth every 8 (eight) hours as needed for Nausea., Disp: 20 tablet, Rfl: 0      Plan does not cover medication.  Please call plan at [phone] to initiate PA or call/fax pharmacy to

## 2019-10-22 NOTE — TELEPHONE ENCOUNTER
Prior authorization for ondansetron 4 mg oral tab completed w/ [employer] on cover my meds Key: Key: [id number] – PA Case ID: [id number] , turn around time 3-5 days.

## 2019-10-25 NOTE — TELEPHONE ENCOUNTER
ondansetron 4 MG Oral Tablet Dispersible, Take 1 tablet (4 mg total) by mouth every 8 (eight) hours as needed for Nausea., Disp: 30 tablet, Rfl: 2    Per pharmacy plan does not cover this med.   Please call plan at [phone] to initiate a prior auth or c

## 2019-10-28 NOTE — TELEPHONE ENCOUNTER
PA denied for Ondasetron ODT 4mg. Patients plan states medication is not covered under Medicare part D. See Dr. Анна Willis message 10/25/2019.

## 2019-10-28 NOTE — TELEPHONE ENCOUNTER
Prochlorperazine Maleate (COMPAZINE) 10 mg tablet, Take 1 tablet (10 mg total) by mouth every 6 (six) hours as needed for Nausea., Disp: 30 tablet, Rfl: 2     Per pharmacy - a prior auth has been started. To complete go to:    [email or web address]  Key:  A

## 2019-10-29 NOTE — TELEPHONE ENCOUNTER
Face to face needs to be done by hospitalist as I have not seen her since July 5 or since her last hospitalization

## 2019-10-31 NOTE — TELEPHONE ENCOUNTER
Jonna/Vicky states she is going to fax over new face to face form with 7/5/19 date on it. Form just needs to be signed. They already have the 7/5/19 note. Aware MKK out of office today.

## 2019-10-31 NOTE — TELEPHONE ENCOUNTER
Prochlorperazine Maleate (COMPAZINE) 10 mg tablet, Take 1 tablet (10 mg total) by mouth every 6 (six) hours as needed for Nausea., Disp: 30 tablet, Rfl: 2    [employer] faxed over prior auth request form for completion. Placed in RN in-box.        Ten Rodriguez

## 2019-11-05 NOTE — TELEPHONE ENCOUNTER
S/w , he relayed situation. appt was cancelled.  He will ask hospice RN about pacemaker equipment disposal.

## 2019-11-05 NOTE — TELEPHONE ENCOUNTER
Please complete Face to Face Certification form for date of service of 7/5/19. [address] fax # [phone].

## 2019-11-05 NOTE — TELEPHONE ENCOUNTER
pts  wants nurse to know that the pt. is in hospice so she can not keep her yearly pacemaker appt. on 11/7/19.  states that if nurse has any questions about pts pacemaker, please give him a call back.

## 2019-11-13 NOTE — TELEPHONE ENCOUNTER
Asking if face to face from ws received - faxed over this morning - needs to completed and faxed back

## 2019-11-15 NOTE — TELEPHONE ENCOUNTER
Steffanie Dan from [facility] dropped off form for [address]. Placed in RN in box. Please fax back to [phone]. Please call Steffanie Dan when faxed at [phone].

## 2019-11-22 NOTE — TELEPHONE ENCOUNTER
I believe this is taken care of now. Sania stewart [employer] was in the office. She mentioned that a hospital visit from [address] [place] [address] can work for [employer]. Reviewed chart and explained that [address] [place] [address] hasn't seen pt F2F since 7/5/19.  Pt did not follow up in the office followin

## 2019-11-22 NOTE — TELEPHONE ENCOUNTER
Yo Worley from [employer] stopped at desk regarding paperwork needed signed by [address]-   Pl call her - aware MKK not in office until later today

## 2019-11-22 NOTE — TELEPHONE ENCOUNTER
I believe this is taken care of now. [place] from [facility] was in the office. She mentioned that a hospital visit from [facility] can work for [employer]. Reviewed chart and explained that [address] [place] [address] hasn't seen pt F2F since 7/5/19.  Pt did not follow up in the office followin

## 2020-06-17 NOTE — TELEPHONE ENCOUNTER
Dejaht sent informing pt of notes below    Barby Lorenzo, MARYANNE     Tried to call patient. Line was busy. Will try calling later.

## 2020-07-31 NOTE — PATIENT INSTRUCTIONS
Call Radiation Nurse for any issues that may arise from radiation at [phone]. Follow up with Dr. Carson Black in 3 months September 2019, at this time he will order future scans for follow up. Call [phone] to schedule a follow up appointment.
No-Patient/Caregiver offered and refused free interpretation services.

## 2021-12-29 NOTE — TELEPHONE ENCOUNTER
Can switch to Xarelto 20 mg, 1 qd,  Crush and give via J-tube #30, refills 5. Stop Lovenox after first given
Copy of message from the answering service -    RE PT JERICA TARANGO PT IS ON BLOOD THINNER AND IT IS VERY HARSH; TURNING BLACK AND BLUE AND IS PAINFUL.  IS THERE SOME OTHER MEDICINE PT CAN BE GIVEN THROUGH FEEDING TUBE INSTEAD OF A SHOT? PT'S  H
New prescription sent to [facility] with directions to crush med and give via J-tube and to stop Lovanox after first dose of Xarelto.  Probably need to wait to make sure insurance will cover this medication
See 7/23/19 Dr. Merlyn Bourne does not want patient to take Xarelto and wants her to restart Lovanox. Med list updated.
She was just in ER for this. There are 2 messages from today??????????? She can not be on coumadin because they cannot crush and put down feeding tube. Check with pharmacy to see id Xarelto or Eliquis can be crushed and put down feeding tube.   They need
Spoke to Pharmacist at [employer] and she said any of the anti coagulant medications can be crushed including Warfarin which she stated that she has patient's who do crush this one.
Yes

## 2022-02-08 NOTE — OCCUPATIONAL THERAPY NOTE
OCCUPATIONAL THERAPY TREATMENT NOTE - INPATIENT        Room Number: 450/450-A           Presenting Problem: (nausea, abdominal pain, j-tube drainage)    Problem List  Principal Problem:    Cellulitis, abdominal wall  Active Problems:    Goals of care, coun training;UE strengthening/ROM; Endurance training;Patient/Family education;Patient/Family training;Equipment eval/education; Compensatory technique education    SUBJECTIVE  \"every time I move around my belly starts to move and I want to throw up\"     OBJEC 6/2  Frequency: 3x a week    Oneita Player OTR/L  [facility] Drysol Counseling:  I discussed with the patient the risks of drysol/aluminum chloride including but not limited to skin rash, itching, irritation, burning.

## 2022-04-12 NOTE — TELEPHONE ENCOUNTER
I spoke to Dillon Miguel    Pt is still taking her Vancomycin    She is hardly having any bowel movements    Ray says she's had nothing in her j tube for 3 weeks and the Hospice doctors won't feed her    He is so frustrated and sad.  He feels his wife has been so mis Yes

## 2022-08-03 NOTE — H&P
[facility]    PATIENT'S NAME: Angela Amna   ATTENDING PHYSICIAN: Carrol Lora MD   PATIENT ACCOUNT#:   [de-identified]    LOCATION:  [place] [address]  MEDICAL RECORD #:   [id number]       YOB: 1940  ADMISSION DATE:       09/08/201 Pre-Operative Instructions    Patient Name: Chetna Desouza Procedure: Excision ganglion cyst of right wrist     Surgery Date: 9-2-22   Arrival Time: TBD Surgery Time: [facility]     Ambulatory Surgery Center : [address] [place] [address]    You will receive a call from the Ambulatory Surgery Department approximately 3 days prior to your surgery date.  At that time, the nurse will review your health history, give you instructions for the ASC and the Arrival time for your procedure.  If you do not receive a call within 2 days of your surgery, please call [phone]. Please ignore any Robo calls or times in the [facility] Well justin. and only follow the time given by the ASC.    Special Instructions for your procedure from Dr. Carmona    **Please bring this checklist with you to your History and Physical appointment. This will allow for you and your provider to review prior to your surgery. **    HISTORY AND PHYSICAL EXAM:  The [facility] requires History and Physical Exams to be done within 30 days of surgery. If this appointment is not completed within this time frame, an additional visit and co-pay may apply.      Dr. Carmona will clear you for this procedure. No additional appointment needed.     - PRE-ADMISSION LAB WORK:  None    - Chest X-Ray: No    - EKG: No    **Your COVID-19 test is scheduled at the [facility] location: [address] [place] [address] on 8-31-22 at 3:10pm . Upon arrival please park in the main parking lot and check in at the Laboratory on the main floor.  You are encouraged to self-quarantine whenever possible after the test until the time of surgery. If you are unable to self-quarantine, please ensure you wear a mask, wash your hands frequently and practice social distancing.**    - Is Physical Therapy needed? No    - Post operative appointment with Marlyn Mccall PA-C on 9-13-22 at 3:30pm at the [place] ([address] [place] [address] location.    NOTHING  TO EAT OR DRINK AFTER MIDNIGHT THE NIGHT BEFORE PRIOR TO SURGERY. THIS INCLUDES WATER.    STOP ALL ANTI-INFLAMMATORIES, VITAMINS, HERBAL SUPPLEMENTS AND ASPIRIN GREATER THAN 81 MG BY 8-26-22    DR. Carmona WILL GIVE YOU DIRECTION OF ALL OTHER MEDICATIONS.    YOU MUST HAVE A  TO AND FROM THE PROCEDURE. YOU MUST HAVE SOMEONE OVER THE AGE OF 18 WITH YOU AT HOME FOR ABOUT 24 HOURS AFTER YOUR PROCEDURE.    YOU CAN SHOWER THE MORNING OF SURGERY OR IF YOU SHOWER THE NIGHT BEFORE, PLEASE REMEMBER NOT TO PUT ON DEODORANT, LOTIONS, POWDER PERFUME/COLOGNE, JEWELRY OR MAKEUP THE DAY OF YOUR PROCEDURE.    ___________________________________________________________________  Contact Patient Care Coordinator at [phone] (hours 8:30 a.m. to 5:00 p.m.) if you have not received a call from the Ambulatory Surgery Center 3 days prior to surgery.    On the day of surgery:  - Please bring with you a photo ID and insurance card.   Failure to have these documents will result in cancellation of your surgery.     Failure to complete any of the following requirements may result in the delay or cancellation of your surgery.     : Rohini               Contact Number: [phone]      [facility][1] Surgery Center Guidelines    1. If there is any change in your health, please contact your surgeon (fever, chest pain, shortness of breath, respiratory infections), or if you have started any new medications since your surgery was scheduled.    2. Plan for unforeseen changes in surgery time.      Although we try to maintain a set surgery schedule, there are times when a surgery case may take longer than expected. This may result in your being in the surgery center longer than originally planned. It is best to \"free up\" your entire day to allow for any unforeseen time changes.     3.  Arrange for an adult (18 yrs old or greater) to stay with you at the surgery center. It is very important that you have an adult stay with you at the surgery  changes on her CT, diabetes, constipation chronically, hemorrhoids, and atrial fibrillation. Previous surgery for diverticulitis with perforation and the old [facility].    ALLERGIES:  Numerous.   She says she is allergic to Demerol, which made her center during your procedure. The surgeon will want to discuss your surgery and post-operative care with an adult friend or adult family member. Additionally, an adult will be needed to drive you home. An adult must stay with you for the first 24 hours after your surgery. IF YOU DO NOT HAVE AN ADULT TO DRIVE YOU HOME, YOUR SURGERY WILL BE CANCELLED.     4. Make arrangements for young children to stay with a  or family member. Children under the age of 12 must stay in the waiting area and must be accompanied by an adult at all times.     5.  Medications             Unless otherwise instructed by your physician: STOP over-the-counter medications, anti-inflammatories (for example Aspirin, Advil, Aleve, Ibuprofen, Motrin), Vitamins, herbal supplements for 7 days prior to surgery. Tylenol, or Acetaminophen based products may be used. Special instructions related to medications: As directed by Dr. Carmona    PATIENTS ON BLOOD THINNERS    IF YOU HAVE NOT RECEIVED INSTRUCTIONS REGARDING YOUR BLOOD THINNING MEDICATIONS WITHIN 7 DAYS OF YOUR SURGERY, PLEASE CALL THE SURGEON'S OFFICE. FAILURE TO DO SO MAY RESULT IN CANCELLATION OF YOUR SURGERY.     Medication Instructions for Day of Surgery:   · Please take your cardiac medicine, high blood pressure medicine, prednisone, and any seizure medication with a sip of water. Do not take any diuretic (water pills) or oral medication for diabetes.  · For insulin dependent diabetics:  Do not take any short acting or sliding scale insulin in the morning.  Unless otherwise directed by your personal physician, follow the following instructions: If your procedure is scheduled before noon, take one half of your long acting insulin in the morning.  If your procedure is scheduled after 12 noon, take one third of your long acting insulin in the morning.       6.  Remember to follow pre-operative dietary restrictions.     An empty stomach is imperative to prevent nausea or vomiting  the left lower quadrant. Normal bowel sounds. GENITOURINARY/RECTAL:  Not performed. EXTREMITIES:  She has no evidence of cyanosis; 1+ edema. NEUROLOGICAL:  She is alert and oriented x3, friendly, cooperative, and completely competent.   Moves all of during and after your procedure. It is important that you \"not eat or drink anything\" after midnight the night of your scheduled surgery. Even that cup of coffee seems harmless, it may lead to a significant delay or even a cancellation of your surgery. Have fluids and soft bland food available at home for the initial recovery period.       7. Things to bring with you:  · a list of your current medications (including \"over the counter\" and herbal medications)  · important legal documents such as 's license, insurance card, Power of , Guardianship papers  · any assistive device you are currently using (crutches, walker, hearing aid, etc.)  · Inhalers    8. Things to leave at home: jewelry, piercing's, purse, wallet, money, and other valuable items. Do not wear makeup or contact lenses.    9. Limit visitors while you are at the surgery center.    10. Avoid alcoholic beverages for at least 24 hours prior to your surgery.    11.  Wear loose comfortable clothes. Clothes that are easy to put on and take off are best. Sweat pants, shirts with buttons, and slip-on shoes are wise choices. Avoid tight-fitting clothing such as blue jeans and turtlenecks.       12. Ask questions if you are unsure about any information given to you. We always want you to feel safe and confident in the care you are receiving.    13.  Location:  [facility] [place] is located at [facility], [address] [place] [address] in [place], [place]. Enter through the main entrance. When you get inside the first set of glass doors, look to the left. On the glass wall is a sign that reads [facility]. Go through the doors and take the stairs or the elevator down to the lower level. Check in with the .       TO ALL AMBULATORY SURGERY PATIENTS    The [facility] utilizes [facility], for anesthesia services. Patients receiving services at the [facility] may receive  three separate bills - one from [facility] for professional services, one from the [facility] for facility services, and one from [facility], for anesthesiology services. Please verify with your insurer that [facility], are in your network as it does differ from [facility].    You can decide today about the care you will receive in the future.  [facility] respects your rights and will support your decisions to the fullest extent permitted by law, including your right to refuse or accept medical or surgical treatment. Please be advised that the [facility] prohibits ambulatory surgery centers from upholding Advanced Directives during surgical procedures. For this reason, any Advanced Directive will be null and void during your stay in the Ambulatory Surgery Center.     Although not honored in the [facility], you are still entitled to be informed about your rights surrounding Advanced Directives. Advanced Directives are legal documents that enable you to specify what forms of treatment you want performed or withheld should you become unable to make or communicate these decisions on your own.  Although this is not a common decision made by outpatient surgery patients, we would like to let you know that an information booklet, \"A Personal Decision\" is available upon your request.      How do you know if an Advanced Directive is right for you?  It is important to realize your rights as an individual, what the nature of consent for treatment implies, what a durable power of  for health care is and what a living will is.      After reviewing the booklet, \"A Personal Decision,\" should you have any further questions, please call us at [phone].      Thank you.     PATIENT’S RIGHTS    I. To be treated with respect, consideration and dignity, free from all forms of abuse, harassment and discrimination.     II. To receive  quality care and high professional standards in a safe environment.    II. To be provided with appropriate privacy.    III. To expect that all disclosures and records are treated confidentially and, except when required by law, to be given the opportunity to approve or refuse their release.    IV. To be provided, to the degree known, complete information concerning their diagnosis, treatment and prognosis. When appropriate, the information is provided to a person designated by the patient to be a legally authorized person/surrogate.    V. To review the records pertaining to his/her medical care and to have the information explained or interpreted as necessary except when restricted law.    VI. To expect that we will communicate with you in a matter that you can understand.     VII. To be given the names of all practitioners and health care personnel participating in his/her care.    VIII. To make decisions about the plan of care prior to and during the course of treatment.  IX. To refuse a recommended treatment or plan of care to the extent permitted by law and to be informed of the medical consequences of this action.     X. To expect that your treatment preferences will be responded to as delineated in your Advanced Directive, within the limits of the ASC bylaws regarding resuscitation    XI. To be informed as to:  A. Rights and Responsibilities.  B. Services available in the organization.  C. Provisions for after-hours and emergency care.  D.  The charges for services and available payment options.   E. The right to consent or decline participation in proposed research  studies.  F.  The available resources for resolving disputes, grievances, and conflicts.      XII. To expect emergency procedures to be implemented without unnecessary delay.    XIII. To expect a safe hospital transfer with appropriate medical records when necessary.     XIV. To know that all patients rights extend to the patient’s legal  representatives/surrogates.     XV. Complaints regarding Patients Rights or any issue surrounding care received during your stay can be made by contacting any of the following organizations:  i. Medicare patient: Visit the Office of Medicare Beneficiary [place] at www.medicare.gov on the web.  Or call 1-800-Medicare (1-222.776.5760).     TTY users should call 1-987.177.9270.  ii. Non-Medicare patients can contact the [facility] at [phone]; fax [phone], TTY [phone].  iii. Any patient can also contact the [employer] at [phone] (toll free 8:30 am to 5:00 pm, central time, weekdays).      *The Ambulatory Surgery Center is a partnership between [facility] and [facility].     Patient Responsibilities    It is your responsibility as a [facility] patient:    · To provide all personal and family health information needed to provide you with appropriate care.    · To participate to the best of your ability in making decisions about your medical treatment, and to comply with the agreed upon plan of care.    · To ask questions of your physician or other care providers when you do not understand any information or instructions.    · To maintain appointments as scheduled, or to reschedule in a timely fashion.    · To inform your physician and other care providers if you anticipate problems in following prescribed treatment.    · To recognize the impact of your lifestyle on your personal health.    · To inform your physician or other care provider if you desire a transfer of care to another physician.    · To be considerate of others receiving and providing care.  To observe relevant surgery center policies and procedures.    · To accept financial responsibility for health care services and to work cooperatively with [facility] to resolve financial obligations

## 2023-03-14 NOTE — TELEPHONE ENCOUNTER
----- Message from Yeni Hazel sent at 3/14/2023  3:53 PM CDT -----  Regarding: FW: Appointment scheduled from Patient Portal  Contact: [phone]    ----- Message -----  From: Junior Hyde  Sent: 3/13/2023   5:11 PM CDT  To: [facility]-Schedule Msg Pool  Subject: Appointment scheduled from Patient Portal        Appointment For: Junior Hyde ([id number])  Visit Type: PC ACUTE ([id number])    3/17/2023    8:45 AM  15 mins.  Jennifer Viveros MD [facility] PEDIATRICS    Patient Comments:  Complaining that his whole body is shaking and he can’t control it.     Yes I do not know why she is on it

## 2023-04-04 NOTE — PROGRESS NOTES
Patient received to [facility] room 232. Severe nausea and retching noted. Vital signs stable.  at bedside, supportive. All belongings with patient (brought down by her previous nurse from room 467 earlier this morning). Informed by RN patient has elevated tempt (100.2) s/p 1 unit platelets.   Patient seen and examined. Alert and oriented x 3, NAD.   Denies HA/vision changes, SOB, lightheaded/dizziness, itching, rashes, hives.   Patient admitted with FUO, s/p Zosyn, recently discontinued by attending today. ID following.   Hematology attempted to be contacted re: above - unable to reach.   D/W Dr. Addison, ?transfusion reaction, although unlikely given patient has had on and off fevers.   D/w blood bank,. Blood sent back to blood bank temporarily.   Tylenol given with improvement in temp, now normalized.   1 unit PRBC to be given tonight (irradiated as per hematology).   Endorsed to night ACP to monitor closely overnight.   Dr. Addison aware and in agreement with above.     Moraima Day PA-C Informed by RN patient has elevated tempt (100.2) s/p 1 unit platelets.   Patient seen and examined. Alert and oriented x 3, NAD.   Denies HA/vision changes, SOB, lightheaded/dizziness, itching, rashes, hives.   Patient admitted with FUO, s/p Zosyn, recently discontinued by attending today. ID following.   Hematology attempted to be contacted re: above - unable to reach.   D/W Dr. Addison, ?transfusion reaction, although unlikely given patient has had on and off fevers.   D/w blood bank,. Blood sent back to blood bank temporarily.   Tylenol given with improvement in temp, now normalized.   1 unit PRBC to be given tonight (irradiated as per hematology).   Endorsed to night ACP to monitor closely overnight.   Dr. Addison aware and in agreement with above.     Moraima Day, PA-C  [id number] Informed by RN patient has elevated tempt (100.2) s/p 1 unit platelets.   Patient seen and examined. Alert and oriented x 3, NAD.   Denies HA/vision changes, SOB, lightheaded/dizziness, itching, rashes, hives.   Patient admitted with FUO, s/p Zosyn, recently discontinued by attending today. ID following.   Hematology attempted to be contacted re: above - unable to reach.   D/W Dr. Addison, ?transfusion reaction, although unlikely given patient has had on and off fevers.   D/w blood bank,. Blood sent back to blood bank temporarily.   Tylenol given with improvement in temp, now normalized.   1 unit PRBC to be given tonight (irradiated as per hematology).   Endorsed to night ACP to monitor closely overnight.   Dr. Addison aware and in agreement with above.     Moraima Day, PA-C  [id number] Informed by RN patient has elevated tempt (100.2) s/p 1 unit platelets.   Patient seen and examined. Alert and oriented x 3, NAD.   Denies HA/vision changes, SOB, lightheaded/dizziness, itching, rashes, hives.   Patient admitted with FUO, s/p Zosyn, recently discontinued by attending today. ID following.   Hematology attempted to be contacted re: above - unable to reach.   D/W Dr. Addison, ?transfusion reaction, although unlikely given patient has had on and off fevers.   D/w blood bank,. Blood sent back to blood bank temporarily.   Tylenol given with improvement in temp, now normalized.   1 unit PRBC to be given tonight (irradiated as per hematology).   Endorsed to night ACP to monitor closely overnight.   Dr. Addison aware and in agreement with above.     Moraima Day, PA-C  [id number]

## 2023-05-24 NOTE — TELEPHONE ENCOUNTER
New form received and placed on Dr. Degroot Pilling shelf to sign. Jenise Gan at [facility] contacted. She just sent a new form rather than having to initial all the crossed out entries. Will fax back once Dr. Hitesh De La Fuente signs.  Also she sent us a new form with prefilled in mild weakness intact

## 2023-07-02 NOTE — TELEPHONE ENCOUNTER
Please see [address] charting.   christopher [Condoms] : uses condoms [Y] : Positive pregnancy history [Menarche Age: ____] : age at menarche was [unfilled] [No] : Patient does not have concerns regarding sex [Currently Active] : currently active [PapSmeardate] : 2/16/23 [TextBox_31] : neg [GonorrheaDate] : 2/16/23 [TextBox_63] : NEG [ChlamydiaDate] : 2/16/23 [TextBox_68] : NEG [TrichomonasDate] : 2/16/23 [TextBox_73] : NEG [HPVDate] : 2/2023 [TextBox_78] : neg [LMPDate] : 6/5/2023 [PGHxTotal] : 2 [[facility] CenterxFulerm] : 1 [PGxPremature] : 1 [[facility] Hospitaliving] : 3 [PGHxMultBirths] : 1 [FreeTextEntry1] : 6/5/2023

## 2023-11-16 NOTE — PROGRESS NOTES
Called patient with sleep study results. Sending order to [facility]. Compliance scheduled.    Dx: chronic midline thoracic back pain                               Next MD visit: none scheduled  Fall Risk: standard         Precautions: n/a           Medications: Yes/no  Subjective: patient stated that she didn't do her home ex's much due to being si Total Timed Treatment: 30 min  Total Treatment Time: 30 min

## 2024-03-15 NOTE — TELEPHONE ENCOUNTER
Noted, orders placed for IR drainage of the abscess, I spoke with IR yesterday, nursing at [facility] will set up the actual appointment hopefully during the time that she is over for her radiation treatments. Request for 15mg dose received after this and this was sent in   Staff confirmed that she is currently taking 15mg

## 2024-05-15 NOTE — PROGRESS NOTES
Cardio  Pt asleep. Did not awaken. Legs more edematous. Spoke with caregiver.  Will inc  Iv lasix while in hospital. Would push diuretics as much as needed after discharge as I suspect her edematous legs will be one her major dissatisfactors when home Could be sec to RV infarct leading to hypotension, bradycardia.   Management/workup as below.

## 2024-12-04 NOTE — PROGRESS NOTES
HPI:    Patient ID: Boo Niño returns today with her  for follow up. Today's visit was performed in conjunction with one for her .  had just suffered an acute syndrome which we discussed today. He has recovered.     Today's in today.    5.  We did not discuss the hepatitis C today. There is concern for chronic liver disease due to the chronic hepatitis C, likely acquired in the 1970s. Alpha-fetoprotein was 3.9 on 1/11/2018.     Other concerns include burning with urination, sup describes rectal pain as often as every day. Some of this rectal pain may be aggravated by lying down at night. She takes occasional hemorrhoid suppositories for the rectal pain.     Current bowel regimen includes daily MiraLAX and frequent mineral oil, a this is not a big deal and is not causing her any symptoms. It is not causing the abdominal pain.   She may have lived the rest of her life without knowing about it and it may not have mattered.     If we decided to treat this, there is a fairly easy treat received blood transfusions with her severe intestinal infection, colostomy in 1977. She recalls profound illness with fevers, sepsis, \"ruptured bowel\" with colostomy or ileostomy. The ostomy was in her right upper quadrant.   She actually was afraid fo Seun Lucero and  to advise that her intestines looked good on the CT scan. We ordered the CT scan to evaluate her abdominal pain and possible constipation. I was concerned that she might have another obstruction going.   Nothing serious is going on on her age-related and post cholecystectomy change. 3.  Subcentimeter duodenal diverticulum at the ampulla.   4.  Prior bowel resection with reanastomosis in the proximal transverse colon and in the mid sigmoid colon.  The bowel loops are slightly patulous at the to Me           Dr Steve Iverson spoke to the pt's      He states the pt is experiencing abdominal pain and the pain radiates from the rectum up into her back.  She has felt this way for the past couple of weeks     She has a hard time digesting food RUQ abdominal pain radiating around base of ribcage to R back     This also seems to vary in severity,    Always there but mild to severe. No identified triggers or aggravating factors for this either.     Both pains #1 and #2 relieved by a recent tramad hemoglobin 12.9 g MCV 89.1 platelets 737,899  CMP same day shows glucose 100, AST 33 ALT 28 alk phos 47 albumin 3.9    ============================================  Previous visit October 16, 2015:    Vishnu Aguilar is a woman with complex medical history facility led to severe constipation and fecal impaction at her sigmoid colon anastomosis.   Two sigmoidoscopy examinations with aggressive irrigation to break up the stool ball were performed September 13 and September 14, 2015 as per [facility] system record numbness and a thoracic MRI revealed a T5/T6 spinal cord lesion. She underwent a thoracic laminectomy with removal of a meningioma. She was evaluated by myself post spinal surgery for abdominal pain.   A CT scan was performed which was negative with the e at 2.14. The patient's EKG reveals a ventricularly paced rhythm.      ==============================================  My inpatient consult September 12, 2015:      Patient:   Jj Manrique. #:   [id number]                    Room: [address] [place] [address]  Girish RASMUSSEN #: to the Emergency Room, and she has been  admitted. In the Emergency Department, she was describing diffuse  abdominal crampy pain, and the above nausea and vomiting, no fevers. Since admission, Ms. Gilberto Rush has been receiving Lactulose, Miralax laxatives with me. VITAL SIGNS:  Have been reassuring. Afebrile since admission. Heart rate  with her pacemaker is running in the low 60's. Vital signs show  intermittent hypertension. HEENT:  Shows facial pallor. No scleral icterus or facial jaundice.   Pupils an  open unremarkable colocolonic anastomosis at about 30 centimeters from the  anal verge. Nonbleeding large AVM seen well proximal to this in the region  of the hepatic flexure. Small sessile polyp removed. Residual  diverticulosis noted.      A repeat while  trying to work in there. I also discussed the risk of necrosis,  ulceration, and spontaneous perforation if we do nothing and the stool ball  continues to press against her sigmoid colon as it currently is.   She and  her  would like to proce Air within the bladder lumen with indwelling Balderas catheter in place  PELVIC NODES: No enlarged mass or adenopathy. PELVIC ORGANS: No visible mass. Pelvic organs appropriate for patient age. BONES: Scoliosis dorsal lumbar spine multilevel spondylosis.  Bi accurate characterize but probable cysts. GI/MESENTERY: There is a large hiatal hernia. The colon is distended up to the level of the sigmoid  colon, which is dilated. There is distal decompression of the rectosigmoid.  Dense contrast material is present i [address], 9/11/2015, 18:29. INDICATIONS:  Constipation with anus and rectum pain   Change in bowel habit. Generalized abdominal pain discomfort.         FINDINGS:          LIVER:  Liver is enlarged at 19.5 cm no or pelvis. ABDOMINAL WALL:      There is a 34 x 77 mm lipoma in the right gluteus medius muscle. Small left fat containing inguinal hernia. URINARY BLADDER:    No visible focal wall thickening, lesion or calculus.     PELVIC NODES:            No enlarged history of adenomatous colon polyps. 2. Abdominal pain. POSTOPERATIVE DIAGNOSES:  1. Colon polyp. 2. Nonbleeding vascular malformation hepatic flexure. 3. Residual diverticulosis. PROCEDURE:  Colonoscopy with polypectomy.      SURGEON:  Leo Mortimer revealed no abnormalities. The patient  tolerated this procedure well without immediate complication. IMPRESSION:  1. Colon polyp. 2. Residual diverticulosis. 3. Surgical changes of the colon. 4. Nonbleeding vascular malformation left undisturbed. ball.  As  the stool ball began to break apart and we got limited views of   the walls  of the colon down in the area of the anastomosis, I saw one 2 cm   white  based ulceration which I photographed. Very limited views in the   area of  all this stool. sedation, likely due to the daily narcotics   over the  past two weeks. She was conscious but sedated throughout the   case. Digital rectal exam was performed which showed fleshy external   hemorrhoids  and skin tags.   Exam was performed with a large irrigation given into and around the   stool  ball. RECOMMENDATIONS:  Continue clear liquids, oral osmotic laxatives and close   observation. If  pain decreases, could become more aggressive with our osmotic   laxatives.          D:    09/13/2015 1:54 SHAKE LIQUID AND USE 2 SPRAYS IN EACH NOSTRIL DAILY Disp: 3 Bottle Rfl: 1   Blood Glucose Monitoring Suppl (ACCU-CHEK SABRINA) Does not apply Device Test blood sugar dailyDx E11.9 Type 2 non insulin diabetic Disp: 1 Device Rfl: 0   Glucose Blood (ACCU-CHEK A anticoagulation. 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 Transanal resection was performed by Dr. Tata Harris May 2003 when the lesion was greater than 5 cm in size.  **    Cholecyste IU/ml.    Returns for follow-up 2/19/2018 to discuss the hepatitis C, follow-up on difficulty with constipation and defecation, possible pelvic floor dysfunction or even rectal problem.     Returns for follow-up 6/25/2018 to describe multiple concerns inclu imaging and mild hypoalbuminemia. · We have previously discussed the new oral antiviral therapy options for chronic Hepatitis C. We discussed the minimal side effects and toxicity.   However, unclear if any of her multiple complicated symptoms relate to t well.      Over 55 minutes spent today discussing the above and counseling this patient. More than 50% at that time was spent in counseling.       Meds This Visit:    No prescriptions requested or ordered in this encounter    Imaging & Referrals:  None

## 2025-05-19 NOTE — TELEPHONE ENCOUNTER
- Continue with ice and analgesics as needed for pain.  -Continue home exercise program.  -Continue to monitor progress following intra-articular guided hip injection by pain management from 5/15/2025.        Pt was called and requested I speak with Tara Orosco, , he is aware of message sent by Dr. Karin Essex nurse. Mr. Lacie Shahid was also concerned about the current diuretics she is on.  He states her blood pressures have been low and he decided to take her off the Carv

## 2025-05-28 NOTE — TELEPHONE ENCOUNTER
Can watch but if symptoms persist follow up with Dr. Bentley Andujar Patient presents for follow up.

## (undated) DEVICE — 3M™ MEDITPORE™ SOFT CLOTH TAPE 6 IN X 10 YD 12 ROLLS/CASE 2966: Brand: 3M™ MEDIPORE™

## (undated) DEVICE — MINOR GENERAL: Brand: MEDLINE INDUSTRIES, INC.

## (undated) DEVICE — MEDI-VAC NON-CONDUCTIVE SUCTION TUBING 6MM X 1.8M (6FT.) L: Brand: CARDINAL HEALTH

## (undated) DEVICE — UNDYED BRAIDED (POLYGLACTIN 910), SYNTHETIC ABSORBABLE SUTURE: Brand: COATED VICRYL

## (undated) DEVICE — BATTERY

## (undated) DEVICE — SUTURE ETHILON 3-0 669H

## (undated) DEVICE — LAPAROSCOPIC INTRODUCER KIT FOR GASTROSTOMY FEEDING TUBE: Brand: AVANOS

## (undated) DEVICE — SOL  .9 1000ML BTL

## (undated) DEVICE — TROCAR: Brand: KII® SLEEVE

## (undated) DEVICE — STERILE TETRA-FLEX CF, ELASTIC BANDAGE, 4" X 5.5YD: Brand: TETRA-FLEX™CF

## (undated) DEVICE — LAP CHOLE: Brand: MEDLINE INDUSTRIES, INC.

## (undated) DEVICE — SUTURE SILK 2-0 SA85H

## (undated) DEVICE — 35 ML SYRINGE REGULAR TIP: Brand: MONOJECT

## (undated) DEVICE — GAMMEX® NON-LATEX PI ORTHO SIZE 8, STERILE POLYISOPRENE POWDER-FREE SURGICAL GLOVE: Brand: GAMMEX

## (undated) DEVICE — CONTAINER SPEC STR 4OZ GRY LID

## (undated) DEVICE — TROCAR: Brand: KII FIOS FIRST ENTRY

## (undated) DEVICE — DRAPE CASSETTE X-RAY

## (undated) DEVICE — PROXIMATE SKIN STAPLERS (35 WIDE) CONTAINS 35 STAINLESS STEEL STAPLES (FIXED HEAD): Brand: PROXIMATE

## (undated) DEVICE — SUTURE SILK 2-0 SH

## (undated) DEVICE — SOL H2O 1000ML BTL

## (undated) DEVICE — Device

## (undated) DEVICE — DRAPE SHEET LG

## (undated) DEVICE — Device: Brand: CUSTOM PROCEDURE KIT

## (undated) DEVICE — 3M™ IOBAN™ 2 ANTIMICROBIAL INCISE DRAPE 6650EZ: Brand: IOBAN™ 2

## (undated) DEVICE — BLADE 24 SS SRG STRL

## (undated) DEVICE — GAMMEX® NON-LATEX PI ORTHO SIZE 7.5, STERILE POLYISOPRENE POWDER-FREE SURGICAL GLOVE: Brand: GAMMEX

## (undated) DEVICE — BIT DRL 200MM 2.8MM LCP PERC

## (undated) DEVICE — INTENDED TO BE USED TO OCCLUDE, RETRACT AND IDENTIFY ARTERIES, VEINS, TENDONS AND NERVES IN SURGICAL PROCEDURES: Brand: STERION®  VESSEL LOOP

## (undated) DEVICE — DRAPE SHEET LAPCHOLE 124X100X7

## (undated) DEVICE — SCREW LOCKING 3.5X40 212.117
Type: IMPLANTABLE DEVICE | Site: ARM | Status: NON-FUNCTIONAL
Removed: 2019-08-23

## (undated) DEVICE — VIOLET BRAIDED (POLYGLACTIN 910), SYNTHETIC ABSORBABLE SUTURE: Brand: COATED VICRYL

## (undated) DEVICE — ADHESIVE MASTISOL 2/3CC VL

## (undated) DEVICE — DRAPE C-ARM UNIVERSAL

## (undated) DEVICE — SUTURE VICRYL 1-0 J977H

## (undated) DEVICE — SHOULDER: Brand: MEDLINE INDUSTRIES, INC.

## (undated) DEVICE — [HIGH FLOW INSUFFLATOR,  DO NOT USE IF PACKAGE IS DAMAGED,  KEEP DRY,  KEEP AWAY FROM SUNLIGHT,  PROTECT FROM HEAT AND RADIOACTIVE SOURCES.]: Brand: PNEUMOSURE

## (undated) DEVICE — ENCORE® LATEX ACCLAIM SIZE 7.5, STERILE LATEX POWDER-FREE SURGICAL GLOVE: Brand: ENCORE

## (undated) DEVICE — CAUTERY: TIP CLEANER XR 100/CS: Brand: MEDICAL ACTION INDUSTRIES

## (undated) DEVICE — TRAY SKIN PREP PVP-1

## (undated) DEVICE — GAMMEX® PI HYBRID SIZE 7.5, STERILE POWDER-FREE SURGICAL GLOVE, POLYISOPRENE AND NEOPRENE BLEND: Brand: GAMMEX

## (undated) DEVICE — SUCTION CANISTER, 3000CC,SAFELINER: Brand: DEROYAL

## (undated) DEVICE — TOWEL OR BLU 16X26 STRL

## (undated) DEVICE — Device: Brand: DEFENDO AIR/WATER/SUCTION AND BIOPSY VALVE

## (undated) DEVICE — 3M™ IOBAN™ 2 ANTIMICROBIAL INCISE DRAPE 6640EZ: Brand: IOBAN™ 2

## (undated) DEVICE — COTTON UNDERCAST PADDING,REGULAR FINISH: Brand: WEBRIL

## (undated) DEVICE — SPONGE LAP 18X18 XRAY STRL

## (undated) DEVICE — ESMARK: Brand: DEROYAL

## (undated) DEVICE — DRILL BIT SYNT 2.5X110 310.25

## (undated) DEVICE — T-MAX DISPOSABLE FACE MASK 8 PER BOX

## (undated) DEVICE — YANKAUER SUCTION INSTRUMENT NO CONTROL VENT, BULB TIP, CLEAR: Brand: YANKAUER

## (undated) DEVICE — STERILE LATEX POWDER-FREE SURGICAL GLOVESWITH NITRILE COATING: Brand: PROTEXIS

## (undated) NOTE — ED AVS SNAPSHOT
Armida Carter   MRN: [id number]    Department:  [facility] Emergency Department   Date of Visit:  3/19/2019           Disclosure     Insurance plans vary and the physician(s) referred by the ER may not be covered by your plan.  Please contact y within the next three months to obtain basic health screening including reassessment of your blood pressure.     IF THERE IS ANY CHANGE OR WORSENING OF YOUR CONDITION, CALL YOUR PRIMARY CARE PHYSICIAN AT ONCE OR RETURN IMMEDIATELY TO THE EMERGENCY DEPARTMEN

## (undated) NOTE — IP AVS SNAPSHOT
[facility]            (For Outpatient Use Only) Initial Admit Date: 8/19/2019   Inpt/Obs Admit Date: Inpt: 8/20/19 / Obs: 08/19/19   Discharge Date:    Tomás Robert:  [de-identified]   MRN: [de-identified]   CSN: [id number]   CEID: [id number] Subscriber ID:  Pt Rel to Subscriber:    Hospital Account Financial Class: Medicare    August 28, 2019

## (undated) NOTE — ED AVS SNAPSHOT
Marcia Ellington   MRN: [id number]    Department:  [facility] Emergency Department   Date of Visit:  7/15/2019           Disclosure     Insurance plans vary and the physician(s) referred by the ER may not be covered by your plan.  Please contact y within the next three months to obtain basic health screening including reassessment of your blood pressure.     IF THERE IS ANY CHANGE OR WORSENING OF YOUR CONDITION, CALL YOUR PRIMARY CARE PHYSICIAN AT ONCE OR RETURN IMMEDIATELY TO THE EMERGENCY DEPARTMEN

## (undated) NOTE — ED AVS SNAPSHOT
Arminda Piper   MRN: [id number]    Department:  [facility] Emergency Department   Date of Visit:  7/2/2018           Disclosure     Insurance plans vary and the physician(s) referred by the ER may not be covered by your plan.  Please contact yo within the next three months to obtain basic health screening including reassessment of your blood pressure.     IF THERE IS ANY CHANGE OR WORSENING OF YOUR CONDITION, CALL YOUR PRIMARY CARE PHYSICIAN AT ONCE OR RETURN IMMEDIATELY TO THE EMERGENCY DEPARTMEN

## (undated) NOTE — MR AVS SNAPSHOT
[address]  [address]  [phone]               Thank you for choosing us for your health care visit with Kenia Merritt MD.  We are glad to serve you and happy to provide you with this ortiz [facility]    It is the patient's responsibility to check with and follow their insurance company's guidelines for prior authorization for this test.  You may be held responsible for payment in full if APPLY EXTERNALLY TO THE AFFECTED AREA TWICE DAILY AS NEEDED FOR DRY SKIN   Commonly known as:  MYCOSTATIN           ONETOUCH ULTRA BLUE Strp   Generic drug:  Glucose Blood   TEST EVERY DAY           ONETOUCH ULTRASOFT LANCETS Misc   Test 2-3 times per day

## (undated) NOTE — ED AVS SNAPSHOT
Char Foster   MRN: [id number]    Department:  [facility] Emergency Department   Date of Visit:  6/27/2018           Disclosure     Insurance plans vary and the physician(s) referred by the ER may not be covered by your plan.  Please contact y within the next three months to obtain basic health screening including reassessment of your blood pressure.     IF THERE IS ANY CHANGE OR WORSENING OF YOUR CONDITION, CALL YOUR PRIMARY CARE PHYSICIAN AT ONCE OR RETURN IMMEDIATELY TO THE EMERGENCY DEPARTMEN

## (undated) NOTE — LETTER
[facility]  Administration of Anesthesia  1. Orlin Book, or _________________________________ acting on her behalf, (Patient) (Dependent/Representative) request to receive anesthesia for my pending procedure/operation/treatment.   A infections, high spinal block, spinal bleeding, seizure, cardiac arrest and death. 7. AWARENESS: I understand that it is possible (but unlikely) to have explicit memory of events from the operating room while under general anesthesia.   8. ELECTROCONVULSIV unconscious pt /Relationship    My signature below affirms that prior to the time of the procedure, I have explained to the patient and/or his/her guardian, the risks and benefits of undergoing anesthesia, as well as any reasonable alternatives.     _______

## (undated) NOTE — IP AVS SNAPSHOT
[facility]            (For Outpatient Use Only) Initial Admit Date: 4/15/2019   Inpt/Obs Admit Date: Inpt: 4/15/19 / Obs: N/A   Discharge Date:    Noris Colon:  [de-identified]   MRN: [de-identified]   CSN: [id number]   CEID: [id number]        KQL Subscriber ID:  Pt Rel to Subscriber:    Hospital Account Financial Class: Medicare    May 1, 2019

## (undated) NOTE — LETTER
[address] [place] [address]  Authorization for Invasive Procedures  1.  I hereby authorize  Dr. Vicki Price my physician and whomever may be designated as the doctor's assistant, to perform the following operation and/or procedure: Ultrasound Julia Pall performed for the purposes of advancing medicine, science, and/or education, provided my identity is not revealed. If the procedure has been videotaped, the physician/surgeon will obtain the original videotape.  The hospital will not be responsible for stor My signature below affirms that prior to the time of the procedure, I have explained to the patient and/or her legal representative, the risks and benefits involved in the proposed treatment and any reasonable alternative to the proposed treatment.  I have

## (undated) NOTE — ED AVS SNAPSHOT
Arminda Piper   MRN: [id number]    Department:  [facility] Emergency Department   Date of Visit:  7/27/2019           Disclosure     Insurance plans vary and the physician(s) referred by the ER may not be covered by your plan.  Please contact y within the next three months to obtain basic health screening including reassessment of your blood pressure.     IF THERE IS ANY CHANGE OR WORSENING OF YOUR CONDITION, CALL YOUR PRIMARY CARE PHYSICIAN AT ONCE OR RETURN IMMEDIATELY TO THE EMERGENCY DEPARTMEN

## (undated) NOTE — MR AVS SNAPSHOT
[address] [facility] [address] [phone]               Thank you for choosing us for your health care visit with Danay Melendrez MD.  We are glad to serve you and happy to provide you with this summary of y BIOTIN PLUS/CALCIUM/VIT D3 Tabs   Take by mouth. CloNIDine HCl 0.2 MG Tabs   Take 1 tablet (0.2 mg total) by mouth 2 (two) times daily. Commonly known as:  CATAPRES           FOLIC ACID OR   Take by mouth.            GLIPIZIDE OR   Take by [facility] (Blue Parking) (Yellow Parking)  [address]   [address] [facility],  [place] [address], [address] [facility]  [address]  [address] [facility]

## (undated) NOTE — IP AVS SNAPSHOT
Patient Demographics     Address  [address] Phone  [phone] (Home) *Preferred*      Emergency Contact(s)     Name Relation Home Work Mobile    Liam Spouse [phone]      Allergies as of 5/1/2019  Re Give 1 unit for blood glucose 150-180 mg/dL  Give 2 units for blood glucose 181-210 mg/dL  Give 3 units for blood glucose 211-240 mg/dL  Give 4 units for blood glucose 241-270 mg/dL  Give 5 units for blood glucose 271-300 mg/dL  Give 6 units for blood gluc Next dose due:  4/30/19 bedtime      Inject 0.8 mL (80 mg total) into the skin every 12 (twelve) hours.    Jaswinder Cordero MD         Fluticasone Propionate 50 MCG/ACT Susp  Commonly known as:  FLONASE  Next dose due:  5/1/19 morning      1 spray by Each Na KENNETH Tan         metoprolol tartrate 10mg/ml Susp  Commonly known as:  LOPRESSOR  Next dose due:  4/30/19 at 6 pm       Take 0.625 mL (6.25 mg total) by mouth every 6 (six) hours.    KENNETH Tan         Nystatin 392928 UNIT/GM Powd [id number] Furosemide (LASIX) 8 MG/ML oral solution 40 mg 05/01/19 1100 Given      [id number] HYDROcodone-acetaminophen 7.5-325 MG/15ML solution 15 mL 05/01/19 1221 Given      [id number] Normal Saline Flush 0.9 % injection 3 mL 04/30/19 1657 Given      [address] Patient's Most Recent Weight       Most Recent Value   Patient Weight  100.8 kg (222 lb 3.2 oz)         Lab Results Last 24 Hours      POTASSIUM [[id number]] (Normal)  Resulted: 05/01/19 0704, Result status: Final result   Ordering provider:  Enrique Wisdom ago.  Today, came into the emergency department with intractable nausea, vomiting, and inability to keep any food down. CBC showed white blood cell count of 2.8, hemoglobin of 10.6, platelet count 626. Chemistry was unremarkable. Potassium 2.8.   Chest x even clear liquids. The patient had been having watery diarrhea, as well. She had some abdominal pain and bloating. Other 12-point review of systems negative. PHYSICAL EXAMINATION:    GENERAL:  Alert and oriented to time, place, and person.   Mode Consults - MD Consult Notes      Consults signed by Ramírez Blackwell DO at 4/23/2019  2:06 PM     Author:  Ramírez Blackwell DO Service:  Pulmonology Author Type:  Physician    Filed:  4/23/2019  2:06 PM Date of Service:  4/23/2019  1:57 PM Status:  Ani Mclean • CATARACT Right 2011    right sided cataract surgery   • COLONOSCOPY  2007   • HYSTERECTOMY     • PACEMAKER/DEFIBRILLATOR  8/2015   • SIGMOIDOSCOPY, FLEXIBLE; WITH ABLATION OF TUMOR(S), POLYP(S), OR OTHER LESIONS(S) NOT AMENABLE TO RE  9/13/15   • TONSILL fentaNYL citrate (SUBLIMAZE) 0.05 MG/ML injection 50 mcg 50 mcg Intravenous Q5 Min PRN   morphINE sulfate (PF) 2 MG/ML injection 2 mg 2 mg Intravenous Q10 Min PRN   morphINE sulfate (PF) 4 MG/ML injection 4 mg 4 mg Intravenous Q10 Min PRN   morphINE sulfat metoprolol Tartrate (LOPRESSOR) 5 MG/5ML injection 5 mg 5 mg Intravenous PRN   Or      metoprolol Tartrate (LOPRESSOR) 5 MG/5ML injection 2.5 mg 2.5 mg Intravenous PRN   hydrALAzine HCl (APRESOLINE) injection 10 mg 10 mg Intravenous Q6H PRN   Normal Saline ondansetron 8 MG Oral Tablet Dispersible Take 1 tablet (8 mg total) by mouth every 8 (eight) hours as needed for Nausea. metoprolol Tartrate 25 MG Oral Tab Take 0.5 tablets (12.5 mg total) by mouth 2 (two) times daily.  Crush tablets   metFORMIN HCl 500 M OneTouch UltraSoft Lancets Does not apply Misc Test 2-3 times per day       Allergies    Definity                PALPITATIONS, DIZZINESS    Comment:Patient felt better after 15-20 minutes, went             home.   Demerol  [Meperidin*    SHORTNESS OF BREATH MG 2.4 04/23/2019    PHOS 3.3 04/23/2019    TROP <0.045 03/19/2019         Imaging  Xr Chest Ap Portable  (cpt=71045)    Result Date: 4/23/2019  CONCLUSION:  1.  Cardiomegaly with mild-moderate CHF showing some improvement from April 21, 2019. 2. Atheroscl Filed:  5/1/2019  3:43 PM Date of Service:  5/1/2019  2:18 PM Status:  Addendum    :  Ryley Baez MD (Physician)    Related Notes:  Original Note by KENNETH Sullivan (APN) filed at 5/1/2019  2:20 PM       [facility] Constipation, unspecified constipation type     Abdominal pain, acute     Nausea     Hypokalemia     Acute cystitis without hematuria     Malnutrition (HCC)     Goals of care, counseling/discussion     Advanced care planning/counseling discussion     Ex - s/p bleeding at j-tube site after she had an episode of retching, lovenox dose decreased, resolved bleeding  -with hx.  Of cirrhosis will check factor X activity level, therapeutic  - d/w Dr. Santosh Sarmiento who will arrange for chemo later this week  - d/w Dr. Avelar Eric -Poor po intake  - npo now  - dietician following   - tolerating j-tube feeding     Hiatal hernia   - chronic     Vitals: Blood pressure 133/50, pulse 65, temperature 98.1 °F (36.7 °C), temperature source Oral, resp.  rate 18, height 5' 3\" (1.6 m), weight Inject 12 Units into the skin nightly. Refills:  0     Insulin Regular Human 100 UNIT/ML Soln  Commonly known as:  NOVOLIN R      Inject 0.01-0.07 mL (1-7 Units total) into the skin every 6 (six) hours.    Refills:  0     LORazepam 2 MG/ML Conc  Commonly albuterol sulfate (2.5 MG/3ML) 0.083% Nebu  Commonly known as:  VENTOLIN      Take 3 mL (2.5 mg total) by nebulization every 6 (six) hours as needed for Wheezing.    Quantity:  25 vial  Refills:  3     glycerin (laxative) 1.2 g Supp      Place 1 suppository Bring a paper prescription for each of these medications  · HYDROcodone-acetaminophen 7.5-325 MG/15ML Soln  · LORazepam 2 MG/ML Conc[RS.2]               Discharge Instructions       Tube Feeding 75ml/hr of Osmolite 1.2 x20 hrs (1500ml volume)    For Outpat Room Number: 465/465-A[AD.1]       Presenting Problem: dehydration with JG tube insertion on 4/25[AD.2]    Problem List[AD.1]  Principal Problem:    Dehydration  Active Problems:    Adenocarcinoma of gastroesophageal junction (HCC)    Constipation, unspeci Static Standing: Fair -  Dynamic Standing: Poor +[AD. 2]    ACTIVITY TOLERANCE                         O2 WALK                  AM-PAC '6-Clicks' INPATIENT SHORT FORM - BASIC MOBILITY  How much difficulty does the patient currently have. ..[AD.1]  -   Ppee Mccord Goal #4 Patient will negotiate 3 stairs/one curb w/ assistive device and supervision   Goal #4   Current Status  NT   Goal #5 Patient to demonstrate independence with home activity/exercise instructions provided to patient in preparation for discharge.    G perform sit<>stand and took steps to bedside chair with stand by assist using rolling walker. Patient with poor posture, decreased step length, and foot flat contact. Patient was left in bedside chair at end of session with all needs in reach.   The patient How much help from another person does the patient currently need. ..   -   Moving to and from a bed to a chair (including a wheelchair)?: A Little   -   Need to walk in hospital room?: A Little   -   Climbing 3-5 steps with a railing?: A Lot     AM-PAC Sco Physical Therapist    Filed:  4/29/2019 11:17 AM Date of Service:  4/29/2019 11:13 AM Status:  Signed    :  Soledad Dorsey PT (Physical Therapist)       Attempted physical therapy treatment. Per RN, patient is occupied with visit from physician.  Will tfr onto rolling commode (higher surface) with CGA and cues to shift hips and use UE to assist, toilet hygiene with CGA, pt requires increased time to walk, on toilet, and assist to thread BLE into new brief while seated in chair, STS from chair level with -   Toileting, which includes using toilet, bedpan or urinal? : A Little  -   Putting on and taking off regular upper body clothing?: A Little  -   Taking care of personal grooming such as brushing teeth?: A Little  -   Eating meals?: A Little[ST. 2]    AM- 5/8/2019 3:30 PM Jose Yeh 19 Hematology Oncology [facility]    5/9/2019 1:00 PM EM CC INFRN [address]    5/16/2019 8:00 AM EM Jose Owens [id number] - Infusion EMO    5/16/2019 9:00 A

## (undated) NOTE — MR AVS SNAPSHOT
[address]  [address]  [phone]               Thank you for choosing us for your health care visit with Torito Quick DPM.  We are glad to serve you and happy to provide yo Acidophilus/Pectin Caps   Take 1 capsule by mouth daily. Commonly known as:  PROBIOTIC           albuterol sulfate (2.5 MG/3ML) 0.083% Nebu   Commonly known as:  VENTOLIN           BIOTIN PLUS/CALCIUM/VIT D3 Tabs   Take by mouth.            CloNIDine HCl

## (undated) NOTE — IP AVS SNAPSHOT
[facility]            (For Outpatient Use Only) Initial Admit Date: 5/15/2019   Inpt/Obs Admit Date: Inpt: 5/15/19 / Obs: N/A   Discharge Date:    Carylon Kawasaki:  [de-identified]   MRN: [de-identified]   CSN: [id number]   CEID: [id number]        [facility] Subscriber Name:  Jamir Real :    Subscriber ID:  Pt Rel to Subscriber:    Hospital Account Financial Class: Medicare    2019

## (undated) NOTE — MR AVS SNAPSHOT
[address]  [phone]               Thank you for choosing us for your health care visit with Russ Carlson MD.  We are glad to serve you and happy to provide you with this summary of your visit. It is the patient's responsibility to check with and follow their insurance company's guidelines for prior authorization for this test.  You may be held responsible for payment in full if proper authorization is not acquired.   Please contact the Patient Bu MetFORMIN HCl  MG Tb24   Take 2 tablets (1,000 mg total) by mouth Before Dinner.    Commonly known as:  GLUCOPHAGE-XR           nystatin 557630 UNIT/GM Crea   APPLY EXTERNALLY TO THE AFFECTED AREA TWICE DAILY AS NEEDED FOR DRY SKIN   Commonly known a are inactive.      HOW TO GET STARTED: HOW TO STAY MOTIVATED:   Start activities slowly and build up over time Do what you like   Get your heart pumping – brisk walking, biking, swimming Even 10 minute increments are effective and add up over the week   2 ½

## (undated) NOTE — LETTER
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 Additional written information will be given to me prior to start of therapy.     Additionally, I will rec

## (undated) NOTE — LETTER
Arminda Raleigh  10/14/1940    A patient of your clinic was recently seen by the hospitalists at [facility], and will be following up with you on 9/18/18.     The patient's last INR values were, as follows:    Lab Results   Component Value Da

## (undated) NOTE — IP AVS SNAPSHOT
Patient Demographics     Address  [address] Phone  [phone] [facility]) *Preferred*  [phone] [facility])      Emergency Contact(s)     Name Relation Home Work Mobile    Liam Spouse [address] [facility] Each increase by 10ml will provide 220ml volume, 220 calories and 10 g protein, therefore with each increase by 10ml recommend decrease TPN by 220 calories and 10 g protein.     - Parenteral Nutrition: current TPN as follows: 1000ml volume  45g protein, 900 KENNETH Rodriguez         bisacodyl 10 MG Supp  Commonly known as:  DULCOLAX  Next dose due:  AS NEEDED      Place 1 suppository (10 mg total) rectally daily as needed.    KENNETH Rodriguez         cefdinir 250 MG/5ML Susr  Commonly known as:  OM Insulin Regular Human 100 UNIT/ML Soln  Commonly known as:  NOVOLIN R  Next dose due:  EVERY 6 HOURS       Inject 0.01-0.07 mL (1-7 Units total) into the skin every 6 (six) hours.    Anurag Chavez MD         Insulin Syringes (Disposable) U-100 0.3 ML Misc Next dose due: Tomorrow      Take 2.5 mL (125 mg total) by mouth daily for 12 days.   Stop taking on:  6/13/2019   KENNETH Jones               Where to Get Your Medications      Please  your prescriptions at the location directed by your do Units 06/01/19 0711 Given            RIGHT UPPER ABDOMEN     Order ID Medication Name Action Time Action Reason Comments    [id number] Enoxaparin Sodium (LOVENOX) 80 MG/0.8ML injection 80 mg 06/01/19 0846 Given      [id number] Insulin Regular Human (Mandy Kong BUN/CREA Ratio 26.9 10.0 - 20.0 [facility]   Calcium, Total 8.4 8.5 - 10.1 mg/dL  [facility]   Calculated Osmolality 288 275 - 295 mOsm/kg — [facility]   GFR, Non-African American 91 >=60 — [facility]   GFR, African-American 104 >=60 — [facility] Giardia Lamblia Pcr Negative     Adenovirus F 40/41 Pcr Negative     Astrovirus Pcr Negative     Norovirus Gi/Gii Pcr Negative     Rotavirus A Pcr Negative     Sapovirus Pcr Negative    Narrative:        The 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 with complaint of steadily increasing leakage around the J-tube site. Several days ago, this began as just some staining, soaking of the gauze around the site. It increased fairly steadily. A colostomy bag has been placed over the site. .  Initially he d neutropenic condition. Options for feeding in the face of this ongoing obstructed esophagus include the enteral feeding tube if it can be salvaged; intravenous TPN therapy; or palliative esophageal stent placement.  Ms Mariusz Kennedy is not currently a candidate for above conservative measures, antibiotics unless worsening abscess and infection necessitates its removal.  This enteral feeding tube remains the safest means of maintaining nutrition, enteral access for medications while we wait for some relief from the ob symptoms at her sigmoid region anastomosis, superimposed on her chronic constipation. · Would focus antibiotics on the soft tissue abscesses rather than diverticulitis.   · Continue aggressive bowel regimen, Miralax, suppositories and stool softeners as ne • Diabetes mellitus ([facility]    • Dilated cardiomyopathy (HCC)    • [facility]     Adenocarcinoma of gastroesophageal junction    • Exposure to medical diagnostic radiation 04/2019    last radiation 5-2-19   • Hepatitis C antibody test positive Rebecca Knott is  to Ulises for 61 yrs. Mother of 1 adult son. Rebecca Knott formerly worked as a  in an office. She and Ulises are retired and live in [place], [place].  She attends Jainism weekly, teaches a bible study classes, and the ladies organizati Or      morphINE sulfate (PF) 4 MG/ML injection 4 mg 4 mg Intravenous Q2H PRN   dextrose 5 % and 0.9 % NaCl infusion  Intravenous Continuous       Medications Prior to Admission:  Loperamide HCl (IMODIUM A-D OR) Take by mouth.    ondansetron 4 MG Oral Table to port a cath needle insertion   Nystatin (NYSTOP) [id number] UNIT/GM External Powder APPLY TO AFFECTED AREAS TWICE DAILY   Glucose Blood (ACCU-CHEK SABRINA PLUS) In Vitro Strip TEST BLOOD SUGAR DAILY AS DIRECTED Dx E11.9 Type 2 non insulin using adult onset di (96.8 kg), SpO2 94 %.     GENERAL: Obese, pale, very ill-appearing  HEENT: Normocephalic; pupils equally round and reactive to light; no temporal wasting; no thyromegaly or cervical lymphadenopathy  PULM: Lungs clear to auscultation anteriorly  CV: RRR not the abdominal wall is surrounded by fat stranding/inflammation, and along the left-lateral aspect of the tube tract is a gas and fluid collection with peripheral enhancement suspicious for abscess which measures approximately 14 x 22 x 51 mm.  2.  In the v No notes of this type exist for this encounter. Physical Therapy Notes (last 72 hours) (Notes from 5/29/2019 10:49 AM through 6/1/2019 10:49 AM)    No notes of this type exist for this encounter.         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 The patient's Approx Degree of Impairment: 56.46% has been calculated based on documentation in the [facility] '6 clicks' Inpatient Daily Activity Short Form.   Re -   Eating meals?: A Little    AM-PAC Score:  Score: 15  Approx Degree of Impairment: 56.46%  Standardized Score (AM-PAC Scale): 34.69  CMS Modifier (G-Code): CK    FUNCTIONAL TRANSFER ASSESSMENT  Supine to Sit : (SBA HOB elevated)  Sit to Stand: (SBA )  B

## (undated) NOTE — LETTER
[facility], [place]  Authorization for Invasive Procedures  1.  I hereby authorize  Dr. Osmar Roberts  my physician and whomever may be designated as the doctor's assistant, to perform the following operation and/or procedure Por performed for the purposes of advancing medicine, science, and/or education, provided my identity is not revealed. If the procedure has been videotaped, the physician/surgeon will obtain the original videotape.  The hospital will not be responsible for stor My signature below affirms that prior to the time of the procedure, I have explained to the patient and/or her legal representative, the risks and benefits involved in the proposed treatment and any reasonable alternative to the proposed treatment.  I have

## (undated) NOTE — IP AVS SNAPSHOT
Patient Demographics     Address  [address] [place] [address] [address] [address] Phone  [phone] [facility])  [phone] (Mobile) *Preferred* E-mail Address  [email or web address]      Emergency Contact(s)     Name Relation Home Work Mobile    [place] Spouse Contact information:  [address] [phone]                  Your medication list      TAKE these medications       Instructions Authorizing Provider Morning Afternoon Evening As Needed   ACCU-CHAPINK SABRINA chen Commonly known as:  NOVOLIN R      Inject q 6 hrs per sliding scale:  150-200= 1u , 201-250= 2u,  251-300= 3u,  301-350= 4u,  351-400= 5u,  401-450= 6u, > 451= 6u and call MD Aretta Jackson D'Amico, DO         Insulin Syringes (Disposable) U-100 0.3 ML Mis 1 tablet (20 mg total) by Per J Tube route daily with food. Neville Sharif MD         scopolamine 1 MG/3DAYS Pt72  Commonly known as:  TRANSDERM-SCOP      Place 1 patch onto the skin every third day.    Governor KENNETH Perea         spirogiovanilact [id number] oxyCODONE-acetaminophen (PERCOCET) 5-325 MG per tab 1 tablet 08/28/19 1652 Given      [id number] spironolactone (ALDACTONE) tab 25 mg 08/28/19 0930 Given            LEFT ANTERIOR THIGH     Order ID Medication Name Action Time Action Reason Commen BUN 21 7 - 18 mg/dL [facility]   Creatinine 0.63 0.55 - 1.02 mg/dL [facility]   BUN/CREA Ratio 33.3 10.0 - 20.0 [facility]   Calcium, Total 8.2 8.5 - 10.1 mg/dL  [facility]   Calculated Osmolality 286 275 - 295 mOsm/kg — [facility]   GFR, N ATTENDING PHYSICIAN: Vero Winters MD   PATIENT ACCOUNT#:   [id number]    LOCATION:  [address] [place] [address] RECORD #:   [id number]       YOB: 1940  ADMISSION DATE:       08/19/2019    HISTORY AND PHYSICAL EXAMINATION    DATE OF EX house, he found her sitting on the floor against the wall complaining of pain in her left arm and left knee. She does not think she hit her head, but the fall was unwitnessed and she does have some confusion about the fall itself.   She was in severe pain of the liver in April of this year; Port-A-Cath placement in April of this year; jejunostomy, gastrostomy tube insertion in April of this year; pacemaker defibrillator; tonsillectomy.       ALLERGIES:  Definity causes palpitations and dizziness; Demerol cau ABDOMEN:  Soft, nontender, with normoactive bowel sounds. No guarding. No rebound. EXTREMITIES:  Left knee is quite swollen and ecchymotic, tender to touch with limited movement secondary to pain. Left upper extremity is out of the sling.   The patient 5.   Adenocarcinoma of the GE junction. Plan per Hematology Oncology following discharge. 6.   History of constipation. Restart MiraLAX once the decision is made about treatment of her humerus fracture.   Once tube feeds can be restarted, we will restart Electronically signed by Ya Miller MD on 8/23/2019  2:22 AM   Attribution Key    [email or web address] - Ya Miller MD on 8/20/2019  6:55 AM                        Consults - MD Consult Notes      Consults signed by Murriel Bamberger, MD at 8/20/2019 • Dehydration    • Diabetes ([facility]    • Diabetes mellitus ([facility]    • Dilated cardiomyopathy (HCC)    • [facility]     Adenocarcinoma of gastroesophageal junction    • Exposure to medical diagnostic radiation 04/2019    last radiation 5-2-19   • Smoking status: Former Smoker        Packs/day: 1.00        Years: 25.00        Pack years: 25        Types: Cigarettes        Quit date: 1977        Years since quittin.6      Smokeless tobacco: Never Used      Tobacco comment: Quit     A hours as needed (Anxiety). Multiple Vitamins-Iron (QC DAILY MULTIVITAMINS/IRON) Oral Tab by Tube route daily. ondansetron 4 MG Oral Tablet Dispersible Take 1 tablet (4 mg total) by mouth every 8 (eight) hours as needed for Nausea.  (Patient taking diffe Scopolamine 1.5mg TD patch 1mg/3days Place 1 patch onto the skin every third day.    lidocaine-prilocaine 2.5-2.5 % External Cream Apply to site 1 hour prior to port a cath needle insertion   Glucose Blood (ACCU-CHEK SABRINA PLUS) In Vitro Strip TEST BLOOD DONIS WBC 4.5 08/20/2019    HGB 8.5 (L) 08/20/2019    HCT 26.1 (L) 08/20/2019    .0 08/20/2019    CREATSERUM 0.81 08/20/2019    BUN 19 (H) 08/20/2019     08/20/2019    K 3.9 08/20/2019     08/20/2019    CO2 31.0 08/20/2019     (H) 08/2 such as death, MI, CVA, DVT/PE, infection, bleeding, failure of fixation or union, nerve/artery injury and stiffness. No guarantees given.  Also discussed likelihood of persistent pain and post trauma OA.[RH.1]     Intractable pain  Pain meds per medicine Closed fracture of proximal end of left humerus, unspecified fracture morphology, initial encounter    Intractable pain    Contusion of left knee, initial encounter    Age-related osteoporosis with current pathol fracture of left shoulder    Acute on chr brace; Energy conservation;Patient education; Family education;Gait training;Strengthening;Stoop training;Stair training;Transfer training;Balance training;Range of motion    SUBJECTIVE  \"are you going to put me in that lift so I fly over to the chair? \" Lower Extremity Ankle pumps  Hip AB/AD  LAQ     Position Sitting and Supine       Patient End of Session: Up in chair;Needs met;Call light within reach;RN aware of session/findings; Family present(caregiver and spouse present)    CURRENT GOALS   Goals to be morphology, initial encounter    Intractable pain    Contusion of left knee, initial encounter    Age-related osteoporosis with current pathol fracture of left shoulder    Acute on chronic systolic congestive heart failure (HCC)      PHYSICAL THERAPY ASSES training;Strengthening;Stoop training;Stair training;Transfer training;Balance training;Range of motion    SUBJECTIVE  \"are you going to put me in that lift so I fly over to the chair? \"    OBJECTIVE  Precautions: (G tube; sling L arm)    WEIGHT BEARING R Patient End of Session: Up in chair;Needs met;Call light within reach;RN aware of session/findings; Family present(caregiver and spouse present)    CURRENT GOALS   Goals to be met by: 9/9/19  Patient Goal Patient's self-stated goal is: return to PLOF   Go humerus fracture performed by . Patient also falling on L knee (-) acute findings.   Patient's current functional deficits include impaired LUE ROM and strength, impaired tolerance toward activity due to anticipation of increased pain, balance, i training;Strengthening;Stoop training;Stair training;Transfer training;Balance training;Range of motion  Rehab Potential : Fair  Frequency (Obs): 5x/week(5-7x/week)[TL.2]       PHYSICAL THERAPY MEDICAL/SOCIAL HISTORY     Problem List[TL. 1]  Principal Probl right sided cataract surgery   • COLONOSCOPY  2007   • FLEXIBLE SIGMOIDOSCOPY N/A 8/5/2019    Performed by Suha Valdivia MD at [address] [place] [address] Road Left 8/23/2019    Performed by Helen Fields, Upper extremity ROM and strength are impaired LUE: refer to OT note    Lower extremity ROM is within functional limits    Lower extremity strength is within functional limits except for the following:    Left Hip flexion  4-/5  Left Knee extension  3+/5 Patient End of Session: In bed;Needs met;Call light within reach;RN aware of session/findings(caregiver at bedside)[TL.2]    CURRENT GOALS    Goals to be met by: 9/9/19  Patient Goal Patient's self-stated goal is: return to PLOF   Goal #1 Patient is able t Patient is NWB through LUE and to use a sling. In this OT evaluation patient presents with the following impairments: pain, decreased activity tolerance, weakness. These deficits manifest functionally while performing ADLs and ambulation.   The patient is Age-related osteoporosis with current pathol fracture of left shoulder[AO.2]      Past Medical History[AO.1]  Past Medical History:   Diagnosis Date   • Anesthesia complication     drug interactions, see allergy list   • Atrial fibrillation ([facility]     on • JEJUNOSTOMY/GASTROSTOMY TUBE INSERTION N/A 4/25/2019    Performed by Lili Cerna MD at [address] [place] [address] OR   • PACEMAKER/DEFIBRILLATOR  08/2015    [employer] pacemaker   • SIGMOIDOSCOPY, FLEXIBLE; WITH ABLATION OF TUMOR(S), POLYP(S), OR OTHER LESIONS(S) NOT NITIN Standardized Score (AM-PAC Scale): 32.03  CMS Modifier (G-Code): CL[AO.2]    FUNCTIONAL TRANSFER ASSESSMENT[AO.1]  Supine to Sit : Moderate assistance(x2)  Sit to Stand: Not tested[AO.2]  Toilet Transfer: NT  Shower Transfer: NT  Chair Transfer: NT  Car Tr